# Patient Record
Sex: MALE | Race: WHITE | NOT HISPANIC OR LATINO | Employment: OTHER | URBAN - METROPOLITAN AREA
[De-identification: names, ages, dates, MRNs, and addresses within clinical notes are randomized per-mention and may not be internally consistent; named-entity substitution may affect disease eponyms.]

---

## 2017-07-24 PROBLEM — I10 ESSENTIAL HYPERTENSION: Status: ACTIVE | Noted: 2017-07-24

## 2017-07-24 PROBLEM — E78.5 HYPERLIPIDEMIA: Status: ACTIVE | Noted: 2017-07-24

## 2017-10-24 PROBLEM — E66.9 OBESITY (BMI 30-39.9): Status: ACTIVE | Noted: 2017-10-24

## 2017-10-24 PROBLEM — F41.9 ANXIETY AND DEPRESSION: Status: ACTIVE | Noted: 2017-10-24

## 2017-10-24 PROBLEM — F32.A ANXIETY AND DEPRESSION: Status: ACTIVE | Noted: 2017-10-24

## 2017-12-18 ENCOUNTER — RESOLUTE PROFESSIONAL BILLING HOSPITAL PROF FEE (OUTPATIENT)
Dept: HOSPITALIST | Facility: MEDICAL CENTER | Age: 62
End: 2017-12-18

## 2017-12-18 ENCOUNTER — APPOINTMENT (OUTPATIENT)
Dept: RADIOLOGY | Facility: MEDICAL CENTER | Age: 62
DRG: 175 | End: 2017-12-18
Attending: EMERGENCY MEDICINE

## 2017-12-18 ENCOUNTER — HOSPITAL ENCOUNTER (INPATIENT)
Facility: MEDICAL CENTER | Age: 62
LOS: 4 days | DRG: 175 | End: 2017-12-22
Attending: EMERGENCY MEDICINE | Admitting: HOSPITALIST

## 2017-12-18 DIAGNOSIS — R55 SYNCOPE, UNSPECIFIED SYNCOPE TYPE: ICD-10-CM

## 2017-12-18 DIAGNOSIS — I26.92 SADDLE EMBOLUS OF PULMONARY ARTERY WITHOUT ACUTE COR PULMONALE, UNSPECIFIED CHRONICITY (HCC): ICD-10-CM

## 2017-12-18 DIAGNOSIS — R00.0 TACHYCARDIA: ICD-10-CM

## 2017-12-18 DIAGNOSIS — I95.9 HYPOTENSION, UNSPECIFIED HYPOTENSION TYPE: ICD-10-CM

## 2017-12-18 PROBLEM — I26.99 PULMONARY EMBOLISM (HCC): Status: ACTIVE | Noted: 2017-12-18

## 2017-12-18 LAB
ALBUMIN SERPL BCP-MCNC: 3.7 G/DL (ref 3.2–4.9)
ALBUMIN/GLOB SERPL: 1.1 G/DL
ALP SERPL-CCNC: 93 U/L (ref 30–99)
ALT SERPL-CCNC: 59 U/L (ref 2–50)
ANION GAP SERPL CALC-SCNC: 12 MMOL/L (ref 0–11.9)
ANISOCYTOSIS BLD QL SMEAR: ABNORMAL
AST SERPL-CCNC: 50 U/L (ref 12–45)
BASOPHILS # BLD AUTO: 1 % (ref 0–1.8)
BASOPHILS # BLD: 0.14 K/UL (ref 0–0.12)
BILIRUB SERPL-MCNC: 0.7 MG/DL (ref 0.1–1.5)
BLOOD CULTURE HOLD CXBCH: NORMAL
BNP SERPL-MCNC: 383 PG/ML (ref 0–100)
BUN SERPL-MCNC: 27 MG/DL (ref 8–22)
CALCIUM SERPL-MCNC: 8.6 MG/DL (ref 8.5–10.5)
CHLORIDE SERPL-SCNC: 106 MMOL/L (ref 96–112)
CO2 SERPL-SCNC: 18 MMOL/L (ref 20–33)
CREAT SERPL-MCNC: 1.49 MG/DL (ref 0.5–1.4)
DEPRECATED D DIMER PPP IA-ACNC: ABNORMAL NG/ML(D-DU)
EKG IMPRESSION: NORMAL
EOSINOPHIL # BLD AUTO: 0.29 K/UL (ref 0–0.51)
EOSINOPHIL NFR BLD: 2 % (ref 0–6.9)
ERYTHROCYTE [DISTWIDTH] IN BLOOD BY AUTOMATED COUNT: 46.4 FL (ref 35.9–50)
GFR SERPL CREATININE-BSD FRML MDRD: 47 ML/MIN/1.73 M 2
GLOBULIN SER CALC-MCNC: 3.3 G/DL (ref 1.9–3.5)
GLUCOSE SERPL-MCNC: 103 MG/DL (ref 65–99)
HCT VFR BLD AUTO: 45.7 % (ref 42–52)
HGB BLD-MCNC: 15.9 G/DL (ref 14–18)
LYMPHOCYTES # BLD AUTO: 1.56 K/UL (ref 1–4.8)
LYMPHOCYTES NFR BLD: 10.9 % (ref 22–41)
MACROCYTES BLD QL SMEAR: ABNORMAL
MANUAL DIFF BLD: NORMAL
MCH RBC QN AUTO: 34 PG (ref 27–33)
MCHC RBC AUTO-ENTMCNC: 34.8 G/DL (ref 33.7–35.3)
MCV RBC AUTO: 97.6 FL (ref 81.4–97.8)
MONOCYTES # BLD AUTO: 1.42 K/UL (ref 0–0.85)
MONOCYTES NFR BLD AUTO: 9.9 % (ref 0–13.4)
MORPHOLOGY BLD-IMP: NORMAL
NEUTROPHILS # BLD AUTO: 10.9 K/UL (ref 1.82–7.42)
NEUTROPHILS NFR BLD: 76.2 % (ref 44–72)
NRBC # BLD AUTO: 0 K/UL
NRBC BLD-RTO: 0 /100 WBC
PLATELET # BLD AUTO: 130 K/UL (ref 164–446)
PLATELET BLD QL SMEAR: NORMAL
PMV BLD AUTO: 11.8 FL (ref 9–12.9)
POTASSIUM SERPL-SCNC: 3.2 MMOL/L (ref 3.6–5.5)
PROT SERPL-MCNC: 7 G/DL (ref 6–8.2)
RBC # BLD AUTO: 4.68 M/UL (ref 4.7–6.1)
RBC BLD AUTO: PRESENT
SODIUM SERPL-SCNC: 136 MMOL/L (ref 135–145)
TROPONIN I SERPL-MCNC: 0.27 NG/ML (ref 0–0.04)
TROPONIN I SERPL-MCNC: 0.38 NG/ML (ref 0–0.04)
WBC # BLD AUTO: 14.3 K/UL (ref 4.8–10.8)

## 2017-12-18 PROCEDURE — 80053 COMPREHEN METABOLIC PANEL: CPT

## 2017-12-18 PROCEDURE — 99291 CRITICAL CARE FIRST HOUR: CPT

## 2017-12-18 PROCEDURE — 85007 BL SMEAR W/DIFF WBC COUNT: CPT

## 2017-12-18 PROCEDURE — A9270 NON-COVERED ITEM OR SERVICE: HCPCS | Performed by: EMERGENCY MEDICINE

## 2017-12-18 PROCEDURE — 83880 ASSAY OF NATRIURETIC PEPTIDE: CPT

## 2017-12-18 PROCEDURE — 36415 COLL VENOUS BLD VENIPUNCTURE: CPT

## 2017-12-18 PROCEDURE — 93005 ELECTROCARDIOGRAM TRACING: CPT | Performed by: EMERGENCY MEDICINE

## 2017-12-18 PROCEDURE — 51798 US URINE CAPACITY MEASURE: CPT

## 2017-12-18 PROCEDURE — 700102 HCHG RX REV CODE 250 W/ 637 OVERRIDE(OP): Performed by: EMERGENCY MEDICINE

## 2017-12-18 PROCEDURE — 85379 FIBRIN DEGRADATION QUANT: CPT

## 2017-12-18 PROCEDURE — 84484 ASSAY OF TROPONIN QUANT: CPT | Mod: 91

## 2017-12-18 PROCEDURE — 93005 ELECTROCARDIOGRAM TRACING: CPT

## 2017-12-18 PROCEDURE — 700117 HCHG RX CONTRAST REV CODE 255: Performed by: EMERGENCY MEDICINE

## 2017-12-18 PROCEDURE — 96361 HYDRATE IV INFUSION ADD-ON: CPT

## 2017-12-18 PROCEDURE — 700105 HCHG RX REV CODE 258: Performed by: EMERGENCY MEDICINE

## 2017-12-18 PROCEDURE — 71275 CT ANGIOGRAPHY CHEST: CPT

## 2017-12-18 PROCEDURE — 85027 COMPLETE CBC AUTOMATED: CPT

## 2017-12-18 PROCEDURE — 71010 DX-CHEST-PORTABLE (1 VIEW): CPT

## 2017-12-18 PROCEDURE — 770022 HCHG ROOM/CARE - ICU (200)

## 2017-12-18 PROCEDURE — 99291 CRITICAL CARE FIRST HOUR: CPT | Performed by: HOSPITALIST

## 2017-12-18 RX ORDER — VITAMIN E 268 MG
400 CAPSULE ORAL DAILY
COMMUNITY
End: 2018-04-11

## 2017-12-18 RX ORDER — SODIUM CHLORIDE 9 MG/ML
INJECTION, SOLUTION INTRAVENOUS ONCE
Status: COMPLETED | OUTPATIENT
Start: 2017-12-19 | End: 2017-12-19

## 2017-12-18 RX ORDER — SODIUM CHLORIDE 9 MG/ML
1000 INJECTION, SOLUTION INTRAVENOUS ONCE
Status: COMPLETED | OUTPATIENT
Start: 2017-12-18 | End: 2017-12-18

## 2017-12-18 RX ORDER — POTASSIUM CHLORIDE 20 MEQ/1
20 TABLET, EXTENDED RELEASE ORAL ONCE
Status: COMPLETED | OUTPATIENT
Start: 2017-12-18 | End: 2017-12-18

## 2017-12-18 RX ORDER — FLUTICASONE PROPIONATE 50 MCG
1 SPRAY, SUSPENSION (ML) NASAL
COMMUNITY
End: 2021-11-19

## 2017-12-18 RX ADMIN — IOHEXOL 75 ML: 350 INJECTION, SOLUTION INTRAVENOUS at 23:21

## 2017-12-18 RX ADMIN — SODIUM CHLORIDE 1000 ML: 9 INJECTION, SOLUTION INTRAVENOUS at 20:43

## 2017-12-18 RX ADMIN — POTASSIUM CHLORIDE 20 MEQ: 1500 TABLET, EXTENDED RELEASE ORAL at 21:27

## 2017-12-18 ASSESSMENT — PAIN SCALES - GENERAL: PAINLEVEL_OUTOF10: 0

## 2017-12-19 PROBLEM — I26.99 PULMONARY EMBOLISM (HCC): Status: ACTIVE | Noted: 2017-12-19

## 2017-12-19 PROBLEM — I26.99 PULMONARY EMBOLISM (HCC): Status: RESOLVED | Noted: 2017-12-19 | Resolved: 2017-12-19

## 2017-12-19 PROBLEM — R65.10 SIRS (SYSTEMIC INFLAMMATORY RESPONSE SYNDROME) (HCC): Status: ACTIVE | Noted: 2017-12-19

## 2017-12-19 PROBLEM — R79.89 ELEVATED TROPONIN: Status: ACTIVE | Noted: 2017-12-19

## 2017-12-19 PROBLEM — I51.7 RIGHT HEART ENLARGEMENT: Status: ACTIVE | Noted: 2017-12-19

## 2017-12-19 PROBLEM — N17.9 AKI (ACUTE KIDNEY INJURY) (HCC): Status: ACTIVE | Noted: 2017-12-19

## 2017-12-19 PROBLEM — I50.9 ACUTE HEART FAILURE (HCC): Status: ACTIVE | Noted: 2017-12-19

## 2017-12-19 PROBLEM — J96.00 ACUTE RESPIRATORY FAILURE (HCC): Status: ACTIVE | Noted: 2017-12-19

## 2017-12-19 PROBLEM — E87.6 HYPOKALEMIA: Status: ACTIVE | Noted: 2017-12-19

## 2017-12-19 PROBLEM — I26.92 SADDLE EMBOLUS OF PULMONARY ARTERY (HCC): Status: ACTIVE | Noted: 2017-12-18

## 2017-12-19 LAB
ABO GROUP BLD: NORMAL
ABO GROUP BLD: NORMAL
ALBUMIN SERPL BCP-MCNC: 3.1 G/DL (ref 3.2–4.9)
ALBUMIN/GLOB SERPL: 1.1 G/DL
ALP SERPL-CCNC: 76 U/L (ref 30–99)
ALT SERPL-CCNC: 30 U/L (ref 2–50)
AMPHET UR QL SCN: NEGATIVE
ANION GAP SERPL CALC-SCNC: 11 MMOL/L (ref 0–11.9)
APPEARANCE UR: CLEAR
APTT PPP: 40.5 SEC (ref 24.7–36)
APTT PPP: 45.5 SEC (ref 24.7–36)
APTT PPP: 53 SEC (ref 24.7–36)
APTT PPP: 68.2 SEC (ref 24.7–36)
AST SERPL-CCNC: 20 U/L (ref 12–45)
BACTERIA #/AREA URNS HPF: NEGATIVE /HPF
BARBITURATES UR QL SCN: NEGATIVE
BASOPHILS # BLD AUTO: 1.2 % (ref 0–1.8)
BASOPHILS # BLD: 0.14 K/UL (ref 0–0.12)
BENZODIAZ UR QL SCN: NEGATIVE
BILIRUB SERPL-MCNC: 0.6 MG/DL (ref 0.1–1.5)
BILIRUB UR QL STRIP.AUTO: NEGATIVE
BLD GP AB SCN SERPL QL: NORMAL
BUN SERPL-MCNC: 22 MG/DL (ref 8–22)
BZE UR QL SCN: NEGATIVE
CALCIUM SERPL-MCNC: 8 MG/DL (ref 8.5–10.5)
CANNABINOIDS UR QL SCN: POSITIVE
CHLORIDE SERPL-SCNC: 108 MMOL/L (ref 96–112)
CO2 SERPL-SCNC: 20 MMOL/L (ref 20–33)
COLOR UR: YELLOW
CREAT SERPL-MCNC: 1.33 MG/DL (ref 0.5–1.4)
CULTURE IF INDICATED INDCX: NO UA CULTURE
EOSINOPHIL # BLD AUTO: 0.75 K/UL (ref 0–0.51)
EOSINOPHIL NFR BLD: 6.4 % (ref 0–6.9)
EPI CELLS #/AREA URNS HPF: ABNORMAL /HPF
ERYTHROCYTE [DISTWIDTH] IN BLOOD BY AUTOMATED COUNT: 49.1 FL (ref 35.9–50)
GFR SERPL CREATININE-BSD FRML MDRD: 54 ML/MIN/1.73 M 2
GLOBULIN SER CALC-MCNC: 2.8 G/DL (ref 1.9–3.5)
GLUCOSE BLD-MCNC: 103 MG/DL (ref 65–99)
GLUCOSE BLD-MCNC: 104 MG/DL (ref 65–99)
GLUCOSE BLD-MCNC: 115 MG/DL (ref 65–99)
GLUCOSE SERPL-MCNC: 107 MG/DL (ref 65–99)
GLUCOSE UR STRIP.AUTO-MCNC: NEGATIVE MG/DL
HCT VFR BLD AUTO: 39.9 % (ref 42–52)
HGB BLD-MCNC: 13.7 G/DL (ref 14–18)
HYALINE CASTS #/AREA URNS LPF: ABNORMAL /LPF
IMM GRANULOCYTES # BLD AUTO: 0.13 K/UL (ref 0–0.11)
IMM GRANULOCYTES NFR BLD AUTO: 1.1 % (ref 0–0.9)
INR PPP: 1.41 (ref 0.87–1.13)
KETONES UR STRIP.AUTO-MCNC: NEGATIVE MG/DL
LEUKOCYTE ESTERASE UR QL STRIP.AUTO: NEGATIVE
LV EJECT FRACT  99904: 75
LV EJECT FRACT MOD 2C 99903: 81.64
LV EJECT FRACT MOD 4C 99902: 76.04
LV EJECT FRACT MOD BP 99901: 74.01
LYMPHOCYTES # BLD AUTO: 2.27 K/UL (ref 1–4.8)
LYMPHOCYTES NFR BLD: 19.4 % (ref 22–41)
MAGNESIUM SERPL-MCNC: 1.8 MG/DL (ref 1.5–2.5)
MCH RBC QN AUTO: 34.7 PG (ref 27–33)
MCHC RBC AUTO-ENTMCNC: 34.3 G/DL (ref 33.7–35.3)
MCV RBC AUTO: 101 FL (ref 81.4–97.8)
METHADONE UR QL SCN: NEGATIVE
MICRO URNS: ABNORMAL
MONOCYTES # BLD AUTO: 1.21 K/UL (ref 0–0.85)
MONOCYTES NFR BLD AUTO: 10.3 % (ref 0–13.4)
NEUTROPHILS # BLD AUTO: 7.2 K/UL (ref 1.82–7.42)
NEUTROPHILS NFR BLD: 61.6 % (ref 44–72)
NITRITE UR QL STRIP.AUTO: NEGATIVE
NRBC # BLD AUTO: 0 K/UL
NRBC BLD-RTO: 0 /100 WBC
OPIATES UR QL SCN: NEGATIVE
OXYCODONE UR QL SCN: NEGATIVE
PCP UR QL SCN: NEGATIVE
PH UR STRIP.AUTO: 5 [PH]
PLATELET # BLD AUTO: 118 K/UL (ref 164–446)
PMV BLD AUTO: 11.7 FL (ref 9–12.9)
POTASSIUM SERPL-SCNC: 3.5 MMOL/L (ref 3.6–5.5)
PROPOXYPH UR QL SCN: NEGATIVE
PROT SERPL-MCNC: 5.9 G/DL (ref 6–8.2)
PROT UR QL STRIP: 30 MG/DL
PROTHROMBIN TIME: 16.9 SEC (ref 12–14.6)
RBC # BLD AUTO: 3.95 M/UL (ref 4.7–6.1)
RBC # URNS HPF: ABNORMAL /HPF
RBC UR QL AUTO: NEGATIVE
RH BLD: NORMAL
SODIUM SERPL-SCNC: 139 MMOL/L (ref 135–145)
SP GR UR REFRACTOMETRY: >1.045
TROPONIN I SERPL-MCNC: 0.34 NG/ML (ref 0–0.04)
TROPONIN I SERPL-MCNC: 0.34 NG/ML (ref 0–0.04)
TROPONIN I SERPL-MCNC: 0.57 NG/ML (ref 0–0.04)
UROBILINOGEN UR STRIP.AUTO-MCNC: 1 MG/DL
WBC # BLD AUTO: 11.7 K/UL (ref 4.8–10.8)
WBC #/AREA URNS HPF: ABNORMAL /HPF

## 2017-12-19 PROCEDURE — 85025 COMPLETE CBC W/AUTO DIFF WBC: CPT

## 2017-12-19 PROCEDURE — 96376 TX/PRO/DX INJ SAME DRUG ADON: CPT

## 2017-12-19 PROCEDURE — 85730 THROMBOPLASTIN TIME PARTIAL: CPT

## 2017-12-19 PROCEDURE — 86850 RBC ANTIBODY SCREEN: CPT

## 2017-12-19 PROCEDURE — 700111 HCHG RX REV CODE 636 W/ 250 OVERRIDE (IP): Performed by: INTERNAL MEDICINE

## 2017-12-19 PROCEDURE — 93306 TTE W/DOPPLER COMPLETE: CPT

## 2017-12-19 PROCEDURE — 85610 PROTHROMBIN TIME: CPT

## 2017-12-19 PROCEDURE — 81001 URINALYSIS AUTO W/SCOPE: CPT

## 2017-12-19 PROCEDURE — 770022 HCHG ROOM/CARE - ICU (200)

## 2017-12-19 PROCEDURE — 700105 HCHG RX REV CODE 258: Performed by: INTERNAL MEDICINE

## 2017-12-19 PROCEDURE — 82962 GLUCOSE BLOOD TEST: CPT | Mod: 91

## 2017-12-19 PROCEDURE — 84484 ASSAY OF TROPONIN QUANT: CPT

## 2017-12-19 PROCEDURE — 86900 BLOOD TYPING SEROLOGIC ABO: CPT

## 2017-12-19 PROCEDURE — 36415 COLL VENOUS BLD VENIPUNCTURE: CPT

## 2017-12-19 PROCEDURE — 83735 ASSAY OF MAGNESIUM: CPT

## 2017-12-19 PROCEDURE — 86901 BLOOD TYPING SEROLOGIC RH(D): CPT

## 2017-12-19 PROCEDURE — 80053 COMPREHEN METABOLIC PANEL: CPT

## 2017-12-19 PROCEDURE — 96375 TX/PRO/DX INJ NEW DRUG ADDON: CPT

## 2017-12-19 PROCEDURE — 700102 HCHG RX REV CODE 250 W/ 637 OVERRIDE(OP): Performed by: HOSPITALIST

## 2017-12-19 PROCEDURE — 93306 TTE W/DOPPLER COMPLETE: CPT | Mod: 26 | Performed by: INTERNAL MEDICINE

## 2017-12-19 PROCEDURE — 96366 THER/PROPH/DIAG IV INF ADDON: CPT

## 2017-12-19 PROCEDURE — 3E03317 INTRODUCTION OF OTHER THROMBOLYTIC INTO PERIPHERAL VEIN, PERCUTANEOUS APPROACH: ICD-10-PCS | Performed by: INTERNAL MEDICINE

## 2017-12-19 PROCEDURE — 80307 DRUG TEST PRSMV CHEM ANLYZR: CPT

## 2017-12-19 PROCEDURE — A9270 NON-COVERED ITEM OR SERVICE: HCPCS | Performed by: HOSPITALIST

## 2017-12-19 PROCEDURE — 96365 THER/PROPH/DIAG IV INF INIT: CPT

## 2017-12-19 PROCEDURE — 37212 THROMBOLYTIC VENOUS THERAPY: CPT

## 2017-12-19 PROCEDURE — 99233 SBSQ HOSP IP/OBS HIGH 50: CPT | Performed by: HOSPITALIST

## 2017-12-19 RX ORDER — POLYETHYLENE GLYCOL 3350 17 G/17G
1 POWDER, FOR SOLUTION ORAL
Status: DISCONTINUED | OUTPATIENT
Start: 2017-12-18 | End: 2017-12-22 | Stop reason: HOSPADM

## 2017-12-19 RX ORDER — AMOXICILLIN 250 MG
2 CAPSULE ORAL 2 TIMES DAILY
Status: DISCONTINUED | OUTPATIENT
Start: 2017-12-19 | End: 2017-12-22 | Stop reason: HOSPADM

## 2017-12-19 RX ORDER — BISACODYL 10 MG
10 SUPPOSITORY, RECTAL RECTAL
Status: DISCONTINUED | OUTPATIENT
Start: 2017-12-18 | End: 2017-12-22 | Stop reason: HOSPADM

## 2017-12-19 RX ORDER — HEPARIN SODIUM 1000 [USP'U]/ML
4000 INJECTION, SOLUTION INTRAVENOUS; SUBCUTANEOUS PRN
Status: DISCONTINUED | OUTPATIENT
Start: 2017-12-19 | End: 2017-12-20

## 2017-12-19 RX ORDER — SERTRALINE HYDROCHLORIDE 100 MG/1
200 TABLET, FILM COATED ORAL DAILY
Status: DISCONTINUED | OUTPATIENT
Start: 2017-12-19 | End: 2017-12-19

## 2017-12-19 RX ORDER — POTASSIUM CHLORIDE 20 MEQ/1
40 TABLET, EXTENDED RELEASE ORAL ONCE
Status: COMPLETED | OUTPATIENT
Start: 2017-12-19 | End: 2017-12-19

## 2017-12-19 RX ORDER — SIMVASTATIN 40 MG
80 TABLET ORAL DAILY
Status: DISCONTINUED | OUTPATIENT
Start: 2017-12-19 | End: 2017-12-22 | Stop reason: HOSPADM

## 2017-12-19 RX ORDER — DEXTROSE MONOHYDRATE 25 G/50ML
25 INJECTION, SOLUTION INTRAVENOUS
Status: DISCONTINUED | OUTPATIENT
Start: 2017-12-19 | End: 2017-12-19

## 2017-12-19 RX ADMIN — HEPARIN SODIUM 1050 UNITS/HR: 5000 INJECTION, SOLUTION INTRAVENOUS at 15:59

## 2017-12-19 RX ADMIN — HEPARIN SODIUM 4000 UNITS: 1000 INJECTION, SOLUTION INTRAVENOUS; SUBCUTANEOUS at 02:21

## 2017-12-19 RX ADMIN — HEPARIN SODIUM 1050 UNITS: 5000 INJECTION, SOLUTION INTRAVENOUS at 08:59

## 2017-12-19 RX ADMIN — SODIUM CHLORIDE 1000 ML: 9 INJECTION, SOLUTION INTRAVENOUS at 02:15

## 2017-12-19 RX ADMIN — SIMVASTATIN 80 MG: 40 TABLET, FILM COATED ORAL at 20:13

## 2017-12-19 RX ADMIN — ALTEPLASE 40 MG: KIT at 00:27

## 2017-12-19 RX ADMIN — HEPARIN SODIUM 4000 UNITS: 1000 INJECTION, SOLUTION INTRAVENOUS; SUBCUTANEOUS at 09:00

## 2017-12-19 RX ADMIN — HEPARIN SODIUM 25000 UNITS: 5000 INJECTION, SOLUTION INTRAVENOUS at 02:31

## 2017-12-19 RX ADMIN — HEPARIN SODIUM 4000 UNITS: 1000 INJECTION, SOLUTION INTRAVENOUS; SUBCUTANEOUS at 16:55

## 2017-12-19 RX ADMIN — SIMVASTATIN 80 MG: 40 TABLET, FILM COATED ORAL at 03:39

## 2017-12-19 RX ADMIN — ASPIRIN 81 MG: 81 TABLET, COATED ORAL at 14:00

## 2017-12-19 RX ADMIN — HEPARIN SODIUM 1150 UNITS/HR: 5000 INJECTION, SOLUTION INTRAVENOUS at 16:58

## 2017-12-19 RX ADMIN — POTASSIUM CHLORIDE 40 MEQ: 1500 TABLET, EXTENDED RELEASE ORAL at 03:38

## 2017-12-19 RX ADMIN — ALTEPLASE 10 MG: KIT at 00:27

## 2017-12-19 ASSESSMENT — PAIN SCALES - GENERAL
PAINLEVEL_OUTOF10: 0

## 2017-12-19 ASSESSMENT — ENCOUNTER SYMPTOMS
ORTHOPNEA: 0
FOCAL WEAKNESS: 0
SHORTNESS OF BREATH: 0
WEAKNESS: 0
FALLS: 0
HEARTBURN: 0
DIAPHORESIS: 0
BLOOD IN STOOL: 0
LOSS OF CONSCIOUSNESS: 1
DOUBLE VISION: 0
WEIGHT LOSS: 0
HEADACHES: 0
DEPRESSION: 0
EYES NEGATIVE: 1
PALPITATIONS: 0
SENSORY CHANGE: 0
DIARRHEA: 0
ABDOMINAL PAIN: 0
VOMITING: 0
CONSTIPATION: 0
FLANK PAIN: 0
PND: 0
BLURRED VISION: 0
WHEEZING: 0
DIZZINESS: 1
CHILLS: 0
MYALGIAS: 0
HEMOPTYSIS: 0
EYE DISCHARGE: 0
SHORTNESS OF BREATH: 1
NAUSEA: 0
INSOMNIA: 1
FEVER: 0
BRUISES/BLEEDS EASILY: 0
NERVOUS/ANXIOUS: 0
COUGH: 0
MUSCULOSKELETAL NEGATIVE: 1

## 2017-12-19 ASSESSMENT — PATIENT HEALTH QUESTIONNAIRE - PHQ9
7. TROUBLE CONCENTRATING ON THINGS, SUCH AS READING THE NEWSPAPER OR WATCHING TELEVISION: MORE THAN HALF THE DAYS
SUM OF ALL RESPONSES TO PHQ9 QUESTIONS 1 AND 2: 6
5. POOR APPETITE OR OVEREATING: NEARLY EVERY DAY
3. TROUBLE FALLING OR STAYING ASLEEP OR SLEEPING TOO MUCH: NEARLY EVERY DAY
8. MOVING OR SPEAKING SO SLOWLY THAT OTHER PEOPLE COULD HAVE NOTICED. OR THE OPPOSITE, BEING SO FIGETY OR RESTLESS THAT YOU HAVE BEEN MOVING AROUND A LOT MORE THAN USUAL: NOT AT ALL
6. FEELING BAD ABOUT YOURSELF - OR THAT YOU ARE A FAILURE OR HAVE LET YOURSELF OR YOUR FAMILY DOWN: NEARLY EVERY DAY
9. THOUGHTS THAT YOU WOULD BE BETTER OFF DEAD, OR OF HURTING YOURSELF: SEVERAL DAYS
2. FEELING DOWN, DEPRESSED, IRRITABLE, OR HOPELESS: NEARLY EVERY DAY
SUM OF ALL RESPONSES TO PHQ QUESTIONS 1-9: 21
1. LITTLE INTEREST OR PLEASURE IN DOING THINGS: NEARLY EVERY DAY
4. FEELING TIRED OR HAVING LITTLE ENERGY: NEARLY EVERY DAY

## 2017-12-19 ASSESSMENT — COPD QUESTIONNAIRES
DURING THE PAST 4 WEEKS HOW MUCH DID YOU FEEL SHORT OF BREATH: NONE/LITTLE OF THE TIME
HAVE YOU SMOKED AT LEAST 100 CIGARETTES IN YOUR ENTIRE LIFE: YES
COPD SCREENING SCORE: 4
DO YOU EVER COUGH UP ANY MUCUS OR PHLEGM?: NO/ONLY WITH OCCASIONAL COLDS OR INFECTIONS

## 2017-12-19 ASSESSMENT — LIFESTYLE VARIABLES
DO YOU DRINK ALCOHOL: NO
EVER_SMOKED: YES
EVER_SMOKED: YES

## 2017-12-19 NOTE — H&P
" Hospital Medicine History and Physical    Date of Service  12/18/2017    Chief Complaint  Chief Complaint   Patient presents with   • Syncope     increasing over past 2 weeks.  reports ongoing since September.  reports passed out this am and woke in \"my own feces\".  reports bruising but denies any specific injury.       History of Presenting Illness  62 y.o. male who presented 12/18/2017 with Syncope. Patient has experienced 2 syncopal episodes today during ambulation. 1st time for this morning was waking up to walk to the bathroom. He fall onto cart for denies any head trauma. 2nd episode of syncope earlier this evening also occurred with standing from a seated position. Symptoms are exacerbated by standing and alleviated by lying flat. Patient has also been having intermittent dizziness lightheadedness past 4 months. He is symptomatic pressure syncopal episodes and has sensation of lightheadedness. He does not have any headachesand nausea vomiting. He does have generalized fatigue as well as shortness of breath that has been persistent for the last several weeks worse over the last 24 hours he has not had any associated chest pain. He denies any history of thrombus or cancer. He denies any long distance travel.     Primary Care Physician  Deb Ponce P.A.-C.    Consultants  [facility]    Code Status  Full Code     Review of Systems  Review of Systems   Constitutional: Positive for malaise/fatigue. Negative for chills and fever.   HENT: Negative for congestion and hearing loss.    Eyes: Negative for blurred vision, double vision and discharge.   Respiratory: Positive for shortness of breath. Negative for cough.    Cardiovascular: Negative for chest pain and leg swelling.   Gastrointestinal: Negative for abdominal pain, constipation, diarrhea, heartburn, nausea and vomiting.   Genitourinary: Negative for dysuria and flank pain.   Musculoskeletal: Negative for falls and myalgias.   Skin: Negative for rash. "   Neurological: Positive for dizziness and loss of consciousness. Negative for sensory change and weakness.   Endo/Heme/Allergies: Does not bruise/bleed easily.   Psychiatric/Behavioral: Negative for depression. The patient is not nervous/anxious.         Past Medical History  Past Medical History:   Diagnosis Date   • Anxiety    • Depression    • Hyperlipidemia    • Hypertension        Surgical History  Past Surgical History:   Procedure Laterality Date   • HERNIA REPAIR         • OTHER      Back sx-L4,L5-        Medications  No current facility-administered medications on file prior to encounter.      Current Outpatient Prescriptions on File Prior to Encounter   Medication Sig Dispense Refill   • losartan (COZAAR) 50 MG Tab Take 1 Tab by mouth every day. 90 Tab 0   • simvastatin (ZOCOR) 80 MG tablet Take 1 Tab by mouth every day. 90 Tab 0   • VITAMIN E PO Take  by mouth.     • Cholecalciferol (VITAMIN D PO) Take  by mouth.     • sertraline (ZOLOFT) 100 MG Tab Take 2 Tabs by mouth every day. 30 Tab 0   • fluticasone (FLONASE) 50 MCG/ACT nasal spray Spray 1 Spray in nose every day.     • Omega-3 Fatty Acids (FISH OIL) 1000 MG Cap capsule Take 1,000 mg by mouth 3 times a day, with meals.     • aspirin 81 MG tablet Take 3 Tabs by mouth every day. 90 Tab 0       Family History  Family History   Problem Relation Age of Onset   • Cancer Mother    • Stroke Mother    • Heart Disease Father        Social History  Social History   Substance Use Topics   • Smoking status: Former Smoker   • Smokeless tobacco: Never Used   • Alcohol use No      Comment: Quit in 2017       Allergies  Allergies   Allergen Reactions   • Seasonal         Physical Exam  Laboratory   Hemodynamics  Temp (24hrs), Av.1 °C (97 °F), Min:36.1 °C (97 °F), Max:36.1 °C (97 °F)   Temperature: 36.1 °C (97 °F)  Pulse  Av.5  Min: 82  Max: 135 Heart Rate (Monitored): (!) 106  Blood Pressure: 115/71, NIBP: 106/72      Respiratory       Respiration: (!) 24, Pulse Oximetry: 95 %             Physical Exam   Constitutional: He is oriented to person, place, and time. He appears well-developed and well-nourished. No distress.   HENT:   Head: Normocephalic and atraumatic.   Eyes: Conjunctivae and EOM are normal. Pupils are equal, round, and reactive to light.   Neck: Normal range of motion. Neck supple. No JVD present.   Cardiovascular: Normal rate, regular rhythm, normal heart sounds and intact distal pulses.    No murmur heard.  Pulmonary/Chest: Effort normal and breath sounds normal. No respiratory distress. He exhibits no tenderness.   Abdominal: Soft. Bowel sounds are normal. He exhibits no distension. There is no tenderness.   Musculoskeletal: Normal range of motion. He exhibits edema.   Neurological: He is alert and oriented to person, place, and time. No cranial nerve deficit. He exhibits normal muscle tone.   Skin: Skin is warm and dry. No erythema.   Psychiatric: He has a normal mood and affect. His behavior is normal. Judgment and thought content normal.   Nursing note and vitals reviewed.      Recent Labs      12/18/17 1945   WBC  14.3*   RBC  4.68*   HEMOGLOBIN  15.9   HEMATOCRIT  45.7   MCV  97.6   MCH  34.0*   MCHC  34.8   RDW  46.4   PLATELETCT  130*   MPV  11.8     Recent Labs      12/18/17 1945   SODIUM  136   POTASSIUM  3.2*   CHLORIDE  106   CO2  18*   GLUCOSE  103*   BUN  27*   CREATININE  1.49*   CALCIUM  8.6     Recent Labs      12/18/17 1945   ALTSGPT  59*   ASTSGOT  50*   ALKPHOSPHAT  93   TBILIRUBIN  0.7   GLUCOSE  103*         Recent Labs      12/18/17 1945   BNPBTYPENAT  383*         Lab Results   Component Value Date    TROPONINI 0.27 (H) 12/18/2017     Urinalysis:  No results found for: SPECGRAVITY, GLUCOSEUR, KETONES, NITRITE, WBCURINE, RBCURINE, BACTERIA, EPITHELCELL     Imaging  CT-CTA CHEST PULMONARY ARTERY W/ RECONS [[id number]] Resulted: 12/18/17 2345   Order Status: Completed Updated: 12/18/17 2347   Narrative:        12/18/2017 10:57 PM    HISTORY/REASON FOR EXAM:  Pain      TECHNIQUE/EXAM DESCRIPTION:  CT angiogram scan for pulmonary embolism with contrast, with reconstructions.    1.25 mm helical sections were obtained from the lung apices through the lung bases following the rapid bolus administration of 75 mL of Omnipaque 350 nonionic contrast. Thin-section overlapping reconstruction interval was utilized. Coronal   reconstructions were generated from the axial data. MIP post processing was performed and utilized for the interpretation.    Low dose optimization technique was utilized for this CT exam including automated exposure control and adjustment of the mA and/or kV according to patient size.    COMPARISON: None    FINDINGS:  Pulmonary Embolism: Extensive.    Sagittal embolus at the bifurcation of the main pulmonary artery with multi segmental and lobar emboli, some are occlusive, some are not.    Only if positive for PE:    RV diameter: 4.2 cm.    LV diameter: Approximately 2.6 cm.    RV/LV ratio: 1.6.    No significant inferior vena cava or hepatic venous reflux    Lungs: There is some lingular peripheral groundglass    Subpleural posterior costophrenic sulcus opacities measuring less than a centimeter.    Pleura: No pleural effusion.    Nodes: No enlarged lymph nodes.     Impression:       1.  Extensive pulmonary thromboemboli with saddle embolus and some evidence of right heart strain    2.  Mild lingular groundglass is worrisome for pulmonary infarction    3.  Subpleural subcentimeter lower lobe opacities most likely indicates scarring although small nodule formation is not excluded    Findings were discussed with and shown to Dr. Bonner on 12/18/2017 11:40 PM.   DX-CHEST-PORTABLE (1 VIEW) [[id number]] Resulted: 12/18/17 2150   Order Status: Completed Updated: 12/18/17 2152   Narrative:       12/18/2017 8:48 PM    HISTORY/REASON FOR EXAM: Chest Pain.    TECHNIQUE/EXAM DESCRIPTION AND NUMBER OF VIEWS:  Single AP  view of the chest.    COMPARISON: None    FINDINGS:  Lungs: No consolidation detected.    Pleura:  No pleural space process is seen.    Heart and mediastinum: The cardiomediastinal contours are normal.   Impression:       No radiographic evidence of acute cardiopulmonary process.        Assessment/Plan     I anticipate this patient will require at least two midnights for appropriate medical management, necessitating inpatient admission.    * Saddle embolus of pulmonary artery (CMS-HCC)   Assessment & Plan    With hemodynamic compromise  Critically ill patient received TPA because of evidence of right heart strain   Saddle embouls   Heparin drip  Echo  Us bilateral r/o dvt  Tele ICU; PMA consulted patient given TPA         Acute heart failure (CMS-HCC)   Assessment & Plan    Echo pending  Edema on exam   R heart strain on CT   S/p TPA should see improvement in function   Slight elevated BNP  Hold on lasix as hypotensive        BLAKE (acute kidney injury) (CMS-HCC)   Assessment & Plan    Also given contrast for PE study; Anticipate worsening function   Received 3 L fluid in ER  antcipate improvement of function with improvement of HF            Hypokalemia   Assessment & Plan    Replace; repeat labs        Acute respiratory failure (CMS-HCC)   Assessment & Plan    2/2 to PE         SIRS (systemic inflammatory response syndrome) (CMS-HCC)   Assessment & Plan    This is not sepsis        Elevated troponin   Assessment & Plan    Likely demand from PE right heart strain   Cont to trend        Syncope   Assessment & Plan    Likely cardiogenic vs hypoxic from PE with R heart strain           Obesity (BMI 30-39.9)- (present on admission)   Assessment & Plan    Encourage weight loss        Hyperlipidemia- (present on admission)   Assessment & Plan    Statin therapy         Essential hypertension- (present on admission)   Assessment & Plan    Currently hypotensive hold medications for now            VTE prophylaxis: heparin        I spent a total of 32  minutes of critical care time during this clinical encounter of which > 50% was devoted to counseling and coordinating care including review of records, pertinent lab data and studies, as well as discussing diagnostic evaluation and work up, planned therapeutic interventions and future disposition of care. Where indicated, the assessment and plan reflect discussion of patient with consultants, other healthcare providers, family members, and additional research needed to obtain further information in formulating the plan of care of this patient. This time includes no procedures or overlap with other providers.

## 2017-12-19 NOTE — ED NOTES
Assumed care of pt, pt laying back in bed with head on pillow and pillows on either side of pt. Pt denies pain currently

## 2017-12-19 NOTE — ED NOTES
No change, resting quietly with no acute distress noted.  Reports feels that symptoms are resolving at this time

## 2017-12-19 NOTE — ED NOTES
"BIB [facility] with report of   Chief Complaint   Patient presents with   • Syncope     increasing over past 2 weeks.  reports ongoing since September.  reports passed out this am and woke in \"my own feces\".  reports bruising but denies any specific injury.   reports increased weakness, fatigue, and SOB with any activity including standing up.  Reports multiple syncopal episode.  Reports no medical evaluation due to lack of insurance.  A&O at this time.  Denies recent illness, denies fever, denies N/V/D, denies cough or congestion  Per EMS report, pt was cold, hypoxic, tachypnic, tachycardic on scene.  Given 1l NS PTA.  "

## 2017-12-19 NOTE — ED NOTES
Received report from ORLIN Garcia, taking over pt care at this time. Pt sitting up comfortably, bed in lowered position, side rails up, call light in reach. Heparin infusion running at 1000 units/hr iv patent, pt on 02 2lpm, pt pain free at this time

## 2017-12-19 NOTE — ED PROVIDER NOTES
ED Provider Note    ED Provider Note    Scribed for Charlotte Arellano M.D. by Charlotte Arellano. 12/18/2017, 7:56 PM.    Primary care provider: Deb Ponce P.A.-C.  Means of arrival: Walk In  History obtained from: Patient  History limited by: None    CHIEF COMPLAINT  Syncope    ALBERTO Quintanilla is a 62 y.o. male who was brought to the Emergency Department by [facility] from his home with syncope. The patient experienced two syncopal episodes today during ambulation. He states his first episode of syncope occurred this morning while walking to use the bathroom. He experienced a ground level fall onto a soft carpet floor, and denies any head trauma. The second episode of syncope was earlier this evening, also occurred when he was standing from a seated position. Symptoms are exacerbated by standing and alleviated by lying flat.    He has been having intermittent dizziness and lightheadedness for the past 4 months. He is currently being treated for high blood pressure, was recently taking hydrochlorothiazide and losartan, discontinued the hydrochlorothiazide 3 days ago. He is still taking losartan. Additionally he was being treated for anxiety by psychiatrist with Prozac and propranolol. Due to his episodes of lightheadedness his propranolol was discontinued several weeks ago by his psychiatrist, however his anxiety returned so he resumed the propranolol on his own 2 weeks ago. He saw his primary care physician 3 days ago, was concerned about decreased GFR, discontinued his hydrochlorothiazide and prescribed metoprolol while instructing him to discontinue his propranolol. Patient has not taken propranolol for 2-3 days. He did not fill his metoprolol prescription. His only antihypertensive for the last 2-3 days has been his losartan.    He is symptomatic prior to the syncopal episodes and has a sensation of lightheadedness. He has not had any headaches, no associated nausea or vomiting. He does have  generalized fatigue, as well as shortness of breath that has been persistent for the last several weeks, worse over the last 24 hours. He has not had any associated chest pain.    He denies any history of thrombus or cancer. He denies any long distance travel, his only hospitalization was 2-3 months ago at [facility] for similar symptoms..   He endorses regular marijuana use at night for treatment of his anxiety.     Denies abdominal pain, nausea, vomiting, leg swelling, rash.     REVIEW OF SYSTEMS  Pertinent positives include syncope, lightheadedness, anxiety, generalized fatigue, shortness of breath. Pertinent negatives include no abdominal pain, nausea, vomiting, leg swelling, rash.  All other systems reviewed and negative.    PAST MEDICAL HISTORY   has a past medical history of Anxiety; Depression; Hyperlipidemia; and Hypertension.    SURGICAL HISTORY   has a past surgical history that includes other and hernia repair.    SOCIAL HISTORY  Social History   Substance Use Topics   • Smoking status: Former Smoker   • Smokeless tobacco: Never Used   • Alcohol use No      Comment: Quit in April 2017      History   Drug Use   • Types: Marijuana     Comment: Pt smokes pot every night      FAMILY HISTORY  Family History   Problem Relation Age of Onset   • Cancer Mother    • Stroke Mother    • Heart Disease Father      CURRENT MEDICATIONS  Home Medications     Reviewed by Mara Parker (Pharmacy Tech) on 12/18/17 at 2253  Med List Status: Complete   Medication Last Dose Status   aspirin EC (ECOTRIN) 81 MG Tablet Delayed Response 12/18/2017 Active   fluticasone (FLONASE) 50 MCG/ACT nasal spray 12/17/2017 Active   losartan (COZAAR) 50 MG Tab 12/18/2017 Active   Omega-3 Fatty Acids (FISH OIL) 1000 MG Cap capsule 12/18/2017 Active   sertraline (ZOLOFT) 100 MG Tab 12/18/2017 Active   simvastatin (ZOCOR) 80 MG tablet 12/18/2017 Active   vitamin e (VITAMIN E) 400 UNIT Cap 12/18/2017 Active              ALLERGIES  Allergies  "  Allergen Reactions   • Seasonal      PHYSICAL EXAM  Vital Signs: /71   Pulse (!) 130   Temp 36.1 °C (97 °F)   Resp (!) 24   Ht 1.854 m (6' 1\")   Wt (!) 128.8 kg (284 lb)   SpO2 95%   BMI 37.47 kg/m²   Constitutional: Alert, no acute distress  HENT: Normocephalic, atraumatic, dry mucus membranes  Eyes: Pupils equal and reactive, normal conjunctiva, non-icteric  Neck: Supple, normal range of motion, no stridor  Cardiovascular: Regular rhythm, Normal peripheral perfusion, no cyanosis, Normal cardiac auscultation, symptomatic lightheadedness when sitting from a lying position.  Pulmonary: No respiratory distress, normal work of breathing, no accessory muscle usage, Clear to auscultation bilaterally  Abdomen: Soft, non tender, no peritoneal signs, bowel sounds are present.   Skin: Warm, dry, no rashes or lesions  Back: No pain with active range of motion  Musculoskeletal: Normal range of motion in all extremities, no swelling or deformity noted  Neurologic: Alert, oriented, normal motor function, no speech deficits.   Psychiatric: Normal and appropriate mood and affect    DIAGNOSTIC STUDIES/PROCEDURES:    LABS  Labs Reviewed   CBC WITH DIFFERENTIAL - Abnormal; Notable for the following:        Result Value    WBC 14.3 (*)     RBC 4.68 (*)     MCH 34.0 (*)     Platelet Count 130 (*)     Neutrophils-Polys 76.20 (*)     Lymphocytes 10.90 (*)     Neutrophils (Absolute) 10.90 (*)     Monos (Absolute) 1.42 (*)     Baso (Absolute) 0.14 (*)     All other components within normal limits    Narrative:     1. Age less then 50  2. Heart reate less then 100  3. 02 sat > 94%  4. No prior history of DVT or PE  5. No recent trauma or surgery (2 weeks)  6. No hemoptisis.  7. No  or HRP  8. No un-lateral leg swelling.   COMP METABOLIC PANEL - Abnormal; Notable for the following:     Potassium 3.2 (*)     Co2 18 (*)     Anion Gap 12.0 (*)     Glucose 103 (*)     Bun 27 (*)     Creatinine 1.49 (*)     AST(SGOT) 50 (*)     " ALT(SGPT) 59 (*)     All other components within normal limits    Narrative:     1. Age less then 50  2. Heart reate less then 100  3. 02 sat > 94%  4. No prior history of DVT or PE  5. No recent trauma or surgery (2 weeks)  6. No hemoptisis.  7. No  or HRP  8. No un-lateral leg swelling.   BTYPE NATRIURETIC PEPTIDE - Abnormal; Notable for the following:     B Natriuretic Peptide 383 (*)     All other components within normal limits    Narrative:     1. Age less then 50  2. Heart reate less then 100  3. 02 sat > 94%  4. No prior history of DVT or PE  5. No recent trauma or surgery (2 weeks)  6. No hemoptisis.  7. No  or HRP  8. No un-lateral leg swelling.   TROPONIN - Abnormal; Notable for the following:     Troponin I 0.27 (*)     All other components within normal limits    Narrative:     1. Age less then 50  2. Heart reate less then 100  3. 02 sat > 94%  4. No prior history of DVT or PE  5. No recent trauma or surgery (2 weeks)  6. No hemoptisis.  7. No  or HRP  8. No un-lateral leg swelling.   TROPONIN - Abnormal; Notable for the following:     Troponin I 0.38 (*)     All other components within normal limits   D-DIMER - Abnormal; Notable for the following:     D-Dimer Screen >19413 (*)     All other components within normal limits    Narrative:     1. Age less then 50  2. Heart reate less then 100  3. 02 sat > 94%  4. No prior history of DVT or PE  5. No recent trauma or surgery (2 weeks)  6. No hemoptisis.  7. No  or HRP  8. No un-lateral leg swelling.   ESTIMATED GFR - Abnormal; Notable for the following:     GFR If  57 (*)     GFR If Non  47 (*)     All other components within normal limits    Narrative:     1. Age less then 50  2. Heart reate less then 100  3. 02 sat > 94%  4. No prior history of DVT or PE  5. No recent trauma or surgery (2 weeks)  6. No hemoptisis.  7. No  or HRP  8. No un-lateral leg swelling.   PROTHROMBIN TIME - Abnormal; Notable for the  following:     PT 16.9 (*)     INR 1.41 (*)     All other components within normal limits    Narrative:     Indicate which anticoagulants the patient is on:->UNKNOWN   APTT - Abnormal; Notable for the following:     APTT 40.5 (*)     All other components within normal limits    Narrative:     Indicate which anticoagulants the patient is on:->UNKNOWN   TROPONIN - Abnormal; Notable for the following:     Troponin I 0.34 (*)     All other components within normal limits   BLOOD CULTURE,HOLD   DIFFERENTIAL MANUAL    Narrative:     1. Age less then 50  2. Heart reate less then 100  3. 02 sat > 94%  4. No prior history of DVT or PE  5. No recent trauma or surgery (2 weeks)  6. No hemoptisis.  7. No  or HRP  8. No un-lateral leg swelling.   PERIPHERAL SMEAR REVIEW    Narrative:     1. Age less then 50  2. Heart reate less then 100  3. 02 sat > 94%  4. No prior history of DVT or PE  5. No recent trauma or surgery (2 weeks)  6. No hemoptisis.  7. No  or HRP  8. No un-lateral leg swelling.   PLATELET ESTIMATE    Narrative:     1. Age less then 50  2. Heart reate less then 100  3. 02 sat > 94%  4. No prior history of DVT or PE  5. No recent trauma or surgery (2 weeks)  6. No hemoptisis.  7. No  or HRP  8. No un-lateral leg swelling.   MORPHOLOGY    Narrative:     1. Age less then 50  2. Heart reate less then 100  3. 02 sat > 94%  4. No prior history of DVT or PE  5. No recent trauma or surgery (2 weeks)  6. No hemoptisis.  7. No  or HRP  8. No un-lateral leg swelling.   COD (ADULT)    Narrative:     Send an extra lavender tube for crossmatch on demand, when  ordered in conjunction with any other hematology order  Quantity->1   ABO CONFIRMATION   URINALYSIS,CULTURE IF INDICATED   URINE DRUG SCREEN   APTT   TROPONIN     All labs reviewed by me.    EKG  12 Lead EKG interpreted by me to show:  Sinus tachycardia, rate 128, regular rhythm, less than 1 mm ST depression in V5 and V6, no ST elevations, no peaked T  waves    Radiology results revealed:   CT-CTA CHEST PULMONARY ARTERY W/ RECONS   Final Result      1.  Extensive pulmonary thromboemboli with saddle embolus and some evidence of right heart strain      2.  Mild lingular groundglass is worrisome for pulmonary infarction      3.  Subpleural subcentimeter lower lobe opacities most likely indicates scarring although small nodule formation is not excluded      Findings were discussed with and shown to Dr. Bonner on 12/18/2017 11:40 PM.      DX-CHEST-PORTABLE (1 VIEW)   Final Result      No radiographic evidence of acute cardiopulmonary process.      LE Venous Duplex-DVT ([facility] only)    (Results Pending)        COURSE & MEDICAL DECISION MAKING  Pertinent Labs & Imaging studies reviewed. (See chart for details)    Review of old medical records for continuity of care. Patient seen and evaluated in primary care clinic 12/15/17. History of hypertension, takes hydrochlorothiazide and losartan daily. Is followed by psychiatry who added propranolol and Zoloft. Hydrochlorothiazide discontinued at this visit due to decreased GFR. Metoprolol 50 mg initiated, patient was advised to discontinue his propranolol.    Differential diagnoses include but are not limited to: Anemia, medication side effect, dehydration, heart failure, pulmonary embolism    7:56 PM - Patient seen and examined at bedside. Patient was resuscitated with IV fluids for dehydration. Ordered Chest X Ray, CBC, CMP, BNP, D Dimer, urine drug screen, blood culture, GFR, differential, platelet estimate to evaluate his symptoms.     9:45 PM Recheck: Patient re-evaluated at beside. Vital signs have improved, blood pressure has normalized. The patient is still complaining of some lightheadedness. He was updated of his lab and radiology results.     9:51 paged cardiology.     9:55 PM Spoke with Dr. Shelton, Hospitalist, who is agreeable to admit patient to medicine service.      10:18 PM Spoke with   Anneliese, Cardiology, who was informed of patient's condition and gave instructions: If troponin's trend up, call him in the morning.     11:22 PM CT called, informing of patient's CTA Chest results showing concern for saddle pulmonary embolism.      11:27 PM paged Pulmonary.     11:29 PM Spoke with pharmacy regarding possible TPA treatment.       CRITICAL CARE  The very real possibilty of a deterioration of this patient's condition required the highest level of my preparedness for sudden, emergent intervention.  I provided critical care services, which included medication orders, frequent reevaluations of the patient's condition and response to treatment, ordering and reviewing test results, and discussing the case with various consultants.  The affected system is the circulatory system with risk of hemodynamic instability, hypotension, circulatory collapse leading to cardiac arrest. The critical care time associated with the care of the patient was 40 minutes. Review chart for interventions. This time is exclusive of any other billable procedures.      Decision Making:  This is a 62 y.o. year old male who presents with 2 syncopal episodes today with associated lightheadedness that has been persistent throughout the day. Both episodes occurred while he was standing from a seated position. He has had symptoms of lightheadedness for the past 4 months. He has undergone multiple medication adjustments including discontinuation of beta blockers however his dizziness has persisted. For the past 3 days he has not taken any beta blockers, has taken his losartan as prescribed. He endorses shortness of breath, no point has he had any chest pain. He denies any risk factors for pulmonary embolism including malignancy, leg pain, leg swelling, travel or recent hospital admission. He has never been treated by a cardiologist.    On physical exam he does have a positive shock index, on arrival to the emergency department is  tachycardic with heart rate in the 130s, systolic blood pressure is in the 80s. He was treated with fluid resuscitation.     On laboratory evaluation white blood count is mildly elevated to 14.3. He has not had any fevers, no cough, no rashes or lesions, no other symptoms of infectious etiology. Potassium is low at 3.2, creatinine elevated at 1.49 consistent with his history of decreased GFR at his most recent primary care visit a few days ago. Again treated with fluid resuscitation. Troponin is elevated to 0.27, he has not had any chest pain, his EKG shows ST depressions as documented above, no elevations. Suspect this may be due demand ischemia as he does have tachycardia in the setting of renal insufficiency. BNP is elevated at 383, doubt primary etiology of heart failure as the cause of his symptoms.    The patient does not have any pulmonary embolism risk factors, however due to persistent tachycardia and hypotension I did order a screening d-dimer. This resulted as greater than 10,000. Despite his renal insufficiency I do believe the risks of CTA outweighed by the benefits as he may have a life-threatening pulmonary embolism.  CTA demonstrates extensive ulnar embolism with saddle embolus and evidence of right heart strain. Groundglass opacities worrisome for pulmonary infarction.    I discussed the case with our hospitalist on call, as well as intensivist. Decision was made to treat with alteplase for submassive pulmonary embolism given his persistent tachycardia, hypotension on admission, and significant clot burden.      Plan at this time is for admission to critical care unit for continued monitoring and alteplase administration. Case discussed with hospitalist and intensivist to agree to admit the patient.    Disposition:  Patient will be admitted to the hospital under the care of Dr. Shelton (Hospitalist) in critical condition.     FINAL IMPRESSION  1. Tachycardia    2. Syncope, unspecified syncope type     3. Hypotension, unspecified hypotension type       The note accurately reflects work and decisions made by me.  Charlotte Arellano  12/19/2017  2:15 AM

## 2017-12-19 NOTE — CONSULTS
Pulmonary & Critical Care Consult Note    DATE OF CONSULTATION:  12/18/2017     REFERRING PHYSICIAN:  Charlotte Arellano M.D.     CONSULTANT:  Ousmane Bonner DO     REASON FOR CONSULTATION:  Submassive Pulmonary embolism     HISTORY OF PRESENT ILLNESS:  62 yom pmhx of HLP, HTN, anxiety and depression presents to [facility] for recurrent syncope. Per the patient he had 2 episodes of syncope today, the syncopal episodes were preceded by light-headedness which was worsened by standing. He denies any chest pain but does admit to some dyspnea which has been present for several weeks but acutely worse today. He does admit to recent travel via plane to [place]. Denies any hx of cancer, VTE or hypercoagulable state. States he had a colonoscopy several years ago which was normal per him, denies any rectal bleeding or unintentional weight loss (45 pounds of intentional weight loss). He denies any hemoptysis or urinary symptoms.    The patient reports several months of intermittent dizziness and was admitted to [facility] for dizziness months ago with no clear etiology. The patient reports he takes HTCZ and losartan for HTN as well as propranolol for his anxiety in addition to zoloft, he does smoke THC daily for his anxiety.    A d-dime was obtained in the ED which was markedly elevated and a CTPA was obtained which showed a large pulmonary embolus with RV/LV ratio of 1.6. His troponin was noted to be elevated in the ED. EKG demonstrated RBBB without significant acute ischemic changes.     PAST MEDICAL HISTORY:   Past Medical History:   Diagnosis Date   • Anxiety    • Depression    • Hyperlipidemia    • Hypertension         PAST SURGICAL HISTORY:   Past Surgical History:   Procedure Laterality Date   • HERNIA REPAIR      1980's   • OTHER      Back sx-L4,L5- 1980's        ALLERGIES:   Seasonal     MEDICATIONS PRIOR TO ADMISSION:  No current facility-administered medications on file prior to encounter.      Current Outpatient  "Prescriptions on File Prior to Encounter   Medication Sig Dispense Refill   • losartan (COZAAR) 50 MG Tab Take 1 Tab by mouth every day. 90 Tab 0   • simvastatin (ZOCOR) 80 MG tablet Take 1 Tab by mouth every day. 90 Tab 0   • sertraline (ZOLOFT) 100 MG Tab Take 2 Tabs by mouth every day. 30 Tab 0   • Omega-3 Fatty Acids (FISH OIL) 1000 MG Cap capsule Take 1,000 mg by mouth every day.         SOCIAL HISTORY:   Social History     Social History   • Marital status: Single     Spouse name: N/A   • Number of children: N/A   • Years of education: N/A     Occupational History   • Not on file.     Social History Main Topics   • Smoking status: Former Smoker   • Smokeless tobacco: Never Used   • Alcohol use No      Comment: Quit in April 2017   • Drug use:      Types: Marijuana      Comment: Pt smokes pot every night    • Sexual activity: Not on file     Other Topics Concern   • Not on file     Social History Narrative   • No narrative on file       FAMILY HISTORY:  Family History   Problem Relation Age of Onset   • Cancer Mother    • Stroke Mother    • Heart Disease Father         REVIEW OF SYSTEMS:   Constitutional: No fever, no chills, + fatigue  Respiratory: No cough, no hemoptysis, + dyspnea  Cardiovascular: No chest pain, no palpitations, no orthopnea, no PND, no lower extremity swelling  GI: No nausea, no vomiting, no abdominal pain, no diarrhea, no constipation, no hematochezia, no melena  : no dysuria, no frequency, no urgency  Endocrine: No polyuria, no polydipsia, no hair loss  Hematology: No easy bruising, no bleeding  Musculoskeletal: No joint swelling, no joint erythema, no arthralgia, no myalgias  Neuro: No seizures, no numbness, no tingling sensation, no extremity weakness, no change in vision. + dizziness and syncope  Psychiatry: + depression, no suicidal ideation     PHYSICAL EXAMINATION:  /71   Pulse 86   Temp 36.1 °C (97 °F)   Resp 20   Ht 1.854 m (6' 1\")   Wt (!) 128.8 kg (284 lb)   SpO2 " 93%   BMI 37.47 kg/m²   GENERAL:Alert, well-developed, age-appropriate appearing male, in moderate distress  HEENT: Normocephalic, atraumatic, PERRL, EOMI, external ears normal, external nose normal, moist mucous membranes, nasal cannula in place  NECK: Supple, trachea midline, no JVD  PULM: Clear to auscultation bilaterally  CVS: Tachycardic rate and normal rhythm  ABDOMEN: Soft, nontender, nondistended  EXTREMITIES: Trace edema bilaterally  SKIN: Warm, pink, dry  NEURO: Alert and oriented ×4, no focal neurologic deficits    LABORATORY DATA:    Lab Results   Component Value Date/Time    WBC 14.3 (H) 12/18/2017 07:45 PM    RBC 4.68 (L) 12/18/2017 07:45 PM    HEMOGLOBIN 15.9 12/18/2017 07:45 PM    HEMATOCRIT 45.7 12/18/2017 07:45 PM    MCV 97.6 12/18/2017 07:45 PM    MCH 34.0 (H) 12/18/2017 07:45 PM    MCHC 34.8 12/18/2017 07:45 PM    MPV 11.8 12/18/2017 07:45 PM    NEUTSPOLYS 76.20 (H) 12/18/2017 07:45 PM    LYMPHOCYTES 10.90 (L) 12/18/2017 07:45 PM    MONOCYTES 9.90 12/18/2017 07:45 PM    EOSINOPHILS 2.00 12/18/2017 07:45 PM    BASOPHILS 1.00 12/18/2017 07:45 PM    ANISOCYTOSIS 1+ 12/18/2017 07:45 PM      Lab Results   Component Value Date/Time    SODIUM 136 12/18/2017 07:45 PM    POTASSIUM 3.2 (L) 12/18/2017 07:45 PM    CHLORIDE 106 12/18/2017 07:45 PM    CO2 18 (L) 12/18/2017 07:45 PM    GLUCOSE 103 (H) 12/18/2017 07:45 PM    BUN 27 (H) 12/18/2017 07:45 PM    CREATININE 1.49 (H) 12/18/2017 07:45 PM      Lab Results   Component Value Date/Time    PROTHROMBTM 16.9 (H) 12/19/2017 01:25 AM    INR 1.41 (H) 12/19/2017 01:25 AM         IMAGING:   CXR (personally reviewed)  CT-CTA CHEST PULMONARY ARTERY W/ RECONS   Final Result      1.  Extensive pulmonary thromboemboli with saddle embolus and some evidence of right heart strain      2.  Mild lingular groundglass is worrisome for pulmonary infarction      3.  Subpleural subcentimeter lower lobe opacities most likely indicates scarring although small nodule formation is  not excluded      Findings were discussed with and shown to Dr. Bonner on 12/18/2017 11:40 PM.      DX-CHEST-PORTABLE (1 VIEW)   Final Result      No radiographic evidence of acute cardiopulmonary process.      LE Venous Duplex-DVT ([facility] only)    (Results Pending)   ECHOCARDIOGRAM COMP W/O CONT    (Results Pending)   LE VENOUS DUPLEX - DVT ([facility])    (Results Pending)     ASSESSMENT/PLAN:  Submassive pulmonary embolus with possible lingular infarction   - Query etiology?   - Submassive PE TPA protocol   - therapeutic heparin infusion   - Supplemental oxygen as needed   - ICU admission   - Post TPA protocol   - Monitor for any signs of bleeding   - Lower extremity venous ultrasound    Syncope   - Likely secondary to pulmonary embolus   - Telemetry   - Trend trops    Hypoxia   - Likely secondary to pulmonary embolus    Elevated troponin   - Likely due to myocardial necrosis secondary to submassive pulmonary embolus   - trend, however ACS remains unlikely    Acute kidney injury   - Renally dose medications, avoid nephrotoxins   - IV fluids    Prolonged QT interval   - Likely secondary to Zoloft use   - Telemetry   - will hold the Zoloft at this time    Leukocytosis   - Likely demargination related to syncope and PE    Hypokalemia   - mild   - Replacement per electrolyte replacement protocol    Hypertension   - Currently with borderline blood pressure   - Hold home antihypertensives    Hyperlipidemia   - Continue Zocor    Anxiety    Prophylaxis: heparin gtt    Patient is critically ill at this time.  I have spent 40 minutes examining this patient, all lab data, x-ray, and discussion with RN, pharmacy, ED physician, radiologist. Critical care time: 40 min. No time overlap. Procedures not included in time. Thank you for asking me to consult on the patient.  I appreciate the opportunity to assist in their care and will follow along closely with you.    Ousmane Bonner,  DO  Critical Care Medicine    This dictation was created using voice recognition software. The accuracy of the dictation is limited to the abilities of the software. Errors of grammar and possibly content are to be expected.

## 2017-12-19 NOTE — PROGRESS NOTES
Pulmonary Critical Care Progress Note        DOS:  12/19/17, admit 12/18/17    Chief Complaint:  Syncope - submassive PE by eval    History of Present Illness:   62 yom pmhx of HLP, HTN, anxiety and depression presents to [facility] (formerly [facility]) for recurrent syncope. Per the patient he had 2 episodes of syncope today, the syncopal episodes were preceded by light-headedness which was worsened by standing. He denies any chest pain but does admit to some dyspnea which has been present for several weeks but acutely worse today. He does admit to recent travel via plane to [place]. Denies any hx of cancer, VTE or hypercoagulable state. States he had a colonoscopy several years ago which was normal per him, denies any rectal bleeding or unintentional weight loss (45 pounds of intentional weight loss). He denies any hemoptysis or urinary symptoms.     The patient reports several months of intermittent dizziness and was admitted to [facility] for dizziness months ago with no clear etiology. The patient reports he takes HTCZ and losartan for HTN as well as propranolol for his anxiety in addition to zoloft, he does smoke THC daily for his anxiety.     A d-dime was obtained in the ED which was markedly elevated and a CTPA was obtained which showed a large pulmonary embolus with RV/LV ratio of 1.6. His troponin was noted to be elevated in the ED. EKG demonstrated RBBB without significant acute ischemic changes.    Review of Systems   Constitutional: Negative for chills, diaphoresis, fever and weight loss.   HENT: Negative.  Negative for nosebleeds.    Eyes: Negative.    Respiratory: Negative for cough, hemoptysis, shortness of breath and wheezing.    Cardiovascular: Negative for chest pain, palpitations, orthopnea, leg swelling and PND.   Gastrointestinal: Negative for abdominal pain, blood in stool, heartburn, melena, nausea and vomiting.   Genitourinary: Negative.    Musculoskeletal: Negative.    Skin: Negative.    Neurological: Negative for  focal weakness, weakness and headaches.   Endo/Heme/Allergies: Does not bruise/bleed easily.   Psychiatric/Behavioral: The patient has insomnia.        Interval Events:  24 hour interval history reviewed   Reason for visit:  Submassive PE with R heart strain, S/p TPA, elevated Trop I, HARSHA    Admit for syncope - dizziness resolved - not out of bed since admit  SOB and CP better  No bleeding pre or post TPA so far  On heparin drip  NC O2 2-4 lpm  CTA reviewed - significant PE!  Clear RV strain  Trop I positive  Hemodynamics noted - no pressors this AM  ECHO pending  NIVT LE pending    PFSH:  No change.    General:  Obese, appears stated age, NAD, A&O x4    Skin:  Warm and dry, no rash or lesion    Respiratory:     Pulse Oximetry: 97 %  NAD - 2 lpm NC O2          Exam: unlabored respirations, no intercostal retractions or accessory muscle use    Diminished BS at the bases   No wheezes  ImagingCXR  I have personally reviewed the chest x-ray my impression is  noted above and films are reviewed with Team on rounds    CTA 12/18  1.  Extensive pulmonary thromboemboli with saddle embolus and some evidence of right heart strain  2.  Mild lingular groundglass is worrisome for pulmonary infarction  3.  Subpleural subcentimeter lower lobe opacities most likely indicates scarring although small nodule formation is not excluded        Invalid input(s): [id number]    HemoDynamics:  Pulse: 70, Heart Rate (Monitored): 83  Blood Pressure: (!) 99/69, NIBP: 113/66       Exam: regular rhythm (Sinus), no ectopy, no edema, strong pulses, no signs DVT  Imaging: Available data reviewed  Recent Labs      12/18/17   1945   12/19/17   0125  12/19/17   0545  12/19/17   1038   TROPONINI  0.27*   < >  0.34*  0.57*  0.34*   BNPBTYPENAT  383*   --    --    --    --     < > = values in this interval not displayed.       Neuro:  GCS  15       Exam: no focal deficits noted mental status intact, follows well, tired from no sleep  Imaging: Available  data reviewed    Fluids:  Intake/Output     None        Weight: (!) 128.8 kg (284 lb)  Recent Labs      17   0545   SODIUM  136   --    POTASSIUM  3.2*   --    CHLORIDE  106   --    CO2  18*   --    BUN  27*   --    CREATININE  1.49*   --    MAGNESIUM   --   1.8   CALCIUM  8.6   --        GI/Nutrition:  Exam: abdomen is soft and non-tender, normal active bowel sounds  Imaging: Available data reviewed  taking PO  Liver Function  Recent Labs      17   ALTSGPT  59*   ASTSGOT  50*   ALKPHOSPHAT  93   TBILIRUBIN  0.7   GLUCOSE  103*       Heme:  Recent Labs      17   0125  17   0824   RBC  4.68*   --    --    HEMOGLOBIN  15.9   --    --    HEMATOCRIT  45.7   --    --    PLATELETCT  130*   --    --    PROTHROMBTM   --   16.9*   --    APTT   --   40.5*  53.0*   INR   --   1.41*   --        Infectious Disease:  Temp  Av.1 °C (97 °F)  Min: 36.1 °C (97 °F)  Max: 36.1 °C (97 °F)  Micro: reviewed  Recent Labs      17   WBC  14.3*   NEUTSPOLYS  76.20*   LYMPHOCYTES  10.90*   MONOCYTES  9.90   EOSINOPHILS  2.00   BASOPHILS  1.00   ASTSGOT  50*   ALTSGPT  59*   ALKPHOSPHAT  93   TBILIRUBIN  0.7     Current Facility-Administered Medications   Medication Dose Frequency Provider Last Rate Last Dose   • senna-docusate (PERICOLACE or SENOKOT S) 8.6-50 MG per tablet 2 Tab  2 Tab BID Ousmane Bonner Jr., D.O.        And   • polyethylene glycol/lytes (MIRALAX) PACKET 1 Packet  1 Packet QDAY PRN JEFFREY Abreu Jr..O.        And   • magnesium hydroxide (MILK OF MAGNESIA) suspension 30 mL  30 mL QDAY PRN Ousmane Bonner Jr., D.O.        And   • bisacodyl (DULCOLAX) suppository 10 mg  10 mg QDAY PRN Ousmane Bonner Jr., D.O.       • Respiratory Care per Protocol   Continuous RT Ousmane Bonner Jr., D.O.       • insulin regular (HUMULIN R) injection 1-6 Units  1-6 Units Q6HRS Ousmane Bonner Jr., D.O.   Stopped at 17 0030    And   • glucose 4 g  chewable tablet 16 g  16 g Q15 MIN PRN Ousmane Bonner Jr. D.O.        And   • dextrose 50% (D50W) injection 25 mL  25 mL Q15 MIN PRN JEFFREY Abreu Jr..O.       • heparin injection 4,000 Units  4,000 Units PRN Ousmane Bonner Jr. D.O.   4,000 Units at 12/19/17 0900    And   • heparin infusion 25,000 units in 500 ml 0.45% nacl   Continuous JEFFREY Abreu Jr..O. 1,000 mL/hr at 12/19/17 0231 1,050 Units at 12/19/17 0859   • aspirin EC (ECOTRIN) tablet 81 mg  81 mg DAILY Tashi Snowden M.D.       • simvastatin (ZOCOR) tablet 80 mg  80 mg DAILY Tashi Snowden M.D.   80 mg at 12/19/17 0339     Last reviewed on 12/18/2017 10:53 PM by Mara Parker    Quality  Measures:  Labs reviewed, Radiology images reviewed, Medications reviewed and EKG reviewed  Paiz catheter: Critically Ill - Requiring Accurate Measurement of Urinary Output      DVT Prophylaxis: Heparin  DVT prophylaxis - mechanical: Not indicated at this time, ambulatory  Ulcer prophylaxis: Not indicated          Problems/plan:  Submassive pulmonary embolus with possible lingular infarction              - Query etiology?              - Submassive PE - s/p TPA protocol PM 12/18              - therapeutic heparin infusion - protocols              - Supplemental oxygen as needed              - ICU admission x 24 hrs - TPA protocols              - Post TPA protocol ongoing              - Monitor for any signs of bleeding - none so far              - Lower extremity venous ultrasound pending still    - ECHO/doppler   - F/u CTA/ECHO 3 months?  Syncope              - Likely secondary to pulmonary embolus              - Cardiac monitoring              - Trend Trop I  Hypoxia              - Likely secondary to pulmonary embolus    - currently mild - monitor for need for escalation in therapy  Elevated troponin              - Likely due to myocardial necrosis secondary to submassive pulmonary embolus              - trend, however ACS remains unlikely    -  Cards eval PRN  Acute kidney injury              - Renally dose medications,    - avoid nephrotoxins              - IV fluids    - follow UO/renal function - Renal consult PRN  Prolonged QT interval              - Likely secondary to Zoloft use              - Cardiac monitoring              - will hold the Zoloft at this time  Leukocytosis              - Likely demargination related to syncope and PE - monitor  Hypokalemia              - mild              - Replacement per electrolyte replacement protocol    - Eval Mg -> 1.8 - replete as well  Hypertension              - Currently with borderline blood pressure              - Hold home antihypertensives - resume when clinically appropriate  Hyperlipidemia              - Continue Zocor   - diet  Thrombocytopenia - mild   - serial CBC - especially on full dose heparin rx  Anxiety - PRN meds  Prophylaxis: heparin gtt    Prognosis guarded.  Critically ill with unstable pulmonary status on full paredes anticoagualtion s/p TPA.  Monitor for need for escalation in respiratory support.  Continue full dose anticoagulation and monitor in ICU.  High risk of deterioration and worsening vital organ dysfunction and death without the above critical care interventions.      Discussed patient condition and risk of morbidity and/or mortality with Hospitalist, ER Staff, RN, RT, Pharmacy and Patient.    The patient remains critically ill.  Critical care time = 40 minutes in directly providing and coordinating critical care and extensive data review.  No time overlap and excludes procedures.

## 2017-12-19 NOTE — PROGRESS NOTES
[facility]    Date of Service: 2017    Chief Complaint  62 y.o. male admitted 2017 with dizziness and syncope for the last couple of months, along with sob, found to have saddle PE.  S/P TPA.    Interval Problem Update  Feeling much better today, breathing fine, on RA and stable, bp better; echo and US pending    Consultants/Specialty  Intensivist    Disposition  ICU 24 hours post TPA        Review of Systems   Constitutional: Negative for chills and fever.   Eyes: Negative for blurred vision.   Respiratory: Negative for cough and shortness of breath.    Cardiovascular: Negative for chest pain and palpitations.        Says that heart feels \"calmer\"   Gastrointestinal: Negative for abdominal pain, nausea and vomiting.   Genitourinary: Negative for dysuria.   Musculoskeletal: Negative for myalgias.   Neurological: Positive for dizziness (feels less dizzy now). Negative for headaches.      Physical Exam  Laboratory/Imaging   Hemodynamics  Temp (24hrs), Av.1 °C (97 °F), Min:36.1 °C (97 °F), Max:36.1 °C (97 °F)   Temperature: 36.1 °C (97 °F)  Pulse  Av.2  Min: 67  Max: 135 Heart Rate (Monitored): 83  Blood Pressure: (!) 99/69, NIBP: 113/66      Respiratory      Respiration: (!) 36, Pulse Oximetry: 97 %, O2 Daily Delivery Respiratory : Silicone Nasal Cannula     Work Of Breathing / Effort: Moderate;Shallow  RUL Breath Sounds: Clear, RML Breath Sounds: Clear;Diminished, RLL Breath Sounds: Diminished, JASE Breath Sounds: Clear, LLL Breath Sounds: Diminished    Fluids  No intake or output data in the 24 hours ending 17 1235    Nutrition  Orders Placed This Encounter   Procedures   • Diet Order     Standing Status:   Standing     Number of Occurrences:   1     Order Specific Question:   Diet:     Answer:   Regular [1]     Physical Exam   Constitutional: He is oriented to person, place, and time. He appears well-developed and well-nourished.   obese   HENT:   Head: Normocephalic " and atraumatic.   Cardiovascular: Normal rate, regular rhythm and normal heart sounds.    No murmur heard.  Pulmonary/Chest: Effort normal and breath sounds normal. No respiratory distress. He has no wheezes. He has no rales.   Now 95% on RA   Abdominal: Soft. Bowel sounds are normal. He exhibits no distension. There is no tenderness.   Musculoskeletal: Normal range of motion. He exhibits no edema (large legs, difficult to tell if edematous, difficult to feel any cords).   Neurological: He is alert and oriented to person, place, and time.   Skin: Skin is warm and dry. No erythema.   Nursing note and vitals reviewed.      Recent Labs      12/18/17 1945   WBC  14.3*   RBC  4.68*   HEMOGLOBIN  15.9   HEMATOCRIT  45.7   MCV  97.6   MCH  34.0*   MCHC  34.8   RDW  46.4   PLATELETCT  130*   MPV  11.8     Recent Labs      12/18/17 1945   SODIUM  136   POTASSIUM  3.2*   CHLORIDE  106   CO2  18*   GLUCOSE  103*   BUN  27*   CREATININE  1.49*   CALCIUM  8.6     Recent Labs      12/19/17   0125  12/19/17   0824   APTT  40.5*  53.0*   INR  1.41*   --      Recent Labs      12/18/17 1945   BNPBTYPENAT  383*              Assessment/Plan     * Saddle embolus of pulmonary artery (CMS-HCC)- (present on admission)   Assessment & Plan    -With hemodynamic compromise, now post TPA and much improved  -Critically ill patient received TPA because of evidence of right heart strain   -continue hep gtt, awaiting echo and US legs  -will likely need coumadin as no insurance        Right heart enlargement- (present on admission)   Assessment & Plan    -2/2 saddle PE; on hep gtt  -Echo and US pending; right heart strain seen on CT   -S/p TPA; clinically much improved          BLAKE (acute kidney injury) (CMS-[facility])- (present on admission)   Assessment & Plan    -Also given contrast for PE study; aworsening function   -Received 3 L fluid in ER  -awaiting repeat bmp            Hypokalemia- (present on admission)   Assessment & Plan    -Replaced;  awaiting repeat labs        Acute respiratory failure (CMS-HCC)- (present on admission)   Assessment & Plan    -2/2 to PE; resolved; currently on RA and satting well        Elevated troponin- (present on admission)   Assessment & Plan    -Likely demand from PE right heart strain, trended back down          Syncope- (present on admission)   Assessment & Plan    -Likely cardiogenic vs hypoxic from PE with R heart strain           Obesity (BMI 30-39.9)- (present on admission)   Assessment & Plan    -Encouraged weight loss        Hyperlipidemia- (present on admission)   Assessment & Plan    -continue zocor        Essential hypertension- (present on admission)   Assessment & Plan    -was hypotensive in ER, improved post TPA, hold cozaar for now            Reviewed items::  Labs reviewed, Medications reviewed and Radiology images reviewed  Paiz catheter::  No Paiz  DVT prophylaxis pharmacological::  Heparin (gtt)  Ulcer Prophylaxis::  Not indicated

## 2017-12-19 NOTE — ED NOTES
3l IVF of NS complete.  Pt unable to void.  ERP aware.  MD at bedside to assess for admit.  Traction called for bladder scan

## 2017-12-19 NOTE — ASSESSMENT & PLAN NOTE
-With hemodynamic compromise, status post TPA and much improved   -change heparin to xarelto  Outpatient hypercoagulable workup    Echo:  CONCLUSIONS  No prior study is available for comparison.   Hyperdynamic left ventricular systolic function.  Left ventricular ejection fraction is visually estimated to be greater   than 75%.  Dilated right ventricle.  Reduced right ventricular systolic function.  Akinetic right ventricular free wall.  Right heart pressures are consistent with severe pulmonary hypertension   of 68 mmHg.

## 2017-12-20 PROBLEM — I26.99 PULMONARY EMBOLISM (HCC): Status: ACTIVE | Noted: 2017-12-20

## 2017-12-20 PROBLEM — I82.403 ACUTE DEEP VEIN THROMBOSIS (DVT) OF BOTH LOWER EXTREMITIES (HCC): Status: ACTIVE | Noted: 2017-12-20

## 2017-12-20 LAB
ALBUMIN SERPL BCP-MCNC: 3 G/DL (ref 3.2–4.9)
ALBUMIN/GLOB SERPL: 1.1 G/DL
ALP SERPL-CCNC: 70 U/L (ref 30–99)
ALT SERPL-CCNC: 25 U/L (ref 2–50)
ANION GAP SERPL CALC-SCNC: 9 MMOL/L (ref 0–11.9)
APTT PPP: 47.5 SEC (ref 24.7–36)
APTT PPP: 55.8 SEC (ref 24.7–36)
AST SERPL-CCNC: 16 U/L (ref 12–45)
BASOPHILS # BLD AUTO: 1.1 % (ref 0–1.8)
BASOPHILS # BLD: 0.12 K/UL (ref 0–0.12)
BILIRUB SERPL-MCNC: 0.6 MG/DL (ref 0.1–1.5)
BUN SERPL-MCNC: 19 MG/DL (ref 8–22)
CALCIUM SERPL-MCNC: 8 MG/DL (ref 8.5–10.5)
CHLORIDE SERPL-SCNC: 110 MMOL/L (ref 96–112)
CO2 SERPL-SCNC: 19 MMOL/L (ref 20–33)
CREAT SERPL-MCNC: 1.11 MG/DL (ref 0.5–1.4)
EOSINOPHIL # BLD AUTO: 0.93 K/UL (ref 0–0.51)
EOSINOPHIL NFR BLD: 8.6 % (ref 0–6.9)
ERYTHROCYTE [DISTWIDTH] IN BLOOD BY AUTOMATED COUNT: 48.5 FL (ref 35.9–50)
GFR SERPL CREATININE-BSD FRML MDRD: >60 ML/MIN/1.73 M 2
GLOBULIN SER CALC-MCNC: 2.7 G/DL (ref 1.9–3.5)
GLUCOSE SERPL-MCNC: 103 MG/DL (ref 65–99)
HCT VFR BLD AUTO: 36.7 % (ref 42–52)
HGB BLD-MCNC: 12.6 G/DL (ref 14–18)
IMM GRANULOCYTES # BLD AUTO: 0.23 K/UL (ref 0–0.11)
IMM GRANULOCYTES NFR BLD AUTO: 2.1 % (ref 0–0.9)
LYMPHOCYTES # BLD AUTO: 2.56 K/UL (ref 1–4.8)
LYMPHOCYTES NFR BLD: 23.6 % (ref 22–41)
MCH RBC QN AUTO: 34.3 PG (ref 27–33)
MCHC RBC AUTO-ENTMCNC: 34.3 G/DL (ref 33.7–35.3)
MCV RBC AUTO: 100 FL (ref 81.4–97.8)
MONOCYTES # BLD AUTO: 0.96 K/UL (ref 0–0.85)
MONOCYTES NFR BLD AUTO: 8.8 % (ref 0–13.4)
NEUTROPHILS # BLD AUTO: 6.06 K/UL (ref 1.82–7.42)
NEUTROPHILS NFR BLD: 55.8 % (ref 44–72)
NRBC # BLD AUTO: 0 K/UL
NRBC BLD-RTO: 0 /100 WBC
PLATELET # BLD AUTO: 125 K/UL (ref 164–446)
PMV BLD AUTO: 11.9 FL (ref 9–12.9)
POTASSIUM SERPL-SCNC: 3.4 MMOL/L (ref 3.6–5.5)
PROT SERPL-MCNC: 5.7 G/DL (ref 6–8.2)
RBC # BLD AUTO: 3.67 M/UL (ref 4.7–6.1)
SODIUM SERPL-SCNC: 138 MMOL/L (ref 135–145)
WBC # BLD AUTO: 10.9 K/UL (ref 4.8–10.8)

## 2017-12-20 PROCEDURE — 93970 EXTREMITY STUDY: CPT | Mod: 26 | Performed by: SURGERY

## 2017-12-20 PROCEDURE — 93970 EXTREMITY STUDY: CPT

## 2017-12-20 PROCEDURE — 700111 HCHG RX REV CODE 636 W/ 250 OVERRIDE (IP): Performed by: INTERNAL MEDICINE

## 2017-12-20 PROCEDURE — 85730 THROMBOPLASTIN TIME PARTIAL: CPT | Mod: 91

## 2017-12-20 PROCEDURE — 36415 COLL VENOUS BLD VENIPUNCTURE: CPT

## 2017-12-20 PROCEDURE — A9270 NON-COVERED ITEM OR SERVICE: HCPCS | Performed by: HOSPITALIST

## 2017-12-20 PROCEDURE — 99233 SBSQ HOSP IP/OBS HIGH 50: CPT | Performed by: HOSPITALIST

## 2017-12-20 PROCEDURE — 80053 COMPREHEN METABOLIC PANEL: CPT

## 2017-12-20 PROCEDURE — 700102 HCHG RX REV CODE 250 W/ 637 OVERRIDE(OP): Performed by: HOSPITALIST

## 2017-12-20 PROCEDURE — 85025 COMPLETE CBC W/AUTO DIFF WBC: CPT

## 2017-12-20 PROCEDURE — 770020 HCHG ROOM/CARE - TELE (206)

## 2017-12-20 RX ORDER — POTASSIUM CHLORIDE 20 MEQ/1
40 TABLET, EXTENDED RELEASE ORAL ONCE
Status: COMPLETED | OUTPATIENT
Start: 2017-12-20 | End: 2017-12-20

## 2017-12-20 RX ADMIN — HEPARIN SODIUM 4000 UNITS: 1000 INJECTION, SOLUTION INTRAVENOUS; SUBCUTANEOUS at 05:05

## 2017-12-20 RX ADMIN — HEPARIN SODIUM 1200 UNITS/HR: 5000 INJECTION, SOLUTION INTRAVENOUS at 13:27

## 2017-12-20 RX ADMIN — SIMVASTATIN 80 MG: 40 TABLET, FILM COATED ORAL at 20:41

## 2017-12-20 RX ADMIN — POTASSIUM CHLORIDE 40 MEQ: 1500 TABLET, EXTENDED RELEASE ORAL at 09:24

## 2017-12-20 RX ADMIN — RIVAROXABAN 15 MG: 15 TABLET, FILM COATED ORAL at 18:47

## 2017-12-20 RX ADMIN — ASPIRIN 81 MG: 81 TABLET, COATED ORAL at 09:24

## 2017-12-20 ASSESSMENT — PAIN SCALES - GENERAL
PAINLEVEL_OUTOF10: 0

## 2017-12-20 ASSESSMENT — ENCOUNTER SYMPTOMS
FEVER: 0
SHORTNESS OF BREATH: 1
CHILLS: 0
COUGH: 0

## 2017-12-20 NOTE — PROGRESS NOTES
Report called to ORLIN Coronado. Pt xferred to Rm [id number] via bed; on monitor; meds, chart, and belongings sent with Pt; RN x2 as escort.

## 2017-12-20 NOTE — PROGRESS NOTES
Report received from ORLIN Luis. Pt to bed 707-1 via bed. Tele box in place, verified with monitor tech. VSS. Pt denies pain/discomfort. No other needs. Oriented to room and unit. Bed in low/locked position, treaded socks on pt. Call bell within reach, encouraged to call with needs. Will continue to monitor.

## 2017-12-20 NOTE — CARE PLAN
Problem: Safety  Goal: Will remain free from injury  Fall prevention and safety protocols in place    Problem: Venous Thromboembolism (VTW)/Deep Vein Thrombosis (DVT) Prevention:  Goal: Patient will participate in Venous Thrombosis (VTE)/Deep Vein Thrombosis (DVT)Prevention Measures   12/20/17 0800   Mechanical/VTE Prophylaxis   Mechanical Prophylaxis  SCDs, Sequential Compression Device   SCDs, Sequential Compression Device On   OTHER   Risk Assessment Score 7   VTE RISK Very High   Pharmacologic Prophylaxis Used Unfractionated Heparin       Problem: Respiratory:  Goal: Respiratory status will improve  Pulm toilet, Therapeutic anticoagulation

## 2017-12-20 NOTE — CARE PLAN
Problem: Safety  Goal: Will remain free from falls  Patient will remain free from falls and use call light when assistance is needed

## 2017-12-20 NOTE — CARE PLAN
Problem: Infection  Goal: Will remain free from infection  Patient will remain free from infection for remainder of hospital stay

## 2017-12-20 NOTE — PROGRESS NOTES
[facility] Hospitalist Progress Note    Date of Service: 2017    Chief Complaint  62 y.o. male admitted 2017 with shortness of breath.    Interval Problem Update  Mr. Quintanilla has no significant previous medical problems that was found to have a saddle PE. He was given alteplase due to right heart strain and severe pulm hypertension.   This morning his nurse notes no neuro deficits. He has been in a sinus rhythm.   He has been on a liquid diet outpatient.  He is on room air. His K is 3.4.   Consultants/Specialty  Pulmonology. I discussed his condition with Dr. Chin on ICU Hot Rounds.    Disposition  tele        Review of Systems   Constitutional: Negative for chills and fever.   Respiratory: Positive for shortness of breath. Negative for cough.    Cardiovascular: Negative for chest pain.   All other systems reviewed and are negative.     Physical Exam  Laboratory/Imaging   Hemodynamics  Temp (24hrs), Av.7 °C (98 °F), Min:36.2 °C (97.1 °F), Max:37.2 °C (98.9 °F)   Temperature: 37.2 °C (98.9 °F)  Pulse  Av.2  Min: 65  Max: 135 Heart Rate (Monitored): 69  NIBP: 122/71      Respiratory      Respiration: (!) 26, Pulse Oximetry: 92 %     Work Of Breathing / Effort: Mild  RUL Breath Sounds: Clear, RML Breath Sounds: Clear, RLL Breath Sounds: Clear, JASE Breath Sounds: Clear, LLL Breath Sounds: Clear    Fluids    Intake/Output Summary (Last 24 hours) at 17 0838  Last data filed at 17 0800   Gross per 24 hour   Intake          1357.07 ml   Output             1075 ml   Net           282.07 ml       Nutrition  Orders Placed This Encounter   Procedures   • Diet Order     Standing Status:   Standing     Number of Occurrences:   1     Order Specific Question:   Diet:     Answer:   Regular [1]     Physical Exam   Constitutional: He is oriented to person, place, and time. He appears well-developed. No distress.   Neck: Neck supple.   Cardiovascular: Normal rate and regular rhythm.    No murmur  heard.  Pulmonary/Chest: Effort normal and breath sounds normal. No respiratory distress. He has no wheezes.   Abdominal: Soft. He exhibits no distension.   Musculoskeletal:   No significant LE swelling.  Negative Manny's sign.   Neurological: He is alert and oriented to person, place, and time.   Skin: Skin is warm and dry. He is not diaphoretic.   Bruises bilateral antecubital fossa.   Psychiatric: He has a normal mood and affect. His behavior is normal.       Recent Labs      12/18/17 1945 12/19/17   1510  12/20/17   0407   WBC  14.3*  11.7*  10.9*   RBC  4.68*  3.95*  3.67*   HEMOGLOBIN  15.9  13.7*  12.6*   HEMATOCRIT  45.7  39.9*  36.7*   MCV  97.6  101.0*  100.0*   MCH  34.0*  34.7*  34.3*   MCHC  34.8  34.3  34.3   RDW  46.4  49.1  48.5   PLATELETCT  130*  118*  125*   MPV  11.8  11.7  11.9     Recent Labs      12/18/17 1945 12/19/17   1510  12/20/17   0407   SODIUM  136  139  138   POTASSIUM  3.2*  3.5*  3.4*   CHLORIDE  106  108  110   CO2  18*  20  19*   GLUCOSE  103*  107*  103*   BUN  27*  22  19   CREATININE  1.49*  1.33  1.11   CALCIUM  8.6  8.0*  8.0*     Recent Labs      12/19/17   0125   12/19/17   1520  12/19/17 2015 12/20/17   0407   APTT  40.5*   < >  45.5*  68.2*  47.5*   INR  1.41*   --    --    --    --     < > = values in this interval not displayed.     Recent Labs      12/18/17 1945   BNPBTYPENAT  383*              Assessment/Plan     * Saddle embolus of pulmonary artery (CMS-HCC)- (present on admission)   Assessment & Plan    -With hemodynamic compromise, now post TPA and much improved   -change heparin to xarelto   consult to assist in outpatient anticoag clinic--see orders.  He will need an outpatient cancer work up.    Echo:  CONCLUSIONS  No prior study is available for comparison.   Hyperdynamic left ventricular systolic function.  Left ventricular ejection fraction is visually estimated to be greater   than 75%.  Dilated right ventricle.  Reduced right  ventricular systolic function.  Akinetic right ventricular free wall.  Right heart pressures are consistent with severe pulmonary hypertension   of 68 mmHg.        Acute deep vein thrombosis (DVT) of both lower extremities (CMS-HCC)- (present on admission)   Assessment & Plan    As seen on lower extremity doppler.        Right heart enlargement- (present on admission)   Assessment & Plan    -2/2 saddle PE  -Echo reveals severe pulm htn.  -S/p TPA; clinically much improved          BLAKE (acute kidney injury) (CMS-HCC)- (present on admission)   Assessment & Plan    -Also given contrast for PE study; aworsening function   -Received 3 L fluid in ER  Repeat BMP revealed an improvement of the Cr.            Hypokalemia- (present on admission)   Assessment & Plan    Replacement given.        Acute respiratory failure (CMS-HCC)- (present on admission)   Assessment & Plan    Due to pulmonary embolism.  Pulmonology consulted.         Elevated troponin- (present on admission)   Assessment & Plan    -Likely demand from PE right heart strain, trended back down          Syncope- (present on admission)   Assessment & Plan    -Likely cardiogenic vs hypoxic from PE with R heart strain           Obesity (BMI 30-39.9)- (present on admission)   Assessment & Plan    -Encouraged weight loss        Hyperlipidemia- (present on admission)   Assessment & Plan    -continue zocor        Essential hypertension- (present on admission)   Assessment & Plan    -was hypotensive in ER, improved post TPA, hold cozaar as he is normotensive.            Reviewed items::  Labs reviewed and Medications reviewed  DVT: xarelto.

## 2017-12-21 PROCEDURE — 99233 SBSQ HOSP IP/OBS HIGH 50: CPT | Performed by: INTERNAL MEDICINE

## 2017-12-21 PROCEDURE — 700102 HCHG RX REV CODE 250 W/ 637 OVERRIDE(OP): Performed by: HOSPITALIST

## 2017-12-21 PROCEDURE — 770020 HCHG ROOM/CARE - TELE (206)

## 2017-12-21 PROCEDURE — A9270 NON-COVERED ITEM OR SERVICE: HCPCS | Performed by: HOSPITALIST

## 2017-12-21 RX ADMIN — RIVAROXABAN 15 MG: 15 TABLET, FILM COATED ORAL at 08:13

## 2017-12-21 RX ADMIN — RIVAROXABAN 15 MG: 15 TABLET, FILM COATED ORAL at 21:42

## 2017-12-21 RX ADMIN — SIMVASTATIN 80 MG: 40 TABLET, FILM COATED ORAL at 21:41

## 2017-12-21 RX ADMIN — ASPIRIN 81 MG: 81 TABLET, COATED ORAL at 08:13

## 2017-12-21 ASSESSMENT — PAIN SCALES - GENERAL
PAINLEVEL_OUTOF10: 0
PAINLEVEL_OUTOF10: 0

## 2017-12-21 ASSESSMENT — ENCOUNTER SYMPTOMS
CHILLS: 0
SHORTNESS OF BREATH: 1
FEVER: 0

## 2017-12-21 NOTE — PROGRESS NOTES
2 RN skin check with Charlotte Rn: Patient's generalized skin is intact and bruising on both upper extremities.

## 2017-12-21 NOTE — PROGRESS NOTES
[facility]ist Progress Note    Date of Service: 2017    Chief Complaint  62 y.o. male admitted 2017 with saddle PE with RV strain status post TPA    Interval Problem Update  No acute events overnight, denies CP. Patient reports dyspnea on exertion. She will be provided with 1 month sample of Xarelto on discharge.    Consultants/Specialty  Pulmonary     Disposition  Likely home tomorrow        Review of Systems   Constitutional: Negative for chills and fever.   Respiratory: Positive for shortness of breath.    Genitourinary: Negative for dysuria and urgency.   All other systems reviewed and are negative.     Physical Exam  Laboratory/Imaging   Hemodynamics  Temp (24hrs), Av.6 °C (97.9 °F), Min:36.4 °C (97.6 °F), Max:36.7 °C (98 °F)   Temperature: 36.4 °C (97.6 °F)  Pulse  Av.4  Min: 65  Max: 135    Blood Pressure: 113/68      Respiratory      Respiration: 18, Pulse Oximetry: 97 %        RUL Breath Sounds: Clear, RML Breath Sounds: Clear, RLL Breath Sounds: Clear, JASE Breath Sounds: Clear, LLL Breath Sounds: Clear    Fluids    Intake/Output Summary (Last 24 hours) at 17 0918  Last data filed at 17 0600   Gross per 24 hour   Intake             1428 ml   Output              750 ml   Net              678 ml       Nutrition  Orders Placed This Encounter   Procedures   • Diet Order     Standing Status:   Standing     Number of Occurrences:   1     Order Specific Question:   Diet:     Answer:   Regular [1]     Physical Exam   Constitutional: He is oriented to person, place, and time. He appears well-developed and well-nourished. No distress.   HENT:   Head: Normocephalic and atraumatic.   Mouth/Throat: Oropharynx is clear and moist.   Eyes: Conjunctivae are normal. Pupils are equal, round, and reactive to light.   Neck: Neck supple.   Cardiovascular: Normal rate and regular rhythm.    Pulmonary/Chest: Effort normal and breath sounds normal. No respiratory distress. He has no  wheezes.   Abdominal: Soft. Bowel sounds are normal. He exhibits no distension. There is no tenderness.   Musculoskeletal: Normal range of motion. He exhibits no edema.   Neurological: He is alert and oriented to person, place, and time. No cranial nerve deficit. Coordination normal.   Skin: Skin is warm and dry.   Psychiatric: He has a normal mood and affect. His behavior is normal.   Nursing note and vitals reviewed.      Recent Labs      12/18/17 1945 12/19/17 1510 12/20/17   0407   WBC  14.3*  11.7*  10.9*   RBC  4.68*  3.95*  3.67*   HEMOGLOBIN  15.9  13.7*  12.6*   HEMATOCRIT  45.7  39.9*  36.7*   MCV  97.6  101.0*  100.0*   MCH  34.0*  34.7*  34.3*   MCHC  34.8  34.3  34.3   RDW  46.4  49.1  48.5   PLATELETCT  130*  118*  125*   MPV  11.8  11.7  11.9     Recent Labs      12/18/17 1945 12/19/17   1510  12/20/17   0407   SODIUM  136  139  138   POTASSIUM  3.2*  3.5*  3.4*   CHLORIDE  106  108  110   CO2  18*  20  19*   GLUCOSE  103*  107*  103*   BUN  27*  22  19   CREATININE  1.49*  1.33  1.11   CALCIUM  8.6  8.0*  8.0*     Recent Labs      12/19/17   0125   12/19/17 2015 12/20/17   0407  12/20/17   1245   APTT  40.5*   < >  68.2*  47.5*  55.8*   INR  1.41*   --    --    --    --     < > = values in this interval not displayed.     Recent Labs      12/18/17 1945   BNPBTYPENAT  383*              Assessment/Plan     * Saddle embolus of pulmonary artery (CMS-HCC)- (present on admission)   Assessment & Plan    -With hemodynamic compromise, status post TPA and much improved   -change heparin to xarelto  Outpatient hypercoagulable workup    Echo:  CONCLUSIONS  No prior study is available for comparison.   Hyperdynamic left ventricular systolic function.  Left ventricular ejection fraction is visually estimated to be greater   than 75%.  Dilated right ventricle.  Reduced right ventricular systolic function.  Akinetic right ventricular free wall.  Right heart pressures are consistent with severe  pulmonary hypertension   of 68 mmHg.        Acute deep vein thrombosis (DVT) of both lower extremities (CMS-HCC)- (present on admission)   Assessment & Plan    As seen on lower extremity doppler.  Continue Xarelto  Outpatient hypercoagulable workup        Right heart enlargement- (present on admission)   Assessment & Plan    -2/2 saddle PE  -Echo reveals severe pulm htn.  -S/p TPA; clinically much improved          BLAKE (acute kidney injury) (CMS-HCC)- (present on admission)   Assessment & Plan    Improving with IV fluid hydration  Avoid nephrotoxic medications  Monitor BMP          Hypokalemia- (present on admission)   Assessment & Plan    Replacement given.        Acute respiratory failure (CMS-HCC)- (present on admission)   Assessment & Plan    Due to pulmonary embolism.  Improving  Continue protocol        Elevated troponin- (present on admission)   Assessment & Plan    -Likely demand from PE right heart strain, trended back down          Syncope- (present on admission)   Assessment & Plan    -Likely cardiogenic vs hypoxic from PE with R heart strain           Obesity (BMI 30-39.9)- (present on admission)   Assessment & Plan    -Encouraged weight loss        Hyperlipidemia- (present on admission)   Assessment & Plan    -continue zocor        Essential hypertension- (present on admission)   Assessment & Plan    -was hypotensive in ER, improved post TPA, hold cozaar as he is normotensive.            Reviewed items::  Labs reviewed, Medications reviewed and Radiology images reviewed  DVT: Xarelto.      Patient plan of care discussed at multidisplinary team rounds, with patient and R.N at beside.     32 minutes were spent on counseling and coordinating care, discussing plan of care with patient, nurse and during multidisciplinary rounds, reviewing the chart, vitals, labs and imaging, medication reconciliation, placing orders and enacting the plan above. All questions answered.  Greater than 50% of this time was at  the patient's bedside.

## 2017-12-21 NOTE — CARE PLAN
Problem: Communication  Goal: The ability to communicate needs accurately and effectively will improve  Outcome: PROGRESSING AS EXPECTED  Patient addresses any questions or concerns that he has with his care, treatment, and medications.     Problem: Venous Thromboembolism (VTW)/Deep Vein Thrombosis (DVT) Prevention:  Goal: Patient will participate in Venous Thrombosis (VTE)/Deep Vein Thrombosis (DVT)Prevention Measures  Outcome: PROGRESSING AS EXPECTED  Patient is now taking Xarelto to prevent VTE.

## 2017-12-21 NOTE — DISCHARGE PLANNING
Medical Social Work     SHAMAR spoke with Tr[facility] and she stated they received the prescriptions and are filling it. The prescription should be ready in the next 30 minutes. SHAMAR called and advised the bedside RN. SHAMAR met with pt at bedside and provided him with the address and phone number [facility] in [facility] to  his medications.     Plan: SHAMAR will remain available for pt support.

## 2017-12-21 NOTE — DISCHARGE PLANNING
Medical Social Work     FRANCISCO JAVIER called to the [facility] to see if they had sample of the medication. FRANCISCO JAVIER spoke with Sal and he stated that they do have samples available. FRANCISCO JAVIER faxed a prescription for Xarelto for a months worth of sample to the [facility] to be filled.     Plan: Francisco Javier will follow up with the clinic to see if the prescription is ready.

## 2017-12-21 NOTE — PROGRESS NOTES
Received report on patient. He is resting in bed watching Tv. He has no complaints of pain and no indications of distress. Bed alarm is on, bed in the lowest position, and call light and personal belongings within reach.

## 2017-12-21 NOTE — DISCHARGE PLANNING
Medical Social Work     SW obtained the medication from the healthcare [facility]. SW also called security and asked if they could bring up a shirt and pants for the pt. SHAMAR will set up transportation once the RN advise the pt is clear for discharge.     Plan: SHAMAR will set up an uber when he is ready for discharge.

## 2017-12-21 NOTE — PROGRESS NOTES
Called pharmacy to verify Xarelto and Heparin doses/changes. Currently Xarelto is ordered for 2100 and Heparin d/c at 1800. Pharmacy stated they would change Xarelto dose to 1900 and Heparin is to be discontinued when it is given. Will continue to check MAR for updated dosing.

## 2017-12-21 NOTE — CARE PLAN
Problem: Safety  Goal: Will remain free from falls  Outcome: PROGRESSING AS EXPECTED  Pt provided with safety precaution and fall prevention education. Pt verbalizes understanding. Calls appropriately as needed. Bed in low/locked position, bed alarm on, treaded socks on pt. Call bell within reach.     Problem: Respiratory:  Goal: Respiratory status will improve  Outcome: PROGRESSING AS EXPECTED  Pt reports improvement in respiratory status today, denies pain/discomfort. No SOB. Continuing to monitor.

## 2017-12-21 NOTE — DIETARY
Nutrition Services:  Poor PO    Poor PO on Nutrition Admit Screen. Pt is currently on a Regular diet and per chart pt PO %. Ht: 184.4 cm, Wt: 135 kg, BMI 39.87.  Attempted to speak with pt at bedside, but pt was busy with another discipline.  Consult RD as needed. RD will re-screen weekly.      RD available PRN.

## 2017-12-21 NOTE — PROGRESS NOTES
Bedside report received. Updated on pt status and POC. Pt sleeping in bed, unlabored breathing on room air. Bed in low/locked position, treaded socks on pt. Call bell within reach. Will continue to monitor.

## 2017-12-22 ENCOUNTER — PATIENT OUTREACH (OUTPATIENT)
Dept: HEALTH INFORMATION MANAGEMENT | Facility: OTHER | Age: 62
End: 2017-12-22

## 2017-12-22 VITALS
BODY MASS INDEX: 41.39 KG/M2 | RESPIRATION RATE: 20 BRPM | OXYGEN SATURATION: 93 % | DIASTOLIC BLOOD PRESSURE: 85 MMHG | HEIGHT: 72 IN | HEART RATE: 79 BPM | SYSTOLIC BLOOD PRESSURE: 139 MMHG | WEIGHT: 305.56 LBS | TEMPERATURE: 98.7 F

## 2017-12-22 PROBLEM — J96.00 ACUTE RESPIRATORY FAILURE (HCC): Status: RESOLVED | Noted: 2017-12-19 | Resolved: 2017-12-22

## 2017-12-22 PROBLEM — E87.6 HYPOKALEMIA: Status: RESOLVED | Noted: 2017-12-19 | Resolved: 2017-12-22

## 2017-12-22 PROBLEM — R55 SYNCOPE: Status: RESOLVED | Noted: 2017-12-18 | Resolved: 2017-12-22

## 2017-12-22 PROBLEM — N17.9 AKI (ACUTE KIDNEY INJURY) (HCC): Status: RESOLVED | Noted: 2017-12-19 | Resolved: 2017-12-22

## 2017-12-22 PROBLEM — R79.89 ELEVATED TROPONIN: Status: RESOLVED | Noted: 2017-12-19 | Resolved: 2017-12-22

## 2017-12-22 LAB
ANION GAP SERPL CALC-SCNC: 10 MMOL/L (ref 0–11.9)
BASOPHILS # BLD AUTO: 1 % (ref 0–1.8)
BASOPHILS # BLD: 0.1 K/UL (ref 0–0.12)
BUN SERPL-MCNC: 13 MG/DL (ref 8–22)
CALCIUM SERPL-MCNC: 9 MG/DL (ref 8.5–10.5)
CHLORIDE SERPL-SCNC: 107 MMOL/L (ref 96–112)
CO2 SERPL-SCNC: 23 MMOL/L (ref 20–33)
CREAT SERPL-MCNC: 1.03 MG/DL (ref 0.5–1.4)
EKG IMPRESSION: NORMAL
EOSINOPHIL # BLD AUTO: 0.74 K/UL (ref 0–0.51)
EOSINOPHIL NFR BLD: 7.2 % (ref 0–6.9)
ERYTHROCYTE [DISTWIDTH] IN BLOOD BY AUTOMATED COUNT: 48.7 FL (ref 35.9–50)
GFR SERPL CREATININE-BSD FRML MDRD: >60 ML/MIN/1.73 M 2
GLUCOSE SERPL-MCNC: 88 MG/DL (ref 65–99)
HCT VFR BLD AUTO: 36.9 % (ref 42–52)
HGB BLD-MCNC: 12.5 G/DL (ref 14–18)
IMM GRANULOCYTES # BLD AUTO: 0.22 K/UL (ref 0–0.11)
IMM GRANULOCYTES NFR BLD AUTO: 2.2 % (ref 0–0.9)
LYMPHOCYTES # BLD AUTO: 1.84 K/UL (ref 1–4.8)
LYMPHOCYTES NFR BLD: 18 % (ref 22–41)
MCH RBC QN AUTO: 34.6 PG (ref 27–33)
MCHC RBC AUTO-ENTMCNC: 33.9 G/DL (ref 33.7–35.3)
MCV RBC AUTO: 102.2 FL (ref 81.4–97.8)
MONOCYTES # BLD AUTO: 1.03 K/UL (ref 0–0.85)
MONOCYTES NFR BLD AUTO: 10.1 % (ref 0–13.4)
NEUTROPHILS # BLD AUTO: 6.3 K/UL (ref 1.82–7.42)
NEUTROPHILS NFR BLD: 61.5 % (ref 44–72)
NRBC # BLD AUTO: 0 K/UL
NRBC BLD-RTO: 0 /100 WBC
PLATELET # BLD AUTO: 151 K/UL (ref 164–446)
PMV BLD AUTO: 11.4 FL (ref 9–12.9)
POTASSIUM SERPL-SCNC: 3.9 MMOL/L (ref 3.6–5.5)
RBC # BLD AUTO: 3.61 M/UL (ref 4.7–6.1)
SODIUM SERPL-SCNC: 140 MMOL/L (ref 135–145)
WBC # BLD AUTO: 10.2 K/UL (ref 4.8–10.8)

## 2017-12-22 PROCEDURE — 99239 HOSP IP/OBS DSCHRG MGMT >30: CPT | Performed by: INTERNAL MEDICINE

## 2017-12-22 PROCEDURE — 85025 COMPLETE CBC W/AUTO DIFF WBC: CPT

## 2017-12-22 PROCEDURE — A9270 NON-COVERED ITEM OR SERVICE: HCPCS | Performed by: HOSPITALIST

## 2017-12-22 PROCEDURE — 36415 COLL VENOUS BLD VENIPUNCTURE: CPT

## 2017-12-22 PROCEDURE — 700102 HCHG RX REV CODE 250 W/ 637 OVERRIDE(OP): Performed by: HOSPITALIST

## 2017-12-22 PROCEDURE — 80048 BASIC METABOLIC PNL TOTAL CA: CPT

## 2017-12-22 RX ADMIN — RIVAROXABAN 15 MG: 15 TABLET, FILM COATED ORAL at 08:24

## 2017-12-22 RX ADMIN — ASPIRIN 81 MG: 81 TABLET, COATED ORAL at 08:23

## 2017-12-22 ASSESSMENT — PAIN SCALES - GENERAL
PAINLEVEL_OUTOF10: 0
PAINLEVEL_OUTOF10: 0

## 2017-12-22 ASSESSMENT — LIFESTYLE VARIABLES: EVER_SMOKED: YES

## 2017-12-22 NOTE — DISCHARGE INSTRUCTIONS
Discharge Instructions    Discharged to home by taxi with self. Discharged via wheelchair, hospital escort: Yes.  Special equipment needed: Not Applicable    Be sure to schedule a follow-up appointment with your primary care doctor or any specialists as instructed.     Discharge Plan:   Diet Plan: Discussed  Activity Level: Discussed  Confirmed Follow up Appointment: Appointment Scheduled  Confirmed Symptoms Management: Discussed  Medication Reconciliation Updated: Yes  Influenza Vaccine Indication: Not indicated: Previously immunized this influenza season and > 8 years of age    I understand that a diet low in cholesterol, fat, and sodium is recommended for good health. Unless I have been given specific instructions below for another diet, I accept this instruction as my diet prescription.   Other diet:     Special Instructions:   Deep Vein Thrombosis Discharge Instructions    A deep vein thrombosis (DVT) is a blood clot (thrombus) that develops in a deep vein. A DVT is a clot in the deep, larger veins of the leg, arm, or pelvis. These are more dangerous than clots that might form in veins on the surface of the body. Deep vein thrombosis can lead to complications if the clot breaks off and travels in the bloodstream to the lungs.     CAUSES  Blood clots form in a vein for different reasons. Usually several things cause blood clots. They include:   · The flow of blood slows down.   · The inside of the vein is damaged in some way.   · The person has a condition that makes blood clot more easily. These conditions may include:  · Older age (especially over 75 years old).  · Having a history of blood clots.  · Having major or lengthy surgery. Hip surgery is particularly high-risk.   · Breaking a hip or leg.  · Sitting or lying still for a long time.  · Cancer or cancer treatment.  · Having a long, thin tube (catheter) placed inside a vein during a medical procedure.   · Being overweight (obese).  · Pregnancy and  childbirth.  · Medicines with estrogen.  · Smoking.  · Other circulation or heart problems.     SYMPTOMS  When a clot forms, it can either partially or totally block the blood flow in that vein. Symptoms of a DVT can include:  · Swelling of the leg or arm, especially if one side is much worse.  · Warmth and redness of the leg or arm, especially if one side is much worse.   · Pain in an arm or leg. If the clot is in the leg, symptoms may be more noticeable or worse when standing or walking.  If the blood clot travels to the lung, it may cause:  · Shortness of breath.  · Chest pain. The pain may be worsened by deep breaths.   · Coughing up thick mucus (phlegm), possibly flecked with blood.   Anyone with these symptoms should get emergency medical treatment right away. Call your local emergency  Services (911 in [place] if you have these symptoms.     DIAGNOSIS  If a DVT is suspected, your caregiver will take a full medical history. He or she will also perform a physical exam. Tests that also may be required include:   · Studies of the clotting properties of the blood.   · An ultrasound scan.   · X-rays to show the flow of blood when special dye is injected into the veins (venography).   · Studies of your lungs if you have any chest symptoms.     PREVENTION  · Exercise the legs regularly. Take a brisk 30 minute walk every day.   · Maintain a weight that is appropriate for your height.  · Avoid sitting or lying in bed for long periods of time without moving your legs.   · Women, particularly those over the age of 35, should consider the risks and benefits of taking estrogen medicine, including birth control pills.   · Do not smoke, especially if you take estrogen medicines.   · Long-distance travel can increase your risk. You should exercise your legs by walking or pumping the muscles every hour.   · In hospital prevention: Prevention may include medical and non medical measures.     TREATMENT  · The most common treatment  for DVT is blood thinning (anticoagulant) medicine, which reduces the blood's tendency to clot. Anticoagulants can stop new blood clots from forming and old ones from growing. They cannot dissolve existing clots. Your body does this by itself over time. Anticoagulants can be given by mouth, by intravenous (IV) access, or by injection. Your caregiver will determine the best program for you.   · Less commonly, clot-dissolving drugs (thrombolytics) are used to dissolve a DVT. They carry a high risk of bleeding, so they are used mainly in severe cases.   · Very rarely, a blood clot in the leg needs to be removed surgically.   · If you are unable to take anticoagulants, your caregiver may arrange for you to have a filter placed in a main vein in your belly (abdomen). This filter prevents clots from traveling to your lungs.     HOME CARE INSTRUCTIONS  Take all medicines prescribed by your caregiver. Follow the directions carefully.   · You will most likely continue taking anticoagulants after you leave the hospital. Your caregiver will advise you on the length of treatment (usually 3 to 5 months, sometimes for life).   · Taking too much or too little of an anticoagulant is dangerous. While taking this type of medicine, you will need to have regular blood tests to be sure the dose is correct. The dose can change for many reasons. It is critically important that you take this medicine exactly as prescribed, and that you have blood tests exactly as directed.   · Many foods can interfere with anticoagulants. These include foods high in vitamin K, such as spinach, kale, broccoli, cabbage, gloria and turnip greens, [place] sprouts, peas, cauliflower, seaweed, parsley, beef and pork liver, green tea, and soybean oil. Your caregiver should discuss limits on these foods with you or you should arrange a visit with a dietician to answer your questions.   · Many medicines can interfere with anticoagulants. You must tell your  caregiver about any and all medicines you take. This includes all vitamins and supplements. Be especially cautious with aspirin and anti-inflammatory medicines. Ask your caregiver before taking these.   · Anticoagulants can have side effects, mostly excessive bruising or bleeding. You will need to hold pressure over cuts for longer than usual. Avoid alcoholic drinks or consume only very small amounts while taking this medicine.    If you are taking an anticoagulant:  · Wear a medical alert bracelet.  · Notify your dentist or other caregivers before procedures.  · Avoid contact sports.    · Ask your caregiver how soon you can go back to normal activities. Not being active can lead to new clots. Ask for a list of what you should and should not do.   · Exercise your lower leg muscles. This is important while traveling.   · You may need to wear compression stockings. These are tight elastic stockings that apply pressure to the lower legs. This can help keep the blood in the legs from clotting.   · If you are a smoker, you should quit.   · Learn as much as you can about DVT.     SEEK MEDICAL CARE IF:  · You have unusual bruising or any bleeding problems.  · The swelling or pain in your affected arm or leg is not gradually improving.   · You anticipate surgery or long-distance travel. You should get specific advice on DVT prevention.   · You discover other family members with blood clots. This may require further testing for inherited diseases or conditions.     SEEK IMMEDIATE MEDICAL CARE IF:  · You develop chest pain.  · You develop severe shortness of breath.  · You begin to cough up bloody mucus or phlegm (sputum).  · You feel dizzy or faint.   · You develop swelling or pain in the leg.  · You have breathing problems after traveling.    MAKE SURE YOU:  · Understand these instructions.  · Will watch your condition.  · Will get help right away if you are not doing well or get worse.       · Is patient discharged on  Warfarin / Coumadin?   No     · Is patient Post Blood Transfusion?  No    Depression / Suicide Risk    As you are discharged from this [facility] facility, it is important to learn how to keep safe from harming yourself.    Recognize the warning signs:  · Abrupt changes in personality, positive or negative- including increase in energy   · Giving away possessions  · Change in eating patterns- significant weight changes-  positive or negative  · Change in sleeping patterns- unable to sleep or sleeping all the time   · Unwillingness or inability to communicate  · Depression  · Unusual sadness, discouragement and loneliness  · Talk of wanting to die  · Neglect of personal appearance   · Rebelliousness- reckless behavior  · Withdrawal from people/activities they love  · Confusion- inability to concentrate     If you or a loved one observes any of these behaviors or has concerns about self-harm, here's what you can do:  · Talk about it- your feelings and reasons for harming yourself  · Remove any means that you might use to hurt yourself (examples: pills, rope, extension cords, firearm)  · Get professional help from the community (Mental Health, Substance Abuse, psychological counseling)  · Do not be alone:Call your Safe Contact- someone whom you trust who will be there for you.  · Call your local CRISIS HOTLINE 595-6529 or 840-142-3436  · Call your local Children's Mobile Crisis Response Team [place] (194) 315-7908 or www.[employer]  · Call the toll free National Suicide Prevention Hotlines   · National Suicide Prevention Lifeline 728-723-GPEM (9090)  · National Hope Line Network 800-SUICIDE (862-8288)    Pulmonary Embolism  A pulmonary (lung) embolism (PE) is a blood clot that has traveled to the lung and results in a blockage of blood flow in the affected lung. Most clots come from deep veins in the legs or pelvis. PE is a dangerous and potentially life-threatening condition that can be treated if  identified.  CAUSES  Blood clots form in a vein for different reasons. Usually several things cause blood clots. They include:  · The flow of blood slows down.  · The inside of the vein is damaged in some way.  · The person has a condition that makes the blood clot more easily.  RISK FACTORS  Some people are more likely than others to develop PE. Risk factors include:   · Smoking.  · Being overweight (obese).  · Sitting or lying still for a long time. This includes long-distance travel, paralysis, or recovery from an illness or surgery.  Other factors that increase risk are:   · Older age, especially over 75 years of age.  · Having a family history of blood clots or if you have already had a blood clot.  · Having major or lengthy surgery. This is especially true for surgery on the hip, knee, or belly (abdomen). Hip surgery is particularly high risk.  · Having a long, thin tube (catheter) placed inside a vein during a medical procedure.  · Breaking a hip or leg.  · Having cancer or cancer treatment.  · Medicines containing the female hormone estrogen. This includes birth control pills and hormone replacement therapy.  · Other circulation or heart problems.  · Pregnancy and childbirth.  ¨ Hormone changes make the blood clot more easily during pregnancy.  ¨ The fetus puts pressure on the veins of the pelvis.  ¨ There is a risk of injury to veins during delivery or a caesarean delivery. The risk is highest just after childbirth.    PREVENTION   · Exercise the legs regularly. Take a brisk 30 minute walk every day.  · Maintain a weight that is appropriate for your height.  · Avoid sitting or lying in bed for long periods of time without moving your legs.  · Women, particularly those over the age of 35 years, should consider the risks and benefits of taking estrogen medicines, including birth control pills.  · Do not smoke, especially if you take estrogen medicines.  · Long-distance travel can increase your risk. You  should exercise your legs by walking or pumping the muscles every hour.  · Many of the risk factors above relate to situations that exist with hospitalization, either for illness, injury, or elective surgery. Prevention may include medical and nonmedical measures.    ¨ Your health care provider will assess you for the need for venous thromboembolism prevention when you are admitted to the hospital. If you are having surgery, your surgeon will assess you the day of or day after surgery.     SYMPTOMS   The symptoms of a PE usually start suddenly and include:  · Shortness of breath.  · Coughing.  · Coughing up blood or blood-tinged mucus.  · Chest pain. Pain is often worse with deep breaths.  · Rapid heartbeat.  DIAGNOSIS   Your health care provider will take a medical history, perform a physical exam, and use rule-out criteria to assess your risk for PE. If your risk is intermediate or high, other tests may be done. These include:  · Blood tests, such as studies of the clotting properties of your blood.  · Imaging tests, such as ultrasound, CT, MRI, and other tests to see if you have clots in your legs or lungs.  · An electrocardiogram. This can look for heart strain from blood clots in the lungs.  TREATMENT   · The most common treatment for a PE is blood thinning (anticoagulant) medicine, which reduces the blood's tendency to clot. Anticoagulants can stop new blood clots from forming and old clots from growing. They cannot dissolve existing clots. Your body does this by itself over time. Anticoagulants can be given by mouth, through an intravenous (IV) tube, or by injection. Your health care provider will determine the best program for you.  · Less commonly, clot-dissolving medicines (thrombolytics) are used to dissolve a PE. They carry a high risk of bleeding, so they are used mainly in severe cases.  · Very rarely, a blood clot in the leg needs to be removed surgically.  · If you are unable to take anticoagulants,  your health care provider may arrange for you to have a filter placed in a main vein in your abdomen. This filter prevents clots from traveling to your lungs.  HOME CARE INSTRUCTIONS   · Take all medicines as directed by your health care provider.  · Learn as much as you can about DVT.  · Wear a medical alert bracelet or carry a medical alert card.  · Ask your health care provider how soon you can go back to normal activities. It is important to stay active to prevent blood clots. If you are on anticoagulant medicine, avoid contact sports.  · It is very important to exercise. This is especially important while traveling, sitting, or standing for long periods of time. Exercise your legs by walking or by tightening and relaxing your leg muscles regularly. Take frequent walks.  · You may need to wear compression stockings. These are tight elastic stockings that apply pressure to the lower legs. This pressure can help keep the blood in the legs from clotting.  Taking Warfarin  Warfarin is a daily medicine that is taken by mouth. Your health care provider will advise you on the length of treatment (usually 3-6 months, sometimes lifelong). If you take warfarin:  · Understand how to take warfarin and foods that can affect how warfarin works in your body.  · Too much and too little warfarin are both dangerous. Too much warfarin increases the risk of bleeding. Too little warfarin continues to allow the risk for blood clots.  Warfarin and Regular Blood Testing  While taking warfarin, you will need to have regular blood tests to measure your blood clotting time. These blood tests usually include both the prothrombin time (PT) and international normalized ratio (INR) tests. The PT and INR results allow your health care provider to adjust your dose of warfarin. It is very important that you have your PT and INR tested as often as directed by your health care provider.   Warfarin and Your Diet  Avoid major changes in your diet,  or notify your health care provider before changing your diet. Arrange a visit with a registered dietitian to answer your questions. Many foods, especially foods high in vitamin K, can interfere with warfarin and affect the PT and INR results. You should eat a consistent amount of foods high in vitamin K. Foods high in vitamin K include:   · Spinach, kale, broccoli, cabbage, gloria and turnip greens, [place] sprouts, peas, cauliflower, seaweed, and parsley.  · Beef and pork liver.  · Green tea.  · Soybean oil.  Warfarin with Other Medicines  Many medicines can interfere with warfarin and affect the PT and INR results. You must:  · Tell your health care provider about any and all medicines, vitamins, and supplements you take, including aspirin and other over-the-counter anti-inflammatory medicines. Be especially cautious with aspirin and anti-inflammatory medicines. Ask your health care provider before taking these.  · Do not take or discontinue any prescribed or over-the-counter medicine except on the advice of your health care provider or pharmacist.  Warfarin Side Effects  Warfarin can have side effects, such as easy bruising and difficulty stopping bleeding. Ask your health care provider or pharmacist about other side effects of warfarin. You will need to:  · Hold pressure over cuts for longer than usual.  · Notify your dentist and other health care providers that you are taking warfarin before you undergo any procedures where bleeding may occur.  Warfarin with Alcohol and Tobacco   · Drinking alcohol frequently can increase the effect of warfarin, leading to excess bleeding. It is best to avoid alcoholic drinks or consume only very small amounts while taking warfarin. Notify your health care provider if you change your alcohol intake.  · Do not use any tobacco products including cigarettes, chewing tobacco, or electronic cigarettes. If you smoke, quit. Ask your health care provider for help with quitting  smoking.  Alternative Medicines to Warfarin: Factor Xa Inhibitor Medicines  · These blood thinning medicines are taken by mouth, usually for several weeks or longer. It is important to take the medicine every single day, at the same time each day.  · There are no regular blood tests required when using these medicines.  · There are fewer food and drug interactions than with warfarin.  · The side effects of this class of medicine is similar to that of warfarin, including excessive bruising or bleeding. Ask your health care provider or pharmacist about other potential side effects.  SEEK MEDICAL CARE IF:   · You notice a rapid heartbeat.  · You feel weaker or more tired than usual.  · You feel faint.  · You notice increased bruising.  · Your symptoms are not getting better in the time expected.  · You are having side effects of medicine.  SEEK IMMEDIATE MEDICAL CARE IF:   · You have chest pain.  · You have trouble breathing.  · You have new or increased swelling or pain in one leg.  · You cough up blood.  · You notice blood in vomit, in a bowel movement, or in urine.  · You have a fever.  Symptoms of PE may represent a serious problem that is an emergency. Do not wait to see if the symptoms will go away. Get medical help right away. Call your local emergency services (911 in the [place]). Do not drive yourself to the hospital.       This information is not intended to replace advice given to you by your health care provider. Make sure you discuss any questions you have with your health care provider.     Document Released: 12/15/2001 Document Revised: 01/08/2016 Document Reviewed: 04/13/2016  [employer] Patient Education ©2016 [employer]

## 2017-12-22 NOTE — DISCHARGE PLANNING
Medical Social Work     SW was advised by the RN that the pt is not medically clear yet. SW gave the medication to the RN. SW advised the RN to notify SW when the pt is clear to discharge so SW can set up transportation to get back to [facility] [facility] [address] [facility] [address] [facility], [address].     Plan: SW will set up [employer] transportation or uber him to his house. Per, supervisor uber is approved if  It is needed.

## 2017-12-22 NOTE — CARE PLAN
Problem: Safety  Goal: Will remain free from falls  Outcome: PROGRESSING AS EXPECTED  Safety precautions and fall prevention interventions in place. Pt provided education regarding fall prevention. Pt verbalized understanding. Calls appropriately for assistance.     Problem: Knowledge Deficit  Goal: Knowledge of the prescribed therapeutic regimen will improve  Outcome: PROGRESSING AS EXPECTED  Updated pt on treatment, medications, POC and discharge plans. All questions answered as needed. Pt verbalizes understanding.

## 2017-12-22 NOTE — PROGRESS NOTES
Attempted ambulation with pt. Ambulated with walker and wheelchair follow, pt walked approx 50 ft and had to stop and sit in wheelchair x2 for weakness and SOB. Pt was brought back to room in wheelchair and assisted to chair. Have encouraged pt to sit up in chair and call for assistance with ambulation.     Dr. Shelton notified, pt will not be discharged today.     SHAMAR gave this RN meds for pt to go home with, placed in pt medication  nurse .

## 2017-12-22 NOTE — DISCHARGE PLANNING
SHAMAR spoke with pt's RN regarding time for uber pickup. RN requested 1515 pickup. SHAMAR then spoke with ER SHAMAR Helms. Analia arranged Uber for pt at 1515, trip will cost $66-88, and RN/med staff should take pt down to [facility] and look out for uber car.      SHAMAR notified RN of above.

## 2017-12-22 NOTE — PROGRESS NOTES
Received report on patient. He is sitting up in the chair watching Tv. Has no complaints of pain and no indications of distress. Patient has been noticing some blood in his urine and says its similar to when he passed a kidney stone. Bed alarm is on, bed in the lowest position and call light and personal belongings within reach.

## 2017-12-23 NOTE — DISCHARGE SUMMARY
"CHIEF COMPLAINT ON ADMISSION  Chief Complaint   Patient presents with   • Syncope     increasing over past 2 weeks.  reports ongoing since September.  reports passed out this am and woke in \"my own feces\".  reports bruising but denies any specific injury.       CODE STATUS  Prior    HPI & HOSPITAL COURSE  Please review H&P for details on admission.   This is a 62 y.o. maleWho presented with a syncopal episode. He also reported shortness of breath. In the ER he is found to be hypoxic and was started on supplemental oxygen. He underwent a CTA that revealed Sagittal embolus at the bifurcation of the main pulmonary artery with multi segmental and lobar emboli with evidence of right heart strain. The patient was admitted to the ICU with close cardiac monitoring and was started on IV heparin. Critical care and pulmonology was consulted. The patient received IV TPA. He was followed also found to have BLAKE and was started on IV fluids. Following day his symptoms improved. He is weaned off of oxygen and he was switched to Xarelto. The patient's vitals remained stable. He was able to ambulate without shortness of breath or chest pain. He was instructed to follow-up with his PCP and hematology for a hypercoagulable workup.    The patient was instructed to return to the ER in the event of worsening symptoms. I have counseled the patient on the importance of compliance and the patient has agreed to proceed with all medical recommendations and follow up plan indicated above.      Therefore, he is discharged in fair and stable condition with close outpatient follow-up.    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  Active Problems:    Acute deep vein thrombosis (DVT) of both lower extremities (CMS-HCC) POA: Yes    Essential hypertension POA: Yes    Hyperlipidemia POA: Yes    Obesity (BMI 30-39.9) POA: Yes    Right heart enlargement " POA: Yes    Pulmonary embolism (CMS-HCC) POA: Yes  Resolved Problems:    Syncope POA: Yes    Elevated troponin POA: Yes    Pulmonary embolism (CMS-HCC) POA: Unknown    Acute respiratory failure (CMS-HCC) POA: Yes    Hypokalemia POA: Yes    BLAKE (acute kidney injury) (CMS-HCC) POA: Yes      FOLLOW UP  Future Appointments  Date Time Provider Department Center   1/2/2018 3:30 PM Deb Ponce P.A.-C. [facility] None     Deb Ponce P.A.-C.  [address]  [place] [address]  [phone]    Go on 1/2/2018  Please arrive at 3:30 pm for your appointment. Thank you.      MEDICATIONS ON DISCHARGE   Jose Quintanilla   Home Medication Instructions MINH:[id number]    Printed on:12/22/17 3179   Medication Information                      aspirin EC (ECOTRIN) 81 MG Tablet Delayed Response  Take 81 mg by mouth every day.             fluticasone (FLONASE) 50 MCG/ACT nasal spray  Spray 1 Spray in nose every bedtime.             losartan (COZAAR) 50 MG Tab  Take 1 Tab by mouth every day.             Omega-3 Fatty Acids (FISH OIL) 1000 MG Cap capsule  Take 1,000 mg by mouth every day.             rivaroxaban (XARELTO) 15 MG Tab tablet  Take 1 Tab by mouth 2 Times a Day.             rivaroxaban (XARELTO) 20 MG Tab tablet  Take 1 Tab by mouth every day.             sertraline (ZOLOFT) 100 MG Tab  Take 2 Tabs by mouth every day.             simvastatin (ZOCOR) 80 MG tablet  Take 1 Tab by mouth every day.             vitamin e (VITAMIN E) 400 UNIT Cap  Take 400 Units by mouth every day.                 DIET  No orders of the defined types were placed in this encounter.      ACTIVITY  As tolerated.  Weight bearing as tolerated      CONSULTATIONS  Pulm Dr. Bonner    PROCEDURES  None.  LE VENOUS DUPLEX   Final Result      ECHOCARDIOGRAM-COMP W/ CONT   Final Result      CT-CTA CHEST PULMONARY ARTERY W/ RECONS   Final Result      1.  Extensive pulmonary thromboemboli with saddle embolus and some evidence of right heart strain      2.   Mild lingular groundglass is worrisome for pulmonary infarction      3.  Subpleural subcentimeter lower lobe opacities most likely indicates scarring although small nodule formation is not excluded      Findings were discussed with and shown to Dr. Bonner on 12/18/2017 11:40 PM.      DX-CHEST-PORTABLE (1 VIEW)   Final Result      No radiographic evidence of acute cardiopulmonary process.            LABORATORY  Lab Results   Component Value Date/Time    SODIUM 140 12/22/2017 02:29 AM    POTASSIUM 3.9 12/22/2017 02:29 AM    CHLORIDE 107 12/22/2017 02:29 AM    CO2 23 12/22/2017 02:29 AM    GLUCOSE 88 12/22/2017 02:29 AM    BUN 13 12/22/2017 02:29 AM    CREATININE 1.03 12/22/2017 02:29 AM        Lab Results   Component Value Date/Time    WBC 10.2 12/22/2017 02:29 AM    HEMOGLOBIN 12.5 (L) 12/22/2017 02:29 AM    HEMATOCRIT 36.9 (L) 12/22/2017 02:29 AM    PLATELETCT 151 (L) 12/22/2017 02:29 AM        Total time of the discharge process exceeds 36 minutes

## 2018-01-03 ENCOUNTER — ANTICOAGULATION VISIT (OUTPATIENT)
Dept: MEDICAL GROUP | Facility: PHYSICIAN GROUP | Age: 63
End: 2018-01-03
Payer: COMMERCIAL

## 2018-01-03 VITALS
HEART RATE: 93 BPM | OXYGEN SATURATION: 95 % | SYSTOLIC BLOOD PRESSURE: 146 MMHG | DIASTOLIC BLOOD PRESSURE: 72 MMHG | HEIGHT: 73 IN

## 2018-01-03 DIAGNOSIS — I26.01 SEPTIC PULMONARY EMBOLISM WITH ACUTE COR PULMONALE, UNSPECIFIED CHRONICITY (HCC): ICD-10-CM

## 2018-01-03 LAB — INR PPP: 1.5 (ref 2–3.5)

## 2018-01-03 PROCEDURE — 99201 PR OFFICE/OUTPT VISIT,NEW,LEVL I: CPT | Performed by: FAMILY MEDICINE

## 2018-01-03 PROCEDURE — 85610 PROTHROMBIN TIME: CPT | Performed by: FAMILY MEDICINE

## 2018-01-03 NOTE — PROGRESS NOTES
Anticoagulation Summary  As of 1/3/2018    INR goal:      TTR:   --   Today's INR:   1.5   Maintenance plan:   No maintenance plan   Next INR check:      Target end date:       Indications    Pulmonary embolism (CMS-HCC) [I26.99]  Pulmonary embolism (CMS-HCC) (Resolved) [I26.99]             Anticoagulation Episode Summary     INR check location:       Preferred lab:       Send INR reminders to:       Comments:         Anticoagulation Care Providers     Provider Role Specialty Phone number    Deb Ponce P.A.-C. Referring Family Medicine [phone]    [facility] Responsible  [phone]    Luiz PradoD Responsible          Anticoagulation Patient Findings     Pt is new to Xarelto and new to [facility].  Discussed indication for drug therapy.  Explained our services, hours of operation, Xarelto therapy, potential SE, potential DI.. Discussed monitoring parameters, such as blood in urine, blood in stool, discussed what to do if a dose is missed, or suspected as missed.  Pt to continue with current Xarelto dosing regimen. Pt denies any unusual s/s of bleeding, bruising, clotting or any changes to diet or medications.        Added [facility] to Care Team  Pt is a new referral for Saddle PE as evidenced by CT 12/19/2017.  Pt PCP is Deb Ponce, with whom he will f/u in am.  Pt is rather sedentary, however has not been bed bound, no recent surgeries.  Pt family history positive for stroke in both mother and father, breast CA in mother. Pt is unaware of any clot history in his family.    Pt was previously on ASA 81 mg daily from his MD in [place] as a preventative measure.  We will dc that now.    He was discharged with f/u with PCP (which he will do in am), and Hematology, for a hyper coagulable workup. however there is no heme referral in the system.         Pt is due to transition from Xarelto 15mg po bid to Xarelto 20mg po daily 1-.  Pt has both strengths, and  had zero copay. Card given for free Xarelto for a year    Hermila Mobley, LuizD

## 2018-01-04 ENCOUNTER — TELEPHONE (OUTPATIENT)
Dept: VASCULAR LAB | Facility: MEDICAL CENTER | Age: 63
End: 2018-01-04

## 2018-01-04 NOTE — TELEPHONE ENCOUNTER
Initial anticoag note and most recent d/c summary reviewed.  Patient started on anticoagulation with xarelto for what appears to be an unprovoked DVT/PE  Evidence of pulmonary htn at presentation   Received thrombolysis.     Pending further recs from PCP, we will continue with at least 6 months of oral anticoagulation after which time we can discuss the risks and benefits of extended anticoag with PCP.  If tolerates anticoagulation well, would be a IIb indication for extended anticoag per accp guidelines.     No obvious indication for concomitant asa, will talk to clinical pharmacist about discontinuing.      Will defer any further indicated w/u for occult malignancy to pcp.    May benefit from repeat echo in a few months to ensure that pulmonary pressures have improved, but will leave that determination to pcp.    Michael Bloch, MD  Anticoagulation Clinic    Cc: JAROCHO Leal

## 2018-04-11 ENCOUNTER — ANTICOAGULATION VISIT (OUTPATIENT)
Dept: MEDICAL GROUP | Facility: PHYSICIAN GROUP | Age: 63
End: 2018-04-11
Payer: COMMERCIAL

## 2018-04-11 VITALS — SYSTOLIC BLOOD PRESSURE: 126 MMHG | HEART RATE: 102 BPM | DIASTOLIC BLOOD PRESSURE: 70 MMHG | OXYGEN SATURATION: 93 %

## 2018-04-11 DIAGNOSIS — I27.82 CHRONIC SEPTIC PULMONARY EMBOLISM WITH ACUTE COR PULMONALE (HCC): ICD-10-CM

## 2018-04-11 DIAGNOSIS — I26.01 CHRONIC SEPTIC PULMONARY EMBOLISM WITH ACUTE COR PULMONALE (HCC): ICD-10-CM

## 2018-04-11 DIAGNOSIS — Z86.711 HISTORY OF PULMONARY EMBOLISM: ICD-10-CM

## 2018-04-11 PROCEDURE — 99211 OFF/OP EST MAY X REQ PHY/QHP: CPT | Performed by: FAMILY MEDICINE

## 2018-04-11 RX ORDER — BUPROPION HYDROCHLORIDE 100 MG/1
TABLET, EXTENDED RELEASE ORAL
COMMUNITY
Start: 2018-03-21 | End: 2018-06-06

## 2018-04-11 NOTE — PROGRESS NOTES
Target end date:tbd     Indication: PE     Drug: Xarelto 20mg          Health Status Since Last Assessment   Patient denies any new relevant medical problems, ED visits or hospitalizations   Patient denies any embolic events (stroke/tia/systemic embolism)    Adherence with DOAC Therapy   Pt has zero missed any doses in the average week    Bleeding Risk Assessment     none Epistaxis   Pt denies any excessive or unusual bleeding/hematomas.  Pt denies any GI bleeds or hematemesis.  Pt denies any concerning daily headache or sub dural hematoma symptoms.     Pt denies any hematuria or abnormal vaginal bleeding.   Latest Hemoglobin pending   ETOH overuse 2 glasses of wine weekly     Creatinine Clearance/Renal Function     Latest ClCr pending     Drug Interactions   Platelets: pending   ASA/other antiplatelets none   NSAID none   Other drug interactions none    Verified no anticonvulsant or azole therapy, education provided for future use.     Examination   Blood Pressure 126/70   Symptomatic hypotension none   Significant gait impairment/imbalance/high risk for falls? none    Final Assessment and Recommendations:   Patient appears stable from the anticoagulation staindpoint.     Benefits of continued DOAC therapy outweigh risks for this patient   Recommend pt continue with current DOAC therapy       Other Actions: cmp/ cbc hemogram ordered prior to next visit    Follow up:   Will follow up with patient via telephone in 6 months.  I have added this patient to my list for follow up.    Pt will do his labs tomorrow.  He will f/u with PCP in migue Mobley, PharmD

## 2018-04-13 PROBLEM — G89.29 ELBOW PAIN, CHRONIC, RIGHT: Status: ACTIVE | Noted: 2018-04-13

## 2018-04-13 PROBLEM — E78.2 ELEVATED TRIGLYCERIDES WITH HIGH CHOLESTEROL: Status: ACTIVE | Noted: 2017-07-24

## 2018-04-13 PROBLEM — D17.21 LIPOMA OF RIGHT UPPER EXTREMITY: Status: ACTIVE | Noted: 2018-04-13

## 2018-04-13 PROBLEM — R73.01 ELEVATED FASTING GLUCOSE: Status: ACTIVE | Noted: 2018-04-13

## 2018-04-13 PROBLEM — R20.2 NUMBNESS AND TINGLING IN BOTH HANDS: Status: ACTIVE | Noted: 2018-04-13

## 2018-04-13 PROBLEM — R20.0 NUMBNESS AND TINGLING IN BOTH HANDS: Status: ACTIVE | Noted: 2018-04-13

## 2018-04-13 PROBLEM — M25.521 ELBOW PAIN, CHRONIC, RIGHT: Status: ACTIVE | Noted: 2018-04-13

## 2018-05-15 PROBLEM — J30.89 ENVIRONMENTAL AND SEASONAL ALLERGIES: Status: ACTIVE | Noted: 2018-05-15

## 2018-05-15 PROBLEM — J45.909 REACTIVE AIRWAY DISEASE: Status: ACTIVE | Noted: 2018-05-15

## 2018-06-01 PROBLEM — N52.9 ERECTILE DYSFUNCTION: Status: ACTIVE | Noted: 2018-06-01

## 2018-06-14 PROBLEM — R94.4 DECREASED GFR: Status: ACTIVE | Noted: 2018-06-14

## 2018-09-07 DIAGNOSIS — I82.403 ACUTE DEEP VEIN THROMBOSIS (DVT) OF BOTH LOWER EXTREMITIES, UNSPECIFIED VEIN (HCC): ICD-10-CM

## 2018-09-07 RX ORDER — RIVAROXABAN 20 MG/1
TABLET, FILM COATED ORAL
Qty: 90 TAB | Refills: 2 | Status: SHIPPED | OUTPATIENT
Start: 2018-09-07 | End: 2019-03-21

## 2018-09-07 NOTE — TELEPHONE ENCOUNTER
Cockcroft & Gault (Actual body weight): 110.4 (ml/min) 6/2018.  Refill for Xarelto sent; rpt labs in Dec.      ANTONETTE Hurd  [facility]

## 2018-10-03 ENCOUNTER — ANTICOAGULATION VISIT (OUTPATIENT)
Dept: MEDICAL GROUP | Facility: PHYSICIAN GROUP | Age: 63
End: 2018-10-03
Payer: COMMERCIAL

## 2018-10-03 VITALS — SYSTOLIC BLOOD PRESSURE: 140 MMHG | DIASTOLIC BLOOD PRESSURE: 90 MMHG | HEART RATE: 110 BPM | OXYGEN SATURATION: 96 %

## 2018-10-03 DIAGNOSIS — I27.82 CHRONIC SEPTIC PULMONARY EMBOLISM WITH ACUTE COR PULMONALE (HCC): ICD-10-CM

## 2018-10-03 DIAGNOSIS — I26.01 CHRONIC SEPTIC PULMONARY EMBOLISM WITH ACUTE COR PULMONALE (HCC): ICD-10-CM

## 2018-10-03 LAB — INR PPP: 1.1 (ref 2–3.5)

## 2018-10-03 PROCEDURE — 99211 OFF/OP EST MAY X REQ PHY/QHP: CPT | Performed by: FAMILY MEDICINE

## 2018-10-03 PROCEDURE — 85610 PROTHROMBIN TIME: CPT | Performed by: FAMILY MEDICINE

## 2018-10-03 NOTE — PROGRESS NOTES
Target end date:tbd     Indication: PE     Drug: Xarelto 20         Health Status Since Last Assessment   Patient denies any new relevant medical problems, ED visits or hospitalizations   Patient denies any embolic events (stroke/tia/systemic embolism)    Adherence with DOAC Therapy   Pt has zero missed any doses in the average week    Bleeding Risk Assessment     none Epistaxis   Pt denies any excessive or unusual bleeding/hematomas.  Pt denies any GI bleeds or hematemesis.  Pt denies any concerning daily headache or sub dural hematoma symptoms.     Pt denies any hematuria or abnormal vaginal bleeding.   Latest Hemoglobin 12.5   ETOH overuse zero     Creatinine Clearance/Renal Function     Latest ClCr 55-60ml/min     Drug Interactions   Platelets: pending   ASA/other antiplatelets none   NSAID none   Other drug interactions      Verified no anticonvulsant or azole therapy, education provided for future use.     Examination   Blood Pressure 140/90   Symptomatic hypotension none   Significant gait impairment/imbalance/high risk for falls? none    Final Assessment and Recommendations:   Patient appears stable from the anticoagulation staindpoint.     Benefits of continued DOAC therapy outweigh risks for this patient   Recommend pt continue with current DOAC therapy  (with dose adjustment)     Other Actions: cmp/ cbc hemogram ordered prior to next visit    Follow up:   Tbd.  Pt has completed 9 months of anticoagulation.   Hermila Mobley, Clinical Pharmacist

## 2018-10-03 NOTE — Clinical Note
This patient has completed 9 months of anticoagulation, however his PCP has not mentioned LOT in any of her notes.

## 2018-10-05 ENCOUNTER — HOSPITAL ENCOUNTER (OUTPATIENT)
Dept: LAB | Facility: MEDICAL CENTER | Age: 63
End: 2018-10-05
Attending: PHYSICIAN ASSISTANT
Payer: COMMERCIAL

## 2018-10-05 DIAGNOSIS — N52.9 ERECTILE DYSFUNCTION, UNSPECIFIED ERECTILE DYSFUNCTION TYPE: ICD-10-CM

## 2018-10-05 LAB
ANION GAP SERPL CALC-SCNC: 13 MMOL/L (ref 0–11.9)
BUN SERPL-MCNC: 21 MG/DL (ref 8–22)
CALCIUM SERPL-MCNC: 10.2 MG/DL (ref 8.5–10.5)
CHLORIDE SERPL-SCNC: 103 MMOL/L (ref 96–112)
CO2 SERPL-SCNC: 21 MMOL/L (ref 20–33)
CREAT SERPL-MCNC: 1.24 MG/DL (ref 0.5–1.4)
GLUCOSE SERPL-MCNC: 107 MG/DL (ref 65–99)
POTASSIUM SERPL-SCNC: 4.2 MMOL/L (ref 3.6–5.5)
PSA SERPL-MCNC: 4.25 NG/ML (ref 0–4)
SODIUM SERPL-SCNC: 137 MMOL/L (ref 135–145)
TSH SERPL DL<=0.005 MIU/L-ACNC: 2.18 UIU/ML (ref 0.38–5.33)

## 2018-10-05 PROCEDURE — 80048 BASIC METABOLIC PNL TOTAL CA: CPT

## 2018-10-05 PROCEDURE — 84443 ASSAY THYROID STIM HORMONE: CPT

## 2018-10-05 PROCEDURE — 84153 ASSAY OF PSA TOTAL: CPT

## 2018-10-05 PROCEDURE — 36415 COLL VENOUS BLD VENIPUNCTURE: CPT

## 2018-10-15 ENCOUNTER — ANTICOAGULATION VISIT (OUTPATIENT)
Dept: VASCULAR LAB | Facility: MEDICAL CENTER | Age: 63
End: 2018-10-15
Attending: INTERNAL MEDICINE
Payer: COMMERCIAL

## 2018-10-15 DIAGNOSIS — I27.82 CHRONIC SEPTIC PULMONARY EMBOLISM WITH ACUTE COR PULMONALE (HCC): ICD-10-CM

## 2018-10-15 DIAGNOSIS — I26.01 CHRONIC SEPTIC PULMONARY EMBOLISM WITH ACUTE COR PULMONALE (HCC): ICD-10-CM

## 2018-10-15 LAB — INR PPP: 1.5 (ref 2–3.5)

## 2018-10-15 PROCEDURE — 99213 OFFICE O/P EST LOW 20 MIN: CPT | Performed by: NURSE PRACTITIONER

## 2018-10-15 PROCEDURE — 85610 PROTHROMBIN TIME: CPT | Performed by: NURSE PRACTITIONER

## 2018-10-15 PROCEDURE — 99212 OFFICE O/P EST SF 10 MIN: CPT | Performed by: NURSE PRACTITIONER

## 2018-10-15 ASSESSMENT — ENCOUNTER SYMPTOMS
WHEEZING: 0
BRUISES/BLEEDS EASILY: 0
CHILLS: 0
HEMOPTYSIS: 0
FEVER: 0
HEADACHES: 0
SHORTNESS OF BREATH: 0
DIZZINESS: 0
BLOOD IN STOOL: 0
FALLS: 0
LOSS OF CONSCIOUSNESS: 0

## 2018-10-15 NOTE — PROGRESS NOTES
"VASCULAR ANTI-COAGULATION VISIT  Subjective:   Jose Quintanilla is a 62 y.o. male who presents today 10/15/2018 for   No chief complaint on file.      HPI: Patient presents today for vascular evaluation of anticoagulation therapy.  Had 1st time VTE in Dec 2017 - \"Extensive pulmonary thromboemboli with saddle embolus and some evidence of right heart strain\" and \"Right lower extremity tibial deep venous thrombosis, without extension into the right popliteal or more proximal veins\" and \"left lower extremity deep venous thrombosis involving the tibial veins and extending into the distal left popliteal vein.\"  No known provoking factors: recent sx, trauma or extended travel  No family hx of DVT or PE  Father and mother  from CVA - mother took warfarin  He smoked for 1-2 years in college in the 1970's  Is up to date on age-appropriate cancer screenings  No hx of malignancies  Never on HRTs  Currently taking Xarelto 20 mg daily; copay $25 per month which he can currently afford. Switching to medicare . Interested in getting part D for rx coverage  No problems with bleeding or excessive bruising  No chest pain or SOB  No leg swelling  Feels anxious about stopping blood thinners      Social History   Substance Use Topics   • Smoking status: Former Smoker   • Smokeless tobacco: Never Used   • Alcohol use No     DIET AND EXERCISE:  Weight Change: none  Diet: common adult  Exercise: no regular exercise program     Review of Systems   Constitutional: Negative for chills and fever.   HENT: Negative for nosebleeds.    Respiratory: Negative for hemoptysis, shortness of breath and wheezing.    Cardiovascular: Negative for chest pain and leg swelling.   Gastrointestinal: Negative for blood in stool and melena.   Genitourinary: Negative for hematuria.   Musculoskeletal: Negative for falls.   Skin: Negative for itching and rash.   Neurological: Negative for dizziness, loss of consciousness and headaches. "   Endo/Heme/Allergies: Does not bruise/bleed easily.      Objective:   There were no vitals filed for this visit.  There is no height or weight on file to calculate BMI.  Physical Exam   Constitutional: He is oriented to person, place, and time. He appears well-developed and well-nourished.   obese   HENT:   Head: Normocephalic.   Neck: Normal range of motion.   Cardiovascular: Normal rate and normal heart sounds.    Pulmonary/Chest: Effort normal and breath sounds normal.   Abdominal: Soft. Bowel sounds are normal.   Musculoskeletal: Normal range of motion.   Neurological: He is alert and oriented to person, place, and time.   Skin: Skin is warm and dry.   Psychiatric: He has a normal mood and affect. His behavior is normal. Judgment and thought content normal.         Lab Results   Component Value Date    PROTHROMBTM 16.9 (H) 12/19/2017    INR 1.5 10/15/2018         Lab Results   Component Value Date    SODIUM 137 10/05/2018    POTASSIUM 4.2 10/05/2018    CHLORIDE 103 10/05/2018    CO2 21 10/05/2018    GLUCOSE 107 (H) 10/05/2018    BUN 21 10/05/2018    CREATININE 1.24 10/05/2018        Lab Results   Component Value Date    WBC 10.2 12/22/2017    RBC 3.61 (L) 12/22/2017    HEMOGLOBIN 12.5 (L) 12/22/2017    HEMATOCRIT 36.9 (L) 12/22/2017    .2 (H) 12/22/2017    MCH 34.6 (H) 12/22/2017    MCHC 33.9 12/22/2017    MPV 11.4 12/22/2017 12/18/17 chest CT  1.  Extensive pulmonary thromboemboli with saddle embolus and some evidence of right heart strain  2.  Mild lingular groundglass is worrisome for pulmonary infarction  3.  Subpleural subcentimeter lower lobe opacities most likely indicates scarring although small nodule formation is not excluded     12/20/17 LE US   1.  Right lower extremity tibial deep venous thrombosis, without extension    into the right popliteal or more proximal veins.   2.  Left lower extremity deep venous thrombosis involving the tibial veins    and extending into the distal left  popliteal vein.  The proximal veins are    patent.     12/19/17 ECHO  No prior study is available for comparison.   Hyperdynamic left ventricular systolic function.  Left ventricular ejection fraction is visually estimated to be greater   than 75%.  Dilated right ventricle.  Reduced right ventricular systolic function.  Akinetic right ventricular free wall.  Right heart pressures are consistent with severe pulmonary hypertension   of 68 mmHg.      Medical Decision Making:  Today's Assessment / Status / Plan:     1. 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 Let pt know there is a 5-10% higher risk of recurrence after having any VTE. Discussed that there is risk for bleeding while on any anticoagulant and that there is no immediate reversal agent currently available for Xarelto. D/t unprovoked nature of her VTE, we also discussed the possibility of a hypercoag w/u to r/o any predisposing genetic factors for increased VTE risk. Pt understands that it is our recommendation for indefinite therapy based on current guidelines for unprovoked VTE. After much discussion, pt wishes to remain on Xarelto. Given identification bracelet. We will recheck ECHO.    HAS-BLED score = 0 (1 for age after 11/6/18)    -obtain ECHO to f/u pulm pressures  -recommended pt see PCP for age-appropriate cancer screenings as a small % of VTE may be caused by an underlying malignancy  -discussed s/s of return VTE and when to seek immediate care  -if any bleeding lasting 30min w/o stop, pt is to seek care at UC or ER  -if having any invasive  procedure, pt is to make sure MD knows of his clot hx and that he is currently on Xarelto  - he will let us know if the cost of this medication becomes too much  - encouraged walking as much as possible and weight loss, even as little as 5% reduction    Smoking: continue complete abstinence      Physical Activity: frequency : goal is 3-4 times a week    Instructed to follow-up with PCP for remainder of adult medical needs: yes  We will partner with other provider in the management of established vascular disease and cardiometabolic risk factors    Studies to Be Obtained: ECHO  Labs to Be Obtained: none    Follow up in: 6 months in antico clinic with labs. I will call pt to discuss ECHO once results received    Next Appointment: Monday, April 15, 2019 at 1:00 pm.         Amara Cardona, AHawaPSERGIO.    CC:   Carrie A Emory, P.A.-C. Bloch

## 2018-10-17 LAB — INR BLD: 1.5 (ref 0.9–1.2)

## 2018-11-12 ENCOUNTER — TELEPHONE (OUTPATIENT)
Dept: VASCULAR LAB | Facility: MEDICAL CENTER | Age: 63
End: 2018-11-12

## 2018-12-06 ENCOUNTER — DOCUMENTATION (OUTPATIENT)
Dept: VASCULAR LAB | Facility: MEDICAL CENTER | Age: 63
End: 2018-12-06

## 2018-12-06 NOTE — PROGRESS NOTES
Spoke with pt by phone to remind him to get ECHO. Pt unable to afford ECHO at this time and declines. He understands that we want to re-eval pulm pressures.    He is currently taking Xarelto. Copay will be too expensive in the new year. He has a 90 day supply. Wants to switch to warfarin after he uses his available tablets. Will plan to see pt in clinic 3/15/19.    Amara WHITMAN

## 2018-12-17 DIAGNOSIS — Z86.718 HISTORY OF DEEP VEIN THROMBOSIS: ICD-10-CM

## 2019-03-15 ENCOUNTER — ANTICOAGULATION VISIT (OUTPATIENT)
Dept: VASCULAR LAB | Facility: MEDICAL CENTER | Age: 64
End: 2019-03-15
Attending: INTERNAL MEDICINE
Payer: MEDICARE

## 2019-03-15 DIAGNOSIS — I82.403 ACUTE DEEP VEIN THROMBOSIS (DVT) OF BOTH LOWER EXTREMITIES, UNSPECIFIED VEIN (HCC): ICD-10-CM

## 2019-03-15 PROCEDURE — 99212 OFFICE O/P EST SF 10 MIN: CPT

## 2019-03-15 RX ORDER — WARFARIN SODIUM 5 MG/1
5 TABLET ORAL DAILY
Qty: 30 TAB | Refills: 3 | Status: SHIPPED | OUTPATIENT
Start: 2019-03-15 | End: 2019-04-03 | Stop reason: SDUPTHER

## 2019-03-15 NOTE — PROGRESS NOTES
Anticoagulation Summary  As of 3/15/2019    INR goal:   2.0-3.0   TTR:   --   INR used for dosing:      Warfarin maintenance plan:   5 mg (5 mg x 1) every day   Weekly warfarin total:   35 mg   Plan last modified:   Eva Casillas PharmD (3/15/2019)   Next INR check:   3/18/2019   Target end date:   Indefinite    Indications    Pulmonary embolism (HCC) [I26.99]  Pulmonary embolism (HCC) (Resolved) [I26.99]             Anticoagulation Episode Summary     INR check location:       Preferred lab:       Send INR reminders to:       Comments:         Anticoagulation Care Providers     Provider Role Specialty Phone number    Deb Ponce P.A.-C. Referring Family Medicine [phone]    [facility] Responsible  [phone]    Luiz PradoD Responsible          Anticoagulation Patient Findings      HPI:  Jose Quintanilla seen in clinic today for follow up on anticoagulation therapy in the presence of PE. Denies any changes to current medical/health status since last appointment. Denies any medication or diet changes. No current symptoms of bleeding or thrombosis reported.    A/P:   Patient cannot afford Xarelto hence we transitioning to warfarin. Pt with indefinite need for anticoagulation.  Discussed side effects and dosing instructions for warfarin.  Pt to Bridge with Xarelto until INR >2.0. (Explained to pt this is an off-label use, he understands). Xarelto samples provided today.  Warfarin 5 mg strength sent to pharmacy. Pt to start tonight.    Follow up appointment in 3 day(s).    Next Appointment: Monday, 03/18/2019     Luiz Bob.D

## 2019-03-18 ENCOUNTER — ANTICOAGULATION VISIT (OUTPATIENT)
Dept: VASCULAR LAB | Facility: MEDICAL CENTER | Age: 64
End: 2019-03-18
Attending: INTERNAL MEDICINE
Payer: MEDICARE

## 2019-03-18 VITALS — SYSTOLIC BLOOD PRESSURE: 130 MMHG | DIASTOLIC BLOOD PRESSURE: 83 MMHG | HEART RATE: 111 BPM

## 2019-03-18 DIAGNOSIS — I26.99 OTHER PULMONARY EMBOLISM WITHOUT ACUTE COR PULMONALE, UNSPECIFIED CHRONICITY (HCC): ICD-10-CM

## 2019-03-18 LAB
INR BLD: 2.1 (ref 0.9–1.2)
INR PPP: 2.1 (ref 2–3.5)

## 2019-03-18 PROCEDURE — 99212 OFFICE O/P EST SF 10 MIN: CPT | Performed by: NURSE PRACTITIONER

## 2019-03-18 PROCEDURE — 85610 PROTHROMBIN TIME: CPT

## 2019-03-21 ENCOUNTER — ANTICOAGULATION VISIT (OUTPATIENT)
Dept: VASCULAR LAB | Facility: MEDICAL CENTER | Age: 64
End: 2019-03-21
Attending: INTERNAL MEDICINE
Payer: MEDICARE

## 2019-03-21 VITALS — DIASTOLIC BLOOD PRESSURE: 95 MMHG | HEART RATE: 104 BPM | SYSTOLIC BLOOD PRESSURE: 144 MMHG

## 2019-03-21 DIAGNOSIS — I26.99 OTHER PULMONARY EMBOLISM WITHOUT ACUTE COR PULMONALE, UNSPECIFIED CHRONICITY (HCC): ICD-10-CM

## 2019-03-21 DIAGNOSIS — Z79.01 CURRENT USE OF LONG TERM ANTICOAGULATION: ICD-10-CM

## 2019-03-21 LAB — INR PPP: 1.3 (ref 2–3.5)

## 2019-03-21 PROCEDURE — 99212 OFFICE O/P EST SF 10 MIN: CPT

## 2019-03-21 PROCEDURE — 85610 PROTHROMBIN TIME: CPT

## 2019-03-21 NOTE — PROGRESS NOTES
Anticoagulation Summary  As of 3/21/2019    INR goal:   2.0-3.0   TTR:   56.3 % (6 d)   INR used for dosin.3! (3/21/2019)   Warfarin maintenance plan:   5 mg (5 mg x 1) every day   Weekly warfarin total:   35 mg   Plan last modified:   Eva Casillas, PharmD (3/15/2019)   Next INR check:   3/25/2019   Target end date:   Indefinite    Indications    Pulmonary embolism (HCC) [I26.99]  Pulmonary embolism (HCC) (Resolved) [I26.99]             Anticoagulation Episode Summary     INR check location:       Preferred lab:       Send INR reminders to:       Comments:         Anticoagulation Care Providers     Provider Role Specialty Phone number    Deb Ponce P.A.-C. [facility] [phone]    [facility] Responsible  [phone]    Hermila Mobley, PharmD Responsible          Anticoagulation Patient Findings  Patient Findings     Negatives:   Signs/symptoms of thrombosis, Signs/symptoms of bleeding, Laboratory test error suspected, Change in health, Change in alcohol use, Change in activity, Upcoming invasive procedure, Emergency department visit, Upcoming dental procedure, Missed doses, Extra doses, Change in medications, Change in diet/appetite, Hospital admission, Bruising, Other complaints        History of Present Illness: follow up appointment for chronic anticoagulation with the high risk medication, warfarin for PE    Last INR was at goal, pt is now sub therapeutic today.  Will bolus dose with 10mg qhs x2 then resume current dosing regimen. Follow up in 3 days, to reduce the risk of adverse events related to this high risk medication, warfarin.    Hermila Mobley, Clinical Pharmacist

## 2019-03-22 LAB — INR BLD: 1.3 (ref 0.9–1.2)

## 2019-03-25 ENCOUNTER — ANTICOAGULATION MONITORING (OUTPATIENT)
Dept: VASCULAR LAB | Facility: MEDICAL CENTER | Age: 64
End: 2019-03-25

## 2019-03-25 DIAGNOSIS — I26.99 OTHER PULMONARY EMBOLISM WITHOUT ACUTE COR PULMONALE, UNSPECIFIED CHRONICITY (HCC): ICD-10-CM

## 2019-03-25 LAB — INR PPP: 1.99 (ref 2–3.5)

## 2019-03-25 NOTE — PROGRESS NOTES
Anticoagulation Summary  As of 3/25/2019    INR goal:   2.0-3.0   TTR:   33.8 % (1.4 wk)   INR used for dosin.99! (3/25/2019)   Warfarin maintenance plan:   5 mg (5 mg x 1) every day   Weekly warfarin total:   35 mg   No change documented:   Sera Santiago   Plan last modified:   Luiz MichaelD (3/15/2019)   Next INR check:   3/27/2019   Target end date:   Indefinite    Indications    Pulmonary embolism (HCC) [I26.99]  Pulmonary embolism (HCC) (Resolved) [I26.99]             Anticoagulation Episode Summary     INR check location:       Preferred lab:       Send INR reminders to:       Comments:         Anticoagulation Care Providers     Provider Role Specialty Phone number    Deb Ponce P.A.-C. [facility] Family Medicine [phone]    [facility] Responsible  [phone]    Luiz PradoD Responsible          Anticoagulation Patient Findings  Patient Findings     Negatives:   Signs/symptoms of thrombosis, Signs/symptoms of bleeding, Laboratory test error suspected, Change in health, Change in alcohol use, Change in activity, Upcoming invasive procedure, Emergency department visit, Upcoming dental procedure, Missed doses, Extra doses, Change in medications, Change in diet/appetite, Hospital admission, Bruising, Other complaints        Spoke with the patient on the phone today, reporting a therapeutic INR of 1.99. Confirmed the current warfarin dosing regimen and patient compliance. Patient denies any interval changes to diet and/or medications. Patient denies any signs/symptoms of bleeding or clotting.  Patient instructed to continue with the current warfarin dosing regimen, and asked to follow up again in 3 days.     Marbella DeeD

## 2019-03-26 ENCOUNTER — ANTICOAGULATION MONITORING (OUTPATIENT)
Dept: VASCULAR LAB | Facility: MEDICAL CENTER | Age: 64
End: 2019-03-26

## 2019-03-26 DIAGNOSIS — I26.99 OTHER PULMONARY EMBOLISM WITHOUT ACUTE COR PULMONALE, UNSPECIFIED CHRONICITY (HCC): ICD-10-CM

## 2019-03-26 NOTE — PROGRESS NOTES
Anticoagulation Summary  As of 3/26/2019    INR goal:   2.0-3.0   TTR:   33.8 % (1.4 wk)   INR used for dosin.99! (3/25/2019)   Warfarin maintenance plan:   5 mg (5 mg x 1) every day   Weekly warfarin total:   35 mg   No change documented:   Hari Berger Med Ass't   Plan last modified:   Luiz MichaelD (3/15/2019)   Next INR check:   2019   Target end date:   Indefinite    Indications    Pulmonary embolism (HCC) [I26.99]  Pulmonary embolism (HCC) (Resolved) [I26.99]             Anticoagulation Episode Summary     INR check location:       Preferred lab:       Send INR reminders to:       Comments:         Anticoagulation Care Providers     Provider Role Specialty Phone number    Deb Ponce P.A.-C. Referring Family Medicine [phone]    [facility] Anticoagulation Services Responsible  [phone]    Hermila Mobley, PharmD Responsible          Anticoagulation Patient Findings  Patient Findings     Negatives:   Signs/symptoms of thrombosis, Signs/symptoms of bleeding, Laboratory test error suspected, Change in health, Change in alcohol use, Change in activity, Upcoming invasive procedure, Emergency department visit, Upcoming dental procedure, Missed doses, Extra doses, Change in medications, Change in diet/appetite, Hospital admission, Bruising, Other complaints        Left voicemail message to report therapeutic INR of 1.99.  Patient to continue with current warfarin dosing regimen. Requested pt contact the clinic for any change to diet or medication, and to report any signs or symptoms of bleeding, bruising or clotting.  Pt to follow up in 1 week.    Hari Berger Med Ass't     I have reviewed and concur with the above plan on 2019.  Hermila Mobley Clinical Pharmacist

## 2019-03-27 ENCOUNTER — ANCILLARY PROCEDURE (OUTPATIENT)
Dept: CARDIOLOGY | Facility: IMAGING CENTER | Age: 64
End: 2019-03-27
Attending: NURSE PRACTITIONER
Payer: MEDICARE

## 2019-03-27 ENCOUNTER — ANTICOAGULATION VISIT (OUTPATIENT)
Dept: MEDICAL GROUP | Facility: PHYSICIAN GROUP | Age: 64
End: 2019-03-27
Payer: MEDICARE

## 2019-03-27 DIAGNOSIS — I26.01 CHRONIC SEPTIC PULMONARY EMBOLISM WITH ACUTE COR PULMONALE (HCC): ICD-10-CM

## 2019-03-27 DIAGNOSIS — I27.82 CHRONIC SEPTIC PULMONARY EMBOLISM WITH ACUTE COR PULMONALE (HCC): ICD-10-CM

## 2019-03-27 DIAGNOSIS — I26.99 OTHER PULMONARY EMBOLISM WITHOUT ACUTE COR PULMONALE, UNSPECIFIED CHRONICITY (HCC): ICD-10-CM

## 2019-03-27 LAB
INR PPP: 2.1 (ref 2–3.5)
LV EJECT FRACT  99904: 75
LV EJECT FRACT MOD 2C 99903: 70.44
LV EJECT FRACT MOD 4C 99902: 77.51
LV EJECT FRACT MOD BP 99901: 74.11

## 2019-03-27 PROCEDURE — 93306 TTE W/DOPPLER COMPLETE: CPT | Performed by: INTERNAL MEDICINE

## 2019-03-27 PROCEDURE — 85610 PROTHROMBIN TIME: CPT | Performed by: NURSE PRACTITIONER

## 2019-03-27 PROCEDURE — 93793 ANTICOAG MGMT PT WARFARIN: CPT | Performed by: NURSE PRACTITIONER

## 2019-03-27 NOTE — PROGRESS NOTES
Anticoagulation Summary  As of 3/27/2019    INR goal:   2.0-3.0   TTR:   45.4 % (1.7 wk)   INR used for dosin.1 (3/27/2019)   Warfarin maintenance plan:   7.5 mg (5 mg x 1.5) every Mon, Wed, Fri; 5 mg (5 mg x 1) all other days   Weekly warfarin total:   42.5 mg   Plan last modified:   Hermila Mobley (3/27/2019)   Next INR check:   4/3/2019   Target end date:   Indefinite    Indications    Pulmonary embolism (HCC) [I26.99]  Pulmonary embolism (HCC) (Resolved) [I26.99]             Anticoagulation Episode Summary     INR check location:       Preferred lab:       Send INR reminders to:       Comments:         Anticoagulation Care Providers     Provider Role Specialty Phone number    Deb Ponce P.A.-C. Referring Family Medicine [phone]    [facility] Anticoagulation Services Responsible  [phone]    Hermila Mobley, PharmD Responsible          Anticoagulation Patient Findings  Patient Findings     Negatives:   Signs/symptoms of thrombosis, Signs/symptoms of bleeding, Laboratory test error suspected, Change in health, Change in alcohol use, Change in activity, Upcoming invasive procedure, Emergency department visit, Upcoming dental procedure, Missed doses, Extra doses, Change in medications, Change in diet/appetite, Hospital admission, Bruising, Other complaints        History of Present Illness: follow up appointment for chronic anticoagulation with the high risk medication, warfarin for PE    Last INR was out of range, dosage adjusted: pt is now at goal. Continue current dosing regimen.  Follow up in 1 weeks, to reduce the risk of adverse events related to this high risk medication, warfarin.    Hermila Mobley Clinical Pharmacist  Pt declines vitals today

## 2019-04-03 ENCOUNTER — ANTICOAGULATION VISIT (OUTPATIENT)
Dept: MEDICAL GROUP | Facility: PHYSICIAN GROUP | Age: 64
End: 2019-04-03
Payer: MEDICARE

## 2019-04-03 DIAGNOSIS — I26.99 OTHER PULMONARY EMBOLISM WITHOUT ACUTE COR PULMONALE, UNSPECIFIED CHRONICITY (HCC): ICD-10-CM

## 2019-04-03 LAB — INR PPP: 2.8 (ref 2–3.5)

## 2019-04-03 PROCEDURE — 85610 PROTHROMBIN TIME: CPT | Performed by: FAMILY MEDICINE

## 2019-04-03 PROCEDURE — 93793 ANTICOAG MGMT PT WARFARIN: CPT | Performed by: FAMILY MEDICINE

## 2019-04-03 RX ORDER — WARFARIN SODIUM 5 MG/1
TABLET ORAL
Qty: 135 TAB | Refills: 1 | Status: SHIPPED | OUTPATIENT
Start: 2019-04-03 | End: 2019-06-28

## 2019-04-03 NOTE — PROGRESS NOTES
Anticoagulation Summary  As of 4/3/2019    INR goal:   2.0-3.0   TTR:   65.0 % (2.7 wk)   INR used for dosin.8 (4/3/2019)   Warfarin maintenance plan:   7.5 mg (5 mg x 1.5) every Mon, Wed, Fri; 5 mg (5 mg x 1) all other days   Weekly warfarin total:   42.5 mg   Plan last modified:   Hermila Mobley (3/27/2019)   Next INR check:   2019   Target end date:   Indefinite    Indications    Pulmonary embolism (HCC) [I26.99]  Pulmonary embolism (HCC) (Resolved) [I26.99]             Anticoagulation Episode Summary     INR check location:       Preferred lab:       Send INR reminders to:       Comments:         Anticoagulation Care Providers     Provider Role Specialty Phone number    Deb Ponce P.A.-C. Referring Family Medicine [phone]    [facility] Anticoagulation Services Responsible  [phone]    Hermila Mobley, PharmD Responsible          Anticoagulation Patient Findings    History of Present Illness: follow up appointment for chronic anticoagulation with the high risk medication, warfarin for PE    Pt remains therapeutic today. Continue current dosing regimen.  Follow up in 3 weeks, to reduce the risk of adverse events related to this high risk medication, warfarin.    Hermila Mobley Clinical Pharmacist    Pt declines vitals today

## 2019-04-24 ENCOUNTER — ANTICOAGULATION VISIT (OUTPATIENT)
Dept: MEDICAL GROUP | Facility: PHYSICIAN GROUP | Age: 64
End: 2019-04-24
Payer: MEDICARE

## 2019-04-24 VITALS — WEIGHT: 298.4 LBS | BODY MASS INDEX: 39.37 KG/M2

## 2019-04-24 DIAGNOSIS — I26.99 OTHER PULMONARY EMBOLISM WITHOUT ACUTE COR PULMONALE, UNSPECIFIED CHRONICITY (HCC): ICD-10-CM

## 2019-04-24 LAB — INR PPP: 2.1 (ref 2–3.5)

## 2019-04-24 PROCEDURE — 85610 PROTHROMBIN TIME: CPT | Performed by: FAMILY MEDICINE

## 2019-04-24 PROCEDURE — 93793 ANTICOAG MGMT PT WARFARIN: CPT | Performed by: FAMILY MEDICINE

## 2019-04-24 NOTE — PROGRESS NOTES
Anticoagulation Summary  As of 2019    INR goal:   2.0-3.0   TTR:   83.0 % (1.3 mo)   INR used for dosin.10 (2019)   Warfarin maintenance plan:   7.5 mg (5 mg x 1.5) every Mon, Wed, Fri; 5 mg (5 mg x 1) all other days   Weekly warfarin total:   42.5 mg   Plan last modified:   Hermila Mobley (3/27/2019)   Next INR check:   2019   Target end date:   Indefinite    Indications    Pulmonary embolism (HCC) [I26.99]  Pulmonary embolism (HCC) (Resolved) [I26.99]             Anticoagulation Episode Summary     INR check location:       Preferred lab:       Send INR reminders to:       Comments:         Anticoagulation Care Providers     Provider Role Specialty Phone number    Deb Ponce P.A.-C. Referring Family Medicine [phone]    [facility] Anticoagulation Services Responsible  [phone]    Hermila Mobley, PharmD Responsible          Anticoagulation Patient Findings    History of Present Illness: follow up appointment for chronic anticoagulation with the high risk medication, warfarin for PE    Pt remains therapeutic today. Continue current dosing regimen.  Follow up in 8 weeks, to reduce the risk of adverse events related to this high risk medication, warfarin.    Hermila Mobley Clinical Pharmacist    Pt declines vitals today

## 2019-06-05 ENCOUNTER — ANTICOAGULATION VISIT (OUTPATIENT)
Dept: MEDICAL GROUP | Facility: PHYSICIAN GROUP | Age: 64
End: 2019-06-05
Payer: MEDICARE

## 2019-06-05 DIAGNOSIS — Z79.01 CHRONIC ANTICOAGULATION: ICD-10-CM

## 2019-06-05 DIAGNOSIS — I26.99 OTHER PULMONARY EMBOLISM WITHOUT ACUTE COR PULMONALE, UNSPECIFIED CHRONICITY (HCC): ICD-10-CM

## 2019-06-05 LAB — INR PPP: 1.4 (ref 2–3.5)

## 2019-06-05 NOTE — PROGRESS NOTES
Anticoagulation Summary  As of 2019    INR goal:   2.0-3.0   TTR:   47.9 % (2.7 mo)   INR used for dosin.40! (2019)   Warfarin maintenance plan:   5 mg (5 mg x 1) every Mon, Fri; 7.5 mg (5 mg x 1.5) all other days   Weekly warfarin total:   47.5 mg   Plan last modified:   Hermila Mobley (2019)   Next INR check:   2019   Target end date:   Indefinite    Indications    Pulmonary embolism (HCC) [I26.99]  Pulmonary embolism (HCC) (Resolved) [I26.99]             Anticoagulation Episode Summary     INR check location:       Preferred lab:       Send INR reminders to:       Comments:         Anticoagulation Care Providers     Provider Role Specialty Phone number    Deb Ponce P.A.-C. Referring Family Medicine [phone]    [facility] Anticoagulation [facility] Responsible  [phone]    Hermila Mobley, PharmD Responsible          Anticoagulation Patient Findings          History of Present Illness: follow up appointment for chronic anticoagulation with the high risk medication, warfarin for Pulmonary embololism    Last INR was at goal, pt is now sub therapeutic today.  Will bolus dose with 10mg tonight, and increase weekly dose by ~10%.  Follow up in 2 weeks, to reduce the risk of adverse events related to this high risk medication, warfarin.    Hermila Mobley Clinical Pharmacist  Pt declines vitals today    Pt is no longer able to be seen in this clinic as he does not have a [facility] PCP.  SO given to patient.

## 2019-06-19 ENCOUNTER — HOSPITAL ENCOUNTER (OUTPATIENT)
Dept: LAB | Facility: MEDICAL CENTER | Age: 64
End: 2019-06-19
Attending: NURSE PRACTITIONER
Payer: MEDICARE

## 2019-06-19 DIAGNOSIS — Z79.01 CHRONIC ANTICOAGULATION: ICD-10-CM

## 2019-06-19 LAB
INR PPP: 1.19 (ref 0.87–1.13)
PROTHROMBIN TIME: 15.4 SEC (ref 12–14.6)

## 2019-06-19 PROCEDURE — 85610 PROTHROMBIN TIME: CPT

## 2019-06-19 PROCEDURE — 36415 COLL VENOUS BLD VENIPUNCTURE: CPT

## 2019-06-20 ENCOUNTER — ANTICOAGULATION MONITORING (OUTPATIENT)
Dept: VASCULAR LAB | Facility: MEDICAL CENTER | Age: 64
End: 2019-06-20

## 2019-06-20 DIAGNOSIS — I26.99 OTHER PULMONARY EMBOLISM WITHOUT ACUTE COR PULMONALE, UNSPECIFIED CHRONICITY (HCC): ICD-10-CM

## 2019-06-20 NOTE — PROGRESS NOTES
Anticoagulation Summary  As of 2019    INR goal:   2.0-3.0   TTR:   40.9 % (3.2 mo)   INR used for dosin.19! (2019)   Warfarin maintenance plan:   10 mg (5 mg x 2) every day   Weekly warfarin total:   70 mg   Plan last modified:   Hermila Mobley (2019)   Next INR check:   2019   Target end date:   Indefinite    Indications    Pulmonary embolism (HCC) [I26.99]  Pulmonary embolism (HCC) (Resolved) [I26.99]             Anticoagulation Episode Summary     INR check location:       Preferred lab:       Send INR reminders to:       Comments:         Anticoagulation Care Providers     Provider Role Specialty Phone number    Deb Ponce P.A.-C. Referring Family Medicine [phone]    [facility] Anticoagulation Services Responsible  [phone]    Hermila Mobley, PharmD Responsible          Anticoagulation Patient Findings  Patient Findings     Negatives:   Signs/symptoms of thrombosis, Signs/symptoms of bleeding, Laboratory test error suspected, Change in health, Change in alcohol use, Change in activity, Upcoming invasive procedure, Emergency department visit, Upcoming dental procedure, Missed doses, Extra doses, Change in medications, Change in diet/appetite, Hospital admission, Bruising, Other complaints              Spoke with Jose to report a sub therapeutic INR of 1.2.  Pt admits to having V-8 juice daily, however he is out now. Will increase weekly dose to 70mg/week.  Follow up in 1 weeks, to reduce the risk of adverse events related to this high risk medication, warfarin.    Hermila Mobley, Clinical Pharmacist

## 2019-06-27 ENCOUNTER — HOSPITAL ENCOUNTER (OUTPATIENT)
Dept: LAB | Facility: MEDICAL CENTER | Age: 64
End: 2019-06-27
Attending: NURSE PRACTITIONER
Payer: MEDICARE

## 2019-06-27 DIAGNOSIS — Z79.01 CHRONIC ANTICOAGULATION: ICD-10-CM

## 2019-06-27 LAB
INR PPP: 1.76 (ref 0.87–1.13)
INR PPP: 1.76 (ref 2–3.5)
PROTHROMBIN TIME: 21 SEC (ref 12–14.6)

## 2019-06-27 PROCEDURE — 36415 COLL VENOUS BLD VENIPUNCTURE: CPT

## 2019-06-27 PROCEDURE — 85610 PROTHROMBIN TIME: CPT

## 2019-06-28 ENCOUNTER — ANTICOAGULATION MONITORING (OUTPATIENT)
Dept: VASCULAR LAB | Facility: MEDICAL CENTER | Age: 64
End: 2019-06-28

## 2019-06-28 DIAGNOSIS — I26.99 OTHER PULMONARY EMBOLISM WITHOUT ACUTE COR PULMONALE, UNSPECIFIED CHRONICITY (HCC): ICD-10-CM

## 2019-06-28 RX ORDER — WARFARIN SODIUM 5 MG/1
TABLET ORAL
Qty: 60 TAB | Refills: 3 | Status: SHIPPED | OUTPATIENT
Start: 2019-06-28 | End: 2020-01-08

## 2019-06-28 NOTE — PROGRESS NOTES
Anticoagulation Summary  As of 2019    INR goal:   2.0-3.0   TTR:   38.0 % (3.5 mo)   INR used for dosin.76! (2019)   Warfarin maintenance plan:   10 mg (5 mg x 2) every day   Weekly warfarin total:   70 mg   Plan last modified:   Hermila Mobley (2019)   Next INR check:   2019   Target end date:   Indefinite    Indications    Pulmonary embolism (HCC) [I26.99]  Pulmonary embolism (HCC) (Resolved) [I26.99]             Anticoagulation Episode Summary     INR check location:       Preferred lab:       Send INR reminders to:       Comments:         Anticoagulation Care Providers     Provider Role Specialty Phone number    Deb Ponce P.A.-C. Referring Family Medicine [phone]    [facility] Anticoagulation Services Responsible  [phone]    Hermila Mobley, PharmD Responsible          Anticoagulation Patient Findings  Patient Findings     Negatives:   Signs/symptoms of thrombosis, Signs/symptoms of bleeding, Laboratory test error suspected, Change in health, Change in alcohol use, Change in activity, Upcoming invasive procedure, Emergency department visit, Upcoming dental procedure, Missed doses, Extra doses, Change in medications, Change in diet/appetite, Hospital admission, Bruising, Other complaints        Spoke with patient today regarding subtherapeutic INR of 1.76.  Patient denies any signs/symptoms of bruising or bleeding or any changes in diet and medications.  Instructed patient to call clinic with any questions or concerns.  Patient no longer drinking V8 juice and has cut down green vegetable consumption as well.  Given the decrease in VitK intake and increase in warfarin dosing, will have patient continue with current warfarin regimen at this point.  Follow up in 1 weeks, to reduce risk of adverse events related to this high risk medication,  Warfarin.    Vinay Alcantara, PharmD, BCACP

## 2019-07-16 ENCOUNTER — ANTICOAGULATION MONITORING (OUTPATIENT)
Dept: VASCULAR LAB | Facility: MEDICAL CENTER | Age: 64
End: 2019-07-16

## 2019-07-16 ENCOUNTER — HOSPITAL ENCOUNTER (OUTPATIENT)
Dept: LAB | Facility: MEDICAL CENTER | Age: 64
End: 2019-07-16
Attending: NURSE PRACTITIONER
Payer: MEDICARE

## 2019-07-16 DIAGNOSIS — I26.99 OTHER PULMONARY EMBOLISM WITHOUT ACUTE COR PULMONALE, UNSPECIFIED CHRONICITY (HCC): ICD-10-CM

## 2019-07-16 DIAGNOSIS — Z79.01 CHRONIC ANTICOAGULATION: ICD-10-CM

## 2019-07-16 LAB
INR PPP: 7.32 (ref 0.87–1.13)
PROTHROMBIN TIME: 65.9 SEC (ref 12–14.6)

## 2019-07-16 PROCEDURE — 85610 PROTHROMBIN TIME: CPT

## 2019-07-16 PROCEDURE — 36415 COLL VENOUS BLD VENIPUNCTURE: CPT

## 2019-07-17 ENCOUNTER — TELEPHONE (OUTPATIENT)
Dept: URGENT CARE | Facility: PHYSICIAN GROUP | Age: 64
End: 2019-07-17

## 2019-07-17 ENCOUNTER — TELEPHONE (OUTPATIENT)
Dept: VASCULAR LAB | Facility: MEDICAL CENTER | Age: 64
End: 2019-07-17

## 2019-07-17 NOTE — TELEPHONE ENCOUNTER
Jose called, returning your call. Per your notes I advised him to stop Warfrin and have INR rechecked on 7/19/19, he advised he is out of town and will not return until Monday. Please provide me with further instructions and I will call him back. Or, he can be reached at [phone].         Joseph returned my call at 437pm. He will be calling patient

## 2019-07-17 NOTE — TELEPHONE ENCOUNTER
Attempted to return patient's call in regards to recent INR results.  No answer to call, left detailed message.  Vinay Alcantara, LuizD, BCACP

## 2019-07-17 NOTE — PROGRESS NOTES
Anticoagulation Summary  As of 2019    INR goal:   2.0-3.0   TTR:   34.9 % (4.1 mo)   INR used for dosin.32! (2019)   Warfarin maintenance plan:   10 mg (5 mg x 2) every day   Weekly warfarin total:   70 mg   Plan last modified:   Hermila ADAM Filter (2019)   Next INR check:      Target end date:   Indefinite    Indications    Pulmonary embolism (HCC) [I26.99]  Pulmonary embolism (HCC) (Resolved) [I26.99]             Anticoagulation Episode Summary     INR check location:       Preferred lab:       Send INR reminders to:       Comments:         Anticoagulation Care Providers     Provider Role Specialty Phone number    Deb Ponce P.A.-C. Referring [facility] Medicine [phone]    [facility] Anticoagulation Services Responsible  [phone]    Herimla Mobley, LuizD Responsible          Anticoagulation Patient Findings     Left voicemail message to report a critically supratherapeutic INR of 7.32 reported by lab this evening.  Requested pt contact the clinic for any s/s of unusual bleeding, bruising, clotting or any changes to diet or medication.   Instructed patient to hold warfarin for three days, then recheck INR by Enrique 19 in the early part of day to ensure results before weekend.  Will call back in the morning to verify patient has received these instructions.  FU 3 days.  Vinay Alcantara, PharmD, BCACP

## 2019-07-17 NOTE — TELEPHONE ENCOUNTER
[facility]    Called pt and both emergency contacts and left a VM requesting to stop warfarin and test INR again on 7/19/19.  Also requested they return a phone call to the clinic to make sure they received the messages we have left.  Instructed pt to seek emergency medical help for any s/s of bleeding.    Joseph Clayton, PharmD

## 2019-07-17 NOTE — TELEPHONE ENCOUNTER
[facility]    Received a call from Kylie about the pt updates and that he is leaving [facility].  Tried calling pt again, but was unable to reach the pt.  Requested the pt to call our on-call pharmacist if necessary and I provided the phone number on the pt's VM.    Joseph Clayton, PharmD

## 2019-07-18 ENCOUNTER — ANTICOAGULATION MONITORING (OUTPATIENT)
Dept: VASCULAR LAB | Facility: MEDICAL CENTER | Age: 64
End: 2019-07-18

## 2019-07-18 NOTE — PROGRESS NOTES
Anticoagulation Summary  As of 7/18/2019    INR goal:   2.0-3.0   TTR:   34.9 % (4.1 mo)   INR used for dosing:   No new INR was available at the time of this encounter.   Warfarin maintenance plan:   10 mg (5 mg x 2) every day   Weekly warfarin total:   70 mg   Plan last modified:   Hermila Mobley (6/20/2019)   Next INR check:   7/22/2019   Target end date:   Indefinite    Indications    Pulmonary embolism (HCC) [I26.99]  Pulmonary embolism (HCC) (Resolved) [I26.99]             Anticoagulation Episode Summary     INR check location:       Preferred lab:       Send INR reminders to:       Comments:         Anticoagulation Care Providers     Provider Role Specialty Phone number    Deb Ponce P.A.-C. [facility] Family Medicine [phone]    [facility] Anticoagulation Services Responsible  [phone]    Hermila Mobley, PharmD Responsible          Anticoagulation Patient Findings    HPI:  Jose Quintanilla, on anticoagulation therapy with warfarin for PE  Changes to current medical/health status since last appt: none  Denies signs/symptoms of bleeding and/or thrombosis since the last appt.    Denies any interval changes to diet  Denies any interval changes to medications since last appt.   Denies any complications or cost restrictions with current therapy.     A/P   INR  SUPRA-therapeutic.   Unknown cause of elevated INR  Pt already took two more doses since his INR.  Requested he stop warfarin today and two more additional days.     Next INR in 4 days.     Joseph Clayton, PharmD

## 2019-07-22 ENCOUNTER — HOSPITAL ENCOUNTER (OUTPATIENT)
Dept: LAB | Facility: MEDICAL CENTER | Age: 64
End: 2019-07-22
Attending: NURSE PRACTITIONER
Payer: MEDICARE

## 2019-07-22 DIAGNOSIS — Z79.01 CHRONIC ANTICOAGULATION: ICD-10-CM

## 2019-07-22 LAB
INR PPP: 2.51 (ref 0.87–1.13)
PROTHROMBIN TIME: 28 SEC (ref 12–14.6)

## 2019-07-22 PROCEDURE — 36415 COLL VENOUS BLD VENIPUNCTURE: CPT

## 2019-07-22 PROCEDURE — 85610 PROTHROMBIN TIME: CPT

## 2019-07-23 ENCOUNTER — ANTICOAGULATION MONITORING (OUTPATIENT)
Dept: VASCULAR LAB | Facility: MEDICAL CENTER | Age: 64
End: 2019-07-23

## 2019-07-23 NOTE — PROGRESS NOTES
Anticoagulation Summary  As of 2019    INR goal:   2.0-3.0   TTR:   33.7 % (4.3 mo)   INR used for dosin.51 (2019)   Warfarin maintenance plan:   7.5 mg (5 mg x 1.5) every day   Weekly warfarin total:   52.5 mg   Plan last modified:   Tiara Albright, Pharmacy Intern (2019)   Next INR check:      Target end date:   Indefinite    Indications    Pulmonary embolism (HCC) [I26.99]  Pulmonary embolism (HCC) (Resolved) [I26.99]             Anticoagulation Episode Summary     INR check location:       Preferred lab:       Send INR reminders to:       Comments:         Anticoagulation Care Providers     Provider Role Specialty Phone number    Deb Ponce P.A.-C. Referring Family Medicine [phone]    [facility]tico[facility] Responsible  [phone]    Hermila Mobley, LuizD Responsible          Anticoagulation Patient Findings    Spoke with patient to inform the last INR reading of 2.5.   Patient is being treated for PE.     Patient is therapeutic and within goal range of 2.0-3.0 and was told to reduce weekly regimen to 7.5mgTThSun and 10mg all other days.     Slight reduction due to supra-therapeutic INR last week and changes in diet from previous notes. Patient was therapeutic on 42.5mg/week in April and then started drinking V8 juices and increased vitK vegetables. He reported reducing those in  which would explain the fluctuations.    Patient reports having no S&S of bleeding. Patient reports no changes in diet and medications.    Patient drinks about 10 oz of red wine each night.     Patient should follow up in 3 days for INR and monitoring per protocol.      Regimen was discussed and approved by Yoni Anne.    Tiara Albright, Pharmacy Intern       I have reviewed and concur with the above plan     Lenard Anne, PharmD

## 2019-07-26 ENCOUNTER — HOSPITAL ENCOUNTER (OUTPATIENT)
Dept: LAB | Facility: MEDICAL CENTER | Age: 64
End: 2019-07-26
Attending: NURSE PRACTITIONER
Payer: MEDICARE

## 2019-07-26 DIAGNOSIS — Z79.01 CHRONIC ANTICOAGULATION: ICD-10-CM

## 2019-07-26 LAB
INR PPP: 2.68 (ref 0.87–1.13)
PROTHROMBIN TIME: 29.5 SEC (ref 12–14.6)

## 2019-07-26 PROCEDURE — 36415 COLL VENOUS BLD VENIPUNCTURE: CPT

## 2019-07-26 PROCEDURE — 85610 PROTHROMBIN TIME: CPT

## 2019-07-29 ENCOUNTER — ANTICOAGULATION MONITORING (OUTPATIENT)
Dept: VASCULAR LAB | Facility: MEDICAL CENTER | Age: 64
End: 2019-07-29

## 2019-07-29 DIAGNOSIS — I26.99 OTHER PULMONARY EMBOLISM WITHOUT ACUTE COR PULMONALE, UNSPECIFIED CHRONICITY (HCC): ICD-10-CM

## 2019-07-29 NOTE — PROGRESS NOTES
OP Telephone Anticoagulation Service Note    Date: 2019      Anticoagulation Summary  As of 2019    INR goal:   2.0-3.0   TTR:   35.8 % (4.4 mo)   INR used for dosin.68 (2019)   Warfarin maintenance plan:   7.5 mg (5 mg x 1.5) every e, Thu, Sat; 10 mg (5 mg x 2) all other days   Weekly warfarin total:   62.5 mg   Plan last modified:   Francis Cook, Pharmacy Intern (2019)   Next INR check:   2019   Target end date:   Indefinite    Indications    Pulmonary embolism (HCC) [I26.99]  Pulmonary embolism (HCC) (Resolved) [I26.99]             Anticoagulation Episode Summary     INR check location:       Preferred lab:       Send INR reminders to:       Comments:         Anticoagulation Care Providers     Provider Role Specialty Phone number    Deb Ponce P.A.-C. Referring Family Medicine [phone]    [facility] Anticoagulation Services Responsible  [phone]    Hermila Mobley, PharmD Responsible          Anticoagulation Patient Findings        Plan: Spoke with patient on the phone. Patient is therapeutic today. Confirmed dosing, patient took 7.5 mg on Tues Th, and Sat as outlined in the last note, but also took 7.5 mg on . No missed tablets in the last week. Patient denies any changes in medications or diet. Patient denies any signs or symptoms of bleeding or clotting. Instructed patient to call clinic if any unusual bleeding or bruising occurs. Instructed patient to continue dosing as outlined and updated calendar to reflect change from previous note. Will follow-up with patient in 1 week(s) due to recent changes in dosing and labile INR readings.        Francis Cook, Pharmacy Intern

## 2019-08-02 ENCOUNTER — HOSPITAL ENCOUNTER (OUTPATIENT)
Dept: LAB | Facility: MEDICAL CENTER | Age: 64
End: 2019-08-02
Attending: NURSE PRACTITIONER
Payer: MEDICARE

## 2019-08-02 DIAGNOSIS — Z79.01 CHRONIC ANTICOAGULATION: ICD-10-CM

## 2019-08-02 LAB
INR PPP: 5.33 (ref 0.87–1.13)
PROTHROMBIN TIME: 51.1 SEC (ref 12–14.6)

## 2019-08-02 PROCEDURE — 36415 COLL VENOUS BLD VENIPUNCTURE: CPT

## 2019-08-02 PROCEDURE — 85610 PROTHROMBIN TIME: CPT

## 2019-08-05 ENCOUNTER — ANTICOAGULATION MONITORING (OUTPATIENT)
Dept: VASCULAR LAB | Facility: MEDICAL CENTER | Age: 64
End: 2019-08-05

## 2019-08-05 DIAGNOSIS — I26.99 OTHER PULMONARY EMBOLISM WITHOUT ACUTE COR PULMONALE, UNSPECIFIED CHRONICITY (HCC): ICD-10-CM

## 2019-08-05 NOTE — PROGRESS NOTES
Anticoagulation Summary  As of 2019    INR goal:   2.0-3.0   TTR:   34.6 % (4.7 mo)   INR used for dosin.33! (2019)   Warfarin maintenance plan:   7.5 mg (5 mg x 1.5) every day   Weekly warfarin total:   52.5 mg   Plan last modified:   Hermila Mobley (2019)   Next INR check:   2019   Target end date:   Indefinite    Indications    Pulmonary embolism (HCC) [I26.99]  Pulmonary embolism (HCC) (Resolved) [I26.99]             Anticoagulation Episode Summary     INR check location:       Preferred lab:       Send INR reminders to:       Comments:         Anticoagulation Care Providers     Provider Role Specialty Phone number    Deb Ponce P.A.-C. Referring Family Medicine [phone]    [facility] Anticoagulation Services Responsible  [phone]    Hermila Mobley, PharmD Responsible          Anticoagulation Patient Findings        spoke with Jose to report a supratherapeutic INR of 5.3.  Pt held his dose last night, will hold dose tonight too and reduce weekly dose by 15%. Follow up in 1 weeks, to reduce the risk of adverse events related to this high risk medication, warfarin.    Hermila Mobley, Clinical Pharmacist

## 2019-08-14 ENCOUNTER — HOSPITAL ENCOUNTER (OUTPATIENT)
Dept: LAB | Facility: MEDICAL CENTER | Age: 64
End: 2019-08-14
Attending: NURSE PRACTITIONER
Payer: MEDICARE

## 2019-08-14 DIAGNOSIS — Z79.01 CHRONIC ANTICOAGULATION: ICD-10-CM

## 2019-08-14 LAB
INR PPP: 3.2 (ref 0.87–1.13)
PROTHROMBIN TIME: 34 SEC (ref 12–14.6)

## 2019-08-14 PROCEDURE — 85610 PROTHROMBIN TIME: CPT

## 2019-08-14 PROCEDURE — 36415 COLL VENOUS BLD VENIPUNCTURE: CPT

## 2019-08-15 ENCOUNTER — ANTICOAGULATION MONITORING (OUTPATIENT)
Dept: VASCULAR LAB | Facility: MEDICAL CENTER | Age: 64
End: 2019-08-15

## 2019-08-15 DIAGNOSIS — I26.99 OTHER PULMONARY EMBOLISM WITHOUT ACUTE COR PULMONALE, UNSPECIFIED CHRONICITY (HCC): ICD-10-CM

## 2019-08-15 NOTE — PROGRESS NOTES
Anticoagulation Summary  As of 8/15/2019    INR goal:   2.0-3.0   TTR:   31.9 % (5.1 mo)   INR used for dosing:   3.20! (8/14/2019)   Warfarin maintenance plan:   5 mg (5 mg x 1) every Tue, Thu; 7.5 mg (5 mg x 1.5) all other days   Weekly warfarin total:   47.5 mg   Plan last modified:   Hermila Mobley (8/15/2019)   Next INR check:   8/29/2019   Target end date:   Indefinite    Indications    Pulmonary embolism (HCC) [I26.99]  Pulmonary embolism (HCC) (Resolved) [I26.99]             Anticoagulation Episode Summary     INR check location:       Preferred lab:       Send INR reminders to:       Comments:         Anticoagulation Care Providers     Provider Role Specialty Phone number    Deb Ponce P.A.-C. [facility] Family Medicine [phone]    [facility] Anticoagulation Services Responsible  [phone]    Hermila Mobley, PharmD Responsible          Anticoagulation Patient Findings        Left voicemail message to report a supra therapeutic INR of 3.2.  Pt to REDUCE WEEKLY DOSE BY 10%. Requested pt contact the clinic for any s/s of unusual bleeding, bruising, clotting or any changes to diet or medication.  Follow up in 2 weeks, to reduce the risk of adverse events related to this high risk medication, warfarin.    Hermila Mobley Clinical Pharmacist

## 2019-09-06 PROBLEM — Z86.711 HISTORY OF PULMONARY EMBOLISM: Status: ACTIVE | Noted: 2019-09-06

## 2019-09-06 PROBLEM — R73.02 IMPAIRED GLUCOSE TOLERANCE: Status: ACTIVE | Noted: 2018-04-13

## 2019-09-06 PROBLEM — I26.92 SADDLE EMBOLUS OF PULMONARY ARTERY (HCC): Status: RESOLVED | Noted: 2017-12-18 | Resolved: 2019-09-06

## 2019-09-06 PROBLEM — I51.7 RIGHT HEART ENLARGEMENT: Status: RESOLVED | Noted: 2017-12-19 | Resolved: 2019-09-06

## 2019-09-06 PROBLEM — Z79.01 WARFARIN ANTICOAGULATION: Status: ACTIVE | Noted: 2019-09-06

## 2019-09-06 PROBLEM — I82.403 ACUTE DEEP VEIN THROMBOSIS (DVT) OF BOTH LOWER EXTREMITIES (HCC): Status: RESOLVED | Noted: 2017-12-20 | Resolved: 2019-09-06

## 2019-09-06 PROBLEM — R94.4 DECREASED GFR: Status: RESOLVED | Noted: 2018-06-14 | Resolved: 2019-09-06

## 2019-09-06 PROBLEM — I26.99 PULMONARY EMBOLISM (HCC): Status: RESOLVED | Noted: 2017-12-20 | Resolved: 2019-09-06

## 2019-09-18 ENCOUNTER — TELEPHONE (OUTPATIENT)
Dept: VASCULAR LAB | Facility: MEDICAL CENTER | Age: 64
End: 2019-09-18

## 2019-09-19 ENCOUNTER — HOSPITAL ENCOUNTER (OUTPATIENT)
Dept: LAB | Facility: MEDICAL CENTER | Age: 64
End: 2019-09-19
Attending: NURSE PRACTITIONER
Payer: MEDICARE

## 2019-09-19 ENCOUNTER — HOSPITAL ENCOUNTER (OUTPATIENT)
Dept: LAB | Facility: MEDICAL CENTER | Age: 64
End: 2019-09-19
Attending: PHYSICIAN ASSISTANT
Payer: MEDICARE

## 2019-09-19 DIAGNOSIS — E78.5 HYPERLIPIDEMIA, UNSPECIFIED HYPERLIPIDEMIA TYPE: ICD-10-CM

## 2019-09-19 DIAGNOSIS — I10 ESSENTIAL HYPERTENSION: ICD-10-CM

## 2019-09-19 DIAGNOSIS — D64.9 ANEMIA, UNSPECIFIED TYPE: ICD-10-CM

## 2019-09-19 DIAGNOSIS — E55.9 VITAMIN D DEFICIENCY: ICD-10-CM

## 2019-09-19 DIAGNOSIS — Z79.01 CHRONIC ANTICOAGULATION: ICD-10-CM

## 2019-09-19 DIAGNOSIS — R94.4 DECREASED GFR: ICD-10-CM

## 2019-09-19 DIAGNOSIS — Z00.00 PREVENTATIVE HEALTH CARE: ICD-10-CM

## 2019-09-19 DIAGNOSIS — R73.02 IMPAIRED GLUCOSE TOLERANCE: ICD-10-CM

## 2019-09-19 LAB
25(OH)D3 SERPL-MCNC: 42 NG/ML (ref 30–100)
ALBUMIN SERPL BCP-MCNC: 5.3 G/DL (ref 3.2–4.9)
ALBUMIN/GLOB SERPL: 1.5 G/DL
ALP SERPL-CCNC: 71 U/L (ref 30–99)
ALT SERPL-CCNC: 19 U/L (ref 2–50)
ANION GAP SERPL CALC-SCNC: 15 MMOL/L (ref 0–11.9)
AST SERPL-CCNC: 27 U/L (ref 12–45)
BASOPHILS # BLD AUTO: 1 % (ref 0–1.8)
BASOPHILS # BLD: 0.09 K/UL (ref 0–0.12)
BILIRUB SERPL-MCNC: 0.9 MG/DL (ref 0.1–1.5)
BUN SERPL-MCNC: 24 MG/DL (ref 8–22)
CALCIUM SERPL-MCNC: 9.9 MG/DL (ref 8.5–10.5)
CHLORIDE SERPL-SCNC: 103 MMOL/L (ref 96–112)
CHOLEST SERPL-MCNC: 255 MG/DL (ref 100–199)
CO2 SERPL-SCNC: 19 MMOL/L (ref 20–33)
CREAT SERPL-MCNC: 1.11 MG/DL (ref 0.5–1.4)
EOSINOPHIL # BLD AUTO: 0.15 K/UL (ref 0–0.51)
EOSINOPHIL NFR BLD: 1.6 % (ref 0–6.9)
ERYTHROCYTE [DISTWIDTH] IN BLOOD BY AUTOMATED COUNT: 56.5 FL (ref 35.9–50)
FASTING STATUS PATIENT QL REPORTED: NORMAL
GLOBULIN SER CALC-MCNC: 3.6 G/DL (ref 1.9–3.5)
GLUCOSE SERPL-MCNC: 68 MG/DL (ref 65–99)
HCT VFR BLD AUTO: 53.7 % (ref 42–52)
HDLC SERPL-MCNC: 73 MG/DL
HGB BLD-MCNC: 17.8 G/DL (ref 14–18)
IMM GRANULOCYTES # BLD AUTO: 0.05 K/UL (ref 0–0.11)
IMM GRANULOCYTES NFR BLD AUTO: 0.5 % (ref 0–0.9)
INR PPP: 4.46 (ref 0.87–1.13)
LDLC SERPL CALC-MCNC: 155 MG/DL
LYMPHOCYTES # BLD AUTO: 1.34 K/UL (ref 1–4.8)
LYMPHOCYTES NFR BLD: 14.7 % (ref 22–41)
MCH RBC QN AUTO: 33.1 PG (ref 27–33)
MCHC RBC AUTO-ENTMCNC: 33.1 G/DL (ref 33.7–35.3)
MCV RBC AUTO: 100 FL (ref 81.4–97.8)
MONOCYTES # BLD AUTO: 0.73 K/UL (ref 0–0.85)
MONOCYTES NFR BLD AUTO: 8 % (ref 0–13.4)
NEUTROPHILS # BLD AUTO: 6.74 K/UL (ref 1.82–7.42)
NEUTROPHILS NFR BLD: 74.2 % (ref 44–72)
NRBC # BLD AUTO: 0 K/UL
NRBC BLD-RTO: 0 /100 WBC
PLATELET # BLD AUTO: 251 K/UL (ref 164–446)
PMV BLD AUTO: 10.6 FL (ref 9–12.9)
POTASSIUM SERPL-SCNC: 4.4 MMOL/L (ref 3.6–5.5)
PROT SERPL-MCNC: 8.9 G/DL (ref 6–8.2)
PROTHROMBIN TIME: 44.3 SEC (ref 12–14.6)
RBC # BLD AUTO: 5.37 M/UL (ref 4.7–6.1)
SODIUM SERPL-SCNC: 137 MMOL/L (ref 135–145)
TRIGL SERPL-MCNC: 136 MG/DL (ref 0–149)
WBC # BLD AUTO: 9.1 K/UL (ref 4.8–10.8)

## 2019-09-19 PROCEDURE — 85610 PROTHROMBIN TIME: CPT

## 2019-09-19 PROCEDURE — 80061 LIPID PANEL: CPT

## 2019-09-19 PROCEDURE — 80053 COMPREHEN METABOLIC PANEL: CPT

## 2019-09-19 PROCEDURE — 36415 COLL VENOUS BLD VENIPUNCTURE: CPT

## 2019-09-19 PROCEDURE — 85025 COMPLETE CBC W/AUTO DIFF WBC: CPT

## 2019-09-19 PROCEDURE — 82306 VITAMIN D 25 HYDROXY: CPT

## 2019-09-20 ENCOUNTER — ANTICOAGULATION MONITORING (OUTPATIENT)
Dept: VASCULAR LAB | Facility: MEDICAL CENTER | Age: 64
End: 2019-09-20

## 2019-09-20 ENCOUNTER — ANTICOAGULATION MONITORING (OUTPATIENT)
Dept: MEDICAL GROUP | Facility: PHYSICIAN GROUP | Age: 64
End: 2019-09-20

## 2019-09-20 NOTE — PROGRESS NOTES
Anticoagulation Summary  As of 2019    INR goal:   2.0-3.0   TTR:   25.8 % (6.3 mo)   INR used for dosin.46! (2019)   Warfarin maintenance plan:   7.5 mg (5 mg x 1.5) every Mon, Wed, Fri; 5 mg (5 mg x 1) all other days   Weekly warfarin total:   42.5 mg   Plan last modified:   Eva Casillas PharmD (2019)   Next INR check:   10/4/2019   Target end date:   Indefinite    Indications    Pulmonary embolism (HCC) (Resolved) [I26.99]  Pulmonary embolism (HCC) (Resolved) [I26.99]             Anticoagulation Episode Summary     INR check location:       Preferred lab:       Send INR reminders to:       Comments:         Anticoagulation Care Providers     Provider Role Specialty Phone number    Deb Ponce P.A.-C. Referring Family Medicine [phone]    [facility] Anticoagulation Services Responsible  [phone]    Luiz PradoD Responsible          Anticoagulation Patient Findings          Spoke to patient on the phone. Patients INR was supratherapeutic today at 4.46 .Patient denied any signs/symptoms of bleeding or bruising. Patient denied any recent changes to medications or diet.    Paitent was instructed to HOLD warfarin tonight, then begin 10% decreased regimen.     Follow up in 2 week(s).    Eva Casillas, Pharm.D

## 2019-10-15 PROBLEM — R73.02 IMPAIRED GLUCOSE TOLERANCE: Status: RESOLVED | Noted: 2018-04-13 | Resolved: 2019-10-15

## 2019-10-23 ENCOUNTER — HOSPITAL ENCOUNTER (OUTPATIENT)
Dept: LAB | Facility: MEDICAL CENTER | Age: 64
End: 2019-10-23
Attending: NURSE PRACTITIONER
Payer: MEDICARE

## 2019-10-23 DIAGNOSIS — Z79.01 CHRONIC ANTICOAGULATION: ICD-10-CM

## 2019-10-23 LAB
INR PPP: 1.84 (ref 0.87–1.13)
PROTHROMBIN TIME: 21.8 SEC (ref 12–14.6)

## 2019-10-23 PROCEDURE — 85610 PROTHROMBIN TIME: CPT

## 2019-10-23 PROCEDURE — 36415 COLL VENOUS BLD VENIPUNCTURE: CPT

## 2019-10-24 ENCOUNTER — ANTICOAGULATION MONITORING (OUTPATIENT)
Dept: VASCULAR LAB | Facility: MEDICAL CENTER | Age: 64
End: 2019-10-24

## 2019-10-24 NOTE — PROGRESS NOTES
Anticoagulation Summary  As of 10/24/2019    INR goal:   2.0-3.0   TTR:   27.7 % (7.4 mo)   INR used for dosin.84! (10/23/2019)   Warfarin maintenance plan:   7.5 mg (5 mg x 1.5) every Mon, Wed, Fri; 5 mg (5 mg x 1) all other days   Weekly warfarin total:   42.5 mg   Plan last modified:   Vinay Alcantara PharmD (10/24/2019)   Next INR check:   2019   Target end date:   Indefinite    Indications    Pulmonary embolism (HCC) (Resolved) [I26.99]  Pulmonary embolism (HCC) (Resolved) [I26.99]             Anticoagulation Episode Summary     INR check location:       Preferred lab:       Send INR reminders to:       Comments:         Anticoagulation Care Providers     Provider Role Specialty Phone number    Deb Ponce P.A.-C. Referring Family Medicine [phone]    [facility] Anticoagulation Services Responsible  [phone]    Luiz PradoD Responsible          Anticoagulation Patient Findings  Patient Findings     Negatives:   Signs/symptoms of thrombosis, Signs/symptoms of bleeding, Laboratory test error suspected, Change in health, Change in alcohol use, Change in activity, Upcoming invasive procedure, Emergency department visit, Upcoming dental procedure, Missed doses, Extra doses, Change in medications, Change in diet/appetite, Hospital admission, Bruising, Other complaints        Spoke with patient today regarding subtherapeutic INR of 1.84.  Patient denies any signs/symptoms of bruising or bleeding or any changes in diet and medications.  Instructed patient to call clinic with any questions or concerns.  Has been taking lower dose than documented.  Instructed patient to bolus with 7.5mg X 1, then resume current warfarin regimen.  Follow up in 2 weeks, to reduce risk of adverse events related to this high risk medication,  Warfarin.    Vinay Alcantara, Xander, BCACP

## 2019-11-13 ENCOUNTER — HOSPITAL ENCOUNTER (OUTPATIENT)
Dept: LAB | Facility: MEDICAL CENTER | Age: 64
End: 2019-11-13
Attending: NURSE PRACTITIONER
Payer: MEDICARE

## 2019-11-13 DIAGNOSIS — Z79.01 CHRONIC ANTICOAGULATION: ICD-10-CM

## 2019-11-13 LAB
INR PPP: 1.21 (ref 0.87–1.13)
PROTHROMBIN TIME: 15.6 SEC (ref 12–14.6)

## 2019-11-13 PROCEDURE — 36415 COLL VENOUS BLD VENIPUNCTURE: CPT

## 2019-11-13 PROCEDURE — 85610 PROTHROMBIN TIME: CPT

## 2019-11-14 ENCOUNTER — ANTICOAGULATION MONITORING (OUTPATIENT)
Dept: MEDICAL GROUP | Facility: PHYSICIAN GROUP | Age: 64
End: 2019-11-14

## 2019-11-14 NOTE — PROGRESS NOTES
Anticoagulation Summary  As of 2019    INR goal:   2.0-3.0   TTR:   25.3 % (8.1 mo)   INR used for dosin.21! (2019)   Warfarin maintenance plan:   7.5 mg (5 mg x 1.5) every Mon, Wed, Fri; 5 mg (5 mg x 1) all other days   Weekly warfarin total:   42.5 mg   Plan last modified:   Belkys Meyers, Pharmacy Intern (2019)   Next INR check:   2019   Target end date:   Indefinite    Indications    Pulmonary embolism (HCC) (Resolved) [I26.99]  Pulmonary embolism (HCC) (Resolved) [I26.99]             Anticoagulation Episode Summary     INR check location:       Preferred lab:       Send INR reminders to:       Comments:         Anticoagulation Care Providers     Provider Role Specialty Phone number    Deb Ponce P.A.-C. Referring Family Medicine [phone]    [facility] Anticoagulation Services Responsible  [phone]    Hermila Mobley, PharmD Responsible          Anticoagulation Patient Findings          HPI:  Jose Quintanilla, on anticoagulation therapy with warfarin for PE  Changes to current medical/health status since last appt: None  Denies signs/symptoms of bleeding and/or thrombosis since the last appt.    Denies any interval changes to diet  Denies any interval changes to medications since last appt.   Denies any complications or cost restrictions with current therapy.     A/P   INR is sub-therapeutic.     Will bolus dose with 10 mg on Thursday, Friday, Saturday, and then continue with current warfarin regimen.     Next INR in 1 week.    Belkys Meyers, Pharmacy Intern

## 2019-11-18 PROBLEM — J30.1 SEASONAL ALLERGIC RHINITIS DUE TO POLLEN: Status: ACTIVE | Noted: 2019-11-18

## 2019-11-18 PROBLEM — Z09 FOLLOW UP: Status: ACTIVE | Noted: 2019-11-18

## 2019-11-25 ENCOUNTER — HOSPITAL ENCOUNTER (OUTPATIENT)
Dept: LAB | Facility: MEDICAL CENTER | Age: 64
End: 2019-11-25
Attending: NURSE PRACTITIONER
Payer: MEDICARE

## 2019-11-25 DIAGNOSIS — Z79.01 CHRONIC ANTICOAGULATION: ICD-10-CM

## 2019-11-25 LAB
INR PPP: 2.91 (ref 0.87–1.13)
PROTHROMBIN TIME: 31.5 SEC (ref 12–14.6)

## 2019-11-25 PROCEDURE — 36415 COLL VENOUS BLD VENIPUNCTURE: CPT

## 2019-11-25 PROCEDURE — 85610 PROTHROMBIN TIME: CPT

## 2019-11-26 ENCOUNTER — ANTICOAGULATION MONITORING (OUTPATIENT)
Dept: VASCULAR LAB | Facility: MEDICAL CENTER | Age: 64
End: 2019-11-26

## 2019-11-27 NOTE — PROGRESS NOTES
Anticoagulation Summary  As of 2019    INR goal:   2.0-3.0   TTR:   26.6 % (8.5 mo)   INR used for dosin.91 (2019)   Warfarin maintenance plan:   5 mg (5 mg x 1) every Tue, Thu; 7.5 mg (5 mg x 1.5) all other days   Weekly warfarin total:   47.5 mg   Plan last modified:   Sera Santiago (2019)   Next INR check:   12/3/2019   Target end date:   Indefinite    Indications    Pulmonary embolism (HCC) (Resolved) [I26.99]  Pulmonary embolism (HCC) (Resolved) [I26.99]             Anticoagulation Episode Summary     INR check location:       Preferred lab:       Send INR reminders to:       Comments:         Anticoagulation Care Providers     Provider Role Specialty Phone number    Deb Ponce P.A.-C. Referring Family Medicine [phone]    [facility] Anticoagulation Services Responsible  [phone]    Luiz PradoD Responsible          Anticoagulation Patient Findings     Spoke with the patient on the phone today, reporting a therapeutic INR of 2.91.  Confirmed the current warfarin dosing regimen and patient compliance. Patient reported taking a DIFFERENT REGIMEN than we had documented. He reported taking 7.5mg on Mon, Wed & Fri and 10mg ROW. This is a significant increase from previous dosing. Patient denies any interval changes to diet and/or medications. Patient denies any signs/symptoms of bleeding or clotting.  Although the patient's INR is therapeutic today, I recommended patient begin a slightly reduced regimen from what he had reported. Patient instructed to begin taking 5mg on Tues and Th and 7.5mg ROW. Patient will retest again in 1 week and further adjustments can be made if necessary.     Marbella DeeD

## 2019-12-09 DIAGNOSIS — Z79.01 CHRONIC ANTICOAGULATION: ICD-10-CM

## 2019-12-18 ENCOUNTER — HOSPITAL ENCOUNTER (OUTPATIENT)
Dept: LAB | Facility: MEDICAL CENTER | Age: 64
End: 2019-12-18
Attending: NURSE PRACTITIONER
Payer: MEDICARE

## 2019-12-18 DIAGNOSIS — Z79.01 CHRONIC ANTICOAGULATION: ICD-10-CM

## 2019-12-18 LAB
INR PPP: 2.92 (ref 0.87–1.13)
PROTHROMBIN TIME: 31.6 SEC (ref 12–14.6)

## 2019-12-18 PROCEDURE — 85610 PROTHROMBIN TIME: CPT

## 2019-12-18 PROCEDURE — 36415 COLL VENOUS BLD VENIPUNCTURE: CPT

## 2019-12-19 ENCOUNTER — ANTICOAGULATION MONITORING (OUTPATIENT)
Dept: VASCULAR LAB | Facility: MEDICAL CENTER | Age: 64
End: 2019-12-19

## 2019-12-19 NOTE — PROGRESS NOTES
Anticoagulation Summary  As of 2019    INR goal:   2.0-3.0   TTR:   32.7 % (9.3 mo)   INR used for dosin.92 (2019)   Warfarin maintenance plan:   5 mg (5 mg x 1) every e, Thu; 7.5 mg (5 mg x 1.5) all other days   Weekly warfarin total:   47.5 mg   Plan last modified:   Sera Santiago (2019)   Next INR check:   2020   Target end date:   Indefinite    Indications    Pulmonary embolism (HCC) (Resolved) [I26.99]  Pulmonary embolism (HCC) (Resolved) [I26.99]             Anticoagulation Episode Summary     INR check location:       Preferred lab:       Send INR reminders to:       Comments:         Anticoagulation Care Providers     Provider Role Specialty Phone number    Deb Ponce P.A.-C. Referring Family Medicine [phone]    [facility] Anticoagulation Services Responsible  [phone]    Luiz PradoD Responsible          Anticoagulation Patient Findings      Left voicemail message to report a therapeutic INR of 2.92.      Pt to continue with current warfarin dosing regimen. Requested pt contact the clinic for any s/s of unusual bleeding, bruising, clotting or any changes to diet or medication.    FU INR in 4 week(s).    Fabiola Walton, PharmD

## 2020-03-05 ENCOUNTER — TELEPHONE (OUTPATIENT)
Dept: VASCULAR LAB | Facility: MEDICAL CENTER | Age: 65
End: 2020-03-05

## 2020-03-09 ENCOUNTER — HOSPITAL ENCOUNTER (OUTPATIENT)
Dept: LAB | Facility: MEDICAL CENTER | Age: 65
End: 2020-03-09
Attending: NURSE PRACTITIONER
Payer: MEDICARE

## 2020-03-09 DIAGNOSIS — Z79.01 CHRONIC ANTICOAGULATION: ICD-10-CM

## 2020-03-09 LAB
INR PPP: 1.24 (ref 0.87–1.13)
PROTHROMBIN TIME: 15.9 SEC (ref 12–14.6)

## 2020-03-09 PROCEDURE — 36415 COLL VENOUS BLD VENIPUNCTURE: CPT

## 2020-03-09 PROCEDURE — 85610 PROTHROMBIN TIME: CPT

## 2020-03-10 ENCOUNTER — ANTICOAGULATION MONITORING (OUTPATIENT)
Dept: MEDICAL GROUP | Facility: PHYSICIAN GROUP | Age: 65
End: 2020-03-10

## 2020-03-10 NOTE — PROGRESS NOTES
Anticoagulation Summary  As of 3/10/2020    INR goal:   2.0-3.0   TTR:   37.7 % (1 y)   INR used for dosin.24! (3/9/2020)   Warfarin maintenance plan:   7.5 mg (5 mg x 1.5) every day   Weekly warfarin total:   52.5 mg   Plan last modified:   Lenard Anne PharmD (3/10/2020)   Next INR check:   3/13/2020   Target end date:   Indefinite    Indications    Pulmonary embolism (HCC) (Resolved) [I26.99]  Pulmonary embolism (HCC) (Resolved) [I26.99]             Anticoagulation Episode Summary     INR check location:       Preferred lab:       Send INR reminders to:       Comments:         Anticoagulation Care Providers     Provider Role Specialty Phone number    Deb Ponce P.A.-C. Referring Family Medicine [phone]    [facility] Anticoagulation Services Responsible  [phone]    Hermila Mobley PharmD Responsible          Anticoagulation Patient Findings    Spoke to patient on the phone.   INR  sub-therapeutic.  He has been taking his warfarin incorrectly.  Denies signs/symptoms of bleeding and/or thrombosis.   Denies changes to diet or medications.   Follow up appointment in 3 days     Increase weekly warfarin dose as noted      Lenard Anne, PharmD, MS, BCACP, LCC    This note was created using voice recognition software (Dragon). The accuracy of the dictation is limited by the abilities of the software. I have reviewed the note prior to signing, however some errors in grammar and context are still possible. If you have any questions related to this note please do not hesitate to contact our office.

## 2020-03-17 ENCOUNTER — HOSPITAL ENCOUNTER (OUTPATIENT)
Dept: LAB | Facility: MEDICAL CENTER | Age: 65
End: 2020-03-17
Attending: NURSE PRACTITIONER
Payer: MEDICARE

## 2020-03-17 DIAGNOSIS — Z79.01 CHRONIC ANTICOAGULATION: ICD-10-CM

## 2020-03-17 LAB
INR PPP: 2.77 (ref 0.87–1.13)
PROTHROMBIN TIME: 30.3 SEC (ref 12–14.6)

## 2020-03-17 PROCEDURE — 85610 PROTHROMBIN TIME: CPT

## 2020-03-17 PROCEDURE — 36415 COLL VENOUS BLD VENIPUNCTURE: CPT

## 2020-03-18 ENCOUNTER — ANTICOAGULATION MONITORING (OUTPATIENT)
Dept: VASCULAR LAB | Facility: MEDICAL CENTER | Age: 65
End: 2020-03-18

## 2020-03-18 NOTE — PROGRESS NOTES
Anticoagulation Summary  As of 3/18/2020    INR goal:   2.0-3.0   TTR:   38.0 % (1 y)   INR used for dosin.77 (3/17/2020)   Warfarin maintenance plan:   7.5 mg (5 mg x 1.5) every day   Weekly warfarin total:   52.5 mg   Plan last modified:   Lenard Anne PharmD (3/10/2020)   Next INR check:   2020   Target end date:   Indefinite    Indications    Pulmonary embolism (HCC) (Resolved) [I26.99]  Pulmonary embolism (HCC) (Resolved) [I26.99]             Anticoagulation Episode Summary     INR check location:       Preferred lab:       Send INR reminders to:       Comments:         Anticoagulation Care Providers     Provider Role Specialty Phone number    Deb Ponce P.A.-C. [facility] Family Medicine [phone]    [facility] Anticoagulation Services Responsible  [phone]    Luiz PradoD Responsible          Anticoagulation Patient Findings      Left voicemail message to report a therapeutic INR of 2.77.      Pt to continue with current warfarin dosing regimen. Requested pt contact the clinic for any s/s of unusual bleeding, bruising, clotting or any changes to diet or medication.    FU INR in 3 week(s).    Fabiola Walton, PharmD

## 2020-06-23 ENCOUNTER — HOSPITAL ENCOUNTER (OUTPATIENT)
Dept: LAB | Facility: MEDICAL CENTER | Age: 65
End: 2020-06-23
Attending: PHYSICIAN ASSISTANT
Payer: MEDICARE

## 2020-06-23 DIAGNOSIS — E78.2 MIXED HYPERLIPIDEMIA: ICD-10-CM

## 2020-06-23 LAB
ALBUMIN SERPL BCP-MCNC: 4.7 G/DL (ref 3.2–4.9)
ALBUMIN/GLOB SERPL: 1.4 G/DL
ALP SERPL-CCNC: 87 U/L (ref 30–99)
ALT SERPL-CCNC: 27 U/L (ref 2–50)
ANION GAP SERPL CALC-SCNC: 17 MMOL/L (ref 7–16)
AST SERPL-CCNC: 27 U/L (ref 12–45)
BILIRUB SERPL-MCNC: 0.9 MG/DL (ref 0.1–1.5)
BUN SERPL-MCNC: 30 MG/DL (ref 8–22)
CALCIUM SERPL-MCNC: 9.7 MG/DL (ref 8.5–10.5)
CHLORIDE SERPL-SCNC: 101 MMOL/L (ref 96–112)
CHOLEST SERPL-MCNC: 229 MG/DL (ref 100–199)
CO2 SERPL-SCNC: 21 MMOL/L (ref 20–33)
CREAT SERPL-MCNC: 1.51 MG/DL (ref 0.5–1.4)
GLOBULIN SER CALC-MCNC: 3.3 G/DL (ref 1.9–3.5)
GLUCOSE SERPL-MCNC: 96 MG/DL (ref 65–99)
HDLC SERPL-MCNC: 62 MG/DL
LDLC SERPL CALC-MCNC: 132 MG/DL
POTASSIUM SERPL-SCNC: 4.9 MMOL/L (ref 3.6–5.5)
PROT SERPL-MCNC: 8 G/DL (ref 6–8.2)
SODIUM SERPL-SCNC: 139 MMOL/L (ref 135–145)
TRIGL SERPL-MCNC: 175 MG/DL (ref 0–149)

## 2020-06-23 PROCEDURE — 36415 COLL VENOUS BLD VENIPUNCTURE: CPT

## 2020-06-23 PROCEDURE — 80053 COMPREHEN METABOLIC PANEL: CPT

## 2020-06-23 PROCEDURE — 80061 LIPID PANEL: CPT

## 2020-06-30 DIAGNOSIS — Z79.01 CHRONIC ANTICOAGULATION: ICD-10-CM

## 2020-06-30 NOTE — PROGRESS NOTES
[facility]    Patient requesting renewal of SO for INR lab draw.  Sent in new SO today.      Marbella DeeD

## 2020-07-08 ENCOUNTER — TELEPHONE (OUTPATIENT)
Dept: VASCULAR LAB | Facility: MEDICAL CENTER | Age: 65
End: 2020-07-08

## 2020-07-08 NOTE — LETTER
July 8, 2020        Jose Quintanilla  [address]  [place] [address]        Dear Jose Quintanilla ,    We have been unsuccessful in our attempts to contact you regarding your Anticoagulation Service appointments. Warfarin is a potent blood-thinning agent that requires monitoring to ensure that the dosage is correct for your body.  If it isn't, you could develop serious, sometimes life-threatening bleeding problems or life-threatening blood clots or stroke could result.     It is extremely important that you have your lab work drawn as soon as possible.  We are open Monday-Friday 8 am until 5 pm.  You may reach our Service at [phone].     To monitor you effectively, we need to be able to communicate with you.  This is a requirement to be followed by our Service.  If we are unable to contact you on three separate occasions, you will be discharged from the Anticoagulation Service.     If you repeatedly fail to keep your lab appointments, you are at risk of being discharged from the Service.           Sincerely,           Reji Her, PharmD, [facility]  Pharmacy Clinical Supervisor  [facility]  Outpatient Anticoagulation Service

## 2020-07-08 NOTE — TELEPHONE ENCOUNTER
Left message for pt to have INR checked  2nd call  Letter sent  Hermila Mobley, Clinical Pharmacist, CDE, CACP

## 2020-07-15 DIAGNOSIS — Z79.01 CHRONIC ANTICOAGULATION: ICD-10-CM

## 2020-07-21 ENCOUNTER — ANTICOAGULATION MONITORING (OUTPATIENT)
Dept: VASCULAR LAB | Facility: MEDICAL CENTER | Age: 65
End: 2020-07-21

## 2020-07-21 DIAGNOSIS — Z79.01 CHRONIC ANTICOAGULATION: ICD-10-CM

## 2020-07-21 NOTE — PROGRESS NOTES
Placed an order for his INR at the patient's request.    Sent the patient today for confirmation email via my chart.    Lenard Anne, PharmD, MS, BCACP, LCC    This note was created using voice recognition software (Dragon). The accuracy of the dictation is limited by the abilities of the software. I have reviewed the note prior to signing, however some errors in grammar and context are still possible. If you have any questions related to this note please do not hesitate to contact our office.

## 2020-07-30 PROBLEM — E66.01 CLASS 3 SEVERE OBESITY WITH SERIOUS COMORBIDITY IN ADULT (HCC): Status: ACTIVE | Noted: 2017-10-24

## 2020-07-30 PROBLEM — N18.30 STAGE 3 CHRONIC KIDNEY DISEASE: Status: ACTIVE | Noted: 2020-07-30

## 2020-07-30 PROBLEM — Z09 FOLLOW UP: Status: RESOLVED | Noted: 2019-11-18 | Resolved: 2020-07-30

## 2020-08-03 ENCOUNTER — HOSPITAL ENCOUNTER (OUTPATIENT)
Dept: LAB | Facility: MEDICAL CENTER | Age: 65
End: 2020-08-03
Attending: NURSE PRACTITIONER
Payer: MEDICARE

## 2020-08-03 DIAGNOSIS — Z79.01 CHRONIC ANTICOAGULATION: ICD-10-CM

## 2020-08-03 LAB
INR PPP: 4.88 (ref 0.87–1.13)
PROTHROMBIN TIME: 47 SEC (ref 12–14.6)

## 2020-08-03 PROCEDURE — 36415 COLL VENOUS BLD VENIPUNCTURE: CPT

## 2020-08-03 PROCEDURE — 85610 PROTHROMBIN TIME: CPT

## 2020-08-04 ENCOUNTER — ANTICOAGULATION MONITORING (OUTPATIENT)
Dept: VASCULAR LAB | Facility: MEDICAL CENTER | Age: 65
End: 2020-08-04

## 2020-08-04 NOTE — PROGRESS NOTES
Anticoagulation Summary  As of 2020    INR goal:   2.0-3.0   TTR:   30.6 % (1.4 y)   INR used for dosin.88! (8/3/2020)   Warfarin maintenance plan:   7.5 mg (5 mg x 1.5) every day   Weekly warfarin total:   52.5 mg   Plan last modified:   Lenard Anne, PharmD (3/10/2020)   Next INR check:   2020   Target end date:   Indefinite    Indications    Pulmonary embolism (HCC) (Resolved) [I26.99]  Pulmonary embolism (HCC) (Resolved) [I26.99]             Anticoagulation Episode Summary     INR check location:       Preferred lab:       Send INR reminders to:       Comments:         Anticoagulation Care Providers     Provider Role Specialty Phone number    Deb Ponce P.A.-C. Referring Family Medicine [phone]    [facility] Anticoagulation Services Responsible  [phone]    Hermila Mobley PharmD Responsible          Anticoagulation Patient Findings  Patient Findings     Positives:   Change in medications (Pt fell and bruised his back. He has been using a topical CBD to help heal. Pt will stop using as he is mostly healed.)    Negatives:   Signs/symptoms of thrombosis, Signs/symptoms of bleeding, Laboratory test error suspected, Change in health, Change in alcohol use, Change in activity, Upcoming invasive procedure, Emergency department visit, Upcoming dental procedure, Missed doses, Extra doses, Change in diet/appetite, Hospital admission, Bruising, Other complaints          Spoke with Jose.  INR is supra-therapeutic.   Pt denies any unusual s/s of bleeding, bruising, clotting or any changes to diet. Denies any etoh, cranberries, supplements, or illness.   Pt verifies warfarin weekly dosing.  Elevated INR may be due to interaction with topical CBD product per interaction from [employer]: Summary Cannabidiol may increase the serum concentration of Warfarin. Pt is going to stop using topical CBD.    Will have pt hold dose today and reduce tomorrow to 5 mg once then continue with current regimen.      Repeat INR in 1 week(s).     Izaiah Morris, Pharmacy Intern

## 2020-08-17 ENCOUNTER — HOSPITAL ENCOUNTER (OUTPATIENT)
Dept: LAB | Facility: MEDICAL CENTER | Age: 65
End: 2020-08-17
Attending: NURSE PRACTITIONER
Payer: MEDICARE

## 2020-08-17 DIAGNOSIS — Z79.01 CHRONIC ANTICOAGULATION: ICD-10-CM

## 2020-08-17 LAB
INR PPP: 1.67 (ref 0.87–1.13)
PROTHROMBIN TIME: 20.2 SEC (ref 12–14.6)

## 2020-08-17 PROCEDURE — 36415 COLL VENOUS BLD VENIPUNCTURE: CPT

## 2020-08-17 PROCEDURE — 85610 PROTHROMBIN TIME: CPT

## 2020-08-18 ENCOUNTER — ANTICOAGULATION MONITORING (OUTPATIENT)
Dept: VASCULAR LAB | Facility: MEDICAL CENTER | Age: 65
End: 2020-08-18

## 2020-08-18 NOTE — PROGRESS NOTES
Anticoagulation Summary  As of 2020    INR goal:  2.0-3.0   TTR:  30.6 % (1.4 y)   INR used for dosin.67 (2020)   Warfarin maintenance plan:  10 mg (5 mg x 2) every Tue; 7.5 mg (5 mg x 1.5) all other days   Weekly warfarin total:  55 mg   Plan last modified:  Vinay Alcantara PharmD (2020)   Next INR check:  2020   Target end date:  Indefinite    Indications    Pulmonary embolism (HCC) (Resolved) [I26.99]  Pulmonary embolism (HCC) (Resolved) [I26.99]             Anticoagulation Episode Summary     INR check location:      Preferred lab:      Send INR reminders to:      Comments:        Anticoagulation Care Providers     Provider Role Specialty Phone number    Deb Ponce P.A.-C. [facility] Family Medicine [phone]    [facility] Anticoagulation Services Responsible  [phone]    Luiz PradoD Responsible          Anticoagulation Patient Findings  Patient Findings     Positives:  Change in alcohol use, Change in diet/appetite    Negatives:  Signs/symptoms of thrombosis, Signs/symptoms of bleeding, Laboratory test error suspected, Change in health, Change in activity, Upcoming invasive procedure, Emergency department visit, Upcoming dental procedure, Missed doses, Extra doses, Change in medications, Hospital admission, Bruising, Other complaints    Comments:  No longer drinking any alcohol  Eating healthier, more vegetables        Spoke with patient today regarding subtherapeutic INR of 1.67.  Patient denies any signs/symptoms of bruising or bleeding or any changes in diet and medications.  Instructed patient to call clinic with any questions or concerns.  Instructed patient to increase weekly warfarin regimen as detailed above.  Follow up in 2 weeks, to reduce risk of adverse events related to this high risk medication,  Warfarin.    Vinay Alcantara, PharmD, BCACP

## 2020-09-03 ENCOUNTER — HOSPITAL ENCOUNTER (OUTPATIENT)
Dept: LAB | Facility: MEDICAL CENTER | Age: 65
End: 2020-09-03
Attending: NURSE PRACTITIONER
Payer: MEDICARE

## 2020-09-03 DIAGNOSIS — Z79.01 CHRONIC ANTICOAGULATION: ICD-10-CM

## 2020-09-03 LAB
INR PPP: 4.13 (ref 0.87–1.13)
PROTHROMBIN TIME: 41.2 SEC (ref 12–14.6)

## 2020-09-03 PROCEDURE — 85610 PROTHROMBIN TIME: CPT

## 2020-09-03 PROCEDURE — 36415 COLL VENOUS BLD VENIPUNCTURE: CPT

## 2020-09-04 ENCOUNTER — ANTICOAGULATION MONITORING (OUTPATIENT)
Dept: VASCULAR LAB | Facility: MEDICAL CENTER | Age: 65
End: 2020-09-04

## 2020-09-04 NOTE — PROGRESS NOTES
Anticoagulation Summary  As of 2020    INR goal:  2.0-3.0   TTR:  30.9 % (1.5 y)   INR used for dosin.13 (9/3/2020)   Warfarin maintenance plan:  10 mg (5 mg x 2) every Tue; 7.5 mg (5 mg x 1.5) all other days   Weekly warfarin total:  55 mg   Plan last modified:  Vinay Alcantara PharmD (2020)   Next INR check:  2020   Target end date:  Indefinite    Indications    Pulmonary embolism (HCC) (Resolved) [I26.99]  Pulmonary embolism (HCC) (Resolved) [I26.99]             Anticoagulation Episode Summary     INR check location:      Preferred lab:      Send INR reminders to:      Comments:        Anticoagulation Care Providers     Provider Role Specialty Phone number    Deb Ponce P.A.-C. Referring Family Medicine [phone]    [facility] Anticoagulation Services Responsible  [phone]    Luiz PradoD Responsible          Anticoagulation Patient Findings    INR  supra-therapeutic.   Left a voice message for the patient, asked patient to please call the anticoagulation clinic if they have any signs/symptoms of bleeding and/or thrombosis or any changes to diet or medications.    Follow up appointment in 1 week(s)    Hold today then continue the same warfarin dose, as noted above.       Lenard Anne, PharmD, MS, BCACP, LCC    This note was created using voice recognition software (Dragon). The accuracy of the dictation is limited by the abilities of the software. I have reviewed the note prior to signing, however some errors in grammar and context are still possible. If you have any questions related to this note please do not hesitate to contact our office.

## 2020-10-07 ENCOUNTER — HOSPITAL ENCOUNTER (OUTPATIENT)
Dept: LAB | Facility: MEDICAL CENTER | Age: 65
End: 2020-10-07
Attending: NURSE PRACTITIONER
Payer: MEDICARE

## 2020-10-07 ENCOUNTER — APPOINTMENT (OUTPATIENT)
Dept: MEDICAL GROUP | Facility: PHYSICIAN GROUP | Age: 65
End: 2020-10-07

## 2020-10-07 DIAGNOSIS — Z79.01 CHRONIC ANTICOAGULATION: ICD-10-CM

## 2020-10-07 LAB
INR PPP: 5.11 (ref 0.87–1.13)
PROTHROMBIN TIME: 48.8 SEC (ref 12–14.6)

## 2020-10-07 PROCEDURE — 36415 COLL VENOUS BLD VENIPUNCTURE: CPT

## 2020-10-07 PROCEDURE — 85610 PROTHROMBIN TIME: CPT

## 2020-10-08 ENCOUNTER — ANTICOAGULATION MONITORING (OUTPATIENT)
Dept: VASCULAR LAB | Facility: MEDICAL CENTER | Age: 65
End: 2020-10-08

## 2020-10-08 NOTE — PROGRESS NOTES
Anticoagulation Summary  As of 10/8/2020    INR goal:  2.0-3.0   TTR:  29.1 % (1.6 y)   INR used for dosin.11 (10/7/2020)   Warfarin maintenance plan:  5 mg (5 mg x 1) every Mon; 7.5 mg (5 mg x 1.5) all other days   Weekly warfarin total:  50 mg   Plan last modified:  Hermila Mobley (10/8/2020)   Next INR check:  10/13/2020   Target end date:  Indefinite    Indications    Pulmonary embolism (HCC) (Resolved) [I26.99]  Pulmonary embolism (HCC) (Resolved) [I26.99]             Anticoagulation Episode Summary     INR check location:      Preferred lab:      Send INR reminders to:      Comments:        Anticoagulation Care Providers     Provider Role Specialty Phone number    Deb Ponce P.A.-C. Referring Family Medicine [phone]    [facility] Anticoagulation Services Responsible  [phone]    Hermila Mobley, PharmD Responsible          Anticoagulation Patient Findings          Spoke with Jose to report a supra therapeutic INR of 5.1.  Will HOLD warfarin x2 days, then reduce weekly dose by 10%.  Follow up in 1 weeks, to reduce the risk of adverse events related to this high risk medication, warfarin.    Hermila Mobley, Clinical Pharmacist

## 2020-10-12 DIAGNOSIS — Z79.01 CHRONIC ANTICOAGULATION: ICD-10-CM

## 2020-10-12 RX ORDER — WARFARIN SODIUM 5 MG/1
TABLET ORAL
Qty: 135 TAB | Refills: 0 | Status: SHIPPED | OUTPATIENT
Start: 2020-10-12 | End: 2020-12-31 | Stop reason: SDUPTHER

## 2020-10-14 ENCOUNTER — HOSPITAL ENCOUNTER (OUTPATIENT)
Dept: LAB | Facility: MEDICAL CENTER | Age: 65
End: 2020-10-14
Attending: NURSE PRACTITIONER
Payer: MEDICARE

## 2020-10-14 DIAGNOSIS — Z79.01 CHRONIC ANTICOAGULATION: ICD-10-CM

## 2020-10-14 LAB
INR PPP: 1.39 (ref 0.87–1.13)
PROTHROMBIN TIME: 17.5 SEC (ref 12–14.6)

## 2020-10-14 PROCEDURE — 85610 PROTHROMBIN TIME: CPT

## 2020-10-14 PROCEDURE — 36415 COLL VENOUS BLD VENIPUNCTURE: CPT

## 2020-10-15 ENCOUNTER — ANTICOAGULATION MONITORING (OUTPATIENT)
Dept: VASCULAR LAB | Facility: MEDICAL CENTER | Age: 65
End: 2020-10-15

## 2020-10-15 NOTE — PROGRESS NOTES
Anticoagulation Summary  As of 10/15/2020    INR goal:  2.0-3.0   TTR:  29.0 % (1.6 y)   INR used for dosin.39 (10/14/2020)   Warfarin maintenance plan:  5 mg (5 mg x 1) every Mon; 7.5 mg (5 mg x 1.5) all other days   Weekly warfarin total:  50 mg   Plan last modified:  Hermila MEDINA Filter (10/8/2020)   Next INR check:  10/21/2020   Target end date:  Indefinite    Indications    Pulmonary embolism (HCC) (Resolved) [I26.99]  Pulmonary embolism (HCC) (Resolved) [I26.99]             Anticoagulation Episode Summary     INR check location:      Preferred lab:      Send INR reminders to:      Comments:        Anticoagulation Care Providers     Provider Role Specialty Phone number    Deb Ponce P.A.-C. Referring Family Medicine [phone]    [facility] Anticoagulation Services Responsible  [phone]    Hermila Mobley, PharmD Responsible          Anticoagulation Patient Findings     Spoke with patient today regarding sub therapeutic INR of 1.39.  Patient denies any signs/symptoms of bruising or bleeding or any changes in medications. He has been drinking boost nutritional drinks. Informed pt that those drinks contain vitamin K and to not drink them until we can get his INR score up. Explained to pt signs/ symptoms of a clot and that his is at higher risk because of current INR score. Instructed patient to call clinic with any questions or concerns.    Instructed pt to bolus x1 tonight with 10 mg then take 7.5 mg ROW.     Follow up in 1 weeks, to reduce risk of adverse events related to this high risk medication,  Warfarin.    Mynor Galvan, Pharmacy Intern

## 2020-10-21 ENCOUNTER — HOSPITAL ENCOUNTER (OUTPATIENT)
Dept: LAB | Facility: MEDICAL CENTER | Age: 65
End: 2020-10-21
Attending: NURSE PRACTITIONER
Payer: MEDICARE

## 2020-10-21 DIAGNOSIS — Z79.01 CHRONIC ANTICOAGULATION: ICD-10-CM

## 2020-10-21 LAB
INR PPP: 2.44 (ref 0.87–1.13)
PROTHROMBIN TIME: 27.3 SEC (ref 12–14.6)

## 2020-10-21 PROCEDURE — 36415 COLL VENOUS BLD VENIPUNCTURE: CPT

## 2020-10-21 PROCEDURE — 85610 PROTHROMBIN TIME: CPT

## 2020-10-22 ENCOUNTER — ANTICOAGULATION MONITORING (OUTPATIENT)
Dept: MEDICAL GROUP | Facility: PHYSICIAN GROUP | Age: 65
End: 2020-10-22

## 2020-10-22 NOTE — PROGRESS NOTES
Anticoagulation Summary  As of 10/22/2020    INR goal:  2.0-3.0   TTR:  29.2 % (1.6 y)   INR used for dosin.44 (10/21/2020)   Warfarin maintenance plan:  5 mg (5 mg x 1) every Mon; 7.5 mg (5 mg x 1.5) all other days   Weekly warfarin total:  50 mg   Plan last modified:  Hermila MEDINA Filter (10/8/2020)   Next INR check:     Target end date:  Indefinite    Indications    Pulmonary embolism (HCC) (Resolved) [I26.99]  Pulmonary embolism (HCC) (Resolved) [I26.99]             Anticoagulation Episode Summary     INR check location:      Preferred lab:      Send INR reminders to:      Comments:        Anticoagulation Care Providers     Provider Role Specialty Phone number    Deb Ponce P.A.-C. Referring Family Medicine [phone]    [facility] Anticoagulation Services Responsible  [phone]    Hermila Mobley, PharmD Responsible          Anticoagulation Patient Findings    Spoke with patient today regarding therapeutic INR of 2.4. Patient verified last weeks dosing. Patient denies any signs/symptoms of bruising or bleeding or any changes in diet and medications. Patient has found an alternative nutrition drink that does not contain vitamin K. Instructed patient to call clinic with any questions or concerns.    Pt is to continue with current warfarin dosing regimen as detailed above.    Follow up in 2 weeks, to reduce risk of adverse events related to this high risk medication,  Warfarin.    Cari German, Pharmacy Intern

## 2020-11-23 ENCOUNTER — HOSPITAL ENCOUNTER (OUTPATIENT)
Dept: LAB | Facility: MEDICAL CENTER | Age: 65
End: 2020-11-23
Attending: NURSE PRACTITIONER
Payer: MEDICARE

## 2020-11-23 DIAGNOSIS — Z79.01 CHRONIC ANTICOAGULATION: ICD-10-CM

## 2020-11-23 LAB
INR PPP: 2.08 (ref 0.87–1.13)
PROTHROMBIN TIME: 24 SEC (ref 12–14.6)

## 2020-11-23 PROCEDURE — 85610 PROTHROMBIN TIME: CPT

## 2020-11-23 PROCEDURE — 36415 COLL VENOUS BLD VENIPUNCTURE: CPT

## 2020-11-24 ENCOUNTER — ANTICOAGULATION MONITORING (OUTPATIENT)
Dept: VASCULAR LAB | Facility: MEDICAL CENTER | Age: 65
End: 2020-11-24

## 2020-11-24 NOTE — PROGRESS NOTES
Anticoagulation Summary  As of 2020    INR goal:  2.0-3.0   TTR:  33.0 % (1.7 y)   INR used for dosin.08 (2020)   Warfarin maintenance plan:  5 mg (5 mg x 1) every Mon; 7.5 mg (5 mg x 1.5) all other days   Weekly warfarin total:  50 mg   Plan last modified:  Hermila Mobley (10/8/2020)   Next INR check:  2020   Target end date:  Indefinite    Indications    Pulmonary embolism (HCC) (Resolved) [I26.99]  Pulmonary embolism (HCC) (Resolved) [I26.99]             Anticoagulation Episode Summary     INR check location:      Preferred lab:      Send INR reminders to:      Comments:        Anticoagulation Care Providers     Provider Role Specialty Phone number    Deb Ponce P.A.-C. Referring Family Medicine [phone]    [facility] Anticoagulation Services Responsible  [phone]    Hermila Mobley, PharmD Responsible          Anticoagulation Patient Findings      Left voicemail message to report a therapeutic INR of 2.07.    Pt to continue with current warfarin dosing regimen. Requested pt contact the clinic for any s/s of unusual bleeding, bruising, clotting or any changes to diet or medication.     FU INR in 4 week(s).    Med Burgos, Pharmacy Intern

## 2020-12-31 DIAGNOSIS — Z79.01 CHRONIC ANTICOAGULATION: ICD-10-CM

## 2020-12-31 RX ORDER — WARFARIN SODIUM 5 MG/1
TABLET ORAL
Qty: 135 TAB | Refills: 0 | Status: SHIPPED | OUTPATIENT
Start: 2020-12-31 | End: 2021-01-29

## 2021-01-14 NOTE — PROGRESS NOTES
Discharge instructions given to patient. Prescriptions sent to pt pharmacy. Discharge instructions given to patient at bedside, verbalizes understanding and states plans for follow-up with PCP. New and home medication review, post-discharge activity level and worsening of symptoms needing follow-up care discussed. Telemetry monitor/IV cathlon removed. All belongings accounted for, all questions answered at this time. Patient escorted out in wheelchair to [facility].   no

## 2021-11-19 PROBLEM — G47.30 SLEEP APNEA: Status: ACTIVE | Noted: 2021-11-19

## 2021-11-19 PROBLEM — Z91.81 RISK FOR FALLS: Status: ACTIVE | Noted: 2021-11-19

## 2022-01-01 ENCOUNTER — APPOINTMENT (OUTPATIENT)
Dept: RADIOLOGY | Facility: MEDICAL CENTER | Age: 67
DRG: 064 | End: 2022-01-01
Attending: EMERGENCY MEDICINE
Payer: MEDICARE

## 2022-01-01 ENCOUNTER — HOSPITAL ENCOUNTER (INPATIENT)
Facility: MEDICAL CENTER | Age: 67
LOS: 24 days | DRG: 064 | End: 2022-01-25
Attending: EMERGENCY MEDICINE | Admitting: STUDENT IN AN ORGANIZED HEALTH CARE EDUCATION/TRAINING PROGRAM
Payer: MEDICARE

## 2022-01-01 DIAGNOSIS — E87.29 HIGH ANION GAP METABOLIC ACIDOSIS: ICD-10-CM

## 2022-01-01 DIAGNOSIS — I63.031: ICD-10-CM

## 2022-01-01 DIAGNOSIS — I63.231 CEREBROVASCULAR ACCIDENT (CVA) DUE TO OCCLUSION OF RIGHT CAROTID ARTERY (HCC): ICD-10-CM

## 2022-01-01 DIAGNOSIS — E87.20 LACTIC ACIDOSIS: ICD-10-CM

## 2022-01-01 DIAGNOSIS — J45.20 MILD INTERMITTENT ASTHMA WITHOUT COMPLICATION: ICD-10-CM

## 2022-01-01 DIAGNOSIS — T79.6XXA TRAUMATIC RHABDOMYOLYSIS, INITIAL ENCOUNTER (HCC): ICD-10-CM

## 2022-01-01 DIAGNOSIS — F32.A DEPRESSION, UNSPECIFIED DEPRESSION TYPE: ICD-10-CM

## 2022-01-01 LAB
ABO + RH BLD: NORMAL
ABO GROUP BLD: NORMAL
ALBUMIN SERPL BCP-MCNC: 4.5 G/DL (ref 3.2–4.9)
ALBUMIN/GLOB SERPL: 1.3 G/DL
ALP SERPL-CCNC: 119 U/L (ref 30–99)
ALT SERPL-CCNC: 35 U/L (ref 2–50)
ANION GAP SERPL CALC-SCNC: 23 MMOL/L (ref 7–16)
APTT PPP: 26.8 SEC (ref 24.7–36)
AST SERPL-CCNC: 54 U/L (ref 12–45)
BILIRUB SERPL-MCNC: 1.6 MG/DL (ref 0.1–1.5)
BLD GP AB SCN SERPL QL: NORMAL
BUN SERPL-MCNC: 37 MG/DL (ref 8–22)
CALCIUM SERPL-MCNC: 9.4 MG/DL (ref 8.5–10.5)
CFT BLD TEG: 4.9 MIN (ref 4.6–9.1)
CFT P HPASE BLD TEG: 7.4 MIN (ref 4.3–8.3)
CHLORIDE SERPL-SCNC: 106 MMOL/L (ref 96–112)
CK SERPL-CCNC: 1571 U/L (ref 0–154)
CLOT ANGLE BLD TEG: 74.8 DEGREES (ref 63–78)
CLOT LYSIS 30M P MA LENFR BLD TEG: 0 % (ref 0–2.6)
CO2 SERPL-SCNC: 18 MMOL/L (ref 20–33)
CREAT SERPL-MCNC: 1.53 MG/DL (ref 0.5–1.4)
CT.EXTRINSIC BLD ROTEM: 1.2 MIN (ref 0.8–2.1)
ERYTHROCYTE [DISTWIDTH] IN BLOOD BY AUTOMATED COUNT: 53.1 FL (ref 35.9–50)
EST. AVERAGE GLUCOSE BLD GHB EST-MCNC: 105 MG/DL
ETHANOL BLD-MCNC: <10.1 MG/DL (ref 0–10)
GLOBULIN SER CALC-MCNC: 3.6 G/DL (ref 1.9–3.5)
GLUCOSE SERPL-MCNC: 98 MG/DL (ref 65–99)
HBA1C MFR BLD: 5.3 % (ref 4–5.6)
HCT VFR BLD AUTO: 53.7 % (ref 42–52)
HGB BLD-MCNC: 18.1 G/DL (ref 14–18)
INR PPP: 1.18 (ref 0.87–1.13)
LACTATE BLD-SCNC: 3.1 MMOL/L (ref 0.5–2)
LACTATE BLD-SCNC: 4.2 MMOL/L (ref 0.5–2)
MAGNESIUM SERPL-MCNC: 1.9 MG/DL (ref 1.5–2.5)
MCF BLD TEG: 62.6 MM (ref 52–69)
MCF.PLATELET INHIB BLD ROTEM: 24.1 MM (ref 15–32)
MCH RBC QN AUTO: 33.5 PG (ref 27–33)
MCHC RBC AUTO-ENTMCNC: 33.7 G/DL (ref 33.7–35.3)
MCV RBC AUTO: 99.3 FL (ref 81.4–97.8)
PA AA BLD-ACNC: 6.3 % (ref 0–11)
PA ADP BLD-ACNC: 40.4 % (ref 0–17)
PHOSPHATE SERPL-MCNC: 4.7 MG/DL (ref 2.5–4.5)
PLATELET # BLD AUTO: 236 K/UL (ref 164–446)
PMV BLD AUTO: 10.6 FL (ref 9–12.9)
POTASSIUM SERPL-SCNC: 4.1 MMOL/L (ref 3.6–5.5)
PROCALCITONIN SERPL-MCNC: <0.05 NG/ML
PROT SERPL-MCNC: 8.1 G/DL (ref 6–8.2)
PROTHROMBIN TIME: 14.6 SEC (ref 12–14.6)
RBC # BLD AUTO: 5.41 M/UL (ref 4.7–6.1)
RH BLD: NORMAL
SODIUM SERPL-SCNC: 147 MMOL/L (ref 135–145)
TEG ALGORITHM TGALG: ABNORMAL
WBC # BLD AUTO: 15.1 K/UL (ref 4.8–10.8)

## 2022-01-01 PROCEDURE — 85027 COMPLETE CBC AUTOMATED: CPT

## 2022-01-01 PROCEDURE — 73620 X-RAY EXAM OF FOOT: CPT | Mod: LT

## 2022-01-01 PROCEDURE — 700105 HCHG RX REV CODE 258: Performed by: EMERGENCY MEDICINE

## 2022-01-01 PROCEDURE — 84145 PROCALCITONIN (PCT): CPT

## 2022-01-01 PROCEDURE — 99222 1ST HOSP IP/OBS MODERATE 55: CPT | Performed by: SURGERY

## 2022-01-01 PROCEDURE — 83605 ASSAY OF LACTIC ACID: CPT

## 2022-01-01 PROCEDURE — 700105 HCHG RX REV CODE 258: Performed by: STUDENT IN AN ORGANIZED HEALTH CARE EDUCATION/TRAINING PROGRAM

## 2022-01-01 PROCEDURE — 85384 FIBRINOGEN ACTIVITY: CPT

## 2022-01-01 PROCEDURE — 700111 HCHG RX REV CODE 636 W/ 250 OVERRIDE (IP): Performed by: EMERGENCY MEDICINE

## 2022-01-01 PROCEDURE — 73030 X-RAY EXAM OF SHOULDER: CPT | Mod: LT

## 2022-01-01 PROCEDURE — 99285 EMERGENCY DEPT VISIT HI MDM: CPT

## 2022-01-01 PROCEDURE — 82077 ASSAY SPEC XCP UR&BREATH IA: CPT

## 2022-01-01 PROCEDURE — 85347 COAGULATION TIME ACTIVATED: CPT

## 2022-01-01 PROCEDURE — 85610 PROTHROMBIN TIME: CPT

## 2022-01-01 PROCEDURE — 72170 X-RAY EXAM OF PELVIS: CPT

## 2022-01-01 PROCEDURE — 72125 CT NECK SPINE W/O DYE: CPT

## 2022-01-01 PROCEDURE — 770000 HCHG ROOM/CARE - INTERMEDIATE ICU *

## 2022-01-01 PROCEDURE — 83036 HEMOGLOBIN GLYCOSYLATED A1C: CPT

## 2022-01-01 PROCEDURE — 71260 CT THORAX DX C+: CPT

## 2022-01-01 PROCEDURE — 80053 COMPREHEN METABOLIC PANEL: CPT

## 2022-01-01 PROCEDURE — 86900 BLOOD TYPING SEROLOGIC ABO: CPT

## 2022-01-01 PROCEDURE — 70496 CT ANGIOGRAPHY HEAD: CPT

## 2022-01-01 PROCEDURE — 73630 X-RAY EXAM OF FOOT: CPT | Mod: RT

## 2022-01-01 PROCEDURE — 99221 1ST HOSP IP/OBS SF/LOW 40: CPT | Mod: AI | Performed by: STUDENT IN AN ORGANIZED HEALTH CARE EDUCATION/TRAINING PROGRAM

## 2022-01-01 PROCEDURE — 71045 X-RAY EXAM CHEST 1 VIEW: CPT

## 2022-01-01 PROCEDURE — 3E0234Z INTRODUCTION OF SERUM, TOXOID AND VACCINE INTO MUSCLE, PERCUTANEOUS APPROACH: ICD-10-PCS | Performed by: EMERGENCY MEDICINE

## 2022-01-01 PROCEDURE — 85730 THROMBOPLASTIN TIME PARTIAL: CPT

## 2022-01-01 PROCEDURE — 73080 X-RAY EXAM OF ELBOW: CPT | Mod: RT

## 2022-01-01 PROCEDURE — 72131 CT LUMBAR SPINE W/O DYE: CPT

## 2022-01-01 PROCEDURE — 70450 CT HEAD/BRAIN W/O DYE: CPT

## 2022-01-01 PROCEDURE — 90471 IMMUNIZATION ADMIN: CPT

## 2022-01-01 PROCEDURE — 84100 ASSAY OF PHOSPHORUS: CPT

## 2022-01-01 PROCEDURE — 72128 CT CHEST SPINE W/O DYE: CPT | Mod: MG

## 2022-01-01 PROCEDURE — 82550 ASSAY OF CK (CPK): CPT

## 2022-01-01 PROCEDURE — 83735 ASSAY OF MAGNESIUM: CPT

## 2022-01-01 PROCEDURE — 85576 BLOOD PLATELET AGGREGATION: CPT | Mod: 91

## 2022-01-01 PROCEDURE — 305949 HCHG RED TRAUMA ACT PRE-NOTIFY NO CC

## 2022-01-01 PROCEDURE — 700117 HCHG RX CONTRAST REV CODE 255: Performed by: EMERGENCY MEDICINE

## 2022-01-01 PROCEDURE — 86901 BLOOD TYPING SEROLOGIC RH(D): CPT

## 2022-01-01 PROCEDURE — 73030 X-RAY EXAM OF SHOULDER: CPT | Mod: RT

## 2022-01-01 PROCEDURE — 70498 CT ANGIOGRAPHY NECK: CPT

## 2022-01-01 PROCEDURE — 90715 TDAP VACCINE 7 YRS/> IM: CPT | Performed by: EMERGENCY MEDICINE

## 2022-01-01 PROCEDURE — 86850 RBC ANTIBODY SCREEN: CPT

## 2022-01-01 RX ORDER — SODIUM CHLORIDE, SODIUM LACTATE, POTASSIUM CHLORIDE, AND CALCIUM CHLORIDE .6; .31; .03; .02 G/100ML; G/100ML; G/100ML; G/100ML
1000 INJECTION, SOLUTION INTRAVENOUS ONCE
Status: COMPLETED | OUTPATIENT
Start: 2022-01-01 | End: 2022-01-01

## 2022-01-01 RX ORDER — ONDANSETRON 4 MG/1
4 TABLET, ORALLY DISINTEGRATING ORAL EVERY 4 HOURS PRN
Status: DISCONTINUED | OUTPATIENT
Start: 2022-01-01 | End: 2022-01-02

## 2022-01-01 RX ORDER — BISACODYL 10 MG
10 SUPPOSITORY, RECTAL RECTAL
Status: DISCONTINUED | OUTPATIENT
Start: 2022-01-01 | End: 2022-01-02

## 2022-01-01 RX ORDER — AMOXICILLIN 250 MG
2 CAPSULE ORAL 2 TIMES DAILY
Status: DISCONTINUED | OUTPATIENT
Start: 2022-01-02 | End: 2022-01-02

## 2022-01-01 RX ORDER — BUPROPION HYDROCHLORIDE 450 MG/1
450 TABLET, FILM COATED, EXTENDED RELEASE ORAL DAILY
Status: ON HOLD | COMMUNITY
End: 2022-01-24

## 2022-01-01 RX ORDER — SODIUM CHLORIDE, SODIUM LACTATE, POTASSIUM CHLORIDE, CALCIUM CHLORIDE 600; 310; 30; 20 MG/100ML; MG/100ML; MG/100ML; MG/100ML
INJECTION, SOLUTION INTRAVENOUS CONTINUOUS
Status: ACTIVE | OUTPATIENT
Start: 2022-01-01 | End: 2022-01-02

## 2022-01-01 RX ORDER — ONDANSETRON 2 MG/ML
4 INJECTION INTRAMUSCULAR; INTRAVENOUS EVERY 4 HOURS PRN
Status: DISCONTINUED | OUTPATIENT
Start: 2022-01-01 | End: 2022-01-25 | Stop reason: HOSPADM

## 2022-01-01 RX ORDER — ATORVASTATIN CALCIUM 80 MG/1
80 TABLET, FILM COATED ORAL EVERY EVENING
Status: DISCONTINUED | OUTPATIENT
Start: 2022-01-01 | End: 2022-01-02

## 2022-01-01 RX ORDER — ATORVASTATIN CALCIUM 80 MG/1
80 TABLET, FILM COATED ORAL NIGHTLY
COMMUNITY

## 2022-01-01 RX ORDER — ESCITALOPRAM OXALATE 20 MG/1
20 TABLET ORAL DAILY
Status: ON HOLD | COMMUNITY
End: 2022-01-24

## 2022-01-01 RX ORDER — SODIUM CHLORIDE, SODIUM LACTATE, POTASSIUM CHLORIDE, CALCIUM CHLORIDE 600; 310; 30; 20 MG/100ML; MG/100ML; MG/100ML; MG/100ML
1000 INJECTION, SOLUTION INTRAVENOUS ONCE
Status: COMPLETED | OUTPATIENT
Start: 2022-01-01 | End: 2022-01-01

## 2022-01-01 RX ORDER — POLYETHYLENE GLYCOL 3350 17 G/17G
1 POWDER, FOR SOLUTION ORAL
Status: DISCONTINUED | OUTPATIENT
Start: 2022-01-01 | End: 2022-01-02

## 2022-01-01 RX ORDER — LOSARTAN POTASSIUM 25 MG/1
25 TABLET ORAL DAILY
COMMUNITY

## 2022-01-01 RX ORDER — WARFARIN SODIUM 5 MG/1
5 TABLET ORAL EVERY EVENING
Status: ON HOLD | COMMUNITY
End: 2022-01-24

## 2022-01-01 RX ORDER — ACETAMINOPHEN 325 MG/1
650 TABLET ORAL EVERY 6 HOURS PRN
Status: DISCONTINUED | OUTPATIENT
Start: 2022-01-01 | End: 2022-01-02

## 2022-01-01 RX ORDER — HYDRALAZINE HYDROCHLORIDE 20 MG/ML
10 INJECTION INTRAMUSCULAR; INTRAVENOUS
Status: DISCONTINUED | OUTPATIENT
Start: 2022-01-01 | End: 2022-01-03

## 2022-01-01 RX ORDER — LABETALOL HYDROCHLORIDE 5 MG/ML
10 INJECTION, SOLUTION INTRAVENOUS
Status: DISCONTINUED | OUTPATIENT
Start: 2022-01-01 | End: 2022-01-03

## 2022-01-01 RX ADMIN — IOHEXOL 100 ML: 350 INJECTION, SOLUTION INTRAVENOUS at 18:24

## 2022-01-01 RX ADMIN — SODIUM CHLORIDE, POTASSIUM CHLORIDE, SODIUM LACTATE AND CALCIUM CHLORIDE: 600; 310; 30; 20 INJECTION, SOLUTION INTRAVENOUS at 23:25

## 2022-01-01 RX ADMIN — SODIUM CHLORIDE, POTASSIUM CHLORIDE, SODIUM LACTATE AND CALCIUM CHLORIDE 1000 ML: 600; 310; 30; 20 INJECTION, SOLUTION INTRAVENOUS at 22:10

## 2022-01-01 RX ADMIN — SODIUM CHLORIDE, POTASSIUM CHLORIDE, SODIUM LACTATE AND CALCIUM CHLORIDE 1000 ML: 600; 310; 30; 20 INJECTION, SOLUTION INTRAVENOUS at 18:51

## 2022-01-01 RX ADMIN — CLOSTRIDIUM TETANI TOXOID ANTIGEN (FORMALDEHYDE INACTIVATED), CORYNEBACTERIUM DIPHTHERIAE TOXOID ANTIGEN (FORMALDEHYDE INACTIVATED), BORDETELLA PERTUSSIS TOXOID ANTIGEN (GLUTARALDEHYDE INACTIVATED), BORDETELLA PERTUSSIS FILAMENTOUS HEMAGGLUTININ ANTIGEN (FORMALDEHYDE INACTIVATED), BORDETELLA PERTUSSIS PERTACTIN ANTIGEN, AND BORDETELLA PERTUSSIS FIMBRIAE 2/3 ANTIGEN 0.5 ML: 5; 2; 2.5; 5; 3; 5 INJECTION, SUSPENSION INTRAMUSCULAR at 17:57

## 2022-01-01 ASSESSMENT — FIBROSIS 4 INDEX: FIB4 SCORE: 2.63

## 2022-01-01 ASSESSMENT — PATIENT HEALTH QUESTIONNAIRE - PHQ9
SUM OF ALL RESPONSES TO PHQ9 QUESTIONS 1 AND 2: 0
1. LITTLE INTEREST OR PLEASURE IN DOING THINGS: NOT AT ALL
2. FEELING DOWN, DEPRESSED, IRRITABLE, OR HOPELESS: NOT AT ALL

## 2022-01-01 ASSESSMENT — LIFESTYLE VARIABLES: DO YOU DRINK ALCOHOL: NO

## 2022-01-02 ENCOUNTER — APPOINTMENT (OUTPATIENT)
Dept: RADIOLOGY | Facility: MEDICAL CENTER | Age: 67
DRG: 064 | End: 2022-01-02
Attending: STUDENT IN AN ORGANIZED HEALTH CARE EDUCATION/TRAINING PROGRAM
Payer: MEDICARE

## 2022-01-02 ENCOUNTER — APPOINTMENT (OUTPATIENT)
Dept: RADIOLOGY | Facility: MEDICAL CENTER | Age: 67
DRG: 064 | End: 2022-01-02
Attending: HOSPITALIST
Payer: MEDICARE

## 2022-01-02 ENCOUNTER — APPOINTMENT (OUTPATIENT)
Dept: CARDIOLOGY | Facility: MEDICAL CENTER | Age: 67
DRG: 064 | End: 2022-01-02
Attending: STUDENT IN AN ORGANIZED HEALTH CARE EDUCATION/TRAINING PROGRAM
Payer: MEDICARE

## 2022-01-02 PROBLEM — J45.20 MILD INTERMITTENT ASTHMA WITHOUT COMPLICATION: Status: ACTIVE | Noted: 2022-01-02

## 2022-01-02 PROBLEM — F32.A DEPRESSION: Status: ACTIVE | Noted: 2022-01-02

## 2022-01-02 PROBLEM — I10 PRIMARY HYPERTENSION: Status: ACTIVE | Noted: 2022-01-02

## 2022-01-02 PROBLEM — Z79.01 CHRONIC ANTICOAGULATION: Status: ACTIVE | Noted: 2022-01-02

## 2022-01-02 PROBLEM — E78.5 DYSLIPIDEMIA: Status: ACTIVE | Noted: 2022-01-02

## 2022-01-02 LAB
ALBUMIN SERPL BCP-MCNC: 3.6 G/DL (ref 3.2–4.9)
ALBUMIN/GLOB SERPL: 1.1 G/DL
ALP SERPL-CCNC: 97 U/L (ref 30–99)
ALT SERPL-CCNC: 31 U/L (ref 2–50)
ANION GAP SERPL CALC-SCNC: 16 MMOL/L (ref 7–16)
AST SERPL-CCNC: 48 U/L (ref 12–45)
BASOPHILS # BLD AUTO: 0.5 % (ref 0–1.8)
BASOPHILS # BLD: 0.08 K/UL (ref 0–0.12)
BILIRUB SERPL-MCNC: 1.6 MG/DL (ref 0.1–1.5)
BUN SERPL-MCNC: 32 MG/DL (ref 8–22)
CALCIUM SERPL-MCNC: 9.1 MG/DL (ref 8.5–10.5)
CHLORIDE SERPL-SCNC: 110 MMOL/L (ref 96–112)
CHOLEST SERPL-MCNC: 204 MG/DL (ref 100–199)
CO2 SERPL-SCNC: 22 MMOL/L (ref 20–33)
CREAT SERPL-MCNC: 1.28 MG/DL (ref 0.5–1.4)
EKG IMPRESSION: NORMAL
EOSINOPHIL # BLD AUTO: 0.08 K/UL (ref 0–0.51)
EOSINOPHIL NFR BLD: 0.5 % (ref 0–6.9)
ERYTHROCYTE [DISTWIDTH] IN BLOOD BY AUTOMATED COUNT: 54.5 FL (ref 35.9–50)
GLOBULIN SER CALC-MCNC: 3.2 G/DL (ref 1.9–3.5)
GLUCOSE SERPL-MCNC: 89 MG/DL (ref 65–99)
HCT VFR BLD AUTO: 48.7 % (ref 42–52)
HDLC SERPL-MCNC: 35 MG/DL
HGB BLD-MCNC: 15.9 G/DL (ref 14–18)
IMM GRANULOCYTES # BLD AUTO: 0.11 K/UL (ref 0–0.11)
IMM GRANULOCYTES NFR BLD AUTO: 0.7 % (ref 0–0.9)
LACTATE BLD-SCNC: 1.3 MMOL/L (ref 0.5–2)
LACTATE BLD-SCNC: 1.4 MMOL/L (ref 0.5–2)
LACTATE BLD-SCNC: 1.9 MMOL/L (ref 0.5–2)
LDLC SERPL CALC-MCNC: 133 MG/DL
LV EJECT FRACT  99904: 75
LV EJECT FRACT MOD 2C 99903: 70.13
LV EJECT FRACT MOD 4C 99902: 74.99
LV EJECT FRACT MOD BP 99901: 72.5
LYMPHOCYTES # BLD AUTO: 1.64 K/UL (ref 1–4.8)
LYMPHOCYTES NFR BLD: 10.9 % (ref 22–41)
MCH RBC QN AUTO: 33 PG (ref 27–33)
MCHC RBC AUTO-ENTMCNC: 32.6 G/DL (ref 33.7–35.3)
MCV RBC AUTO: 101 FL (ref 81.4–97.8)
MONOCYTES # BLD AUTO: 1.16 K/UL (ref 0–0.85)
MONOCYTES NFR BLD AUTO: 7.7 % (ref 0–13.4)
NEUTROPHILS # BLD AUTO: 12.03 K/UL (ref 1.82–7.42)
NEUTROPHILS NFR BLD: 79.7 % (ref 44–72)
NRBC # BLD AUTO: 0 K/UL
NRBC BLD-RTO: 0 /100 WBC
PLATELET # BLD AUTO: 182 K/UL (ref 164–446)
PMV BLD AUTO: 10.4 FL (ref 9–12.9)
POTASSIUM SERPL-SCNC: 3.7 MMOL/L (ref 3.6–5.5)
PROT SERPL-MCNC: 6.8 G/DL (ref 6–8.2)
RBC # BLD AUTO: 4.82 M/UL (ref 4.7–6.1)
SODIUM SERPL-SCNC: 148 MMOL/L (ref 135–145)
TRIGL SERPL-MCNC: 182 MG/DL (ref 0–149)
WBC # BLD AUTO: 15.1 K/UL (ref 4.8–10.8)

## 2022-01-02 PROCEDURE — 700102 HCHG RX REV CODE 250 W/ 637 OVERRIDE(OP): Performed by: STUDENT IN AN ORGANIZED HEALTH CARE EDUCATION/TRAINING PROGRAM

## 2022-01-02 PROCEDURE — 93306 TTE W/DOPPLER COMPLETE: CPT

## 2022-01-02 PROCEDURE — 99233 SBSQ HOSP IP/OBS HIGH 50: CPT | Performed by: HOSPITALIST

## 2022-01-02 PROCEDURE — 93010 ELECTROCARDIOGRAM REPORT: CPT | Performed by: INTERNAL MEDICINE

## 2022-01-02 PROCEDURE — 70551 MRI BRAIN STEM W/O DYE: CPT | Mod: MG

## 2022-01-02 PROCEDURE — 700111 HCHG RX REV CODE 636 W/ 250 OVERRIDE (IP): Performed by: HOSPITALIST

## 2022-01-02 PROCEDURE — 80053 COMPREHEN METABOLIC PANEL: CPT

## 2022-01-02 PROCEDURE — 93306 TTE W/DOPPLER COMPLETE: CPT | Mod: 26 | Performed by: INTERNAL MEDICINE

## 2022-01-02 PROCEDURE — 93005 ELECTROCARDIOGRAM TRACING: CPT | Performed by: STUDENT IN AN ORGANIZED HEALTH CARE EDUCATION/TRAINING PROGRAM

## 2022-01-02 PROCEDURE — 93970 EXTREMITY STUDY: CPT

## 2022-01-02 PROCEDURE — 92610 EVALUATE SWALLOWING FUNCTION: CPT

## 2022-01-02 PROCEDURE — A9270 NON-COVERED ITEM OR SERVICE: HCPCS | Performed by: STUDENT IN AN ORGANIZED HEALTH CARE EDUCATION/TRAINING PROGRAM

## 2022-01-02 PROCEDURE — 99223 1ST HOSP IP/OBS HIGH 75: CPT | Performed by: STUDENT IN AN ORGANIZED HEALTH CARE EDUCATION/TRAINING PROGRAM

## 2022-01-02 PROCEDURE — 85025 COMPLETE CBC W/AUTO DIFF WBC: CPT

## 2022-01-02 PROCEDURE — 80061 LIPID PANEL: CPT

## 2022-01-02 PROCEDURE — 700102 HCHG RX REV CODE 250 W/ 637 OVERRIDE(OP): Performed by: HOSPITALIST

## 2022-01-02 PROCEDURE — 302136 NUTRITION PUMP: Performed by: HOSPITALIST

## 2022-01-02 PROCEDURE — A9270 NON-COVERED ITEM OR SERVICE: HCPCS | Performed by: HOSPITALIST

## 2022-01-02 PROCEDURE — 770000 HCHG ROOM/CARE - INTERMEDIATE ICU *

## 2022-01-02 PROCEDURE — 83605 ASSAY OF LACTIC ACID: CPT

## 2022-01-02 RX ORDER — ATORVASTATIN CALCIUM 80 MG/1
80 TABLET, FILM COATED ORAL EVERY EVENING
Status: DISCONTINUED | OUTPATIENT
Start: 2022-01-02 | End: 2022-01-20

## 2022-01-02 RX ORDER — IPRATROPIUM BROMIDE AND ALBUTEROL SULFATE 2.5; .5 MG/3ML; MG/3ML
3 SOLUTION RESPIRATORY (INHALATION)
Status: DISCONTINUED | OUTPATIENT
Start: 2022-01-02 | End: 2022-01-15

## 2022-01-02 RX ORDER — ASPIRIN 81 MG/1
81 TABLET, CHEWABLE ORAL DAILY
Status: DISCONTINUED | OUTPATIENT
Start: 2022-01-02 | End: 2022-01-03

## 2022-01-02 RX ORDER — ASPIRIN 81 MG/1
81 TABLET, CHEWABLE ORAL DAILY
Status: DISCONTINUED | OUTPATIENT
Start: 2022-01-02 | End: 2022-01-02

## 2022-01-02 RX ORDER — ACETAMINOPHEN 325 MG/1
650 TABLET ORAL EVERY 6 HOURS PRN
Status: DISCONTINUED | OUTPATIENT
Start: 2022-01-02 | End: 2022-01-20

## 2022-01-02 RX ORDER — ONDANSETRON 4 MG/1
4 TABLET, ORALLY DISINTEGRATING ORAL EVERY 4 HOURS PRN
Status: DISCONTINUED | OUTPATIENT
Start: 2022-01-02 | End: 2022-01-20

## 2022-01-02 RX ORDER — ASPIRIN 300 MG/1
300 SUPPOSITORY RECTAL ONCE
Status: COMPLETED | OUTPATIENT
Start: 2022-01-02 | End: 2022-01-02

## 2022-01-02 RX ORDER — POLYETHYLENE GLYCOL 3350 17 G/17G
1 POWDER, FOR SOLUTION ORAL
Status: DISCONTINUED | OUTPATIENT
Start: 2022-01-02 | End: 2022-01-18

## 2022-01-02 RX ORDER — BISACODYL 10 MG
10 SUPPOSITORY, RECTAL RECTAL
Status: DISCONTINUED | OUTPATIENT
Start: 2022-01-02 | End: 2022-01-18

## 2022-01-02 RX ORDER — AMOXICILLIN 250 MG
2 CAPSULE ORAL 2 TIMES DAILY
Status: DISCONTINUED | OUTPATIENT
Start: 2022-01-02 | End: 2022-01-18

## 2022-01-02 RX ADMIN — ASPIRIN 300 MG: 300 SUPPOSITORY RECTAL at 01:22

## 2022-01-02 RX ADMIN — ATORVASTATIN CALCIUM 80 MG: 80 TABLET, FILM COATED ORAL at 17:49

## 2022-01-02 RX ADMIN — DOCUSATE SODIUM 50 MG AND SENNOSIDES 8.6 MG 2 TABLET: 8.6; 5 TABLET, FILM COATED ORAL at 17:49

## 2022-01-02 RX ADMIN — ASPIRIN 81 MG: 81 TABLET, CHEWABLE ORAL at 17:49

## 2022-01-02 RX ADMIN — ENOXAPARIN SODIUM 40 MG: 40 INJECTION SUBCUTANEOUS at 17:48

## 2022-01-02 ASSESSMENT — ENCOUNTER SYMPTOMS
NAUSEA: 0
COUGH: 0
SPUTUM PRODUCTION: 0
SORE THROAT: 0
NECK PAIN: 1
FOCAL WEAKNESS: 1
BACK PAIN: 1
EYE PAIN: 0
FEVER: 0
NECK PAIN: 0
CHILLS: 0
DEPRESSION: 1
PHOTOPHOBIA: 0
FALLS: 1
PALPITATIONS: 0
SPEECH CHANGE: 1
SINUS PAIN: 0
ABDOMINAL PAIN: 0
VOMITING: 0
NERVOUS/ANXIOUS: 0
SENSORY CHANGE: 1
SHORTNESS OF BREATH: 0
HEADACHES: 0

## 2022-01-02 ASSESSMENT — PATIENT HEALTH QUESTIONNAIRE - PHQ9
2. FEELING DOWN, DEPRESSED, IRRITABLE, OR HOPELESS: NOT AT ALL
SUM OF ALL RESPONSES TO PHQ9 QUESTIONS 1 AND 2: 0
1. LITTLE INTEREST OR PLEASURE IN DOING THINGS: NOT AT ALL

## 2022-01-02 ASSESSMENT — FIBROSIS 4 INDEX
FIB4 SCORE: 3.22
FIB4 SCORE: 3.22

## 2022-01-02 ASSESSMENT — LIFESTYLE VARIABLES: SUBSTANCE_ABUSE: 0

## 2022-01-02 NOTE — ED NOTES
Bedside report to ORLIN Swain.  [facility] transported to admit room via [facility] with RN and tech. All personal belongings in possession.  NAD noted.

## 2022-01-02 NOTE — CONSULTS
"Neurology Initial Consult H&P  Neurohospitalist Service, [facility]iences    Referring Physician: Toby Vanegas M.D.    CC: L sided weakness, found down    HPI: Dayami Morel (first name Jose) is a 66 y.o. man presenting as a trauma code after being found down with an unknown last well time.  Initial CT head revealed a large subacute right MCA territory ischemic infarct.  Follow-up CTA showed a complete right ICA occlusion.    Today, the patient states that he does not remember exactly what happened, but he remembers that he fell down.  He knows that he is in the hospital because he had a stroke.  He states that he is never had a stroke before or anything like this in the past.    Further history is limited due to the patient's condition.  He is very slow to respond to questions and occasionally does not have an answer at all.  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  Patient's INR on presentation is 1.18.    Review of systems: In addition to what is detailed in the HPI above, all other systems reviewed and are negative.    Past Medical History:   Reports HTN and HLD    FHx:  Mother had a stroke. Unknown age.    SHx:  Denies tobacco, recreational drugs.  Reports drinking daily but \"only a little.\"    Allergies:  No Known Allergies    Medications:    Current Facility-Administered Medications:   •  ipratropium-albuterol (DUONEB) nebulizer solution, 3 mL, Nebulization, Q4H PRN (RT), Toby Vanegas M.D.  •  Allow for permissive hypertension: SBP up to 220 mmHg/DBP to 120 mmHg; If SBP greater than 220 mmHg or DBP greater than 120 mmHg administer PRN antihypertensive medications., , Other, PHARMACY TO DOSE, Toby Vanegas M.D.  •  senna-docusate (PERICOLACE or SENOKOT S) 8.6-50 MG per tablet 2 Tablet, 2 Tablet, Oral, BID **AND** polyethylene glycol/lytes (MIRALAX) PACKET 1 Packet, 1 Packet, Oral, QDAY PRN **AND** magnesium " "hydroxide (MILK OF MAGNESIA) suspension 30 mL, 30 mL, Oral, QDAY PRN **AND** bisacodyl (DULCOLAX) suppository 10 mg, 10 mg, Rectal, QDAY PRN, Toby Vanegas M.D.  •  acetaminophen (Tylenol) tablet 650 mg, 650 mg, Oral, Q6HRS PRN, Toby Vanegas M.D.  •  labetalol (NORMODYNE/TRANDATE) injection 10 mg, 10 mg, Intravenous, Q10 MIN PRN, Toby Vanegas M.D.  •  hydrALAZINE (APRESOLINE) injection 10 mg, 10 mg, Intravenous, Q2HRS PRN, Toby Vanegas M.D.  •  ondansetron (ZOFRAN) syringe/vial injection 4 mg, 4 mg, Intravenous, Q4HRS PRN, Toby Vanegas M.D.  •  ondansetron (ZOFRAN ODT) dispertab 4 mg, 4 mg, Oral, Q4HRS PRN, Toby Vanegas M.D.  •  atorvastatin (LIPITOR) tablet 80 mg, 80 mg, Oral, Q EVENING, Toby Vanegas M.D.    Physical Examination:     General: Patient is awake and in no acute distress  Eye: Examination of optic disks not indicated at this time given acuity of consult  Neck: There is normal range of motion  CV: regular rate   Extremities:  clear, dry, intact, without peripheral edema    NEUROLOGICAL EXAM:     /64   Pulse 98   Temp 36.4 °C (97.5 °F) (Temporal)   Resp 19   Ht 1.854 m (6' 1\")   Wt 122 kg (269 lb 13.5 oz)   SpO2 98%   BMI 35.60 kg/m²       Mental status: Awake, alert and fully oriented  Speech and language: Speech is dysarthric but fluent. The patient is able to name and repeat, and follow commands  Cranial nerve exam: Pupils are equal, round and reactive to light bilaterally. L visual field neglect.  There is no nystagmus. Extraocular muscles are intact.  Left facial droop. Sensation in the face reduced on the left to light touch. Palate elevates symmetrically. Tongue is midline.  Motor exam: Power 0/5 in the left arm and leg.  Tone is increased in the left arm and leg.  Moves right side with purpose 5/5 power.  No abnormal movements were seen on exam.  Sensory exam: Dense left-sided hemisensory neglect.  Deep tendon reflexes:   3/4 in the left arm and leg. "  2/4 in the right.  Left toe upgoing.  Coordination: No gross dysmetria on the right  Gait: Deferred due to patient condition      Objective Data:    Labs:  Lab Results   Component Value Date/Time    PROTHROMBTM 14.6 01/01/2022 05:55 PM    INR 1.18 (H) 01/01/2022 05:55 PM      Lab Results   Component Value Date/Time    WBC 15.1 (H) 01/02/2022 01:25 AM    RBC 4.82 01/02/2022 01:25 AM    HEMOGLOBIN 15.9 01/02/2022 01:25 AM    HEMATOCRIT 48.7 01/02/2022 01:25 AM    .0 (H) 01/02/2022 01:25 AM    MCH 33.0 01/02/2022 01:25 AM    MCHC 32.6 (L) 01/02/2022 01:25 AM    MPV 10.4 01/02/2022 01:25 AM    NEUTSPOLYS 79.70 (H) 01/02/2022 01:25 AM    LYMPHOCYTES 10.90 (L) 01/02/2022 01:25 AM    MONOCYTES 7.70 01/02/2022 01:25 AM    EOSINOPHILS 0.50 01/02/2022 01:25 AM    BASOPHILS 0.50 01/02/2022 01:25 AM      Lab Results   Component Value Date/Time    SODIUM 148 (H) 01/02/2022 01:25 AM    POTASSIUM 3.7 01/02/2022 01:25 AM    CHLORIDE 110 01/02/2022 01:25 AM    CO2 22 01/02/2022 01:25 AM    GLUCOSE 89 01/02/2022 01:25 AM    BUN 32 (H) 01/02/2022 01:25 AM    CREATININE 1.28 01/02/2022 01:25 AM      Lab Results   Component Value Date/Time    CHOLSTRLTOT 204 (H) 01/02/2022 01:25 AM     (H) 01/02/2022 01:25 AM    HDL 35 (A) 01/02/2022 01:25 AM    TRIGLYCERIDE 182 (H) 01/02/2022 01:25 AM       Lab Results   Component Value Date/Time    ALKPHOSPHAT 97 01/02/2022 01:25 AM    ASTSGOT 48 (H) 01/02/2022 01:25 AM    ALTSGPT 31 01/02/2022 01:25 AM    TBILIRUBIN 1.6 (H) 01/02/2022 01:25 AM        Imaging/Testing:    I interpreted and/or reviewed the patient's neuroimaging    EC-ECHOCARDIOGRAM COMPLETE W/O CONT         DX-SHOULDER 2+ LEFT   Final Result      Negative 2 views of left shoulder.      DX-SHOULDER 2+ RIGHT   Final Result      Moderate subacromial spurring      DX-FOOT-COMPLETE 3+ RIGHT   Final Result      No radiographic evidence of acute displaced fracture or subluxation.      DX-FOOT-2- LEFT   Final Result      No  radiographic evidence of acute displaced fracture or subluxation.      DX-ELBOW-COMPLETE 3+ RIGHT   Final Result      No radiographic evidence of acute traumatic injury.      Limited due to rotation of the lateral radiograph attempts. Consider repeat lateral radiograph to help exclude effusion      CT-CHEST,ABDOMEN,PELVIS WITH   Final Result      No significant traumatic abnormality in thorax, abdomen and pelvis CT scans.      Hepatic steatosis      Moderate colonic diverticulosis.      CT-LSPINE W/O PLUS RECONS   Final Result      No CT evidence of acute traumatic injury.      Moderate degenerative disc disease with trace degenerative L5/S1 retrolisthesis      CT-TSPINE W/O PLUS RECONS   Final Result      No CT evidence of acute traumatic abnormality.      CT-CSPINE WITHOUT PLUS RECONS   Final Result      No CT evidence of acute cervical spine abnormality.      CT-HEAD W/O   Final Result      Subacute right temporal, temporo-occipital infarction without hemorrhagic transformation      CT-CTA HEAD WITH & W/O-POST PROCESS   Final Result      Right internal carotid artery occlusion with extension to the MCA which lacks contrast opacification      Right A1 and KATIA contrast opacification secondary to cross-filling from the left      Normal left anterior and bilateral posterior circulation      Findings of all of the CTs were discussed with Dr. Betancourt before completion of this dictation.            CT-CTA NECK WITH & W/O-POST PROCESSING   Final Result      Occluded right internal carotid artery at its origin      Atherosclerosis at origin of bilateral vertebral arteries could obscure stenosis. Arteries are patent without post stenotic dilatation      DX-PELVIS-1 OR 2 VIEWS   Final Result      Negative AP view of the pelvis.      DX-CHEST-LIMITED (1 VIEW)   Final Result      No acute cardiopulmonary disease.      US-ABORTED US PROCEDURE    (Results Pending)   MR-BRAIN-W/O    (Results Pending)       Assessment and  Plan:    Dayami Morel (first name Jose) is a 66 y.o. man with hypertension and hyperlipidemia presenting as a trauma code after being found down for an unknown time.  He was found to have a large subacute right MCA ischemic infarct in the setting of a complete right ICA occlusion.  I reviewed the neuroimaging, and the patient is not a candidate for any surgical intervention given the completed nature of his strokes.  Reopening the right internal carotid artery would be high risk for reperfusion injury, further strokes, and hemorrhagic conversion.  The etiology is likely large vessel given the appearance of the artery, however the patient is on chronic Coumadin therapy per documentation, and his INR was subtherapeutic on admission.  It is unclear whether or not he has an underlying hypercoagulable state.  The stroke on CT head does not appear impressively dense, and the patient appears to have enough room for some degree of swelling.  I do not anticipate malignant edema in this case, but we will be able to predict this better after MRI.    Problem list:  1.  Large right MCA ischemic infarct, likely large vessel disease versus hypercoagulable state    Recommendations:  Observation - Admit to Hospital for q2H neurochecks and vitals.  Vitals - Allow permissive HTN up to 180/110 for first 24-48 hours. Hold home anti-hypertensives if appropriate. Half home beta blockers to avoid reflex tachycardia if needed for rate control. If neuro-stable after 24-48 hours, then gradually reduce BP to normal with a long-term goal of 110-130/80-90. Telemetry monitoring.  Diet - NPO until appropriate bedside swallow. SLP consult and modified diet or NG tube if fails bedside swallow.  Treatment - Aspirin 81mg daily. Atorvastatin 80mg, or comparable high-intensity statin with a long-term LDL goal < 70.  Unless otherwise medically indicated, I would hold anticoagulation therapy at this time (DVT prophylaxis okay) until MRI can verify  the severity of stroke burden and help quantify hemorrhagic conversion risk.  Laboratory studies - Check Lipid panel, A1C.  Would be worth checking a hypercoagulable panel given the patient's questionable history of unprovoked DVT and PE.  Other imaging - Check MRI Brain without contrast, ECHOcardiogram  Counselling - Tobacco cessation counseling, if applicable.  Dispo - PT/OT to aid in disposition.      The evaluation of the patient, and recommended management, was discussed with the resident staff.     Rivas Otoole D.O.  Neurohospitalist, Acute Care Services

## 2022-01-02 NOTE — CARE PLAN
Problem: Optimal Care of the Stroke Patient  Goal: Optimal emergency care for the stroke patient  Outcome: Progressing  Goal: Optimal acute care for the stroke patient  Outcome: Progressing     Problem: Knowledge Deficit - Stroke Education  Goal: Patient's knowledge of stroke and risk factors will improve  Outcome: Progressing     Problem: Psychosocial - Patient Condition  Goal: Patient's ability to verbalize feelings about condition will improve  Outcome: Progressing  Goal: Patient's ability to re-evaluate and adapt role responsibilities will improve  Outcome: Progressing     Problem: Psychosocial - Patient Condition  Goal: Patient's ability to re-evaluate and adapt role responsibilities will improve  Outcome: Progressing     Problem: Discharge Planning - Stroke  Goal: Ensure Stroke Core Measures are met prior to discharge  Outcome: Progressing  Goal: Patient’s continuum of care needs will be met  Outcome: Progressing     Problem: Respiratory - Stroke Patient  Goal: Patient will achieve/maintain optimum respiratory rate/effort  Outcome: Progressing   The patient is Watcher - Medium risk of patient condition declining or worsening    Shift Goals  Clinical Goals: stable neuro assessments  Patient Goals: unable to assess  Family Goals: stay updated    Progress made toward(s) clinical / shift goals:  patient is alert and aware of person, place, situation

## 2022-01-02 NOTE — ED NOTES
Patient A&Ox4, slow speech, c-collar in place, educated on NPO status, verbalized understanding, mouth swabbed.

## 2022-01-02 NOTE — ED PROVIDER NOTES
ED Provider Note    Scribed for Sera Betancourt M.D. by Josue Cortez-Reyes. 1/1/2022  6:06 PM    Primary care provider: No primary care provider noted  Means of arrival: EMS  History obtained from: EMS  History limited by: None  CHIEF COMPLAINT  No chief complaint on file.      [facility]  Dayami Morel is a 66 y.o. male who presents to the ER as a trauma red via care flight from [place] after he was found today on a welfare check at the bottom of the stairs with left-sided hemiparesis and rightward gaze.  Patient told EMS and told me that he fell down 15 stairs yesterday at 9 PM.  He is unable to get himself up off of the ground.  He lives alone.  He said he fell headfirst.  He complains of some right elbow pain.  He has contusions to his left chest wall and a contusion to his left forehead.  He also complained of some neck pain.  He arrives in a c-collar.  He does not recall when his last tetanus shot was.  He is completely vaccinated for COVID-19 including booster.  Patient has never had a stroke before.  He denies abdominal pain.  He denies back pain.  He denies feeling short of breath.  He denies headache.  He is on Coumadin for pulmonary embolism.  He arrives with his left great toenail avulsed and with his right great toenail partially avulsed.  He has some bruising over his great great toe.  Patient complains of some mild right hip pain.  He denies vision changes.  His speech is slurred.    REVIEW OF SYSTEMS  See HPI for further details.  Review of systems unable to be obtained due to critical condition of patient.    PAST MEDICAL HISTORY  No past medical history on file.  History of PEs per patient.  Is on Coumadin.  No previous history of stroke.    FAMILY HISTORY  No family history on file.    SOCIAL HISTORY  Social History     Socioeconomic History   • Marital status: Single     Spouse name: Not on file   • Number of children: Not on file   • Years of education: Not on file   • Highest education level:  Not on file   Occupational History   • Not on file   Tobacco Use   • Smoking status: Not on file   • Smokeless tobacco: Not on file   Substance and Sexual Activity   • Alcohol use: Not on file   • Drug use: Not on file   • Sexual activity: Not on file   Other Topics Concern   • Not on file   Social History Narrative   • Not on file     Social Determinants of Health     Financial Resource Strain:    • Difficulty of Paying Living Expenses: Not on file   Food Insecurity:    • Worried About Running Out of Food in the Last Year: Not on file   • Ran Out of Food in the Last Year: Not on file   Transportation Needs:    • Lack of Transportation (Medical): Not on file   • Lack of Transportation (Non-Medical): Not on file   Physical Activity:    • Days of Exercise per Week: Not on file   • Minutes of Exercise per Session: Not on file   Stress:    • Feeling of Stress : Not on file   Social Connections:    • Frequency of Communication with Friends and Family: Not on file   • Frequency of Social Gatherings with Friends and Family: Not on file   • Attends Buddhism Services: Not on file   • Active Member of Clubs or Organizations: Not on file   • Attends Club or Organization Meetings: Not on file   • Marital Status: Not on file   Intimate Partner Violence:    • Fear of Current or Ex-Partner: Not on file   • Emotionally Abused: Not on file   • Physically Abused: Not on file   • Sexually Abused: Not on file   Housing Stability:    • Unable to Pay for Housing in the Last Year: Not on file   • Number of Places Lived in the Last Year: Not on file   • Unstable Housing in the Last Year: Not on file       SURGICAL HISTORY  No past surgical history on file.    CURRENT MEDICATIONS  Home Medications     Reviewed by Mara Parker (Pharmacy Tech) on 01/01/22 at 1953  Med List Status: Complete   Medication Last Dose Status        Patient Jose Taking any Medications                       ALLERGIES  No Known Allergies    PHYSICAL EXAM  VITAL  "SIGNS: /81   Pulse (!) 108   Temp 36.5 °C (97.7 °F)   Resp 20   Ht 1.854 m (6' 1\")   Wt 118 kg (260 lb)   SpO2 95%   BMI 34.30 kg/m²      Constitutional: Well developed, well nourished; mild acute distress;   Elevated BMI.  Ill-appearing.  HENT: Normocephalic, atraumatic; Bilateral external ears normal; poor dentition.  No loose teeth.  Very dry mucous membranes.  No raccoons or battles.  Contusion to the left forehead.  No obvious lacerations to the scalp.  Eyes: PERRL, EOMI, Conjunctiva normal. No discharge.   Neck: Arrives in c-collar.  Diffuse midline C-spine tenderness; No stridor; No nuchal rigidity.   Lymphatic: No cervical lymphadenopathy noted.   Cardiovascular: Regular rate and rhythm without murmurs, rubs, or gallop.   Thorax & Lungs: No respiratory distress, breath sounds decreased bilaterally but are otherwise clear to auscultation bilaterally without wheezing, rales or rhonchi. Nontender chest wall. No crepitus or subcutaneous air.  There is some contusion and abrasion over the left mid and lower lateral chest wall and anterolateral chest wall.  Abdomen: Soft, nontender, bowel sounds normal. No obvious masses; No pulsatile masses; no rebound, guarding, or peritoneal signs.   Skin: Good color; warm and dry without rash or petechia.  Back: Nontender midline T-spine and L-spine.  There is no obvious trauma to the back.  Extremities: Distal radial, dorsalis pedis, posterior tibial pulses are equal bilaterally; No edema; Nontender calves or saphenous, No cyanosis, No clubbing.   Musculoskeletal: There is some mild swelling to the right elbow.  The patient denies pain with flexion, extension, pronation or supination.  No crepitus.  No ecchymosis.  He has contusions to the lateral aspects of bilateral shoulders.  He denies any pain with range of motion of his shoulders.  There is blood to the left great toe as the left great toenail is completely avulsed and missing.  The right great toenail is " elevated and partially avulsed.  The right great toe is ecchymotic and a bit swollen.  Good capillary refill.  Good pedal pulses equal bilaterally.  Neurologic: Alert & oriented x 4, speech is slurred.  He has a left-sided facial droop.  He has a preferential rightward gaze with difficulty looking leftward past the midline.  He has hemiparesis to the left arm and left leg.  He can perceive light touch to the left arm and left leg.  NIH 13      EKG  Please see my EKG interpretation below    LABS/RADIOLOGY/PROCEDURES  Results for orders placed or performed during the hospital encounter of 01/01/22   DIAGNOSTIC ALCOHOL   Result Value Ref Range    Diagnostic Alcohol <10.1 0.0 - 10.0 mg/dL   CBC WITHOUT DIFFERENTIAL   Result Value Ref Range    WBC 15.1 (H) 4.8 - 10.8 K/uL    RBC 5.41 4.70 - 6.10 M/uL    Hemoglobin 18.1 (H) 14.0 - 18.0 g/dL    Hematocrit 53.7 (H) 42.0 - 52.0 %    MCV 99.3 (H) 81.4 - 97.8 fL    MCH 33.5 (H) 27.0 - 33.0 pg    MCHC 33.7 33.7 - 35.3 g/dL    RDW 53.1 (H) 35.9 - 50.0 fL    Platelet Count 236 164 - 446 K/uL    MPV 10.6 9.0 - 12.9 fL   Comp Metabolic Panel   Result Value Ref Range    Sodium 147 (H) 135 - 145 mmol/L    Potassium 4.1 3.6 - 5.5 mmol/L    Chloride 106 96 - 112 mmol/L    Co2 18 (L) 20 - 33 mmol/L    Anion Gap 23.0 (H) 7.0 - 16.0    Glucose 98 65 - 99 mg/dL    Bun 37 (H) 8 - 22 mg/dL    Creatinine 1.53 (H) 0.50 - 1.40 mg/dL    Calcium 9.4 8.5 - 10.5 mg/dL    AST(SGOT) 54 (H) 12 - 45 U/L    ALT(SGPT) 35 2 - 50 U/L    Alkaline Phosphatase 119 (H) 30 - 99 U/L    Total Bilirubin 1.6 (H) 0.1 - 1.5 mg/dL    Albumin 4.5 3.2 - 4.9 g/dL    Total Protein 8.1 6.0 - 8.2 g/dL    Globulin 3.6 (H) 1.9 - 3.5 g/dL    A-G Ratio 1.3 g/dL   Prothrombin Time   Result Value Ref Range    PT 14.6 12.0 - 14.6 sec    INR 1.18 (H) 0.87 - 1.13   APTT   Result Value Ref Range    APTT 26.8 24.7 - 36.0 sec   LACTIC ACID   Result Value Ref Range    Lactic Acid 4.2 (HH) 0.5 - 2.0 mmol/L   PLATELET MAPPING WITH BASIC  TEG   Result Value Ref Range    Reaction Time Initial-R 4.9 4.6 - 9.1 min    React Time Initial Hep 7.4 4.3 - 8.3 min    Clot Kinetics-K 1.2 0.8 - 2.1 min    Clot Angle-Angle 74.8 63.0 - 78.0 degrees    Maximum Clot Strength-MA 62.6 52.0 - 69.0 mm    TEG Functional Fibrinogen(MA) 24.1 15.0 - 32.0 mm    Lysis 30 minutes-LY30 0.0 0.0 - 2.6 %    % Inhibition ADP 40.4 (H) 0.0 - 17.0 %    % Inhibition AA 6.3 0.0 - 11.0 %    TEG Algorithm Link Algorithm    CREATINE KINASE   Result Value Ref Range    CPK Total 1571 (HH) 0 - 154 U/L   COD - Adult (Type and Screen)   Result Value Ref Range    ABO Grouping Only O     Rh Grouping Only POS     Antibody Screen-Cod NEG    ABO Rh Confirm   Result Value Ref Range    ABO Rh Confirm O POS    ESTIMATED GFR   Result Value Ref Range    GFR If  55 (A) >60 mL/min/1.73 m 2    GFR If Non African American 45 (A) >60 mL/min/1.73 m 2         DX-SHOULDER 2+ LEFT   Final Result      Negative 2 views of left shoulder.      DX-SHOULDER 2+ RIGHT   Final Result      Moderate subacromial spurring      DX-FOOT-COMPLETE 3+ RIGHT   Final Result      No radiographic evidence of acute displaced fracture or subluxation.      DX-FOOT-2- LEFT   Final Result      No radiographic evidence of acute displaced fracture or subluxation.      DX-ELBOW-COMPLETE 3+ RIGHT   Final Result      No radiographic evidence of acute traumatic injury.      Limited due to rotation of the lateral radiograph attempts. Consider repeat lateral radiograph to help exclude effusion      CT-CHEST,ABDOMEN,PELVIS WITH   Final Result      No significant traumatic abnormality in thorax, abdomen and pelvis CT scans.      Hepatic steatosis      Moderate colonic diverticulosis.      CT-LSPINE W/O PLUS RECONS   Final Result      No CT evidence of acute traumatic injury.      Moderate degenerative disc disease with trace degenerative L5/S1 retrolisthesis      CT-TSPINE W/O PLUS RECONS   Final Result      No CT evidence of acute  traumatic abnormality.      CT-CSPINE WITHOUT PLUS RECONS   Final Result      No CT evidence of acute cervical spine abnormality.      CT-HEAD W/O   Final Result      Subacute right temporal, temporo-occipital infarction without hemorrhagic transformation      CT-CTA HEAD WITH & W/O-POST PROCESS   Final Result      Right internal carotid artery occlusion with extension to the MCA which lacks contrast opacification      Right A1 and KATIA contrast opacification secondary to cross-filling from the left      Normal left anterior and bilateral posterior circulation      Findings of all of the CTs were discussed with Dr. Betancourt before completion of this dictation.            CT-CTA NECK WITH & W/O-POST PROCESSING   Final Result      Occluded right internal carotid artery at its origin      Atherosclerosis at origin of bilateral vertebral arteries could obscure stenosis. Arteries are patent without post stenotic dilatation      DX-PELVIS-1 OR 2 VIEWS   Final Result      Negative AP view of the pelvis.      DX-CHEST-LIMITED (1 VIEW)   Final Result      No acute cardiopulmonary disease.      US-ABORTED US PROCEDURE    (Results Pending)   EC-ECHOCARDIOGRAM COMPLETE W/O CONT    (Results Pending)       COURSE & MEDICAL DECISION MAKING  Pertinent Labs & Imaging studies reviewed. (See chart for details)        6:06 PM - Patient seen and examined at bedside.  Dr. Penn, trauma surgeon, was present at bedside evaluating patient alongside me.    6:35 PM - I reviewed the patient's imaging with radiology who notes that patient has complete occlusion of the right carotid artery and no contrast going into the right-sided [place].  Patient has subacute right-sided stroke.    6:41 PM - Paged Neuro    6:53 PM - I discussed the patient's case and the above findings with Dr. Otoole (Neuro) who does not believe that vascular surgery needs to be consulted at this time for a completely occluded carotid that already resulted in stroke. He  will review the patient's imaging and call back.     6:58 PM - Paged Hospitalist.     7:18 PM - I discussed the patient's case and the above findings with Dr. Vanegas (Hospitalist) who is aware of patient in the event that neurology wants patient in ICU.    7:19 PM - I reevaluated the patient at bedside. I updated him on his lab and imaging results noted above informing him that he had a stroke. I discussed my plan of having him admitted to the hospital for further treatment and monitoring of his symptoms. Patient verbalizes understanding and agreement to this plan of care.     7:30 PM - Paged Intensivist as Dr. Otoole, neurologist, recommends ICU for this patient.    7:35 PM - I discussed the patient's case and the above findings with Dr. Rose (Intensivist) who advised that the patient be placed in the intermediate care unit and that I consult with hospitalist.  Dr. Vanegas is aware and will eval for hospitalize.    9:02 PM - I reevaluated the patient at bedside.       Patient presents to the ER as a trauma red via care flight from [place] after he was found at the bottom of a flight of stairs after falling down the stairs yesterday morning at 9 AM.  The patient believes he might of slipped.  However, upon arrival of EMS crew they found him to be hemiparetic on the left with a preferential rightward gaze and slurred speech.  It is unclear if patient was traumatic head bleed versus stroke.  For this reason he presents as a trauma red.  Dr. Penn, trauma surgeon, was evaluated the patient alongside me.  Patient has some contusion to his left chest but his breath sounds are equal.  Chest x-ray is unremarkable.  He complained of some right hip pain when I ranged his right hip.  Pelvis x-ray is unremarkable.  No obvious fracture.  He complained of some right elbow pain.  Elbow x-ray is limited but no obvious fracture.  He did not really have much pain when I ranged his right elbow.  He did have obvious left-sided  hemiparesis, preferential rightward gaze, inability to look past the midline on the left, and slurred speech with a facial droop.  Symptoms are consistent with a large right-sided stroke.  The patient is on Coumadin.  He says he takes it for PE.  He had a soft and nontender abdomen.  He told EMS he had some neck pain.  He arrives in a c-collar.  He underwent a full trauma evaluation including CT scans of his entire body except for the face.  We also did a CTA of the head and neck.  CT a of the neck reveals complete occlusion of the right carotid artery.  CT of the brain reveals a subacute temporal/temporal septal infarct without hemorrhagic conversion.  I spoke with Dr. Otoole, neurologist on-call.  He says at this time this is a completed stroke.  No surgical intervention for the completely occluded right carotid artery needed at this time.  He recommends ICU admission and permissive hypertension.  Patient did have an avulsed toenail of the great toe on the right.  He has a slightly elevated and minimally mobile great toenail on the left.  X-rays are negative for acute fracture.  His tetanus was updated here in the ER.  He says he is fully vaccinated for COVID, including his booster.  Lab work reveals he has a lactic acidosis with a lactic acid of 4.2.  He has an anion gap of 23.  He has some rhabdomyolysis, likely from trauma as well as from lying on the ground for over 24 hours.  He was given IV fluids here in the ER.  He will be hospitalized in the intermediate care unit under the care of Dr. Rose and Dr. Vanegas.      CRITICAL CARE  The very real possibility of a deterioration of this patient's condition required the highest level of my preparedness for sudden, emergent intervention.  I provided critical care services, which included medication orders, frequent reevaluations of the patient's condition and response to treatment, ordering and reviewing test results, and discussing the case with various  consultants.  The critical care time associated with the care of the patient was 40 minutes. Review chart for interventions. This time is exclusive of any other billable procedures.       DISPOSITION:  Patient will be hospitalized by Gonzalo Rose and Romina in guarded condition.      FINAL IMPRESSION  1. Cerebrovascular accident (CVA) due to occlusion of right carotid artery (HCC) Acute   2. Lactic acidosis Acute   3. High anion gap metabolic acidosis Acute   4. Traumatic rhabdomyolysis, initial encounter (HCC) Acute   5. Stroke due to thrombosis of right carotid artery (HCC)       The critical care time associated with the care of the patient was 40 minutes.    This dictation has been created using voice recognition software. The accuracy of the dictation is limited by the abilities of the software. I expect there may be some errors of grammar and possibly content. I made every attempt to manually correct the errors within my dictation. However, errors related to voice recognition software may still exist and should be interpreted within the appropriate context.     I, Josue Cortez-Reyes (Radhaibe), am scribing for, and in the presence of, Sera Betancourt M.D..    Electronically signed by: Josue Cortez-Reyes (Radhaibedyta), 1/1/2022    Sera HERNANDEZ M.D. personally performed the services described in this documentation, as scribed by Josue Cortez-Reyes in my presence, and it is both accurate and complete.    The note accurately reflects work and decisions made by me.  Sera Betancourt M.D.  1/1/2022  8:21 PM

## 2022-01-02 NOTE — ED NOTES
Tech from Lab called with critical result of LA 4.2 at 1825. Critical lab result read back to Tech.   Dr. Betancourt notified of critical lab result at 1628.  Critical lab result read back by Dr. Betancourt.   Detail Level: Zone Detail Level: Detailed

## 2022-01-02 NOTE — PROGRESS NOTES
66 year old man found down with unknown LWN.   Has R gaze and L hemiplegia on exam.  CTH reviewed and shows large R MCA territory ischemic infarct, subacute.  CTA with complete R ICA occlusion.    Recommendations:  Not a candidate for tPA or surgical intervention due to completed stroke.  Admit to ICU for q1H neurochecks and vitals.  Allow permissive HTN up to 180/110. Hold home antihypertensives if appropriate.  ASA 81mg. Atorvastatin 80mg.   Check FLP, A1C, ECHOcardiogram.  SLP for swallow screen. NG tube if fails.  PT/OT.    Neurology to co-follow.

## 2022-01-02 NOTE — ED NOTES
Jose is a 66 year old gentleman.Found in [facility] fell down 20 stairs yesterday 12/31/2021 at 0900 found down tonight 1/1/2022 around 1700. Exposure to the outside temperature d/t door open. Complain of right knee pain and neck pain. Flaccid on left side. Follows on the right side. Left side responds minimally to painful stimuli. GCS 13.   4 Zofran 300 ml NS  Takes Coumadin for saddle PE

## 2022-01-02 NOTE — PROGRESS NOTES
Hospital Medicine Daily Progress Note    Date of Service  1/2/2022    Chief Complaint  Fall down 15 stairs and left sided weakness.    Hospital Course  Dayami Morel is a 66 y.o. male found down after a fall at home w/ subsequent left sided weakness admitted 1/1/2022 with acute stroke and found on CT head to have a large subacute right MCA territory ischemic infarct.  CTA head showed complete right ICA occlusion.    Interval Problem Update  1/2:  Awake and alert with slurred speech. He has left side neglect.  Left arm/leg responds to pain but has sensory deficit. MRI brain pending.  Has q 2 hour neurochecks for now.    I have personally seen and examined the patient at bedside. I discussed the plan of care with bedside RN.    Consultants/Specialty  neurology    Code Status  Full Code    Disposition  Patient is not medically cleared for discharge.   Anticipate discharge to to an inpatient rehabilitation hospital.  I have placed the appropriate orders for post-discharge needs.    Review of Systems  Review of Systems   Constitutional: Negative for fever and malaise/fatigue.   HENT: Negative for sore throat.    Respiratory: Negative for shortness of breath.    Cardiovascular: Negative for chest pain and leg swelling.   Gastrointestinal: Negative for abdominal pain and nausea.   Musculoskeletal: Positive for back pain and neck pain.   Neurological: Positive for sensory change, speech change and focal weakness. Negative for headaches.   Psychiatric/Behavioral: Positive for depression.        Physical Exam  Temp:  [36.3 °C (97.4 °F)-37.1 °C (98.8 °F)] 36.3 °C (97.4 °F)  Pulse:  [] 92  Resp:  [16-24] 16  BP: (114-185)/() 161/81  SpO2:  [92 %-99 %] 93 %    Physical Exam  Vitals reviewed.   Constitutional:       Appearance: Normal appearance. He is not diaphoretic.   HENT:      Head: Normocephalic and atraumatic.      Nose: Nose normal.      Mouth/Throat:      Mouth: Mucous membranes are moist.      Pharynx: No  oropharyngeal exudate.   Eyes:      General: No scleral icterus.        Right eye: No discharge.         Left eye: No discharge.      Extraocular Movements: Extraocular movements intact.      Conjunctiva/sclera: Conjunctivae normal.   Cardiovascular:      Rate and Rhythm: Normal rate and regular rhythm.      Pulses:           Radial pulses are 2+ on the right side and 2+ on the left side.        Dorsalis pedis pulses are 2+ on the right side and 2+ on the left side.      Heart sounds: No murmur heard.      Pulmonary:      Effort: Pulmonary effort is normal. No respiratory distress.      Breath sounds: Normal breath sounds. No wheezing or rales.   Abdominal:      General: Bowel sounds are normal. There is no distension.      Palpations: Abdomen is soft.      Tenderness: There is no abdominal tenderness.   Musculoskeletal:         General: No swelling.      Cervical back: Tenderness (Cspine) present. No muscular tenderness.   Skin:     Coloration: Skin is not jaundiced or pale.      Findings: Bruising (right and left shoulder, right upper shin,  LUQ abdmen with blister/abrasion) present.   Neurological:      Mental Status: He is alert.      Cranial Nerves: Cranial nerve deficit present.      Sensory: Sensory deficit present.      Motor: Weakness present.      Coordination: Coordination abnormal.   Psychiatric:         Mood and Affect: Mood normal.         Speech: Speech is slurred.         Behavior: Behavior is cooperative.         Fluids    Intake/Output Summary (Last 24 hours) at 1/2/2022 1143  Last data filed at 1/2/2022 1000  Gross per 24 hour   Intake 2322.92 ml   Output 650 ml   Net 1672.92 ml       Laboratory  Recent Labs     01/01/22  1755 01/02/22  0125   WBC 15.1* 15.1*   RBC 5.41 4.82   HEMOGLOBIN 18.1* 15.9   HEMATOCRIT 53.7* 48.7   MCV 99.3* 101.0*   MCH 33.5* 33.0   MCHC 33.7 32.6*   RDW 53.1* 54.5*   PLATELETCT 236 182   MPV 10.6 10.4     Recent Labs     01/01/22  1755 01/02/22  0125   SODIUM 147* 148*    POTASSIUM 4.1 3.7   CHLORIDE 106 110   CO2 18* 22   GLUCOSE 98 89   BUN 37* 32*   CREATININE 1.53* 1.28   CALCIUM 9.4 9.1     Recent Labs     01/01/22  1755   APTT 26.8   INR 1.18*         Recent Labs     01/02/22  0125   TRIGLYCERIDE 182*   HDL 35*   *       Imaging  MR-BRAIN-W/O   Final Result         1. Age-related cerebral atrophy. Mild effacement of the right lateral ventricle.      2. Mild periventricular white matter changes consistent with chronic microvascular ischemic gliosis.      3. Large area of acute infarction involving the right frontal and parietal lobes as well as the right-sided basal ganglia. Subacute area of infarction involving the right temporal and lateral occipital lobes.      4. Petechial hemorrhage noted in the right basal ganglia and involving the cortex in the right temporal and lateral occipital lobes.      5. Right internal carotid artery occlusion or high-grade stenosis.            EC-ECHOCARDIOGRAM COMPLETE W/O CONT         DX-SHOULDER 2+ LEFT   Final Result      Negative 2 views of left shoulder.      DX-SHOULDER 2+ RIGHT   Final Result      Moderate subacromial spurring      DX-FOOT-COMPLETE 3+ RIGHT   Final Result      No radiographic evidence of acute displaced fracture or subluxation.      DX-FOOT-2- LEFT   Final Result      No radiographic evidence of acute displaced fracture or subluxation.      DX-ELBOW-COMPLETE 3+ RIGHT   Final Result      No radiographic evidence of acute traumatic injury.      Limited due to rotation of the lateral radiograph attempts. Consider repeat lateral radiograph to help exclude effusion      CT-CHEST,ABDOMEN,PELVIS WITH   Final Result      No significant traumatic abnormality in thorax, abdomen and pelvis CT scans.      Hepatic steatosis      Moderate colonic diverticulosis.      CT-LSPINE W/O PLUS RECONS   Final Result      No CT evidence of acute traumatic injury.      Moderate degenerative disc disease with trace degenerative L5/S1  retrolisthesis      CT-TSPINE W/O PLUS RECONS   Final Result      No CT evidence of acute traumatic abnormality.      CT-CSPINE WITHOUT PLUS RECONS   Final Result      No CT evidence of acute cervical spine abnormality.      CT-HEAD W/O   Final Result      Subacute right temporal, temporo-occipital infarction without hemorrhagic transformation      CT-CTA HEAD WITH & W/O-POST PROCESS   Final Result      Right internal carotid artery occlusion with extension to the MCA which lacks contrast opacification      Right A1 and KATIA contrast opacification secondary to cross-filling from the left      Normal left anterior and bilateral posterior circulation      Findings of all of the CTs were discussed with Dr. Betancourt before completion of this dictation.            CT-CTA NECK WITH & W/O-POST PROCESSING   Final Result      Occluded right internal carotid artery at its origin      Atherosclerosis at origin of bilateral vertebral arteries could obscure stenosis. Arteries are patent without post stenotic dilatation      DX-PELVIS-1 OR 2 VIEWS   Final Result      Negative AP view of the pelvis.      DX-CHEST-LIMITED (1 VIEW)   Final Result      No acute cardiopulmonary disease.      US-ABORTED US PROCEDURE    (Results Pending)   US-EXTREMITY VENOUS LOWER BILAT    (Results Pending)        Assessment/Plan  * Stroke due to thrombosis of right carotid artery (HCC)- (present on admission)  Assessment & Plan  Start asa  Atorvastatin high intensity once passes swallow eval or has enteral access.  PT/OT/ST  1/2/22 MRI brain showing large right CVA on my read.  Hold anticoagulation until approval from neurology  Has right occluded carotic per CTA neck as etiology of CVA.  Left side neglect  Physiatry consultation    Depression- (present on admission)  Assessment & Plan  On bupropion and Lexapro outpatient, pending swallow eval if he fails he will need NG tube for p.o. intake    Mild intermittent asthma without complication- (present on  admission)  Assessment & Plan  Not in exacerbation, duo nebs as needed    Chronic anticoagulation- (present on admission)  Assessment & Plan  On lifelong anticoagulation for history of unprovoked DVT and saddle pulmonary embolism  Takes warfarin  1/1 INR:1.18 (subtherapeutic)  Currently High risk of hemorrhagic conversion given large right sided CVA.  Await instruction from neurology for restarting anticoagulation.  In the mean time check US DVT studies, if positive he would need an IVC filter.    Dyslipidemia- (present on admission)  Assessment & Plan  High-intensity statin pending swallow or enteral access    Primary hypertension- (present on admission)  Assessment & Plan  History of, home regimen losartan 25 mg daily (holding for now)  Allow permissive hypertension up to 220 systolic, IV antihypertensives with parameters ordered.       VTE prophylaxis: SCDs/TEDs and enoxaparin ppx    I have performed a physical exam and reviewed and updated ROS and Plan today (1/2/2022). In review of yesterday's note (1/1/2022), there are no changes except as documented above.

## 2022-01-02 NOTE — PROGRESS NOTES
4 Eyes Skin Assessment Completed by ORLIN Swain and ORLIN Alegria.    Head Abrasion on forehead  Ears WDL  Nose WDL  Mouth WDL  Neck WDL  Breast/Chest Abrasion and Bruising  Shoulder Blades Bruising  Spine WDL  (R) Arm/Elbow/Hand Bruising and Abrasion  (L) Arm/Elbow/Hand Bruising and Abrasion  Abdomen Abrasion and Bruising  Groin WDL  Scrotum/Coccyx/Buttocks WDL  (R) Leg Redness and Blanching  (L) Leg Redness and Blanching  (R) Heel/Foot/Toe missing toe nail open to air  (L) Heel/Foot/Toe missing toe nail open to air          Devices In Places Tele Box, Blood Pressure Cuff and Pulse Ox      Interventions In Place Gray Ear Foams, Pillows and Q2 Turns    Possible Skin Injury No    Pictures Uploaded Into Epic N/A  Wound Consult Placed N/A  RN Wound Prevention Protocol Ordered No

## 2022-01-02 NOTE — CONSULTS
66-year-old man found down with unknown last known well time  Came in as trauma and has been seen by surgery  Found to have occluded right internal carotid artery and completed stroke    No acute interventions available  Permissive hypertension, SBP<180    No acute needs for ICU as there are no interventions being made aside from neurological checks  Okay for IMCU admission    Marcelino Rose M.D.

## 2022-01-02 NOTE — FLOWSHEET NOTE
Trauma Red       01/01/22 1756   Vital Signs   Pulse (!) 108   Respiration 20   Pulse Oximetry 95 %   Oxygen   O2 (LPM) 2   Oxygen Equipment Initial Setup   O2 Delivery Device Silicone Nasal Cannula

## 2022-01-02 NOTE — THERAPY
"Speech Language Pathology   Initial Assessment     Patient Name: Dayami Sixty-Six  AGE:  66 y.o., SEX:  male  Medical Record #: [id number]  Today's Date: 1/2/2022     Precautions: Swallow Precautions ( See Comments)  Comments: LEFT SIDE deficits: poor sensation    Assessment    Jose Quintanilla is a 66 y.o. male who presented to the ER on 1/1/2022 as a trauma after concerns he fell down stairs being found with left-sided hemiparesis and rightward gaze.  He was found to have occluded right carotid artery causing right sided stroke.  He was not a candidate for IR.  Work up also showed: Otherwise work-up showed dehydration, BLAKE and rhabdomyolysis with metabolic acidosis, lactic acidosis.  PMH includes HTN, hyperlipidemia, chronic anticoagulation with Warfarin 2/2 unprovoked prior DVT and saddle embolus, asthma, and depression.       MRI 1/2/22:   \"1. Age-related cerebral atrophy. Mild effacement of the right lateral ventricle.     2. Mild periventricular white matter changes consistent with chronic microvascular ischemic gliosis.     3. Large area of acute infarction involving the right frontal and parietal lobes as well as the right-sided basal ganglia. Subacute area of infarction involving the right temporal and lateral occipital lobes.     4. Petechial hemorrhage noted in the right basal ganglia and involving the cortex in the right temporal and lateral occipital lobes.     5. Right internal carotid artery occlusion or high-grade stenosis.\"    Patient seen for clinical swallow evaluation this date.  He was awake, alert and oriented to person, place, month, year and episode with some confusion to recent events. Patient with left side facial droop, impaired mobility on left side of face and poor sensation to left side of face. Tongue deviates to the left on protrusion and overall ROM and coordination were noted to be impaired.   Speech was noted to be moderately to severely dysarthric with decreased intensity of voice. " Presentation of PO consisted of single ice chips x5, two single 1/2 teaspoon boluses of thin liquids and three 1/4 - 1/2 teaspoon boluses of mildly thick liquids.  All boluses were presented to the right side of the mouth and patient with fair ability to strip bolus from spoon.  Oral phase was noted to be prolonged leading to delayed onset of swallow.  Laryngeal elevation was palpated as weak and incomplete.  Patient with wet voice in 2/5 trials and throat clearing following 1/5 trials of ice chips.  On 1/4 teaspoon of mildly thick liquids, voice was intermittently wet and there was significant coughing following 1/2 teaspoon bolus.  On thin liquids, he was noted to have wet/audible swallow followed by immediate coughing in both trials.  No further trials were given due to S/Sx of aspiration and high risk.  Extensive education to patient regarding CVA, aspiration risk and concerns for proceeding with further PO trials.  Discussed that NPO with consideration of non oral nutrition would be optimal at this juncture to allow time for brain healing and to ensure nutritional needs are being met.  Patient in agreement with need for nutrition and did agree to isabell if cleared by MD.  Discussed need for FEES in the near future in order to further assess integrity and efficacy of the swallow musculature and to r/o aspiration.  Results discussed with Dr. Acuna who was in agreement with isabell for now.  OK for patient to have 1-2 single ice chips per hour with RN only as long as he is sitting upright.  SLP will follow for aggressive dysphagia therapy, FEES in future and comprehensive speech/language and cognitive evaluation as appropriate.  Thank you for the consult.       RECOMMENDATIONS:     1) NPO with consideration of non oral source of nutrition and medication delivery   2) OK for 1-2 single ice chips per hour with direct 1:1 supervision from RN to minimize xerostomia and improve integrity of the swallow musculature  3)  Aggressive oral care to mitigate potential for aspiration pneumonia     Plan    Recommend Speech Therapy 5 times per week until therapy goals are met for the following treatments:  Dysphagia Training and Patient / Family / Caregiver Education.    Discharge Recommendations: Recommend post-acute placement for additional speech therapy services prior to discharge home       Objective       01/02/22 1445   Pain 0 - 10 Group   Therapist Pain Assessment 0;Nurse Notified;Post Activity Pain Same as Prior to Activity   Prior Living Situation   Prior Services None   Lives with - Patient's Self Care Capacity   (reports he lives with Edgar)   Prior Level Of Function   Communication Within Functional Limits   Swallow Within Functional Limits   Dentition Intact   Dentures None   Hearing Impaired Both Ears   Hearing Aid None   Vision Wears Corrective Lenses   Patient's Primary Language English   Education Completed College   Education Years Completed   (reports he has a PhD)   Occupation (Pre-Hospital Vocational) Employed Full Time  (reports he is  of the [employer] in [facility])   Comments reports he lives with Edgar, but unable to determine who Edgar is from patient's reporting   Oral Motor Eval    Is Patient Able to Complete Oral Motor Eval Yes but Impaired   Labial Function   Labial Structure At Rest Moderate  (left side deficits)   Labial Vowel Production / I /, / U / Moderate   Labial Sequence / I /, / U / Moderate   Frown, Pucker Moderate   Lingual Function   Lingual Structure At Rest Moderate   Lingual Protrude Minimal  (deviates to the left)   Lingual Retract Moderate   Elevate In Mouth Moderate   Elevate Outside Mouth Moderate   Lateralization Moderate Right;Moderate Left   Lick Lips (Circular) Moderate   Lick Palate Moderate   / Pa / 5X's Minimal   / Ta / 5X's Moderate   / Ka / 5X's Moderate   / [place] / 5X's Moderate   Jaw   Jaw Structure At Rest Minimal   Bite ([place]) Within Functional Limits   Chew (Rotary)  "Not Tested   Jaw Open / Resist Within Functional Limits   Jaw Close / Resist Within Functional Limits   Velar Function   Velar Structure At Rest Minimal  (left side palatal asymmetry)   / A / Prolonged Minimal   / A /  5X's Minimal   Laryngeal Function   Voice Quality Minimal   Volutional Cough Moderate   Excursion Upon Swallow Weak    Max Phonation Time (Seconds) 1-2 seconds   Oral Food Presentation   Ice Chips Moderate   Single Swallow Mildly Thick (2) - (Nectar Thick)  Moderate   Single Swallow Thin (0) Severe   Tracheostomy   Tracheostomy  No   Dysphagia Strategies / Recommendations   Strategies / Interventions Recommended (Yes / No) Yes   Compensatory Strategies To Be Assessed   Diet / Liquid Recommendation NPO;Pre-Feeding Trials with SLP Only   Medication Administration  Other (See Comments)  (via alternative route)   Therapy Interventions Dysphagia Therapy By Speech Language Pathologist;Oral Motor Exercises;FEES Evaluation   Follow Up SLP Evaluation Will need FEES in the near future, but would like to work with patient first to maximize potential   Dysphagia Rating   Nutritional Liquid Intake Rating Scale Nothing by mouth   Nutritional Food Intake Rating Scale Tube dependent with minimal attempts of oral intake  (1-2 ice chips per hour with RN only )   Short Term Goals   Short Term Goal # 1 Pt will be able to consume prefeeding trials with SLP only with no overt S/Sx of aspiration noted   Short Term Goal # 2 Pt will be able to complete 10/10 reps of oral motor and laryngeal/pharyngeal exercises with \"good\" accuracy as judged by SLP   Problem List   Problem List Dysphagia;Dysarthia   Anticipated Discharge Needs   Discharge Recommendations Recommend post-acute placement for additional speech therapy services prior to discharge home   Therapy Recommendations Upon DC Dysphagia Training;Community Re-Integration;Patient / Family / Caregiver Education       "

## 2022-01-02 NOTE — H&P
"TRAUMA HISTORY AND PHYSICAL    CHIEF COMPLAINT:     HISTORY OF PRESENT ILLNESS: The patient is a 66 year old male who fell 33 hours ago.  He was found at the bottom of stairs late this afternoon then transported from the [facility] by [facility] .  He is hemiparetic.  [facility] only prehospital.   The patient was triaged as a full trauma activation and met on arrival.   range.  Alert with slurred speech.  GCS 15, hemiparetic Left.  CXR and Pelvis grossly clear.  CTA shows occlusion of the right carotid.        PAST MEDICAL HISTORY:   Coumadin for PE    PAST SURGICAL HISTORY: patient denies any surgical history     ALLERGIES:   Allergies   Allergen Reactions   • Augmentin Rash     Diffuse itchy rash all over patient's back noted. started during this admission (possibly on day 2 of augmentin therapy), was not there prior per discussion with RN.        CURRENT MEDICATIONS:   Home Medications     Reviewed by Mara Parker (Pharmacy Tech) on 01/01/22 at 1953  Med List Status: Complete   Medication Last Dose Status        Patient Jose Taking any Medications                       FAMILY HISTORY: No family history on file.     SOCIAL HISTORY:   Social History     Tobacco Use   • Smoking status: Not on file   • Smokeless tobacco: Not on file   Substance and Sexual Activity   • Alcohol use: Not on file   • Drug use: Not on file   • Sexual activity: Not on file       REVIEW OF SYSTEMS: Comprehensive review of systems is negative with the   exception of the aforementioned HPI, PMH, and PSH elements in accordance with CMS guidelines.     PHYSICAL EXAMINATION:     GENERAL: No distress  VITAL SIGNS: /77   Pulse 91   Temp 36.3 °C (97.3 °F) (Temporal)   Resp 18   Ht 1.854 m (6' 0.99\")   Wt 122 kg (268 lb 11.9 oz)   SpO2 98%   HEAD AND NECK: Pupils:  Equal and Reactive  Dentition: Occlusion is adequate  Facial:  Lower facial paralysis, right gaze preference.    NECK: No JVD. Trachea midline. Cervical " tenderness is  absent   CHEST: Breath sounds are equal. No sternal tenderness.  No  lateral rib tenderness.  CARDIOVASCULAR: Regular rhythm  ABDOMEN: Soft, no tenderness guarding or peritoneal findings.   PELVIS: Stable.  EXTREMITIES: Examination of the upper and lower extremities : Left elbow tender.  Great toe nail avulsion.    BACK: No midline stepoffs.  No Tenderness  NEUROLOGIC: [place] Coma Score is  15 . Left hemiparesis.       LABORATORY VALUES:   Recent Labs     01/15/22  0445 01/16/22  0420   WBC 17.5* 16.1*   RBC 4.13* 4.05*   HEMOGLOBIN 13.3* 13.2*   HEMATOCRIT 39.3* 38.7*   MCV 95.2 95.6   MCH 32.2 32.6   MCHC 33.8 34.1   RDW 49.7 50.6*   PLATELETCT 325 377   MPV 11.4 11.2     Recent Labs     01/15/22  0445 01/16/22  0420   SODIUM 135 137   POTASSIUM 4.2 3.8   CHLORIDE 102 101   CO2 23 24   GLUCOSE 139* 122*   BUN 19 20   CREATININE 0.63 0.68   CALCIUM 8.7 8.8                IMAGING:   DX-CHEST-LIMITED (1 VIEW)   Final Result      1.  Patchy bilateral interstitial opacities. Underlying infection is possible.   2.  Stable enlargement of the cardiomediastinal silhouette.      DX-CHEST-LIMITED (1 VIEW)   Final Result         Linear opacities in the left lung base could be due to discoid atelectasis or developing infiltrate such as pneumonia or pneumonitis.      DX-ABDOMEN FOR TUBE PLACEMENT   Final Result      1.  Feeding tube tip overlies the distal stomach.      IR-PICC LINE PLACEMENT W/ GUIDANCE > AGE 5   Final Result                  Ultrasound-guided PICC placement performed by qualified nursing staff as    above.          CT-HEAD W/O   Final Result      Evolving moderate to large right middle cerebral artery territory has increased in size when compared with the previous exam. No evidence of acute intracranial hemorrhage.         DX-ABDOMEN FOR TUBE PLACEMENT   Final Result      Cortrak feeding tube tip projects in the region of the proximal/mid stomach.      US-EXTREMITY VENOUS LOWER BILAT   Final  Result      MR-BRAIN-W/O   Final Result         1. Age-related cerebral atrophy. Mild effacement of the right lateral ventricle.      2. Mild periventricular white matter changes consistent with chronic microvascular ischemic gliosis.      3. Large area of acute infarction involving the right frontal and parietal lobes as well as the right-sided basal ganglia. Subacute area of infarction involving the right temporal and lateral occipital lobes.      4. Petechial hemorrhage noted in the right basal ganglia and involving the cortex in the right temporal and lateral occipital lobes.      5. Right internal carotid artery occlusion or high-grade stenosis.            EC-ECHOCARDIOGRAM COMPLETE W/O CONT   Final Result      DX-SHOULDER 2+ LEFT   Final Result      Negative 2 views of left shoulder.      DX-SHOULDER 2+ RIGHT   Final Result      Moderate subacromial spurring      DX-FOOT-COMPLETE 3+ RIGHT   Final Result      No radiographic evidence of acute displaced fracture or subluxation.      DX-FOOT-2- LEFT   Final Result      No radiographic evidence of acute displaced fracture or subluxation.      DX-ELBOW-COMPLETE 3+ RIGHT   Final Result      No radiographic evidence of acute traumatic injury.      Limited due to rotation of the lateral radiograph attempts. Consider repeat lateral radiograph to help exclude effusion      CT-CHEST,ABDOMEN,PELVIS WITH   Final Result      No significant traumatic abnormality in thorax, abdomen and pelvis CT scans.      Hepatic steatosis      Moderate colonic diverticulosis.      CT-LSPINE W/O PLUS RECONS   Final Result      No CT evidence of acute traumatic injury.      Moderate degenerative disc disease with trace degenerative L5/S1 retrolisthesis      CT-TSPINE W/O PLUS RECONS   Final Result      No CT evidence of acute traumatic abnormality.      CT-CSPINE WITHOUT PLUS RECONS   Final Result      No CT evidence of acute cervical spine abnormality.      CT-HEAD W/O   Final Result       Subacute right temporal, temporo-occipital infarction without hemorrhagic transformation      CT-CTA HEAD WITH & W/O-POST PROCESS   Final Result      Right internal carotid artery occlusion with extension to the MCA which lacks contrast opacification      Right A1 and KATIA contrast opacification secondary to cross-filling from the left      Normal left anterior and bilateral posterior circulation      Findings of all of the CTs were discussed with Dr. Betancourt before completion of this dictation.            CT-CTA NECK WITH & W/O-POST PROCESSING   Final Result      Occluded right internal carotid artery at its origin      Atherosclerosis at origin of bilateral vertebral arteries could obscure stenosis. Arteries are patent without post stenotic dilatation      DX-PELVIS-1 OR 2 VIEWS   Final Result      Negative AP view of the pelvis.      DX-CHEST-LIMITED (1 VIEW)   Final Result      No acute cardiopulmonary disease.      US-ABORTED US PROCEDURE    (Results Pending)       IMPRESSION AND PLAN:    The patient was triaged as a full activation for the trauma service.  Met on arrival.  Work-up demonstrates an occluded right carotid and a significant stroke.  See imaging results above.  The patient's care will be transferred to medicine.         ____________________________________   Petar Penn MD, FACS      DD: 1/1/2022   DT: 6:03 PM

## 2022-01-02 NOTE — CARE PLAN
The patient is Stable - Low risk of patient condition declining or worsening    Shift Goals  Clinical Goals: improved neuro exam  Patient Goals: rest  Family Goals: stay updated    Progress made toward(s) clinical / shift goals:    Problem: Optimal Care of the Stroke Patient  Goal: Optimal emergency care for the stroke patient  Outcome: Progressing  Goal: Optimal acute care for the stroke patient  Outcome: Progressing     Problem: Knowledge Deficit - Stroke Education  Goal: Patient's knowledge of stroke and risk factors will improve  Outcome: Progressing     Problem: Psychosocial - Patient Condition  Goal: Patient's ability to verbalize feelings about condition will improve  Outcome: Progressing  Goal: Patient's ability to re-evaluate and adapt role responsibilities will improve  Outcome: Progressing     Problem: Discharge Planning - Stroke  Goal: Ensure Stroke Core Measures are met prior to discharge  Outcome: Progressing  Goal: Patient’s continuum of care needs will be met  Outcome: Progressing     Problem: Neuro Status  Goal: Neuro status will remain stable or improve  Outcome: Progressing     Problem: Hemodynamic Monitoring  Goal: Patient's hemodynamics, fluid balance and neurologic status will be stable or improve  Outcome: Progressing     Problem: Respiratory - Stroke Patient  Goal: Patient will achieve/maintain optimum respiratory rate/effort  Outcome: Progressing     Problem: Dysphagia  Goal: Dysphagia will improve  Outcome: Progressing     Problem: Risk for Aspiration  Goal: Patient's risk for aspiration will be absent or decrease  Outcome: Progressing     Problem: Urinary Elimination  Goal: Establish and maintain regular urinary output  Outcome: Progressing     Problem: Bowel Elimination  Goal: Establish and maintain regular bowel function  Outcome: Progressing     Problem: Mobility - Stroke  Goal: Patient's capacity to carry out activities will improve  Outcome: Progressing  Goal: Spasticity will be  prevented or improved  Outcome: Progressing  Goal: Subluxation will be prevented or improved  Outcome: Progressing     Problem: Self Care  Goal: Patient will have the ability to perform ADLs independently or with assistance (bathe, groom, dress, toilet and feed)  Outcome: Progressing     Problem: Knowledge Deficit - Standard  Goal: Patient and family/care givers will demonstrate understanding of plan of care, disease process/condition, diagnostic tests and medications  Outcome: Progressing     Problem: Skin Integrity  Goal: Skin integrity is maintained or improved  Outcome: Progressing     Problem: Fall Risk  Goal: Patient will remain free from falls  Outcome: Progressing     Problem: Pain - Standard  Goal: Alleviation of pain or a reduction in pain to the patient’s comfort goal  Outcome: Progressing       Patient is not progressing towards the following goals:

## 2022-01-02 NOTE — ASSESSMENT & PLAN NOTE
Goal normotensive  Continue monitoring, consider restarting enteral blood pressure pressure agent if needed.

## 2022-01-02 NOTE — ED NOTES
tech from Lab called with critical result of CPK 1057 at 1846. Critical lab result read back to Tech.   Dr. Betancourt notified of critical lab result at 1855.  Critical lab result read back by Dr. Betancourt.

## 2022-01-02 NOTE — ED NOTES
Med rec updated and complete. Allergies reviewed.  Pt able to state name and date of birth.   Pt only able to state his medications , not strengths or last doses taken.  Placed call to lila ( 24h) to verify.      Home pharmacy [facility] [phone]

## 2022-01-02 NOTE — H&P
Hospital Medicine History & Physical Note    Date of Service  1/1/2022    Primary Care Physician  Shivani Weber P.A.-C.    Consultants  Neurology    Specialist Names: Dr. Otoole    Code Status  Full Code    Chief Complaint  Trauma - Fell down stairs      History of Presenting Illness  Jose Quintanilla is a 66 y.o. male with hypertension, hyperlipidemia, chronic anticoagulation with warfarin secondary to unprovoked prior DVT and saddle embolus, asthma, depression, who presented 1/1/2022 as a trauma after concerns he fell down stairs being found with left-sided hemiparesis and rightward gaze, found to have occluded right carotid artery causing right sided stroke.  Patient reported to EMS that he fell down his stairs at 9 PM the night prior to presentation and he was unable to get himself off the ground.  He reported head trauma, right elbow pain.  Additionally he reports right elbow pain, left chest wall tenderness, right knee pain. history is difficult given acute stroke symptoms however denies any recent infectious symptoms, denies recent fever or chills, cough or dyspnea, nausea vomiting, chest pain.  He denies any history of stroke.  Symptoms started acutely there are no aggravating or alleviating factors.  He was immediately evaluated as a trauma with Dr. Penn bedside with ERP.  Initial work-up showed complete occlusion of the right carotid artery and no contrast going to the right-sided [place] causing a subacute right-sided stroke.  ERP discussed case with Dr. Otoole from neurology and the decision was made that he was not a candidate for IR therapy.  Recommendation was made for admission to ICU or IMCU with hourly neuro checks and permissive hypertension for now, start aspirin.  Otherwise work-up showed dehydration, BLAKE and rhabdomyolysis with metabolic acidosis, lactic acidosis.  Patient was subsequently referred to hospitalist for admission.    I discussed the plan of care with patient, bedside RN and  pharmacy.    Review of Systems  Review of Systems   Constitutional: Negative for chills, fever and malaise/fatigue.   HENT: Negative for congestion and sinus pain.    Eyes: Negative for photophobia and pain.   Respiratory: Negative for cough, sputum production and shortness of breath.    Cardiovascular: Negative for chest pain and palpitations.   Gastrointestinal: Negative for abdominal pain, nausea and vomiting.   Genitourinary: Negative for dysuria and urgency.   Musculoskeletal: Positive for falls. Negative for neck pain.   Neurological: Positive for sensory change, speech change and focal weakness.   Psychiatric/Behavioral: Positive for depression. Negative for substance abuse. The patient is not nervous/anxious.        Past Medical History   has no past medical history on file.    Surgical History   has no past surgical history on file.     Family History  family history is not on file.   Family history HTN     Social History       Allergies  No Known Allergies    Medications  Prior to Admission Medications   Prescriptions Last Dose Informant Patient Reported? Taking?   atorvastatin (LIPITOR) 80 MG tablet unknown at unknown Patient's Home Pharmacy Yes Yes   Sig: Take 80 mg by mouth every evening.   buPROPion HCl ER, XL, 450 MG TABLET SR 24 HR unknown at unknown Patient's Home Pharmacy Yes Yes   Sig: Take 450 mg by mouth every day.   escitalopram (LEXAPRO) 20 MG tablet unknown at unknown Patient's Home Pharmacy Yes Yes   Sig: Take 20 mg by mouth every day.   losartan (COZAAR) 25 MG Tab unknown at unknowmn Patient's Home Pharmacy Yes Yes   Sig: Take 25 mg by mouth every day.   warfarin (COUMADIN) 5 MG Tab unknown at unknown Patient's Home Pharmacy Yes Yes   Sig: Take 5 mg by mouth every evening.      Facility-Administered Medications: None       Physical Exam  Temp:  [36.5 °C (97.7 °F)-37.1 °C (98.8 °F)] 37.1 °C (98.8 °F)  Pulse:  [] 105  Resp:  [18-20] 19  BP: (120-185)/() 131/105  SpO2:  [92 %-98 %]  98 %  Blood Pressure : 131/105   Temperature: 37.1 °C (98.8 °F)   Pulse: (!) 105   Respiration: 19   Pulse Oximetry: 98 %       Physical Exam  Vitals and nursing note reviewed. Exam conducted with a chaperone present.   Constitutional:       General: He is not in acute distress.     Appearance: He is obese. He is not toxic-appearing.      Comments: 66-year-old male appears stated age alert and conversant, uncomfortable secondary to musculoskeletal pains and severe dry mouth   HENT:      Head: Normocephalic and atraumatic.      Nose: Nose normal. No rhinorrhea.      Mouth/Throat:      Mouth: Mucous membranes are dry.      Pharynx: Oropharynx is clear.   Eyes:      General: No scleral icterus.     Extraocular Movements: Extraocular movements intact.      Conjunctiva/sclera: Conjunctivae normal.      Pupils: Pupils are equal, round, and reactive to light.      Comments: Right eye conjunctival injection   Neck:      Comments: Rigid C-collar in place  Cardiovascular:      Rate and Rhythm: Normal rate.      Pulses: Normal pulses.      Heart sounds: No murmur heard.      Pulmonary:      Effort: Pulmonary effort is normal. No respiratory distress.      Breath sounds: Normal breath sounds. No wheezing, rhonchi or rales.   Abdominal:      General: Bowel sounds are normal.      Palpations: Abdomen is soft.      Tenderness: There is no abdominal tenderness. There is no guarding.   Musculoskeletal:         General: Tenderness (tenderness to left chest wall) present. No deformity.      Cervical back: No tenderness.   Skin:     General: Skin is warm and dry.      Capillary Refill: Capillary refill takes less than 2 seconds.   Neurological:      Mental Status: He is alert and oriented to person, place, and time.      Cranial Nerves: Cranial nerve deficit present.      Sensory: Sensory deficit present.      Motor: Weakness present.      Comments: Left side hemiparesis, left-sided facial droop, right-sided gaze preference, sensation  decreased on the left, speech slurred, follows commands and oriented, generally answers all questions appropriately, no abnormal movements seen         Laboratory:  Recent Labs     01/01/22  1755   WBC 15.1*   RBC 5.41   HEMOGLOBIN 18.1*   HEMATOCRIT 53.7*   MCV 99.3*   MCH 33.5*   MCHC 33.7   RDW 53.1*   PLATELETCT 236   MPV 10.6     Recent Labs     01/01/22  1755   SODIUM 147*   POTASSIUM 4.1   CHLORIDE 106   CO2 18*   GLUCOSE 98   BUN 37*   CREATININE 1.53*   CALCIUM 9.4     Recent Labs     01/01/22  1755   ALTSGPT 35   ASTSGOT 54*   ALKPHOSPHAT 119*   TBILIRUBIN 1.6*   GLUCOSE 98     Recent Labs     01/01/22  1755   APTT 26.8   INR 1.18*     No results for input(s): NTPROBNP in the last 72 hours.      No results for input(s): TROPONINT in the last 72 hours.    Imaging:  DX-SHOULDER 2+ LEFT   Final Result      Negative 2 views of left shoulder.      DX-SHOULDER 2+ RIGHT   Final Result      Moderate subacromial spurring      DX-FOOT-COMPLETE 3+ RIGHT   Final Result      No radiographic evidence of acute displaced fracture or subluxation.      DX-FOOT-2- LEFT   Final Result      No radiographic evidence of acute displaced fracture or subluxation.      DX-ELBOW-COMPLETE 3+ RIGHT   Final Result      No radiographic evidence of acute traumatic injury.      Limited due to rotation of the lateral radiograph attempts. Consider repeat lateral radiograph to help exclude effusion      CT-CHEST,ABDOMEN,PELVIS WITH   Final Result      No significant traumatic abnormality in thorax, abdomen and pelvis CT scans.      Hepatic steatosis      Moderate colonic diverticulosis.      CT-LSPINE W/O PLUS RECONS   Final Result      No CT evidence of acute traumatic injury.      Moderate degenerative disc disease with trace degenerative L5/S1 retrolisthesis      CT-TSPINE W/O PLUS RECONS   Final Result      No CT evidence of acute traumatic abnormality.      CT-CSPINE WITHOUT PLUS RECONS   Final Result      No CT evidence of acute  cervical spine abnormality.      CT-HEAD W/O   Final Result      Subacute right temporal, temporo-occipital infarction without hemorrhagic transformation      CT-CTA HEAD WITH & W/O-POST PROCESS   Final Result      Right internal carotid artery occlusion with extension to the MCA which lacks contrast opacification      Right A1 and KATIA contrast opacification secondary to cross-filling from the left      Normal left anterior and bilateral posterior circulation      Findings of all of the CTs were discussed with Dr. Betancourt before completion of this dictation.            CT-CTA NECK WITH & W/O-POST PROCESSING   Final Result      Occluded right internal carotid artery at its origin      Atherosclerosis at origin of bilateral vertebral arteries could obscure stenosis. Arteries are patent without post stenotic dilatation      DX-PELVIS-1 OR 2 VIEWS   Final Result      Negative AP view of the pelvis.      DX-CHEST-LIMITED (1 VIEW)   Final Result      No acute cardiopulmonary disease.      US-ABORTED US PROCEDURE    (Results Pending)   EC-ECHOCARDIOGRAM COMPLETE W/O CONT    (Results Pending)   MR-BRAIN-W/O    (Results Pending)       X-Ray:  My impression is: No consolidations, pneumothorax, pleural effusions, no obvious rib fractures    Assessment/Plan:  I anticipate this patient will require at least two midnights for appropriate medical management, necessitating inpatient admission.    * Stroke due to thrombosis of right carotid artery (HCC)- (present on admission)  Assessment & Plan  Admit to MICU, cardiac monitoring  Neuro checks q1h per neurology  MRI  TTE  seizure, aspiration, and fall precautions   NPO pending dysphagia screen  SLP/PT/OT evaluation  lipid panel, HbA1c  ASA  Atorvastatin  Glucose control to maintain <180   Goal SBP <180 mmHg; hydralazine, labetalol and nicardipine PRN  Allow permissive hypertension up to 220/110  Avoid hypotension below 100/50.  Use IV 0.9% normal saline bolus if necessary.   ERP  discussed with neuro, not an IR candidate.    Depression- (present on admission)  Assessment & Plan  On bupropion and Lexapro outpatient, pending swallow eval if he fails he will need NG tube for p.o. intake    Mild intermittent asthma without complication- (present on admission)  Assessment & Plan  Not in exacerbation, duo nebs as needed    Chronic anticoagulation- (present on admission)  Assessment & Plan  On lifelong anticoagulation for history of unprovoked DVT and saddle pulmonary embolism  Takes warfarin  Restart anticoagulation after discussion with neurology.    Dyslipidemia- (present on admission)  Assessment & Plan  High-intensity statin    Primary hypertension- (present on admission)  Assessment & Plan  History of, home regimen losartan 25 mg daily  Allow permissive hypertension up to 220 systolic, IV antihypertensives with parameters ordered.      VTE prophylaxis: SCDs/TEDs

## 2022-01-02 NOTE — ED NOTES
Patient resting on gurney, respirations even and unlabored.  C-collar remains in place.     Patient alert to self, continues to be slow to respond, dysarthria, L sided deficits noted - hemipeligia and decreased sensation continued.   R gaze noted.

## 2022-01-02 NOTE — DISCHARGE PLANNING
Trauma Response    Referral: Trauma Red Response    Intervention: SW responded to trauma Red.  Pt was BIB [facility] after [facility].  Pt was alert upon arrival.  Pts name is Jose Quintanilla (: 1955).  SW obtained the following pt information: Per [facility], pt was found down after a welfare check done from police in [place], [place]. Pt fell down 15-20 stairs yesterday at 9:30am.     MSW met with pt. Pt requested his son Chandra bed called. MSW called and spoke to Chandra. He lives in [place] and called the welfare check on pt. Chandra to call MSW for updates.     Son: Chandra Quintanilla [phone]    Plan: Remain available for support

## 2022-01-02 NOTE — ASSESSMENT & PLAN NOTE
Extensive stroke due to thrombosis of right carotid artery  Occurred while on anticoagulation with warfarin, INR slightly subtherapeutic at 1.8  Cytotoxic edema has improved, appreciate neurology recommendations, low sodium to normalize  High intensity statin  On eliquis 5 mg BID  PT/OT/speech  Needs rehab, pending placement in [place]   Continue inpatient therapies to prevent further deconditioning   Pt with nerve pain and spasms, baclofen at night added, low dose gabapentin

## 2022-01-03 ENCOUNTER — HOSPITAL ENCOUNTER (INPATIENT)
Facility: REHABILITATION | Age: 67
End: 2022-01-03
Attending: PHYSICAL MEDICINE & REHABILITATION | Admitting: PHYSICAL MEDICINE & REHABILITATION
Payer: MEDICARE

## 2022-01-03 ENCOUNTER — APPOINTMENT (OUTPATIENT)
Dept: RADIOLOGY | Facility: MEDICAL CENTER | Age: 67
DRG: 064 | End: 2022-01-03
Attending: HOSPITALIST
Payer: MEDICARE

## 2022-01-03 LAB
ANION GAP SERPL CALC-SCNC: 14 MMOL/L (ref 7–16)
ANION GAP SERPL CALC-SCNC: 15 MMOL/L (ref 7–16)
BUN SERPL-MCNC: 17 MG/DL (ref 8–22)
BUN SERPL-MCNC: 18 MG/DL (ref 8–22)
CALCIUM SERPL-MCNC: 9.5 MG/DL (ref 8.5–10.5)
CALCIUM SERPL-MCNC: 9.5 MG/DL (ref 8.5–10.5)
CHLORIDE SERPL-SCNC: 113 MMOL/L (ref 96–112)
CHLORIDE SERPL-SCNC: 115 MMOL/L (ref 96–112)
CO2 SERPL-SCNC: 19 MMOL/L (ref 20–33)
CO2 SERPL-SCNC: 22 MMOL/L (ref 20–33)
CREAT SERPL-MCNC: 0.93 MG/DL (ref 0.5–1.4)
CREAT SERPL-MCNC: 1.01 MG/DL (ref 0.5–1.4)
CRP SERPL HS-MCNC: 4.79 MG/DL (ref 0–0.75)
GLUCOSE SERPL-MCNC: 102 MG/DL (ref 65–99)
GLUCOSE SERPL-MCNC: 112 MG/DL (ref 65–99)
POTASSIUM SERPL-SCNC: 3.4 MMOL/L (ref 3.6–5.5)
POTASSIUM SERPL-SCNC: 3.6 MMOL/L (ref 3.6–5.5)
PREALB SERPL-MCNC: 13.7 MG/DL (ref 18–38)
SODIUM SERPL-SCNC: 148 MMOL/L (ref 135–145)
SODIUM SERPL-SCNC: 150 MMOL/L (ref 135–145)

## 2022-01-03 PROCEDURE — 700111 HCHG RX REV CODE 636 W/ 250 OVERRIDE (IP): Performed by: STUDENT IN AN ORGANIZED HEALTH CARE EDUCATION/TRAINING PROGRAM

## 2022-01-03 PROCEDURE — 770022 HCHG ROOM/CARE - ICU (200)

## 2022-01-03 PROCEDURE — 92526 ORAL FUNCTION THERAPY: CPT

## 2022-01-03 PROCEDURE — 700101 HCHG RX REV CODE 250: Performed by: STUDENT IN AN ORGANIZED HEALTH CARE EDUCATION/TRAINING PROGRAM

## 2022-01-03 PROCEDURE — 700105 HCHG RX REV CODE 258: Performed by: HOSPITALIST

## 2022-01-03 PROCEDURE — 99233 SBSQ HOSP IP/OBS HIGH 50: CPT | Performed by: HOSPITALIST

## 2022-01-03 PROCEDURE — 700111 HCHG RX REV CODE 636 W/ 250 OVERRIDE (IP): Performed by: HOSPITALIST

## 2022-01-03 PROCEDURE — 70450 CT HEAD/BRAIN W/O DYE: CPT | Mod: MG

## 2022-01-03 PROCEDURE — 700102 HCHG RX REV CODE 250 W/ 637 OVERRIDE(OP): Performed by: HOSPITALIST

## 2022-01-03 PROCEDURE — 99291 CRITICAL CARE FIRST HOUR: CPT | Mod: GC | Performed by: INTERNAL MEDICINE

## 2022-01-03 PROCEDURE — 84134 ASSAY OF PREALBUMIN: CPT

## 2022-01-03 PROCEDURE — 80048 BASIC METABOLIC PNL TOTAL CA: CPT

## 2022-01-03 PROCEDURE — A9270 NON-COVERED ITEM OR SERVICE: HCPCS | Performed by: HOSPITALIST

## 2022-01-03 PROCEDURE — 86140 C-REACTIVE PROTEIN: CPT

## 2022-01-03 PROCEDURE — 99233 SBSQ HOSP IP/OBS HIGH 50: CPT | Performed by: PSYCHIATRY & NEUROLOGY

## 2022-01-03 PROCEDURE — 97602 WOUND(S) CARE NON-SELECTIVE: CPT

## 2022-01-03 RX ORDER — HYDRALAZINE HYDROCHLORIDE 20 MG/ML
10 INJECTION INTRAMUSCULAR; INTRAVENOUS
Status: DISCONTINUED | OUTPATIENT
Start: 2022-01-03 | End: 2022-01-04

## 2022-01-03 RX ORDER — LABETALOL HYDROCHLORIDE 5 MG/ML
10 INJECTION, SOLUTION INTRAVENOUS
Status: COMPLETED | OUTPATIENT
Start: 2022-01-03 | End: 2022-01-04

## 2022-01-03 RX ORDER — 3% SODIUM CHLORIDE 3 G/100ML
500 INJECTION, SOLUTION INTRAVENOUS CONTINUOUS
Status: DISCONTINUED | OUTPATIENT
Start: 2022-01-03 | End: 2022-01-06

## 2022-01-03 RX ADMIN — ACETAMINOPHEN 650 MG: 325 TABLET, FILM COATED ORAL at 22:00

## 2022-01-03 RX ADMIN — ACETAMINOPHEN 650 MG: 325 TABLET, FILM COATED ORAL at 08:30

## 2022-01-03 RX ADMIN — DOCUSATE SODIUM 50 MG AND SENNOSIDES 8.6 MG 2 TABLET: 8.6; 5 TABLET, FILM COATED ORAL at 18:43

## 2022-01-03 RX ADMIN — HYDRALAZINE HYDROCHLORIDE 10 MG: 20 INJECTION INTRAMUSCULAR; INTRAVENOUS at 22:04

## 2022-01-03 RX ADMIN — ATORVASTATIN CALCIUM 80 MG: 80 TABLET, FILM COATED ORAL at 18:43

## 2022-01-03 RX ADMIN — SODIUM CHLORIDE 500 ML: 3 INJECTION, SOLUTION INTRAVENOUS at 16:05

## 2022-01-03 RX ADMIN — DOCUSATE SODIUM 50 MG AND SENNOSIDES 8.6 MG 2 TABLET: 8.6; 5 TABLET, FILM COATED ORAL at 05:57

## 2022-01-03 RX ADMIN — ACETAMINOPHEN 650 MG: 325 TABLET, FILM COATED ORAL at 14:14

## 2022-01-03 RX ADMIN — LABETALOL HYDROCHLORIDE 10 MG: 5 INJECTION, SOLUTION INTRAVENOUS at 13:56

## 2022-01-03 RX ADMIN — ENOXAPARIN SODIUM 40 MG: 40 INJECTION SUBCUTANEOUS at 18:43

## 2022-01-03 ASSESSMENT — ENCOUNTER SYMPTOMS
HEADACHES: 1
BACK PAIN: 0
HEARTBURN: 0
HEADACHES: 0
NECK PAIN: 1
FOCAL WEAKNESS: 1
MYALGIAS: 0
FEVER: 0
SHORTNESS OF BREATH: 0
SORE THROAT: 0
NAUSEA: 0
BACK PAIN: 1
DIZZINESS: 0
PALPITATIONS: 0
SPEECH CHANGE: 1
DOUBLE VISION: 0
WEAKNESS: 1
SENSORY CHANGE: 1
BLURRED VISION: 0
ABDOMINAL PAIN: 0
COUGH: 0
CHILLS: 0

## 2022-01-03 ASSESSMENT — LIFESTYLE VARIABLES: SUBSTANCE_ABUSE: 0

## 2022-01-03 ASSESSMENT — PAIN DESCRIPTION - PAIN TYPE
TYPE: CHRONIC PAIN
TYPE: CHRONIC PAIN
TYPE: ACUTE PAIN
TYPE: CHRONIC PAIN
TYPE: CHRONIC PAIN
TYPE: ACUTE PAIN
TYPE: ACUTE PAIN

## 2022-01-03 NOTE — CARE PLAN
Problem: Optimal Care of the Stroke Patient  Goal: Optimal emergency care for the stroke patient  Outcome: Progressing  Goal: Optimal acute care for the stroke patient  Outcome: Progressing     Problem: Knowledge Deficit - Stroke Education  Goal: Patient's knowledge of stroke and risk factors will improve  Outcome: Progressing     Problem: Psychosocial - Patient Condition  Goal: Patient's ability to verbalize feelings about condition will improve  Outcome: Progressing  Goal: Patient's ability to re-evaluate and adapt role responsibilities will improve  Outcome: Progressing     Problem: Discharge Planning - Stroke  Goal: Ensure Stroke Core Measures are met prior to discharge  Outcome: Progressing     Problem: Neuro Status  Goal: Neuro status will remain stable or improve  Outcome: Progressing     Problem: Hemodynamic Monitoring  Goal: Patient's hemodynamics, fluid balance and neurologic status will be stable or improve  Outcome: Progressing     Problem: Respiratory - Stroke Patient  Goal: Patient will achieve/maintain optimum respiratory rate/effort  Outcome: Progressing     Problem: Dysphagia  Goal: Dysphagia will improve  Outcome: Progressing     Problem: Risk for Aspiration  Goal: Patient's risk for aspiration will be absent or decrease  Outcome: Progressing     Problem: Urinary Elimination  Goal: Establish and maintain regular urinary output  Outcome: Progressing     Problem: Mobility - Stroke  Goal: Patient's capacity to carry out activities will improve  Outcome: Progressing  Goal: Spasticity will be prevented or improved  Outcome: Progressing  Goal: Subluxation will be prevented or improved  Outcome: Progressing   The patient is Watcher - Medium risk of patient condition declining or worsening    Shift Goals  Clinical Goals: improved neuro  Patient Goals: rest  Family Goals: stay updated

## 2022-01-03 NOTE — DISCHARGE PLANNING
Anticipated Discharge Disposition: TBD possibly rehab    Action: Patient discussed in rounds. Patient not medically clear. Rehab is following. Patient is pending therapy evaluations but will likely need placement.     Barriers to Discharge: therapy evals needed    Plan: LSW to follow for therapy recommendations

## 2022-01-03 NOTE — ASSESSMENT & PLAN NOTE
-presented on 1/1/2022  -MRI brain suggestive of right frontal and parietal lobe infarction, right basal ganglia infarction, as well as evidence of petechial hemorrhage   -noted to have worsening lethargy the AM of 1/3  -CT head completed prior to transfer to RICU indicates no acute intracranial hemorrhage, however increase MCA territory. Minimal right to left shift  -3% saline drip protocol.to midagate cerebral edema -DC'd 1/6/2022  -q 4 neuro checks  -Enteral tube for PO intake, speech therapy onboard  -permissive hypertension, expires 1/3/22  -Atorvastatin 80mg  -holding aspirin for hemorrhage   -Neurology already onboard  -PT/OT, Physiatry  -gabapentin for nerve pain   -Case management for placement.  Son interested in having patient transferred near him in [place]   Adam Cantu is a 15 year old male presenting with sore throat that started on 5/30/21 now with sinus congestion and cough.  Pt denies fever.  Denies known Latex allergy or symptoms of Latex sensitivity.  Medications reviewed and updated.

## 2022-01-03 NOTE — PROGRESS NOTES
Report given to Hui VALENTE RN at bedside in IM. Pt taken to CT dept on monitor by Hui ORDAZ and CNA. Anthony good

## 2022-01-03 NOTE — CONSULTS
History & Physical Note    Date of Admission: 1/1/2022  Admission Status: Inpatient  Attending: ADAM Waldrop      Chief Complaint: Stroke    History of Present Illness (HPI):   Dayami is a 66 y.o. 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 Additionally found to have subacute infarction of the right temporal an lateral occipital lobes.   Patient was transferred to RICU on 1/3/2022 after being noted to be more lethargic than prior that same AM for closer monitoring following concerns of mass effect or impending mass effect secondary to edema.    Review of Systems:   Review of Systems   Constitutional: Negative for chills and fever.   HENT: Negative for hearing loss and sore throat.    Eyes: Negative for blurred vision and double vision.   Respiratory: Negative for cough and shortness of breath.    Cardiovascular: Negative for chest pain and palpitations.   Gastrointestinal: Negative for heartburn and nausea.   Genitourinary: Negative for dysuria.   Musculoskeletal: Negative for back pain and myalgias.   Neurological: Positive for weakness. Negative for dizziness and headaches.   Psychiatric/Behavioral: Negative for substance abuse and suicidal ideas.     Past Medical History:   Past Medical History was reviewed with patient.   has no past medical history on file.   Chronic Warfarin therapy secondary to DVTs  Depression    Past Surgical History:    has no past surgical history on file.    Medications:   Prior to Admission Medications   Prescriptions Last Dose Informant Patient Reported? Taking?   atorvastatin (LIPITOR) 80 MG tablet unknown at unknown Patient's Home Pharmacy Yes Yes   Sig: Take 80 mg by mouth every evening.    buPROPion HCl ER, XL, 450 MG TABLET SR 24 HR unknown at unknown Patient's Home Pharmacy Yes Yes   Sig: Take 450 mg by mouth every day.   escitalopram (LEXAPRO) 20 MG tablet unknown at unknown Patient's Home Pharmacy Yes Yes   Sig: Take 20 mg by mouth every day.   losartan (COZAAR) 25 MG Tab unknown at unknowmn Patient's Home Pharmacy Yes Yes   Sig: Take 25 mg by mouth every day.   warfarin (COUMADIN) 5 MG Tab unknown at unknown Patient's Home Pharmacy Yes Yes   Sig: Take 5 mg by mouth every evening.      Facility-Administered Medications: None        Allergies:   No Known Allergies    Family History:   family history is not on file.     Social History:   Tobacco: Former smoker, reports quit in 1976  Alcohol: 2 shots of vodka daily  Recreational drugs (illegal and prescription): denies    Physical Exam:   Vitals:  Temp:  [36.2 °C (97.2 °F)-36.8 °C (98.3 °F)] 36.2 °C (97.2 °F)  Pulse:  [] 80  Resp:  [14-37] 24  BP: (114-188)/() 188/71  SpO2:  [92 %-96 %] 94 %    Physical Exam  Constitutional:       General: He is not in acute distress.  HENT:      Head: Normocephalic.      Mouth/Throat:      Mouth: Mucous membranes are dry.   Eyes:      General: No scleral icterus.     Pupils: Pupils are equal, round, and reactive to light.   Cardiovascular:      Rate and Rhythm: Normal rate and regular rhythm.   Pulmonary:      Effort: Pulmonary effort is normal. No respiratory distress.   Abdominal:      Palpations: Abdomen is soft.      Tenderness: There is no abdominal tenderness. There is no guarding.   Musculoskeletal:      Cervical back: No rigidity.      Right lower leg: No edema.      Left lower leg: No edema.      Comments: 0/5 strength on LUE, 1/5 strength on LLE 5/5 on R Upper and Lower extremity    Skin:     General: Skin is warm and dry.   Neurological:      Mental Status: He is alert and oriented to person, place, and time.      Sensory: Sensory deficit present.      Motor: Weakness present.      Comments:  "Lower left facial droop is present   Psychiatric:         Mood and Affect: Mood normal.         Behavior: Behavior normal.         Labs:   Results for REBECCA THORNTON (MRN [id number]) as of 1/3/2022 11:25   Ref. Range 1/3/2022 08:25   Pre-Albumin Latest Ref Range: 18.0 - 38.0 mg/dL 13.7 (L)   Stat C-Reactive Protein Latest Ref Range: 0.00 - 0.75 mg/dL 4.79 (H)     Imaging:   CT Head W/O  \"IMPRESSION:     Evolving moderate to large right middle cerebral artery territory has increased in size when compared with the previous exam. No evidence of acute intracranial hemorrhage.\"    Previous Data Review: reviewed    Problem Representation: 66 year old man presenting with R MCA stroke which presented on 1/1/22 found to have petechial hemorrhage on MRI, transferred to RICU following increase in lethargy on 1/3 AM concerning for mass effect. CT scan following this change was negative for acute mass effect. 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 Minimal right to left shift  Sodium of 148 on 1/2/2022  Plan  -3% saline drip, at this time will administer through PIV, as patient was already close to goal of 150 without saline therapy anticipate may not need rate higher than 50ml/hr  -Ordering PICC line  -q2 neuro checks  -Enteral tube for PO intake, speech therapy onboard  -permissive hypertension, expires 1/3/22  -Atorvastatin 80mg  -holding aspirin for hemorrhage   -Neurology already onboard  -PT/OT, Physiatry    Chronic anticoagulation- (present on admission)  Assessment & Plan  -was on " chronic warfarin for prior DVT/PEs  -US DVT study on 1/2/2022 negative  -MRI indicative of petechial hemorrhage   -per neurology, holding anticoagulation until 1/13/2022  -per neurology holding aspirin  -per neurology, will continue with Lovenox at VTE prophylaxis dose      Dyslipidemia- (present on admission)  Assessment & Plan  -atorvastatin as above    Primary hypertension- (present on admission)  Assessment & Plan  -permissive hypertension expires on 1/3/2021    Depression- (present on admission)  Assessment & Plan  on lexapro and bupropion at home  -holding lexapro due to associated increase in bleeding risk  -holding bupropion due to lowering of seizure threshold     Quality Measures:  Lines: PIV x2, Enteral tube  Code: FULL  VTE: Lovenox  Diet: Enteral tube feeds  GI prophylaxis: none   Disposition: inpatient for close neuro checks  Antibiotics: none  Bowel regimen: Doc senna  Analgesia: tylenol

## 2022-01-03 NOTE — ASSESSMENT & PLAN NOTE
-He takes lexapro and bupropion at home  -holding lexapro due to associated increase in bleeding risk  -holding bupropion due to lowering of seizure threshold

## 2022-01-03 NOTE — ASSESSMENT & PLAN NOTE
-was on chronic warfarin for prior DVT/PEs  -US DVT study on 1/2/2022 negative  -MRI indicative of petechial hemorrhage   -TTE without evidence of cardioembolic source-  -per neurology,anticoagulation hold until 1/13/2022- consider apixaban 5mg BID   -per neurology, will continue with Lovenox at VTE prophylaxis dose  -Every 4 hours neuro checks  --atorvastatin 80 mg daily for a gaol of <70  - PT/OT/SLT

## 2022-01-03 NOTE — DISCHARGE PLANNING
[facility] Transitional [facility] Coordination    Referral from:  Dr. Vanegas    Insurance Provider on Facesheet: MCR    Potential Rehab Diagnosis: TBD    Chart review indicates patient may have on going medical management and may have therapy needs to possibly meet inpatient rehab facility criteria with the goal of returning to community.    D/C support: TBD     Physiatry consultation forwarded per protocol.     TX pending.      Thank you for the referral.

## 2022-01-03 NOTE — DIETARY
"Nutrition Support Assessment:  Day 2 of admit.  Dayami Morel is a 66 y.o. male with admitting DX of stroke d/t thrombosis of R carotid artery     Current problem list:  1. Primary HTN  2. Dyslipidemia  3. Mild intermittent asthma  4. Depression     Assessment:  Estimated Nutritional Needs based on:   Height: 185.4 cm (6' 0.99\")  Weight: 122 kg (268 lb 11.9 oz)  Weight to Use in Calculations: 119 kg (261 lb 14.5 oz) - via bed scale; suspect closer to dry/actual wt.  Ideal Body Weight: 83.5 kg (184 lb)  Body mass index is 35.46 kg/m²., BMI classification: Obese Class II.     Calculation/Equation: MSJ x 1 - 1.1 =  -  kcal.   Total Calories / day:  - 2280 (Calories / k - 19)  Total Grams Protein / day: 125 - 167 (Grams Protein / k.5 - 2.0 IBW)     Evaluation:   1. Pt failed SLP eval  - SLP rec'd NPO w/ consideration for non-oral source of nutrition.  Consult received for TF recommendations.    2. Gastric Cortrak in place for enteral access.  3. Pt found down after a fall at home w/ subsequent left sided weakness admitted 2022 with acute stroke and found on CT head to have a large subacute right MCA territory ischemic infarct.  CTA head showed complete right ICA occlusion.  No additional PMHx on file.   4. Current clinical picture and MD progress notes reviewed including Neurology notes.    5. Labs from : Sodium: 148, BUN: 32, Total bilirubin: 1.6.  Additional labs and meds reviewed.  6. LBM: PTA.   7. Replete with Fiber is an appropriate formula to meet estimated protein needs.       Malnutrition Risk: Unable to be determined at this time.      Recommendations/Plan:  1. Initiate Replete with Fiber @ 25 mL/hr and advance per protocol to goal rate of 85 mL/hr, providing 2040 kcal, 131 gm protein, and 1693 mL of free water per day.   2. Fluids per MD.  3. Continue to monitor wt.  4. RD continues to monitor labs.  5. Diet upgrades per SLP.    RD follows.               "

## 2022-01-03 NOTE — ASSESSMENT & PLAN NOTE
-permissive hypertension  on 1/3/2021  -3% saline infusion  2022  -Started antihypertensive therapy with home losartan 20 mg daily

## 2022-01-03 NOTE — PROGRESS NOTES
Neurology Progress Note  Neurohospitalist Service, [facility]    Referring Physician: Toby Vanegas M.D.      Interval History: No acute events overnight.  Very lethargic this AM.  MRI completed with large R MCA acute infarct, with associated mass effect.      Review of systems: In addition to what is detailed in the HPI and/or updated in the interval history, all other systems reviewed and are negative.    Past Medical History, Past Surgical History and Social History reviewed and unchanged from prior    Medications:    Current Facility-Administered Medications:   •  ipratropium-albuterol (DUONEB) nebulizer solution, 3 mL, Nebulization, Q4H PRN (RT), Toby Vanegas M.D.  •  Pharmacy Consult: Enteral tube insertion - review meds/change route/product selection, 1 Each, Other, PHARMACY TO DOSE, Samir Acuna D.O.  •  enoxaparin (LOVENOX) inj 40 mg, 40 mg, Subcutaneous, DAILY, JUSTICE LaraO., 40 mg at 01/02/22 1748  •  acetaminophen (Tylenol) tablet 650 mg, 650 mg, Enteral Tube, Q6HRS PRN, JUSTICE LaraO.  •  aspirin (ASA) chewable tab 81 mg, 81 mg, Enteral Tube, DAILY, JUSTICE LaraO., 81 mg at 01/02/22 1749  •  atorvastatin (LIPITOR) tablet 80 mg, 80 mg, Enteral Tube, Q EVENING, JEFFREY Lara.O., 80 mg at 01/02/22 1749  •  ondansetron (ZOFRAN ODT) dispertab 4 mg, 4 mg, Enteral Tube, Q4HRS PRN, JUSTICE LaraO.  •  senna-docusate (PERICOLACE or SENOKOT S) 8.6-50 MG per tablet 2 Tablet, 2 Tablet, Enteral Tube, BID, 2 Tablet at 01/03/22 0557 **AND** polyethylene glycol/lytes (MIRALAX) PACKET 1 Packet, 1 Packet, Enteral Tube, QDAY PRN **AND** magnesium hydroxide (MILK OF MAGNESIA) suspension 30 mL, 30 mL, Enteral Tube, QDAY PRN **AND** bisacodyl (DULCOLAX) suppository 10 mg, 10 mg, Rectal, QDAY PRN, Samir Acuna D.O.  •  Allow for permissive hypertension: SBP up to 220 mmHg/DBP to 120 mmHg; If SBP greater than 220 mmHg or DBP greater than 120 mmHg administer PRN  "antihypertensive medications., , Other, PHARMACY TO DOSE, Toby Vanegas M.D.  •  labetalol (NORMODYNE/TRANDATE) injection 10 mg, 10 mg, Intravenous, Q10 MIN PRN, Toby Vanegas M.D.  •  hydrALAZINE (APRESOLINE) injection 10 mg, 10 mg, Intravenous, Q2HRS PRN, Toby Vanegas M.D.  •  ondansetron (ZOFRAN) syringe/vial injection 4 mg, 4 mg, Intravenous, Q4HRS PRN, Toby Vanegas M.D.    Physical Examination:   /83   Pulse 88   Temp 36.8 °C (98.3 °F) (Temporal)   Resp (!) 23   Ht 1.854 m (6' 1\")   Wt 122 kg (268 lb 11.9 oz)   SpO2 95%   BMI 35.46 kg/m²       General: Patient is struggling to stay awake.  Arouses to tactile, but inattentive, and goes back to sleep  Neck: There is normal range of motion  CV: Regular rate   Extremities:  Warm, dry, and intact, without peripheral lower extremity edema    NEUROLOGICAL EXAM:     Mental status: Oriented to name, follows commands on L  Speech and language: Speech is dysarthric and sparse.  Follows distal commands/  Cranial nerve exam: PERRL, decreased blink to threat from L. R gaze preference, but crosses midline with tracking face, L face droop.  Motor exam: There is sustained antigravity with no drift in RUE, RLE withdraws briskly with antigravity movements at knee, LLE withdraws less briskly on plane of bed.  LUE with no movement  Sensory exam:  Does not react to tactile in L hemibody  Coordination: No ataxia seen on elicited and spontaneous movements      NIHSS: National Institutes of Health Stroke Scale    [1] 1a:Level of Consciousness    0-alert 1-drowsy   2-stupor   3-coma  [1] 1b:LOC Questions                  0-both  1-one      2-neither  [2] 1c:LOC Commands                   0-both  1-one      2-neither  [1] 2: Best Gaze                     0-nl    1-partial  2-forced  [2] 3: Visual Fields                   0-nl    1-partial  2-complete 3-bilat  [2] 4: Facial Paresis                0-nl    1-minor    2-partial  3-full  MOTOR                    "    0-nl  [1] 5: Right Arm           1-drift  [4] 6: Left Arm             2-some effort vs gravity  [2] 7: Right Leg           3-no effort vs gravity  [2] 8: Left Leg             4-no movement                             x-untestable  [0] 9: Limb Ataxia                    0-abs   1-1_limb   2-2+_limbs       x-untestable  [2] 10:Sensory                        0-nl    1-partial  2-dense  [1] 11:Best Language/Aphasia         0-nl    1-mild/mod 2-severe   3-mute  [2] 12:Dysarthria                     0-nl    1-mild/mod 2-severe       x-untestable  [2] 13:Neglect/Inattention            0-none  1-partial  2-complete  [25] TOTAL      Objective Data:    Labs:  Lab Results   Component Value Date/Time    PROTHROMBTM 14.6 01/01/2022 05:55 PM    INR 1.18 (H) 01/01/2022 05:55 PM      Lab Results   Component Value Date/Time    WBC 15.1 (H) 01/02/2022 01:25 AM    RBC 4.82 01/02/2022 01:25 AM    HEMOGLOBIN 15.9 01/02/2022 01:25 AM    HEMATOCRIT 48.7 01/02/2022 01:25 AM    .0 (H) 01/02/2022 01:25 AM    MCH 33.0 01/02/2022 01:25 AM    MCHC 32.6 (L) 01/02/2022 01:25 AM    MPV 10.4 01/02/2022 01:25 AM    NEUTSPOLYS 79.70 (H) 01/02/2022 01:25 AM    LYMPHOCYTES 10.90 (L) 01/02/2022 01:25 AM    MONOCYTES 7.70 01/02/2022 01:25 AM    EOSINOPHILS 0.50 01/02/2022 01:25 AM    BASOPHILS 0.50 01/02/2022 01:25 AM      Lab Results   Component Value Date/Time    SODIUM 148 (H) 01/02/2022 01:25 AM    POTASSIUM 3.7 01/02/2022 01:25 AM    CHLORIDE 110 01/02/2022 01:25 AM    CO2 22 01/02/2022 01:25 AM    GLUCOSE 89 01/02/2022 01:25 AM    BUN 32 (H) 01/02/2022 01:25 AM    CREATININE 1.28 01/02/2022 01:25 AM      Lab Results   Component Value Date/Time    CHOLSTRLTOT 204 (H) 01/02/2022 01:25 AM     (H) 01/02/2022 01:25 AM    HDL 35 (A) 01/02/2022 01:25 AM    TRIGLYCERIDE 182 (H) 01/02/2022 01:25 AM       Lab Results   Component Value Date/Time    ALKPHOSPHAT 97 01/02/2022 01:25 AM    ASTSGOT 48 (H) 01/02/2022 01:25 AM    ALTSGPT 31  01/02/2022 01:25 AM    TBILIRUBIN 1.6 (H) 01/02/2022 01:25 AM        Imaging/Testing:    I interpreted and/or reviewed the patient's neuroimaging    DX-ABDOMEN FOR TUBE PLACEMENT   Final Result      Cortrak feeding tube tip projects in the region of the proximal/mid stomach.      US-EXTREMITY VENOUS LOWER BILAT   Final Result      MR-BRAIN-W/O   Final Result         1. Age-related cerebral atrophy. Mild effacement of the right lateral ventricle.      2. Mild periventricular white matter changes consistent with chronic microvascular ischemic gliosis.      3. Large area of acute infarction involving the right frontal and parietal lobes as well as the right-sided basal ganglia. Subacute area of infarction involving the right temporal and lateral occipital lobes.      4. Petechial hemorrhage noted in the right basal ganglia and involving the cortex in the right temporal and lateral occipital lobes.      5. Right internal carotid artery occlusion or high-grade stenosis.            EC-ECHOCARDIOGRAM COMPLETE W/O CONT   Final Result      DX-SHOULDER 2+ LEFT   Final Result      Negative 2 views of left shoulder.      DX-SHOULDER 2+ RIGHT   Final Result      Moderate subacromial spurring      DX-FOOT-COMPLETE 3+ RIGHT   Final Result      No radiographic evidence of acute displaced fracture or subluxation.      DX-FOOT-2- LEFT   Final Result      No radiographic evidence of acute displaced fracture or subluxation.      DX-ELBOW-COMPLETE 3+ RIGHT   Final Result      No radiographic evidence of acute traumatic injury.      Limited due to rotation of the lateral radiograph attempts. Consider repeat lateral radiograph to help exclude effusion      CT-CHEST,ABDOMEN,PELVIS WITH   Final Result      No significant traumatic abnormality in thorax, abdomen and pelvis CT scans.      Hepatic steatosis      Moderate colonic diverticulosis.      CT-LSPINE W/O PLUS RECONS   Final Result      No CT evidence of acute traumatic injury.       Moderate degenerative disc disease with trace degenerative L5/S1 retrolisthesis      CT-TSPINE W/O PLUS RECONS   Final Result      No CT evidence of acute traumatic abnormality.      CT-CSPINE WITHOUT PLUS RECONS   Final Result      No CT evidence of acute cervical spine abnormality.      CT-HEAD W/O   Final Result      Subacute right temporal, temporo-occipital infarction without hemorrhagic transformation      CT-CTA HEAD WITH & W/O-POST PROCESS   Final Result      Right internal carotid artery occlusion with extension to the MCA which lacks contrast opacification      Right A1 and KATIA contrast opacification secondary to cross-filling from the left      Normal left anterior and bilateral posterior circulation      Findings of all of the CTs were discussed with Dr. Betancourt before completion of this dictation.            CT-CTA NECK WITH & W/O-POST PROCESSING   Final Result      Occluded right internal carotid artery at its origin      Atherosclerosis at origin of bilateral vertebral arteries could obscure stenosis. Arteries are patent without post stenotic dilatation      DX-PELVIS-1 OR 2 VIEWS   Final Result      Negative AP view of the pelvis.      DX-CHEST-LIMITED (1 VIEW)   Final Result      No acute cardiopulmonary disease.      US-ABORTED US PROCEDURE    (Results Pending)       Assessment and Plan:  Dayami Morel is a 66 year old man presenting with R MCA syndrome, found to have an occluded R ICA, and a relatively large completed R MCA infarct on MRI.  There is some petechial hemorrhagic conversion and associated mass effect related to evolving cerebral edema.  He is likely about day 3-4 from his stroke onset, and is currently in peak edema window.  Given lethargy on exam, recommend repeating head CT today to reassess mass effect.  Transfer to ICU for close neuromonitoring and initiation of 3% therapy.   May need decompressive hemicraniectomy in upcoming hours/days.  Would hold on antithrombotic therapy  given possible need for surgical decompression, large infarct, and evidence of hemorrhagic conversion.  Would reinitiate anticoagulation in 2 weeks.    Problem list:  1.  Large R MCA stroke  2.  Pulmonary emboli  3.  Anticoagulated, subtherapeutic    Plan:   - q2h neurochecks, monitor for pupillary and mental status changes that may be suggestive of elevated ICP and impending herniation   - given current lethargy, repeat non-contrast head CT today   - no need for permissive HTN as stroke is completed, long-term goal is 110-130/60-80- mostly at goal without interventions, ok to start PO anti-HTN if necessary   - discontinue ASA therapy given MRI results   - start 3% for goal Na 150-160 to mitigate development of malignant cerebral edema- anticipate additional 2-3 days of therapy while in peak edema window.  PICC line or CVC placement for initial 23% to attain Na goal quickly   - stroke labs:  HgbA1c 5.3 and    - continue atorvastatin 80 mg daily for goal LDL < 70   - TTE without evidence of cardioembolic source- if anticoagulation is not anticipated to be lifelong- will need long-term cardiac monitoring at discharge   - restart anticoagulation in 2 weeks (1/13 start date), consider apixaban 5mg BID    - PT/OT/SLP   - lovenox SQ for DVT chemoppx ok      The evaluation of the patient, and recommended management, was discussed with the attending hospitalist. I have performed a physical exam and reviewed and updated ROS and Plan today (1/3/2022).     Darci Watson MD  Neurohospitalist, Acute Care Services

## 2022-01-03 NOTE — PROGRESS NOTES
Patient missing big toe nails Left and right upon admission wrapped with non adhesive bandaging             Blister, bruising and abrasions on chest upon admission.  Open to air.

## 2022-01-03 NOTE — PROGRESS NOTES
Hospital Medicine Daily Progress Note    Date of Service  1/3/2022    Chief Complaint  Fall down 15 stairs and left sided weakness.    Hospital Course  Alamo Sixty-Six is a 66 y.o. male found down after a fall at home w/ subsequent left sided weakness admitted 1/1/2022 with acute stroke and found on CT head to have a large subacute right MCA territory ischemic infarct.  CTA head showed complete right ICA occlusion. On 1/3 transfer to ICU for closure neuro monitoring in face of possible brain edema and need for 3% NaCl.    Interval Problem Update  1/3: Discussed with Dr Watson, neurology, and Dr Alvarado, lntensivist.  Plan is to transfer to neuro ICU for close neurochecks and initiation of 3% NaCl with a concern of possible brain swelling from large infarct and petechial hemorrhage (noted on MRI yesterday).   Patient awakens to voice but falls to sleep quickly. Remains with left sided hemiparesis and neglect.    1/2:  Awake and alert with slurred speech. He has left side neglect.  Left arm/leg responds to pain but has sensory deficit. MRI brain pending.  Has q 2 hour neurochecks for now.    I have personally seen and examined the patient at bedside. I discussed the plan of care with bedside RN.    Consultants/Specialty  neurology   Intensivist    Code Status  Full Code    Disposition  Patient is not medically cleared for discharge.   Anticipate discharge to to an inpatient rehabilitation hospital.  I have placed the appropriate orders for post-discharge needs.    Review of Systems  Review of Systems   Constitutional: Negative for fever and malaise/fatigue.   HENT: Negative for sore throat.    Respiratory: Negative for shortness of breath.    Cardiovascular: Negative for chest pain and leg swelling.   Gastrointestinal: Negative for abdominal pain.   Musculoskeletal: Positive for back pain and neck pain.   Neurological: Positive for sensory change, speech change, focal weakness and headaches.        Physical  Exam  Temp:  [36.2 °C (97.2 °F)-36.8 °C (98.3 °F)] 36.2 °C (97.2 °F)  Pulse:  [] 80  Resp:  [14-37] 24  BP: (114-188)/() 188/71  SpO2:  [92 %-96 %] 94 %    Physical Exam  Vitals reviewed.   Constitutional:       Appearance: Normal appearance. He is not diaphoretic.   HENT:      Head: Normocephalic and atraumatic.      Nose: Nose normal.      Mouth/Throat:      Mouth: Mucous membranes are moist.      Pharynx: No oropharyngeal exudate.   Eyes:      General: No scleral icterus.        Right eye: No discharge.         Left eye: No discharge.      Extraocular Movements: Extraocular movements intact.      Conjunctiva/sclera: Conjunctivae normal.   Cardiovascular:      Rate and Rhythm: Normal rate and regular rhythm.      Pulses:           Radial pulses are 2+ on the right side and 2+ on the left side.        Dorsalis pedis pulses are 2+ on the right side and 2+ on the left side.      Heart sounds: No murmur heard.      Pulmonary:      Effort: Pulmonary effort is normal. No respiratory distress.      Breath sounds: Normal breath sounds. No wheezing or rales.   Abdominal:      General: Bowel sounds are normal. There is no distension.      Palpations: Abdomen is soft.      Tenderness: There is no abdominal tenderness.   Musculoskeletal:         General: No swelling.      Cervical back: Tenderness (Cspine) present. No muscular tenderness.   Skin:     Coloration: Skin is not jaundiced or pale.      Findings: Bruising (right and left shoulder, right upper shin,  LUQ abdmen with blister/abrasion) present.   Neurological:      Mental Status: He is alert.      Cranial Nerves: Cranial nerve deficit present.      Sensory: Sensory deficit present.      Motor: Weakness present.      Coordination: Coordination abnormal.   Psychiatric:         Mood and Affect: Mood normal.         Speech: Speech is slurred.         Behavior: Behavior is cooperative.         Fluids    Intake/Output Summary (Last 24 hours) at 1/3/2022  1435  Last data filed at 1/3/2022 0900  Gross per 24 hour   Intake 925 ml   Output 125 ml   Net 800 ml       Laboratory  Recent Labs     01/01/22  1755 01/02/22  0125   WBC 15.1* 15.1*   RBC 5.41 4.82   HEMOGLOBIN 18.1* 15.9   HEMATOCRIT 53.7* 48.7   MCV 99.3* 101.0*   MCH 33.5* 33.0   MCHC 33.7 32.6*   RDW 53.1* 54.5*   PLATELETCT 236 182   MPV 10.6 10.4     Recent Labs     01/01/22  1755 01/02/22  0125   SODIUM 147* 148*   POTASSIUM 4.1 3.7   CHLORIDE 106 110   CO2 18* 22   GLUCOSE 98 89   BUN 37* 32*   CREATININE 1.53* 1.28   CALCIUM 9.4 9.1     Recent Labs     01/01/22  1755   APTT 26.8   INR 1.18*         Recent Labs     01/02/22  0125   TRIGLYCERIDE 182*   HDL 35*   *       Imaging  CT-HEAD W/O   Final Result      Evolving moderate to large right middle cerebral artery territory has increased in size when compared with the previous exam. No evidence of acute intracranial hemorrhage.         DX-ABDOMEN FOR TUBE PLACEMENT   Final Result      Cortrak feeding tube tip projects in the region of the proximal/mid stomach.      US-EXTREMITY VENOUS LOWER BILAT   Final Result      MR-BRAIN-W/O   Final Result         1. Age-related cerebral atrophy. Mild effacement of the right lateral ventricle.      2. Mild periventricular white matter changes consistent with chronic microvascular ischemic gliosis.      3. Large area of acute infarction involving the right frontal and parietal lobes as well as the right-sided basal ganglia. Subacute area of infarction involving the right temporal and lateral occipital lobes.      4. Petechial hemorrhage noted in the right basal ganglia and involving the cortex in the right temporal and lateral occipital lobes.      5. Right internal carotid artery occlusion or high-grade stenosis.            EC-ECHOCARDIOGRAM COMPLETE W/O CONT   Final Result      DX-SHOULDER 2+ LEFT   Final Result      Negative 2 views of left shoulder.      DX-SHOULDER 2+ RIGHT   Final Result      Moderate  subacromial spurring      DX-FOOT-COMPLETE 3+ RIGHT   Final Result      No radiographic evidence of acute displaced fracture or subluxation.      DX-FOOT-2- LEFT   Final Result      No radiographic evidence of acute displaced fracture or subluxation.      DX-ELBOW-COMPLETE 3+ RIGHT   Final Result      No radiographic evidence of acute traumatic injury.      Limited due to rotation of the lateral radiograph attempts. Consider repeat lateral radiograph to help exclude effusion      CT-CHEST,ABDOMEN,PELVIS WITH   Final Result      No significant traumatic abnormality in thorax, abdomen and pelvis CT scans.      Hepatic steatosis      Moderate colonic diverticulosis.      CT-LSPINE W/O PLUS RECONS   Final Result      No CT evidence of acute traumatic injury.      Moderate degenerative disc disease with trace degenerative L5/S1 retrolisthesis      CT-TSPINE W/O PLUS RECONS   Final Result      No CT evidence of acute traumatic abnormality.      CT-CSPINE WITHOUT PLUS RECONS   Final Result      No CT evidence of acute cervical spine abnormality.      CT-HEAD W/O   Final Result      Subacute right temporal, temporo-occipital infarction without hemorrhagic transformation      CT-CTA HEAD WITH & W/O-POST PROCESS   Final Result      Right internal carotid artery occlusion with extension to the MCA which lacks contrast opacification      Right A1 and KATIA contrast opacification secondary to cross-filling from the left      Normal left anterior and bilateral posterior circulation      Findings of all of the CTs were discussed with Dr. Betancourt before completion of this dictation.            CT-CTA NECK WITH & W/O-POST PROCESSING   Final Result      Occluded right internal carotid artery at its origin      Atherosclerosis at origin of bilateral vertebral arteries could obscure stenosis. Arteries are patent without post stenotic dilatation      DX-PELVIS-1 OR 2 VIEWS   Final Result      Negative AP view of the pelvis.       DX-CHEST-LIMITED (1 VIEW)   Final Result      No acute cardiopulmonary disease.      US-ABORTED US PROCEDURE    (Results Pending)   IR-PICC LINE PLACEMENT W/ GUIDANCE > AGE 5    (Results Pending)        Assessment/Plan  * Stroke due to thrombosis of right carotid artery (HCC)- (present on admission)  Assessment & Plan  Due to petechial hemorrhage noted on MRI I will stop aspirin  Due to size of stroke, Dr Watson was concerned of possible brain swelling and wanted patient to be on 3% NaCl and close neurochecks in Neuro ICU.  1/3/22 Transfer to neuro ICU and discussed with Dr Alvarado   Atorvastatin high intensity once passes swallow eval or has enteral access.  PT/OT/ST  1/2/22 MRI brain showing large right CVA on my read.  Hold anticoagulation until approval from neurology  Has right occluded carotid per CTA neck as etiology of CVA.  Left side neglect  Physiatry consultation    Depression- (present on admission)  Assessment & Plan  On bupropion and Lexapro outpatient, pending swallow eval if he fails he will need NG tube for p.o. 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  Await instruction from neurology for restarting anticoagulation.  In the mean time check US DVT studies, if positive he would need an IVC filter.  1/2 LE U/S negative for overt DVT, follow weekly U/S for any sign of DVT.    Dyslipidemia- (present on admission)  Assessment & Plan  High-intensity statin pending swallow or enteral access    Primary hypertension- (present on admission)  Assessment & Plan  History of, home regimen losartan 25 mg  daily (holding for now)  Allow permissive hypertension up to 220 systolic, IV antihypertensives with parameters ordered.       VTE prophylaxis: SCDs/TEDs and enoxaparin ppx    I have performed a physical exam and reviewed and updated ROS and Plan today (1/3/2022). In review of yesterday's note (1/2/2022), there are no changes except as documented above.

## 2022-01-03 NOTE — THERAPY
Speech Language Pathology  Daily Treatment     Patient Name: Dayami Sixty-Six  Age:  66 y.o., Sex:  male  Medical Record #: [id number]  Today's Date: 1/3/2022     Precautions  Precautions: (P) Swallow Precautions ( See Comments),Nasogastric Tube  Comments: (P) L sided deficits, Poor sensation    Assessment    RN reporting pt continues to have difficulty with speech output, however is following simple directives.  He was seen for dysphagia tx.  Speech continues to be dysarthric with decreased intensity of voice, however improved following oral care.  Extensive oral care provided, with dried blood noted in oral cavity.  Pt participated in oral care, brushing his own teeth with hand over hand assistance, s/p brushing by SLP.  Pt with coughing noted x2 during oral care, concerning for possible penetration or aspiration, so extensive suctioning provided.  Improved lingual movement noted after oral care, and as stated, improved voice appreciated.  Pt was given 2 single ice chips.  Oral phase continues to be prolonged, with delayed onset of swallow appreciated.  Laryngeal elevation was palpated as weak.  Pt had throat clearing in 1/2 ice chips, concerning for aspiration.  Pt fatigued significantly at this point, so no further trials were given due to high risk for  aspiration.  Pt is not yet at the level to participate in FEES.  Recommend to continue NPO/cortrak.  He is OK for 1-2 single ice chips per hour with RN only as long as he is awake and alert, and sitting upright.  SLP to complete FEES and speech/language and cognitive evaluation as appropriate.        Recommendations:  1) NPO with cortrak  2) OK for 1-2 single ice chips per hour with 1:1 supervision from RN, IF pt is awake, alert and sitting upright  3) Please provide frequent oral care to mitigate potential for aspiration pneumonia   4) Recommend FEES prior to initiation of consistent PO intake    Plan    Continue current treatment plan.    Discharge  "Recommendations: Recommend post-acute placement for additional speech therapy services prior to discharge home       Objective     01/03/22 0905   Vitals   O2 (LPM) 3   O2 Delivery Device Room air w/o2 available   Voice   Comments reduced in volume but clear following oral care   Dysphagia    Diet / Liquid Recommendation NPO;Pre-Feeding Trials with SLP Only   Nutritional Liquid Intake Rating Scale Nothing by mouth   Nutritional Food Intake Rating Scale Tube dependent with minimal attempts of oral intake   Nursing Communication Swallow Precaution Sign Posted at Head of Bed  (Ice chip sign)   Recommended Route of Medication Administration   Medication Administration  Via Gastric Tube   Other Treatments   Other Treatments Provided Extensive oral care   Patient / Family Goals   Patient / Family Goal #1 \"Popsicles\"   Goal #1 Outcome Goal not met   Short Term Goals   Short Term Goal # 1 Pt will be able to consume prefeeding trials with SLP only with no overt S/Sx of aspiration noted   Goal Outcome # 1 Progressing slower than expected   Short Term Goal # 2 Pt will be able to complete 10/10 reps of oral motor and laryngeal/pharyngeal exercises with \"good\" accuracy as judged by SLP   Goal Outcome # 2  Progressing slower than expected         "

## 2022-01-04 ENCOUNTER — APPOINTMENT (OUTPATIENT)
Dept: RADIOLOGY | Facility: MEDICAL CENTER | Age: 67
DRG: 064 | End: 2022-01-04
Attending: HOSPITALIST
Payer: MEDICARE

## 2022-01-04 ENCOUNTER — APPOINTMENT (OUTPATIENT)
Dept: RADIOLOGY | Facility: MEDICAL CENTER | Age: 67
DRG: 064 | End: 2022-01-04
Attending: INTERNAL MEDICINE
Payer: MEDICARE

## 2022-01-04 LAB
ANION GAP SERPL CALC-SCNC: 11 MMOL/L (ref 7–16)
ANION GAP SERPL CALC-SCNC: 13 MMOL/L (ref 7–16)
ANION GAP SERPL CALC-SCNC: 14 MMOL/L (ref 7–16)
ANION GAP SERPL CALC-SCNC: 14 MMOL/L (ref 7–16)
BUN SERPL-MCNC: 16 MG/DL (ref 8–22)
BUN SERPL-MCNC: 17 MG/DL (ref 8–22)
BUN SERPL-MCNC: 18 MG/DL (ref 8–22)
BUN SERPL-MCNC: 19 MG/DL (ref 8–22)
CALCIUM SERPL-MCNC: 9.3 MG/DL (ref 8.5–10.5)
CALCIUM SERPL-MCNC: 9.4 MG/DL (ref 8.5–10.5)
CALCIUM SERPL-MCNC: 9.5 MG/DL (ref 8.5–10.5)
CALCIUM SERPL-MCNC: 9.9 MG/DL (ref 8.5–10.5)
CHLORIDE SERPL-SCNC: 117 MMOL/L (ref 96–112)
CHLORIDE SERPL-SCNC: 117 MMOL/L (ref 96–112)
CHLORIDE SERPL-SCNC: 119 MMOL/L (ref 96–112)
CHLORIDE SERPL-SCNC: 120 MMOL/L (ref 96–112)
CO2 SERPL-SCNC: 22 MMOL/L (ref 20–33)
CO2 SERPL-SCNC: 22 MMOL/L (ref 20–33)
CO2 SERPL-SCNC: 23 MMOL/L (ref 20–33)
CO2 SERPL-SCNC: 25 MMOL/L (ref 20–33)
CREAT SERPL-MCNC: 0.96 MG/DL (ref 0.5–1.4)
CREAT SERPL-MCNC: 0.96 MG/DL (ref 0.5–1.4)
CREAT SERPL-MCNC: 1.07 MG/DL (ref 0.5–1.4)
CREAT SERPL-MCNC: 1.1 MG/DL (ref 0.5–1.4)
GLUCOSE SERPL-MCNC: 117 MG/DL (ref 65–99)
GLUCOSE SERPL-MCNC: 120 MG/DL (ref 65–99)
GLUCOSE SERPL-MCNC: 121 MG/DL (ref 65–99)
GLUCOSE SERPL-MCNC: 129 MG/DL (ref 65–99)
POTASSIUM SERPL-SCNC: 3.4 MMOL/L (ref 3.6–5.5)
POTASSIUM SERPL-SCNC: 3.8 MMOL/L (ref 3.6–5.5)
POTASSIUM SERPL-SCNC: 4 MMOL/L (ref 3.6–5.5)
POTASSIUM SERPL-SCNC: 4.2 MMOL/L (ref 3.6–5.5)
SODIUM SERPL-SCNC: 153 MMOL/L (ref 135–145)
SODIUM SERPL-SCNC: 153 MMOL/L (ref 135–145)
SODIUM SERPL-SCNC: 155 MMOL/L (ref 135–145)
SODIUM SERPL-SCNC: 156 MMOL/L (ref 135–145)

## 2022-01-04 PROCEDURE — 700105 HCHG RX REV CODE 258: Performed by: HOSPITALIST

## 2022-01-04 PROCEDURE — B548ZZA ULTRASONOGRAPHY OF SUPERIOR VENA CAVA, GUIDANCE: ICD-10-PCS | Performed by: HOSPITALIST

## 2022-01-04 PROCEDURE — A9270 NON-COVERED ITEM OR SERVICE: HCPCS | Performed by: STUDENT IN AN ORGANIZED HEALTH CARE EDUCATION/TRAINING PROGRAM

## 2022-01-04 PROCEDURE — 770022 HCHG ROOM/CARE - ICU (200)

## 2022-01-04 PROCEDURE — C1751 CATH, INF, PER/CENT/MIDLINE: HCPCS

## 2022-01-04 PROCEDURE — 92526 ORAL FUNCTION THERAPY: CPT

## 2022-01-04 PROCEDURE — 97163 PT EVAL HIGH COMPLEX 45 MIN: CPT

## 2022-01-04 PROCEDURE — 97167 OT EVAL HIGH COMPLEX 60 MIN: CPT

## 2022-01-04 PROCEDURE — 700102 HCHG RX REV CODE 250 W/ 637 OVERRIDE(OP): Performed by: STUDENT IN AN ORGANIZED HEALTH CARE EDUCATION/TRAINING PROGRAM

## 2022-01-04 PROCEDURE — 700111 HCHG RX REV CODE 636 W/ 250 OVERRIDE (IP): Performed by: HOSPITALIST

## 2022-01-04 PROCEDURE — A9270 NON-COVERED ITEM OR SERVICE: HCPCS | Performed by: NURSE PRACTITIONER

## 2022-01-04 PROCEDURE — 80048 BASIC METABOLIC PNL TOTAL CA: CPT | Mod: 91

## 2022-01-04 PROCEDURE — 302136 NUTRITION PUMP: Performed by: STUDENT IN AN ORGANIZED HEALTH CARE EDUCATION/TRAINING PROGRAM

## 2022-01-04 PROCEDURE — 99223 1ST HOSP IP/OBS HIGH 75: CPT | Performed by: PHYSICAL MEDICINE & REHABILITATION

## 2022-01-04 PROCEDURE — 700102 HCHG RX REV CODE 250 W/ 637 OVERRIDE(OP): Performed by: HOSPITALIST

## 2022-01-04 PROCEDURE — 700101 HCHG RX REV CODE 250: Performed by: STUDENT IN AN ORGANIZED HEALTH CARE EDUCATION/TRAINING PROGRAM

## 2022-01-04 PROCEDURE — 700102 HCHG RX REV CODE 250 W/ 637 OVERRIDE(OP): Performed by: NURSE PRACTITIONER

## 2022-01-04 PROCEDURE — 02HV33Z INSERTION OF INFUSION DEVICE INTO SUPERIOR VENA CAVA, PERCUTANEOUS APPROACH: ICD-10-PCS | Performed by: HOSPITALIST

## 2022-01-04 PROCEDURE — A9270 NON-COVERED ITEM OR SERVICE: HCPCS | Performed by: HOSPITALIST

## 2022-01-04 PROCEDURE — 99291 CRITICAL CARE FIRST HOUR: CPT | Performed by: NURSE PRACTITIONER

## 2022-01-04 PROCEDURE — 99233 SBSQ HOSP IP/OBS HIGH 50: CPT | Performed by: PSYCHIATRY & NEUROLOGY

## 2022-01-04 RX ORDER — SODIUM CHLORIDE 1 G/1
1 TABLET ORAL ONCE
Status: COMPLETED | OUTPATIENT
Start: 2022-01-04 | End: 2022-01-04

## 2022-01-04 RX ORDER — OXYCODONE HYDROCHLORIDE 10 MG/1
10 TABLET ORAL EVERY 4 HOURS PRN
Status: DISCONTINUED | OUTPATIENT
Start: 2022-01-04 | End: 2022-01-09

## 2022-01-04 RX ORDER — OXYCODONE HYDROCHLORIDE 5 MG/1
5 TABLET ORAL EVERY 4 HOURS PRN
Status: DISCONTINUED | OUTPATIENT
Start: 2022-01-04 | End: 2022-01-09

## 2022-01-04 RX ORDER — HYDRALAZINE HYDROCHLORIDE 20 MG/ML
20 INJECTION INTRAMUSCULAR; INTRAVENOUS EVERY 4 HOURS PRN
Status: DISCONTINUED | OUTPATIENT
Start: 2022-01-04 | End: 2022-01-18

## 2022-01-04 RX ORDER — LOSARTAN POTASSIUM 50 MG/1
25 TABLET ORAL DAILY
Status: DISCONTINUED | OUTPATIENT
Start: 2022-01-05 | End: 2022-01-05

## 2022-01-04 RX ORDER — LABETALOL HYDROCHLORIDE 5 MG/ML
10-20 INJECTION, SOLUTION INTRAVENOUS EVERY 4 HOURS PRN
Status: DISCONTINUED | OUTPATIENT
Start: 2022-01-04 | End: 2022-01-18

## 2022-01-04 RX ORDER — GABAPENTIN 400 MG/1
400 CAPSULE ORAL ONCE
Status: COMPLETED | OUTPATIENT
Start: 2022-01-04 | End: 2022-01-04

## 2022-01-04 RX ADMIN — DOCUSATE SODIUM 50 MG AND SENNOSIDES 8.6 MG 2 TABLET: 8.6; 5 TABLET, FILM COATED ORAL at 05:10

## 2022-01-04 RX ADMIN — POTASSIUM BICARBONATE 50 MEQ: 978 TABLET, EFFERVESCENT ORAL at 09:53

## 2022-01-04 RX ADMIN — LABETALOL HYDROCHLORIDE 10 MG: 5 INJECTION, SOLUTION INTRAVENOUS at 09:53

## 2022-01-04 RX ADMIN — Medication 1 G: at 01:15

## 2022-01-04 RX ADMIN — OXYCODONE HYDROCHLORIDE 10 MG: 10 TABLET ORAL at 20:23

## 2022-01-04 RX ADMIN — LABETALOL HYDROCHLORIDE 10 MG: 5 INJECTION, SOLUTION INTRAVENOUS at 18:21

## 2022-01-04 RX ADMIN — ATORVASTATIN CALCIUM 80 MG: 80 TABLET, FILM COATED ORAL at 17:39

## 2022-01-04 RX ADMIN — DOCUSATE SODIUM 50 MG AND SENNOSIDES 8.6 MG 2 TABLET: 8.6; 5 TABLET, FILM COATED ORAL at 17:39

## 2022-01-04 RX ADMIN — SODIUM CHLORIDE 500 ML: 3 INJECTION, SOLUTION INTRAVENOUS at 04:41

## 2022-01-04 RX ADMIN — ACETAMINOPHEN 650 MG: 325 TABLET, FILM COATED ORAL at 04:38

## 2022-01-04 RX ADMIN — OXYCODONE HYDROCHLORIDE 10 MG: 10 TABLET ORAL at 11:14

## 2022-01-04 RX ADMIN — ACETAMINOPHEN 650 MG: 325 TABLET, FILM COATED ORAL at 11:14

## 2022-01-04 RX ADMIN — ENOXAPARIN SODIUM 40 MG: 40 INJECTION SUBCUTANEOUS at 17:40

## 2022-01-04 RX ADMIN — GABAPENTIN 400 MG: 400 CAPSULE ORAL at 20:23

## 2022-01-04 ASSESSMENT — PATIENT HEALTH QUESTIONNAIRE - PHQ9
SUM OF ALL RESPONSES TO PHQ9 QUESTIONS 1 AND 2: 0
2. FEELING DOWN, DEPRESSED, IRRITABLE, OR HOPELESS: NOT AT ALL
1. LITTLE INTEREST OR PLEASURE IN DOING THINGS: NOT AT ALL

## 2022-01-04 ASSESSMENT — COGNITIVE AND FUNCTIONAL STATUS - GENERAL
DAILY ACTIVITIY SCORE: 6
CLIMB 3 TO 5 STEPS WITH RAILING: TOTAL
TURNING FROM BACK TO SIDE WHILE IN FLAT BAD: UNABLE
PERSONAL GROOMING: TOTAL
DRESSING REGULAR LOWER BODY CLOTHING: TOTAL
TOILETING: TOTAL
MOVING FROM LYING ON BACK TO SITTING ON SIDE OF FLAT BED: UNABLE
STANDING UP FROM CHAIR USING ARMS: TOTAL
WALKING IN HOSPITAL ROOM: TOTAL
MOBILITY SCORE: 6
MOVING TO AND FROM BED TO CHAIR: UNABLE
SUGGESTED CMS G CODE MODIFIER DAILY ACTIVITY: CN
DRESSING REGULAR UPPER BODY CLOTHING: TOTAL
HELP NEEDED FOR BATHING: TOTAL
EATING MEALS: TOTAL
SUGGESTED CMS G CODE MODIFIER MOBILITY: CN

## 2022-01-04 ASSESSMENT — ENCOUNTER SYMPTOMS
BLURRED VISION: 0
COUGH: 0
SHORTNESS OF BREATH: 0
FEVER: 0
DEPRESSION: 1
DIZZINESS: 0
HEARTBURN: 0
NAUSEA: 0
BACK PAIN: 1
WEAKNESS: 1
HEADACHES: 0

## 2022-01-04 ASSESSMENT — GAIT ASSESSMENTS: GAIT LEVEL OF ASSIST: UNABLE TO PARTICIPATE

## 2022-01-04 NOTE — THERAPY
Occupational Therapy   Initial Evaluation     Patient Name: Jose Quintanilla  Age:  66 y.o., Sex:  male  Medical Record #: [id number]  Today's Date: 1/4/2022     Precautions  Precautions: Fall Risk,Swallow Precautions ( See Comments),Nasogastric Tube  Comments: L sided deficits    Assessment  Patient is 66 y.o. male with a diagnosis of CVA.  Pt currently limited by decreased functional mobility, activity tolerance, cognition, sensation, strength, AROM, coordination, balance, and pain which are affecting pt's ability to complete ADLs/IADLs at baseline. Pt would benefit from OT services in the acute care setting to maximize functional recovery.     Plan    Recommend Occupational Therapy 4 times per week until therapy goals are met for the following treatments:  Neuro Re-Education / Balance, Self Care/Activities of Daily Living and Therapeutic Activities.       Discharge Recommendations: (P) Recommend post-acute placement for additional occupational therapy services prior to discharge home        01/04/22 0818   Prior Living Situation   Prior Services None   Housing / Facility 2 Story Apartment / Condo   Equipment Owned None   Lives with - Patient's Self Care Capacity Alone and Able to Care For Self   Comments not sure of the accuracy of pt report on PLOF   Prior Level of ADL Function   Self Feeding Unable To Determine At This Time   Dressing Unable To Determine At This Time   ADL Assessment   Grooming Total Assist   Upper Body Dressing Total Assist   Lower Body Dressing Total Assist   Toileting Total Assist   Functional Mobility   Sit to Stand Total Assist   Bed, Chair, Wheelchair Transfer Unable to Participate   Short Term Goals   Short Term Goal # 1 Mod A with washing face with a cloth   Short Term Goal # 2 mod A with UB dressing   Short Term Goal # 3 mod A with ADL txfs

## 2022-01-04 NOTE — PROGRESS NOTES
Spoke with Chica from lab regarding BMP results, only preliminary result available at this time due to instrument issue per lab staff. Sodium result pending.

## 2022-01-04 NOTE — DISCHARGE PLANNING
Anticipated Discharge Disposition: likely SNF with rehab     Action: RN CM met with patient's son Chandra at bedside to discuss discharge planning options.  Per Chandra, patient was living alone in [place] and had moved to this area for a job.  Chandra reports he lives out of area in [place] and his brother lives in [place].  Chandra saw his father during the Christmas holiday and there was discussion regarding getting patient to [facility] with assisted living at that time.  Patient has history or PE that has caused him significant decline in health.  Discussed physiatry recommendations with son and provided him with a list of SNF with rehab in his local area in [place].  Discussed liklihood that family will be responsible for cost of transport to [place], verbalizes understanding.  Chandra will review list and provide choice to CM in morning.       Barriers to discharge: medical clearance; ICU level of care, facility choice and acceptance, transportation arrangements     Plan: HCM to remain available for discharge planning needs and barriers     Care Transition Team Assessment  Emergency Contacts  Chandra Quintanilla (son) [phone]  Thien Dwight (son) [phone]    Information Source: justin Artis and chart review   Orientation Level: Oriented X4  Information Given By: Other (Comments)  Informant's Name: EHR  Who is responsible for making decisions for patient? : Patient    Readmission Evaluation  Is this a readmission?: No    Elopement Risk  Legal Hold: No  Ambulatory or Self Mobile in Wheelchair: No-Not an Elopement Risk  Elopement Risk: Not at Risk for Elopement    Interdisciplinary Discharge Planning  Lives with - Patient's Self Care Capacity: Alone and Able to Care For Self  Patient or legal guardian wants to designate a caregiver: No  Housing / Facility: 2 Story Apartment / Condo  Prior Services: None    Discharge Preparedness  What is your plan after discharge?: Uncertain - pending medical team  collaboration  What are your discharge supports?: Child (adult children live out of state)  Prior Functional Level: Ambulatory,Independent with Activities of Daily Living  Difficulity with ADLs: None  Difficulity with IADLs: None    Functional Assesment  Prior Functional Level: Ambulatory,Independent with Activities of Daily Living    Finances  Financial Barriers to Discharge: No  Prescription Coverage: Yes    Advance Directive  Advance Directive?: None    Domestic Abuse  Have you ever been the victim of abuse or violence?: No  Physical Abuse or Sexual Abuse: No  Verbal Abuse or Emotional Abuse: No  Possible Abuse/Neglect Reported to:: Not Applicable    Psychological Assessment  History of Substance Abuse: None  History of Psychiatric Problems: No  Non-compliant with Treatment: No  Newly Diagnosed Illness: Yes    Discharge Risks or Barriers  Discharge risks or barriers?: Lives alone, no community support  Patient risk factors: Lives alone and no community support,Vulnerable adult    Anticipated Discharge Information  Discharge Disposition: D/T to SNF with Medicare cert in anticipation of skilled care (03)

## 2022-01-04 NOTE — PROGRESS NOTES
Verbal order rcvd from Dr. Min to place melendez catheter. Multiple attempts made to place melendez catheter unsuccessful.   Resident, Dr. Rendon at bedside and aware. Charge Rn Janette notified.

## 2022-01-04 NOTE — PROCEDURES
Vascular Access Team     Date of Insertion: 1/4/22  Arm Circumference: 36  Internal length: 47  External Length: 0  Vein Occupancy %: 45   Reason for PICC: 3%  Labs: on 1/2/22 WBC 15.1, , on 1/4/22 BUN 16, Cr 0.96, GFR >60, INR 1.18 on 1/1/22     Consents confirmed, vessel patency confirmed with ultrasound. Risks and benefits of procedure explained to family member and education regarding central line associated bloodstream infections provided. Questions answered.      PICC placed in RUE per licensed provider order with ultrasound guidance.  5 Fr, double lumen PICC placed in cepalic vein after 1 attempt(s). 2 mL of 1% lidocaine injected intradermally at the insertion site. A 21 gauge microintroducer needle and modified Seldinger technique was used to obtain access to the vein. 47 cm catheter inserted and brisk blood return was observed from each lumen upon aspiration. Line secured at the 0 cm marker. TCS stylet removed and observed to be fully intact. Each lumen flushed using pulsatile method without resistance with 10 mL 0.9% normal saline. PICC line secured with Biopatch and Tegaderm.     PICC tip placement location is confirmed by nurse to be in the Superior Vena Cava (SVC) utilizing 3CG technology. PICC line is appropriate for use at this time. Patient tolerated procedure well, without complications.  Patient condition relayed to primary RN or ordering physician via this post procedure note in the EMR.      Ultrasound images uploaded to PACS and viewable in the EMR - yes  Ultrasound imaged printed and placed in paper chart - no     BeavEx Power PICC ref # [id number], Lot # [id number], Expiration Date 03/31/2022

## 2022-01-04 NOTE — WOUND TEAM
"[facility]  Inpatient Services  Initial Wound and Skin Care Evaluation    Admission Date: 1/1/2022     Last order of IP CONSULT TO WOUND CARE was found on 1/2/2022 from Hospital Encounter on 12/27/2021     HPI, PMH, SH: Reviewed    No past surgical history on file.  Social History     Tobacco Use   • Smoking status: Not on file   • Smokeless tobacco: Not on file   Substance Use Topics   • Alcohol use: Not on file     No chief complaint on file.    Diagnosis: Stroke due to thrombosis of right carotid artery (HCC) [I63.031]    Unit where seen by Wound Team: T601/00     WOUND CONSULT/FOLLOW UP RELATED TO:  bilat hallux, L chest    WOUND HISTORY:  Per H&P: \"Jose Quintanilla is a 66 y.o. male with hypertension, hyperlipidemia, chronic anticoagulation with warfarin secondary to unprovoked prior DVT and saddle embolus, asthma, depression, who presented 1/1/2022 as a trauma after concerns he fell down stairs being found with left-sided hemiparesis and rightward gaze, found to have occluded right carotid artery causing right sided stroke.  Patient reported to EMS that he fell down his stairs at 9 PM the night prior to presentation and he was unable to get himself off the ground.  He reported head trauma, right elbow pain.  Additionally he reports right elbow pain, left chest wall tenderness, right knee pain. history is difficult given acute stroke symptoms however denies any recent infectious symptoms, denies recent fever or chills, cough or dyspnea, nausea vomiting, chest pain.  He denies any history of stroke.  Symptoms started acutely there are no aggravating or alleviating factors.  He was immediately evaluated as a trauma with Dr. Penn bedside with ERP.  Initial work-up showed complete occlusion of the right carotid artery and no contrast going to the right-sided Solomon of [place] causing a subacute right-sided stroke.  ERP discussed case with Dr. Otoole from neurology and the decision was made that he was " "not a candidate for IR therapy.  Recommendation was made for admission to ICU or IMCU with hourly neuro checks and permissive hypertension for now, start aspirin.  Otherwise work-up showed dehydration, BLAKE and rhabdomyolysis with metabolic acidosis, lactic acidosis.  Patient was subsequently referred to hospitalist for admission.\"    WOUND ASSESSMENT/LDA  Wound 01/03/22 Toe, Hallux Bilateral Toenail denuded (Active)     R     L 01/03/22 1120   Site Assessment Red    Periwound Assessment Pink;Red    Margins Defined edges    Closure None    Drainage Amount Scant    Drainage Description Serosanguineous    Treatments Cleansed    Wound Cleansing Normal Saline Irrigation    Periwound Protectant Not Applicable    Dressing Cleansing/Solutions Not Applicable    Dressing Options Adaptic    Dressing Changed Reinforced    Dressing Status Intact    Dressing Change/Treatment Frequency Every 48 hrs, and As Needed    NEXT Dressing Change/Treatment Date 01/05/22    NEXT Weekly Photo (Inpatient Only) 01/10/22    Non-staged Wound Description Full thickness 01/03/22 1120   Wound Length (cm) 1.5 cm R&L 01/03/22 1120   Wound Width (cm) 1.5 cm R L-1.3 01/03/22 1120   Wound Depth (cm) 0.3 cm R&L 01/03/22 1120   Wound Surface Area (cm^2) 2.25 cm^2 01/03/22 1120   Wound Volume (cm^3) 0.675 cm^3 01/03/22 1120   Shape squarish    Wound Odor None    Pulses 2+;DP    Exposed Structures Other (Comments)    Number of days: 0       Wound 01/03/22 Abrasion Chest;Flank Upper;Anterior Left Blister (Active)      01/03/22 1120   Site Assessment Dry;Red    Periwound Assessment Blanchable erythema;Dry    Margins Defined edges    Closure Open to air    Drainage Amount None    Treatments Cleansed    Wound Cleansing Normal Saline Irrigation    Periwound Protectant Not Applicable    Dressing Cleansing/Solutions Not Applicable    Dressing Options Open to Air    NEXT Weekly Photo (Inpatient Only) 01/10/22    Non-staged Wound Description Partial thickness " 01/03/22 1120   Wound Length (cm) 16 cm 01/03/22 1120   Wound Width (cm) 13 cm 01/03/22 1120   Wound Surface Area (cm^2) 208 cm^2 01/03/22 1120   Shape irregular    Wound Odor None    Exposed Structures OMAYRA    Number of days: 0        Vascular:    CJ:   No results found.    Lab Values:    Lab Results   Component Value Date/Time    WBC 15.1 (H) 01/02/2022 01:25 AM    RBC 4.82 01/02/2022 01:25 AM    HEMOGLOBIN 15.9 01/02/2022 01:25 AM    HEMATOCRIT 48.7 01/02/2022 01:25 AM    CREACTPROT 4.79 (H) 01/03/2022 08:25 AM    HBA1C 5.3 01/01/2022 08:36 PM        Culture Results show:  No results found for this or any previous visit (from the past 720 hour(s)).    Pain Level/Medicated:  C/o pain when cleaning blood from between toes on L foot. No c/o pain when finished        INTERVENTIONS BY WOUND TEAM:  Chart and images reviewed. Discussed with bedside RN. All areas of concern (based on picture review, LDA review and discussion with bedside RN) have been thoroughly assessed. Documentation of areas based on significant findings. This RN in to assess patient. Performed standard wound care which includes appropriate positioning, dressing removal and non-selective debridement. Pictures and measurements obtained weekly if/when required.   Toes  Preparation for Dressing removal: Dressing peeled back to assess wounds  Non-selectively Debrided with:  NS and gauze.  Sharp debridement: NA  Jessica wound: Cleansed with NS, dried  Primary Dressing: Adaptic  Secondary (Outer) Dressing: dry gauze and tape    Interdisciplinary consultation: Patient, Bedside RN  EVALUATION / RATIONALE FOR TREATMENT:  Most Recent Date:  1/3/22: Pt has abrasions to L chest, L cheek 8 x 3.5, L forehead 1 x 1 cm and L knee 6 x 2 cm. Petechia present to LUE lateral. Both knees are red and blanching. L 2nd toe tip 0.4 x 0.8 cm purple and tender to touch. L shoulder 5 x 3.5 cm and RLE 6 x 6 cm with ecchymosis.       Goals: Steady decrease in wound area and depth  weekly.    WOUND TEAM PLAN OF CARE ([X] for frequency of wound follow up,):   Nursing to follow orders written for wound care. Contact wound team if area fails to progress, deteriorates or with any questions/concerns  Dressing changes by wound team:                   Follow up 3 times weekly:                NPWT change 3 times weekly:     Follow up 1-2 times weekly:      Follow up Bi-Monthly:                   Follow up as needed:     Other (explain):     NURSING PLAN OF CARE ORDERS (X):  Dressing changes: See Dressing Care orders: x  Skin care: See Skin Care orders: x  RN Prevention Protocol: x  Rectal tube care: See Rectal Tube Care orders:   Other orders:    RSKIN:   CURRENTLY IN PLACE (X), APPLIED THIS VISIT (A), ORDERED (O):   Q shift Jourdan:  X  Q shift pressure point assessments:  X    Surface/Positioning   Pressure redistribution mattress            Low Airloss  x        Bariatric foam      Bariatric MERY     Waffle cushion        Waffle Overlay          Reposition q 2 hours   x   TAPs Turning system     Z David Pillow     Offloading/Redistribution not assessed  Sacral Mepilex (Silicone dressing)     Heel Mepilex (Silicone dressing)         Heel float boots (Prevalon boot)             Float Heels off Bed with Pillows           Respiratory   Silicone O2 tubing X        Gray Foam Ear protectors     Cannula fixation Device (Tender )          High flow offloading Clip    Elastic head band offloading device      Anchorfast                                                         Trach with Optifoam split foam             Containment/Moisture Prevention   Rectal tube or BMS    Purwick/Condom Cath   x     Paiz Catheter    Barrier wipes           Barrier paste       Antifungal tx      Interdry        Mobilization not assessed     Up to chair        Ambulate      PT/OT      Nutrition       Dietician        Diabetes Education      PO     TF  x   TPN     NPO   # days     Other        Anticipated discharge plans:  TBD  LTACH:        SNF/Rehab:                  Home Health Care:           Outpatient Wound Center:            Self/Family Care:        Other:           Vac Discharge Needs:   Not Applicable Pt not on a wound vac:   x    Regular Vac while inpatient, alternative dressing at DC:        Regular Vac in use and continued at DC:            Reg. Vac w/ Skin Sub/Biologic in use. Will need to be changed 2x wkly:      Veraflo Vac while inpatient, ok to transition to Regular Vac on Discharge:           Veraflo Vac while inpatient, will need to remain on Veraflo Vac upon discharge:

## 2022-01-04 NOTE — CARE PLAN
The patient is Watcher - Medium risk of patient condition declining or worsening    Problem: Neuro Status  Goal: Neuro status will remain stable or improve  Note: Neuro exams stable      Problem: Hemodynamic Monitoring  Goal: Patient's hemodynamics, fluid balance and neurologic status will be stable or improve  Note: 3% gtt, q6h BMP     Shift Goals  Clinical Goals: Stable hemodynamics  Patient Goals:   Family Goals: heidy    Progress made toward(s) clinical / shift goals: 3% gtt titrated per protocol goal sodium [id number]    Patient is not progressing towards the following goals:

## 2022-01-04 NOTE — PROGRESS NOTES
Writer rctrinh verbal order to contact Vascular Access to notify of PICC order by Dr Iglesias. Called vascular access three times, unable to contact. Oncoming Ash Ayala notified.

## 2022-01-04 NOTE — PROGRESS NOTES
"ICU Cross-Coverage Note    Received Voalte from ORLIN Geiger at 8:59 PM: Patient had headache and back pain for several hours which was not alleviated by Tylenol. BP was under 160 SBP but patient required IV labetalol; also, neuro exam had been waxing/waning throughout the day per day shift.    I immediately came to the patient's bedside. Discussed the case with ORLIN Ayala. RN also told me that multiple people had attempted to place melendez without success.     BP (!) 188/71   Pulse 80   Temp 36.2 °C (97.2 °F) (Temporal)   Resp (!) 24   Ht 1.854 m (6' 0.99\")   Wt 122 kg (268 lb 11.9 oz)   SpO2 94%   BMI 35.46 kg/m²       PE:    Gen: In somewhat acute distress, laying in bed, moaning at times  HEENT: Mouth open, cortrak in place in R nostril. Headache indicated at bilateral temples and bilateral cheeks.  CV: RRR  Lung: Diminished breath sounds in bilateral bases  Abd: Tenderness upon palpation, though particularly toward R hip area. Possible suprapubic tenderness.  Ext: Bilateral great toes bloody, covered with tape/bandage.  Neuro: Low back tenderness. Strength 0/5 in LUE, 5/5 in RUE. Strength 2/5 in LLE, 5/5 in RLE. Left lower facial droop present. Cranial nerve exam limited; Unable to appreciate EOM (CN 3, 4, 6). Able to perform R shoulder shrug, unable to perform L shoulder shrug. Patellar reflexes 2+ bilaterally. Negative babinski bilaterally.    Assessment and plan: 67 y/o M with R MCA stroke w/ petechial hemorrhage per MRI brain. Transferred 1/3/22 to ICU for risk for cerebral edema/higher level of care. Concern for increasing headache and back pain, possibly increasing edema.    Obtained bladder scan: PVR 86 cc. No concern for retention at this time, so back pain less likely referred from bladder urine retention.    Discussed with Dr. Chin:  -Hold off on repeat CTH w/o contrast for now as the last CTH was performed within 12 hrs ago  -Continue neuro checks, if abrupt change in mental status, low " threshold to perform CTH. If increasing swelling, consult neuro/NSG.  -Continue to monitor for decreased LOC  -Bladder scan q4 hr, straight cath/notify MD if PVR >400cc  -Continue hypertonic saline at current rate.  -Monitor sodium level - stat BMP. Repeat at 3 AM per signout from Dr. Rendon.  -Discontinuing permissive HTN parameters per Neuro note from Dr. Watson on 1/3/22 and per note from Dr. Rendon on 1/3/22    Will continue to monitor closely. Discussed plan with ORLIN Ayala and Charge ORLIN Gordon.    Latoya Hill MD, MPH  [employer] [facility], PGY-2    ---------    Addendum at 9:33 PM:    Spoke with ORLIN Ayala: Able to place melendez cath for patient. Will d/c bladder scans

## 2022-01-04 NOTE — THERAPY
Physical Therapy   Initial Evaluation     Patient Name: Jose Quintanilla  Age:  66 y.o., Sex:  male  Medical Record #: [id number]  Today's Date: 1/4/2022     Precautions  Precautions: Fall Risk;Swallow Precautions ( See Comments);Nasogastric Tube  Comments: L sided deficits    Assessment  Patient is 66 y.o. male that presented to acute after falling down stairs and was unable to get up. Medical dx of R MCA CVA. PMHx significant for HTN, HLD, DVT/PE, asthma, depression. He presented to PT with pain, impaired cognition and communication, impaired motor function, impaired balance and coordination, functional weakness, and decreased activity tolerance which are limiting his ability to safely perform functional mobility. He required total A for mobility and once positioned at EOB demonstrated push with RUE and required assistance to maintain upright sitting posture. He was able to follow commands with R extremities but no volitional movement on L side and no visual tracking beyond midline. Will follow.    Plan    Recommend Physical Therapy 4 times per week until therapy goals are met for the following treatments:  Bed Mobility, Community Re-integration, Equipment, Gait Training, Manual Therapy, Neuro Re-Education / Balance, Self Care/Home Evaluation, Therapeutic Activities and Therapeutic Exercises    DC Equipment Recommendations: Unable to determine at this time  Discharge Recommendations: Recommend post-acute placement for additional physical therapy services prior to discharge home     Objective       01/04/22 0840   Total Time Spent   Total Time Spent (Mins) 19   Charge Group   PT Evaluation PT Evaluation High   Initial Contact Note    Initial Contact Note Order Received and Verified, Physical Therapy Evaluation in Progress with Full Report to Follow.   Precautions   Precautions Fall Risk;Swallow Precautions ( See Comments);Nasogastric Tube   Comments L sided deficits   Vitals   Pulse Oximetry 94 %   O2 Delivery Device  None - Room Air   Pain 0 - 10 Group   Location Back   Therapist Pain Assessment During Activity;Post Activity Pain Same as Prior to Activity;Nurse Notified   Prior Living Situation   Prior Services None   Comments Patient unable to provide subjective. Chart indiates he lives alone. Will need to verify social support and PLOF   Prior Level of Functional Mobility   Bed Mobility Independent   Transfer Status Independent   Ambulation Independent   Distance Ambulation (Feet)   (community)   Comments above assumed per chart, need to verify   History of Falls   History of Falls Yes  (fell down stairs PTA)   Cognition    Cognition / Consciousness X   Speech/ Communication Delayed Responses;Dysarthric;Expressive Aphasia   Level of Consciousness Alert   Safety Awareness Impaired;Impulsive   New Learning Impaired   Attention Impaired   Comments limited verbalizations, perseverative on back pain. L inattention. Decreased insight   Passive ROM Lower Body   Passive ROM Lower Body WDL   Active ROM Lower Body    Active ROM Lower Body  X   Comments RLE limited by pain, no volitional movement LLE   Strength Lower Body   Lower Body Strength  X   Comments as above   Sensation Lower Body   Lower Extremity Sensation   X   Comments patient unable to participate in formal testing   Neurological Concerns   Neurological Concerns Yes   Comments given presentation, medical dx   Coordination Lower Body    Coordination Lower Body  X   Balance Assessment   Sitting Balance (Static) Trace +   Sitting Balance (Dynamic) Trace   Standing Balance (Static) Dependent   Weight Shift Sitting Poor   Weight Shift Standing Absent   Comments pushing with RUE, improved somewhat with facilitation of hand in lap and continued time OOB but required assist throughout   Gait Analysis   Gait Level Of Assist Unable to Participate   Bed Mobility    Supine to Sit Total Assist   Sit to Supine Total Assist   Scooting Total Assist   Functional Mobility   Sit to Stand Total  Assist   Bed, Chair, Wheelchair Transfer Unable to Participate   ICU Target Mobility Level   ICU Mobility - Targeted Level Level 1   How much difficulty does the patient currently have...   Turning over in bed (including adjusting bedclothes, sheets and blankets)? 1   Sitting down on and standing up from a chair with arms (e.g., wheelchair, bedside commode, etc.) 1   Moving from lying on back to sitting on the side of the bed? 1   How much help from another person does the patient currently need...   Moving to and from a bed to a chair (including a wheelchair)? 1   Need to walk in a hospital room? 1   Climbing 3-5 steps with a railing? 1   6 clicks Mobility Score 6   Activity Tolerance   Sitting in Chair NT   Sitting Edge of Bed 12 min with assist   Standing 10 sec with assist   Comments limited by pain   Edema / Skin Assessment   Edema / Skin  Not Assessed   Patient / Family Goals    Patient / Family Goal #1 unable to state   Short Term Goals    Short Term Goal # 1 Patient will move supine<>sitting EOB with bed features and mod A within 6tx in order to get in/out of bed   Short Term Goal # 2 Patient will maintain dynamic sitting balance for 5 min with min A within 6tx in order to perform functional task   Short Term Goal # 3 Patient will transfer with mod A within 6tx in order to achieve functional transfer   Education Group   Education Provided Role of Physical Therapist   Role of Physical Therapist Patient Response Patient;Acceptance;Explanation;Verbal Demonstration   Problem List    Problems Pain;Impaired Bed Mobility;Impaired Transfers;Impaired Ambulation;Functional ROM Deficit;Functional Strength Deficit;Impaired Balance;Impaired Coordination;Decreased Activity Tolerance;Safety Awareness Deficits / Cognition;Limited Knowledge of Post-Op Precautions;Motor Planning / Sequencing   Anticipated Discharge Equipment and Recommendations   DC Equipment Recommendations Unable to determine at this time   Discharge  Recommendations Recommend post-acute placement for additional physical therapy services prior to discharge home   Interdisciplinary Plan of Care Collaboration   IDT Collaboration with  Nursing;Occupational Therapist   Patient Position at End of Therapy In Bed;Bed Alarm On;Call Light within Reach   Collaboration Comments RN aware of visit, response   Session Information   Date / Session Number  1/4-1 (1/4, 1/10)   Priority   (CVA)

## 2022-01-04 NOTE — HOSPITAL COURSE
Dayami is a 66 y.o. 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 Additionally found to have subacute infarction of the right temporal an lateral occipital lobes.   Patient was transferred to RICU on 1/3/2022 after being noted to be more lethargic than prior that same AM for more frequent neuro  monitoring and initiation of 3% saline following concerns of mass effect or impending mass effect secondary to edema-  1/06/22-ending permissive HTN and 3% infusion as stroke is completed, long-term blood pressure goal is 110-130/60-80-            -

## 2022-01-04 NOTE — THERAPY
Speech Language Pathology  Daily Treatment     Patient Name: Jose Quintanilla  Age:  66 y.o., Sex:  male  Medical Record #: [id number]  Today's Date: 1/4/2022     Precautions  Precautions: Fall Risk,Swallow Precautions ( See Comments),Nasogastric Tube  Comments: L sided deficits    Assessment  Patient seen this date for dysphagia tx session. Patient initially awake and alert, still with noted Left visual neglect, but could attend to this clinician on left side with MAX cues. Patient NPO with Graham. RN had just recently performed oral care. Patient consumed PO trials of single ice chips and MTL via tsp. Patient presented with delayed A-P bolus transit leading to delayed initiation of swallow trigger. Patient had intermittent coughing and throat clearing on PO trials of both consistencies and intermittent anterior bolus loss was noted as well. Patient was able to trigger two swallows at times when cued. Patient was able to complete 5-10 reps of Falsetto and Effortful swallow. Patient fatigued very quickly and was unable to sustain alertness by end of session, thus PO trials were discontinued.     Recommend patient continue strict NPO with Cortrak. Please continue frequent oral care. SLP is following.     Plan  Continue current treatment plan.    Discharge Recommendations: Recommend post-acute placement for additional speech therapy services prior to discharge home     Objective     01/04/22 1212   Precautions   Precautions Fall Risk;Swallow Precautions ( See Comments);Nasogastric Tube   Vitals   O2 Delivery Device None - Room Air   Dysphagia    Positioning / Behavior Modification Self Monitoring;Multiple Swallows;Modulate Rate or Bite Size;Cough / Clear after Swallow;Effortful Swallow   Diet / Liquid Recommendation NPO;Pre-Feeding Trials with SLP Only   Nutritional Liquid Intake Rating Scale Nothing by mouth   Nutritional Food Intake Rating Scale Nothing by mouth   Nursing Communication Swallow Precaution Sign Posted at  "Head of Bed   Skilled Intervention Verbal Cueing;Compensatory Strategies   Recommended Route of Medication Administration   Medication Administration  Via Gastric Tube   Short Term Goals   Short Term Goal # 1 Pt will be able to consume prefeeding trials with SLP only with no overt S/Sx of aspiration noted   Goal Outcome # 1 Progressing slower than expected   Short Term Goal # 2 Pt will be able to complete 10/10 reps of oral motor and laryngeal/pharyngeal exercises with \"good\" accuracy as judged by SLP   Goal Outcome # 2  Progressing slower than expected   Anticipated Discharge Needs   Discharge Recommendations Recommend post-acute placement for additional speech therapy services prior to discharge home         "

## 2022-01-04 NOTE — CONSULTS
Physical Medicine and Rehabilitation Consultation          Note adapted from Dr. Dill's original consult note on 1/4    Date of initial consultation: 1/4/2022  Requesting provider: Toby Vanegas MD   Reason for consultation: assess for acute inpatient rehab appropriateness  LOS: 3 Day(s)    Chief complaint: left sided weakness     HPI: The patient is a 66 y.o. 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 Per documetnation, patient had a fall down stairs with head trauma and elbow pain. Initial work up revealed complete occlusion of the right carotid artery on CTA. Neurology consulted for evidence of large subacute R MCA territory infarct. Patient admitted to the ICU,and started on 3% NaCl for concern of brain edema noted on MRI. Addtionally some petechial hemorrhagic conversion noted on MRI. TTE completed without evidence of cardioembolic source. Per neurology, planning to restart secondary stroke ppx with anticoagulation in 2 weeks ( 1/13), he is on a statin.  Functionally, patient has been able to participate with therapy but is functioning at a Total A for mobility.     Since that time, patient's hospitalization has been complicated by aspiration pneumonia, asthma exacerbation, and hypokalemia.  Patient's discharge plan is to go to [place].  Patient has failed to make progress functionally during this hospitalization.     At the time of my visit, patient is very lethargic.  Family member approaches the room at the end of the visit, explains to me that he received a muscle relaxer last night which has made him lethargic this morning.  Patient is able to answer some questions, has significant dysarthria, has significant left-sided weakness, particularly in the left upper extremity which is flaccid.  In discussions with patient and family, plan is for patient  to discharge to a skilled nursing facility, with hopeful transition to [facility], followed by either discharge to son Chandra's house, or assisted living facility versus group home.  Son, Chandra, lives in [place], works full-time days.  Lives in a two-story house.      Social Hx:  Patient lives in [facility] in a 2 story apartment with 2 steps to enter and 1 FOS inside. He lives alone. According to family, he would like to be in [place] where he can be close to his son, Chandra, who is his primary care taker.        THERAPY:  Restrictions: Fall risk, swallow precautions   PT: Functional mobility    PT Note: total A for bed mobility and sit to stand, unable to participate in functional transfer   : Unable to participate in gait, total assist for bed mobility    OT: ADLs   OT Note: total A for bed mobility, grooming, upper and lower body dressing   : Total assist lower body dressing and toileting    SLP:   1/3 SLP Note: NPO with isabell, pending FEES   : Minced and moist solids, mildly thick liquids    IMAGIN/2 MRI Brain   IMPRESSION:        1. Age-related cerebral atrophy. Mild effacement of the right lateral ventricle.     2. Mild periventricular white matter changes consistent with chronic microvascular ischemic gliosis.     3. Large area of acute infarction involving the right frontal and parietal lobes as well as the right-sided basal ganglia. Subacute area of infarction involving the right temporal and lateral occipital lobes.     4. Petechial hemorrhage noted in the right basal ganglia and involving the cortex in the right temporal and lateral occipital lobes.     5. Right internal carotid artery occlusion or high-grade stenosis.      PROCEDURES:  None     PMH:  No past medical history on file.    PSH:  No past surgical history on file.    FHX:  No family history on file.    Medications:  Current Facility-Administered Medications   Medication Dose   • potassium bicarbonate (KLYTE)  "effervescent tablet 50 mEq  50 mEq   • 3% sodium chloride (HYPERTONIC SALINE) 500mL infusion 500 mL  500 mL   • hydrALAZINE (APRESOLINE) injection 10 mg  10 mg   • labetalol (NORMODYNE/TRANDATE) injection 10 mg  10 mg   • ipratropium-albuterol (DUONEB) nebulizer solution  3 mL   • Pharmacy Consult: Enteral tube insertion - review meds/change route/product selection  1 Each   • enoxaparin (LOVENOX) inj 40 mg  40 mg   • acetaminophen (Tylenol) tablet 650 mg  650 mg   • atorvastatin (LIPITOR) tablet 80 mg  80 mg   • ondansetron (ZOFRAN ODT) dispertab 4 mg  4 mg   • senna-docusate (PERICOLACE or SENOKOT S) 8.6-50 MG per tablet 2 Tablet  2 Tablet    And   • polyethylene glycol/lytes (MIRALAX) PACKET 1 Packet  1 Packet    And   • magnesium hydroxide (MILK OF MAGNESIA) suspension 30 mL  30 mL    And   • bisacodyl (DULCOLAX) suppository 10 mg  10 mg   • ondansetron (ZOFRAN) syringe/vial injection 4 mg  4 mg       Allergies:  No Known Allergies      Physical Exam:  Vitals: /74   Pulse 81   Temp 36.4 °C (97.5 °F) (Temporal)   Resp 17   Ht 1.854 m (6' 0.99\")   Wt 122 kg (268 lb 11.9 oz)   SpO2 93%   Gen: NAD  Head: NC/AT  Eyes/ Nose/ Mouth: dry mucous membranes  Cardio: RRR, good distal perfusion, warm extremities  Pulm: normal respiratory effort, no cyanosis   Abd: Soft NTND, negative borborygmi   Ext: No peripheral edema. No calf tenderness. No clubbing.    Mental status:  A&Ox4 (person, place, date, situation) answers questions appropriately follows commands  Speech: fluent, no aphasia or dysarthria    CRANIAL NERVES:  2,3: visual acuity grossly intact, PERRL  3,4,6: EOMI bilaterally, no nystagmus or diplopia  5: sensation intact to light touch bilaterally and symmetric  7: left facial droop  8: hearing grossly intact  9,10: symmetric palate elevation  11: SCM/Trapezius strength 0/5 on left  12: tongue protrudes right      Motor:      Upper Extremity  Myotome R L   Shoulder flexion C5 5 0/5   Elbow flexion C5 5 " 0/5   Wrist extension C6 5 0/5   Elbow extension C7 5 0/5   Finger flexion C8 5 0/5   Finger abduction T1 5 0/5     Lower Extremity Myotome R L   Hip flexion L2 5 2/5   Knee extension L3 5 1/5   Ankle dorsiflexion L4 5 0/5   Toe extension L5 5 1/5   Ankle plantarflexion S1 5 0/5     Sensory:   intact to light touch through out    Tone: no spasticity noted, no cogwheeling noted    Labs: Reviewed and significant for   Recent Labs     01/01/22  1755 01/02/22  0125   RBC 5.41 4.82   HEMOGLOBIN 18.1* 15.9   HEMATOCRIT 53.7* 48.7   PLATELETCT 236 182   PROTHROMBTM 14.6  --    APTT 26.8  --    INR 1.18*  --      Recent Labs     01/03/22  1600 01/03/22  2200 01/04/22  0500   SODIUM 150* 148* 153*   POTASSIUM 3.4* 3.6 3.4*   CHLORIDE 113* 115* 117*   CO2 22 19* 22   GLUCOSE 102* 112* 129*   BUN 18 17 16   CREATININE 1.01 0.93 0.96   CALCIUM 9.5 9.5 9.5     Recent Results (from the past 24 hour(s))   Basic Metabolic Panel    Collection Time: 01/03/22  4:00 PM   Result Value Ref Range    Sodium 150 (H) 135 - 145 mmol/L    Potassium 3.4 (L) 3.6 - 5.5 mmol/L    Chloride 113 (H) 96 - 112 mmol/L    Co2 22 20 - 33 mmol/L    Glucose 102 (H) 65 - 99 mg/dL    Bun 18 8 - 22 mg/dL    Creatinine 1.01 0.50 - 1.40 mg/dL    Calcium 9.5 8.5 - 10.5 mg/dL    Anion Gap 15.0 7.0 - 16.0   ESTIMATED GFR    Collection Time: 01/03/22  4:00 PM   Result Value Ref Range    GFR If African American >60 >60 mL/min/1.73 m 2    GFR If Non African American >60 >60 mL/min/1.73 m 2   Basic Metabolic Panel    Collection Time: 01/03/22 10:00 PM   Result Value Ref Range    Sodium 148 (H) 135 - 145 mmol/L    Potassium 3.6 3.6 - 5.5 mmol/L    Chloride 115 (H) 96 - 112 mmol/L    Co2 19 (L) 20 - 33 mmol/L    Glucose 112 (H) 65 - 99 mg/dL    Bun 17 8 - 22 mg/dL    Creatinine 0.93 0.50 - 1.40 mg/dL    Calcium 9.5 8.5 - 10.5 mg/dL    Anion Gap 14.0 7.0 - 16.0   ESTIMATED GFR    Collection Time: 01/03/22 10:00 PM   Result Value Ref Range    GFR If  >60  >60 mL/min/1.73 m 2    GFR If Non African American >60 >60 mL/min/1.73 m 2   Basic Metabolic Panel    Collection Time: 01/04/22  5:00 AM   Result Value Ref Range    Sodium 153 (H) 135 - 145 mmol/L    Potassium 3.4 (L) 3.6 - 5.5 mmol/L    Chloride 117 (H) 96 - 112 mmol/L    Co2 22 20 - 33 mmol/L    Glucose 129 (H) 65 - 99 mg/dL    Bun 16 8 - 22 mg/dL    Creatinine 0.96 0.50 - 1.40 mg/dL    Calcium 9.5 8.5 - 10.5 mg/dL    Anion Gap 14.0 7.0 - 16.0   ESTIMATED GFR    Collection Time: 01/04/22  5:00 AM   Result Value Ref Range    GFR If African American >60 >60 mL/min/1.73 m 2    GFR If Non African American >60 >60 mL/min/1.73 m 2         ASSESSMENT:  Patient is a 66 y.o. male admitted with L sided weakness due to R MCA CVA     [facility] Code / Diagnosis to Support: 0001.1 - Stroke: Left Body Involvement (Right Brain)    Rehabilitation: Impaired ADLs and mobility  Patient is not candidate for inpatient rehab at this time due to prolonged period of requiring TOTAL A level and has no local DC support.         Additional Recommendations:  R MCA CVA   - greatest deficits are left sided weakness, dysphagia, impaired strength, endurance, balance, and stability   - Spasticity: Baclofen 10mg BID   - ASA/ Statin per neurology  - Neuropathic pain: gabapentin 100mg TID     Discharge:  In discussions with family, best option for Mr. Quintanilla is to travel via ambulance to [place], where he can attend a skilled nursing facility near to his son, Chandra.  Once at skilled nursing, patient is anticipated to improve over the next 8 to 12 weeks, and hopefully be able to tolerate inpatient rehab followed by placement at an assisted living facility, or in [facility].  According to his cousin at bedside, Chandra may be able to pay privately for transport to [place].    -PMR will sign off, please reconsult or reach out via Voalte if further evaluation or medical management is requested      Medical Complexity:  R MCA CVA  Dysphagia   HTN   HLD    Hx of PE  Impaired mobility and ADLs       DVT PPX: SCDs       Thank you for allowing us to participate in the care of this patient.     Patient was seen for 112 minutes on unit/floor of which > 50% of time was spent on counseling and coordination of care regarding the above, including prognosis, risk reduction, benefits of treatment, and options for next stage of care.      Lidya Dill D.O.   Physical Medicine and Rehabilitation     Please note that this dictation was created using voice recognition software. I have made every reasonable attempt to correct obvious errors, but there may be errors of grammar and possibly content that I did not discover before finalizing the note.

## 2022-01-04 NOTE — PROGRESS NOTES
Neurology Progress Note  Neurohospitalist Service, [facility] Neurosciences    Referring Physician: Toby Vanegas M.D.      Interval History: No acute events overnight.  Repeat head CT with stable mass effect, no evidence of impending herniation.  Transferred to RICU for close neuromonitoring, stable exam this AM.  Na 153.    Review of systems: In addition to what is detailed in the HPI and/or updated in the interval history, all other systems reviewed and are negative.    Past Medical History, Past Surgical History and Social History reviewed and unchanged from prior    Medications:    Current Facility-Administered Medications:   •  oxyCODONE immediate-release (ROXICODONE) tablet 5 mg, 5 mg, Enteral Tube, Q4HRS PRN **OR** oxyCODONE immediate release (ROXICODONE) tablet 10 mg, 10 mg, Enteral Tube, Q4HRS PRN, Patricia Jackson M.D., 10 mg at 01/04/22 1114  •  hydrALAZINE (APRESOLINE) injection 20 mg, 20 mg, Intravenous, Q4HRS PRN, Patricia Jackson M.D.  •  labetalol (NORMODYNE/TRANDATE) injection 10-20 mg, 10-20 mg, Intravenous, Q4HRS PRN, Patricia Jackson M.D.  •  3% sodium chloride (HYPERTONIC SALINE) 500mL infusion 500 mL, 500 mL, Intravenous, Continuous, Samir Acuna D.O., Last Rate: 30 mL/hr at 01/04/22 0645, Rate Change at 01/04/22 0645  •  ipratropium-albuterol (DUONEB) nebulizer solution, 3 mL, Nebulization, Q4H PRN (RT), Toby Vanegas M.D.  •  Pharmacy Consult: Enteral tube insertion - review meds/change route/product selection, 1 Each, Other, PHARMACY TO DOSE, Samir Acuna D.O.  •  enoxaparin (LOVENOX) inj 40 mg, 40 mg, Subcutaneous, DAILY, Samir Acuna D.O., 40 mg at 01/03/22 1843  •  acetaminophen (Tylenol) tablet 650 mg, 650 mg, Enteral Tube, Q6HRS PRN, JUSTICE LaraOHawa, 650 mg at 01/04/22 1114  •  atorvastatin (LIPITOR) tablet 80 mg, 80 mg, Enteral Tube, Q EVENING, Samir Acuna D.O., 80 mg at 01/03/22 1843  •  ondansetron (ZOFRAN ODT) dispertab 4 mg, 4 mg, Enteral  "Tube, Q4HRS PRN, JUSTICE LaraO.  •  senna-docusate (PERICOLACE or SENOKOT S) 8.6-50 MG per tablet 2 Tablet, 2 Tablet, Enteral Tube, BID, 2 Tablet at 01/04/22 0510 **AND** polyethylene glycol/lytes (MIRALAX) PACKET 1 Packet, 1 Packet, Enteral Tube, QDAY PRN **AND** magnesium hydroxide (MILK OF MAGNESIA) suspension 30 mL, 30 mL, Enteral Tube, QDAY PRN **AND** bisacodyl (DULCOLAX) suppository 10 mg, 10 mg, Rectal, QDAY PRN, Samir Acuna D.O.  •  ondansetron (ZOFRAN) syringe/vial injection 4 mg, 4 mg, Intravenous, Q4HRS PRN, Toby Vanegas M.D.    Physical Examination:   /66   Pulse 69   Temp 36.1 °C (96.9 °F) (Temporal)   Resp (!) 56   Ht 1.854 m (6' 0.99\")   Wt 122 kg (268 lb 11.9 oz)   SpO2 92%   BMI 35.46 kg/m²       General: Awakens to tactile  Neck: There is normal range of motion  CV: Regular rate   Extremities:  Warm, dry, and intact, without peripheral lower extremity edema    NEUROLOGICAL EXAM:     Mental status: Oriented to name, follows commands on L  Speech and language: Speech is dysarthric and sparse.  Follows distal commands  Cranial nerve exam: PERRL, decreased blink to threat from L. R gaze preference, but crosses midline with tracking face, L face droop.  Motor exam: There is sustained antigravity with no drift in RUE, RLE withdraws briskly with antigravity movements at knee, LLE withdraws less briskly on plane of bed.  LUE with no movement  Sensory exam:  Does not react to tactile in L hemibody  Coordination: No ataxia seen on elicited and spontaneous movements      NIHSS: National Institutes of Health Stroke Scale    [0] 1a:Level of Consciousness    0-alert 1-drowsy   2-stupor   3-coma  [0] 1b:LOC Questions                  0-both  1-one      2-neither  [0] 1c:LOC Commands                   0-both  1-one      2-neither  [1] 2: Best Gaze                     0-nl    1-partial  2-forced  [2] 3: Visual Fields                   0-nl    1-partial  2-complete 3-bilat  [2] 4: Facial " Paresis                0-nl    1-minor    2-partial  3-full  MOTOR                       0-nl  [1] 5: Right Arm           1-drift  [4] 6: Left Arm             2-some effort vs gravity  [2] 7: Right Leg           3-no effort vs gravity  [2] 8: Left Leg             4-no movement                             x-untestable  [0] 9: Limb Ataxia                    0-abs   1-1_limb   2-2+_limbs       x-untestable  [2] 10:Sensory                        0-nl    1-partial  2-dense  [1] 11:Best Language/Aphasia         0-nl    1-mild/mod 2-severe   3-mute  [2] 12:Dysarthria                     0-nl    1-mild/mod 2-severe       x-untestable  [2] 13:Neglect/Inattention            0-none  1-partial  2-complete  [21] TOTAL      Objective Data:    Labs:  Lab Results   Component Value Date/Time    PROTHROMBTM 14.6 01/01/2022 05:55 PM    INR 1.18 (H) 01/01/2022 05:55 PM      Lab Results   Component Value Date/Time    WBC 15.1 (H) 01/02/2022 01:25 AM    RBC 4.82 01/02/2022 01:25 AM    HEMOGLOBIN 15.9 01/02/2022 01:25 AM    HEMATOCRIT 48.7 01/02/2022 01:25 AM    .0 (H) 01/02/2022 01:25 AM    MCH 33.0 01/02/2022 01:25 AM    MCHC 32.6 (L) 01/02/2022 01:25 AM    MPV 10.4 01/02/2022 01:25 AM    NEUTSPOLYS 79.70 (H) 01/02/2022 01:25 AM    LYMPHOCYTES 10.90 (L) 01/02/2022 01:25 AM    MONOCYTES 7.70 01/02/2022 01:25 AM    EOSINOPHILS 0.50 01/02/2022 01:25 AM    BASOPHILS 0.50 01/02/2022 01:25 AM      Lab Results   Component Value Date/Time    SODIUM 153 (H) 01/04/2022 05:00 AM    POTASSIUM 3.4 (L) 01/04/2022 05:00 AM    CHLORIDE 117 (H) 01/04/2022 05:00 AM    CO2 22 01/04/2022 05:00 AM    GLUCOSE 129 (H) 01/04/2022 05:00 AM    BUN 16 01/04/2022 05:00 AM    CREATININE 0.96 01/04/2022 05:00 AM      Lab Results   Component Value Date/Time    CHOLSTRLTOT 204 (H) 01/02/2022 01:25 AM     (H) 01/02/2022 01:25 AM    HDL 35 (A) 01/02/2022 01:25 AM    TRIGLYCERIDE 182 (H) 01/02/2022 01:25 AM       Lab Results   Component Value Date/Time     ALKPHOSPHAT 97 01/02/2022 01:25 AM    ASTSGOT 48 (H) 01/02/2022 01:25 AM    ALTSGPT 31 01/02/2022 01:25 AM    TBILIRUBIN 1.6 (H) 01/02/2022 01:25 AM        Imaging/Testing:    I interpreted and/or reviewed the patient's neuroimaging    IR-PICC LINE PLACEMENT W/ GUIDANCE > AGE 5   Final Result                  Ultrasound-guided PICC placement performed by qualified nursing staff as    above.          CT-HEAD W/O   Final Result      Evolving moderate to large right middle cerebral artery territory has increased in size when compared with the previous exam. No evidence of acute intracranial hemorrhage.         DX-ABDOMEN FOR TUBE PLACEMENT   Final Result      Cortrak feeding tube tip projects in the region of the proximal/mid stomach.      US-EXTREMITY VENOUS LOWER BILAT   Final Result      MR-BRAIN-W/O   Final Result         1. Age-related cerebral atrophy. Mild effacement of the right lateral ventricle.      2. Mild periventricular white matter changes consistent with chronic microvascular ischemic gliosis.      3. Large area of acute infarction involving the right frontal and parietal lobes as well as the right-sided basal ganglia. Subacute area of infarction involving the right temporal and lateral occipital lobes.      4. Petechial hemorrhage noted in the right basal ganglia and involving the cortex in the right temporal and lateral occipital lobes.      5. Right internal carotid artery occlusion or high-grade stenosis.            EC-ECHOCARDIOGRAM COMPLETE W/O CONT   Final Result      DX-SHOULDER 2+ LEFT   Final Result      Negative 2 views of left shoulder.      DX-SHOULDER 2+ RIGHT   Final Result      Moderate subacromial spurring      DX-FOOT-COMPLETE 3+ RIGHT   Final Result      No radiographic evidence of acute displaced fracture or subluxation.      DX-FOOT-2- LEFT   Final Result      No radiographic evidence of acute displaced fracture or subluxation.      DX-ELBOW-COMPLETE 3+ RIGHT   Final Result      No  radiographic evidence of acute traumatic injury.      Limited due to rotation of the lateral radiograph attempts. Consider repeat lateral radiograph to help exclude effusion      CT-CHEST,ABDOMEN,PELVIS WITH   Final Result      No significant traumatic abnormality in thorax, abdomen and pelvis CT scans.      Hepatic steatosis      Moderate colonic diverticulosis.      CT-LSPINE W/O PLUS RECONS   Final Result      No CT evidence of acute traumatic injury.      Moderate degenerative disc disease with trace degenerative L5/S1 retrolisthesis      CT-TSPINE W/O PLUS RECONS   Final Result      No CT evidence of acute traumatic abnormality.      CT-CSPINE WITHOUT PLUS RECONS   Final Result      No CT evidence of acute cervical spine abnormality.      CT-HEAD W/O   Final Result      Subacute right temporal, temporo-occipital infarction without hemorrhagic transformation      CT-CTA HEAD WITH & W/O-POST PROCESS   Final Result      Right internal carotid artery occlusion with extension to the MCA which lacks contrast opacification      Right A1 and KATIA contrast opacification secondary to cross-filling from the left      Normal left anterior and bilateral posterior circulation      Findings of all of the CTs were discussed with Dr. Betancourt before completion of this dictation.            CT-CTA NECK WITH & W/O-POST PROCESSING   Final Result      Occluded right internal carotid artery at its origin      Atherosclerosis at origin of bilateral vertebral arteries could obscure stenosis. Arteries are patent without post stenotic dilatation      DX-PELVIS-1 OR 2 VIEWS   Final Result      Negative AP view of the pelvis.      DX-CHEST-LIMITED (1 VIEW)   Final Result      No acute cardiopulmonary disease.      US-ABORTED US PROCEDURE    (Results Pending)       Assessment and Plan:  Jose Quintanilla is a 66 year old man presenting with R MCA syndrome, found to have an occluded R ICA, and a relatively large completed R MCA infarct on MRI.   There is some petechial hemorrhagic conversion and associated mass effect related to evolving cerebral edema.  He is likely about day 4-5 from his stroke onset, and is currently in peak edema window.    Transfer to ICU for close neuromonitoring and initiation of 3% therapy to mitigate development of malignant cerebral edema.     Would hold on antithrombotic therapy given possible need for surgical decompression, large infarct, and evidence of hemorrhagic conversion.  Would reinitiate anticoagulation in 2 weeks.    Problem list:  1.  Large R MCA stroke  2.  Pulmonary emboli  3.  Anticoagulated, subtherapeutic    Plan:   - q2h neurochecks, monitor for pupillary and mental status changes that may be suggestive of elevated ICP and impending herniation   - no need for permissive HTN as stroke is completed, long-term goal is 110-130/60-80- mostly at goal without interventions, ok to start PO anti-HTN if necessary   - continue 3% for goal Na 150-160 to mitigate development of malignant cerebral edema- anticipate additional 1-2 days of therapy while in peak edema window.     - stroke labs:  HgbA1c 5.3 and    - continue atorvastatin 80 mg daily for goal LDL < 70   - TTE without evidence of cardioembolic source- if anticoagulation is not anticipated to be lifelong- will need long-term cardiac monitoring at discharge   - restart anticoagulation in 2 weeks (1/13 start date), consider apixaban 5mg BID    - PT/OT/SLP   - lovenox SQ for DVT chemoppx ok      The evaluation of the patient, and recommended management, was discussed with the attending intensivist. I have performed a physical exam and reviewed and updated ROS and Plan today (1/4/2022).     Darci Watson MD  Neurohospitalist, Acute Care Services

## 2022-01-04 NOTE — PROGRESS NOTES
Critical Care Progress Note    Date of admission  1/1/2022    Chief Complaint  66 y.o. male admitted 1/1/2022 with acute left-sided weakness    Hospital Course  Dayami is a 66 y.o. 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 Additionally found to have subacute infarction of the right temporal an lateral occipital lobes.   Patient was transferred to RICU on 1/3/2022 after being noted to be more lethargic than prior that same AM for more frequent neuro  monitoring and initiation of 3% saline following concerns of mass effect or impending mass effect secondary to edema-      Interval Problem Update  Reviewed last 24 hour events:  - Transferred to ICU   -Na 153-with 3% saline infusing-with goal of 150-160 to mitigate  cerebral edema.  -He will most likely need 1-2 days of hypertonic therapy   -Continue neuro checks-every 2 hours  -Blood pressure goal (long-term goal) 110-130/60-80-no longer requires permissive hypertension and neurology okay to initiate antihypertensives as needed   +4300 since admission      Review of Systems  Review of Systems   Constitutional: Negative for fever.   HENT: Negative for hearing loss.    Eyes: Negative for blurred vision.   Respiratory: Negative for cough and shortness of breath.    Cardiovascular: Negative for chest pain.   Gastrointestinal: Negative for heartburn and nausea.   Genitourinary: Negative for dysuria.   Musculoskeletal: Positive for back pain.        Bilateral toe and knee pain  Chronic back   Skin: Negative for rash.   Neurological: Positive for weakness. Negative for dizziness and headaches.        Left side weakness   Psychiatric/Behavioral: Positive for depression.        History of         Vital Signs for last 24 hours   Temp:  [36.1 °C (96.9 °F)-36.7 °C (98 °F)] 36.2 °C (97.2 °F)  Pulse:  [] 93  Resp:  [15-56] 30  BP: (119-164)/(65-88) 146/76  SpO2:  [91 %-97 %] 93 %    Hemodynamic parameters for last 24 hours       Respiratory Information for the last 24 hours     Physical Exam   Physical Exam  Vitals and nursing note reviewed.   Constitutional:       Appearance: Normal appearance.   HENT:      Head: Normocephalic and atraumatic.      Mouth/Throat:      Mouth: Mucous membranes are moist.   Eyes:      Pupils: Pupils are equal, round, and reactive to light.   Cardiovascular:      Rate and Rhythm: Normal rate and regular rhythm.   Pulmonary:      Comments: Equal chest rise and fall with resp  Pt without resp distress  Chest:   Breasts: Breasts are symmetrical.       Abdominal:      General: Bowel sounds are normal.      Comments: Soft , flat round, bowel sounds present   Genitourinary:     Comments: Paiz to down drain- clear yellow urine  Musculoskeletal:      Cervical back: Normal range of motion.   Skin:     General: Skin is warm.      Capillary Refill: Capillary refill takes less than 2 seconds.   Neurological:      Comments: Oriented to person, follows commands on L Speech is dysarthric   PERRL,. R gaze preference able to cross midline. L face droop.  LUE- no moevment- no sensation  LLE slgith withdrawal- no sensation  RUE and RLE- antigravity      Psychiatric:         Behavior: Behavior is cooperative.      Comments: dysarthric         Medications  Current Facility-Administered Medications   Medication Dose Route Frequency Provider Last Rate Last Admin   • oxyCODONE immediate-release (ROXICODONE) tablet 5 mg  5 mg Enteral Tube Q4HRS PRN Patricia Jackson M.D.        Or   • oxyCODONE immediate release (ROXICODONE) tablet 10 mg  10 mg Enteral Tube Q4HRS PRN Patricia Jackson M.D.   10 mg at 01/04/22 1114   • hydrALAZINE (APRESOLINE) injection 20 mg  20 mg Intravenous Q4HRS PRJOHN DE SOUZA  GABRIELLE Jackson       • labetalol (NORMODYNE/TRANDATE) injection 10-20 mg  10-20 mg Intravenous Q4HRS PRN Patricia Jackson M.D.       • 3% sodium chloride (HYPERTONIC SALINE) 500mL infusion 500 mL  500 mL Intravenous Continuous Samir Acuna D.O.   Held at 01/04/22 1200   • ipratropium-albuterol (DUONEB) nebulizer solution  3 mL Nebulization Q4H PRN (RT) Toby Vanegas M.D.       • Pharmacy Consult: Enteral tube insertion - review meds/change route/product selection  1 Each Other PHARMACY TO DOSE Samir Acuna D.O.       • enoxaparin (LOVENOX) inj 40 mg  40 mg Subcutaneous DAILY JUSTICE LaraO.   40 mg at 01/03/22 1843   • acetaminophen (Tylenol) tablet 650 mg  650 mg Enteral Tube Q6HRS PRN JUSTICE LaraOHawa   650 mg at 01/04/22 1114   • atorvastatin (LIPITOR) tablet 80 mg  80 mg Enteral Tube Q EVENING JEFFREY Lara.O.   80 mg at 01/03/22 1843   • ondansetron (ZOFRAN ODT) dispertab 4 mg  4 mg Enteral Tube Q4HRS PRN Samir Acuna D.O.       • senna-docusate (PERICOLACE or SENOKOT S) 8.6-50 MG per tablet 2 Tablet  2 Tablet Enteral Tube BID JUSTICE LaraOHawa   2 Tablet at 01/04/22 0510    And   • polyethylene glycol/lytes (MIRALAX) PACKET 1 Packet  1 Packet Enteral Tube QDAY PRN Samir Acuna D.O.        And   • magnesium hydroxide (MILK OF MAGNESIA) suspension 30 mL  30 mL Enteral Tube QDAY PRN Samir Acuna D.O.        And   • bisacodyl (DULCOLAX) suppository 10 mg  10 mg Rectal QDAY PRN JUSTICE LaraO.       • ondansetron (ZOFRAN) syringe/vial injection 4 mg  4 mg Intravenous Q4HRS PRN Toby Vanegas M.D.           Fluids    Intake/Output Summary (Last 24 hours) at 1/4/2022 1516  Last data filed at 1/4/2022 1500  Gross per 24 hour   Intake 2277.5 ml   Output 435 ml   Net 1842.5 ml       Laboratory      Recent Labs     01/01/22  1755   CPKTOTAL 1571*     Recent Labs     01/01/22  2036 01/02/22  0125 01/03/22  2200 01/04/22  0500 01/04/22  1115   SODIUM  --    < > 148* 153* 156*    POTASSIUM  --    < > 3.6 3.4* 4.2   CHLORIDE  --    < > 115* 117* 120*   CO2  --    < > 19* 22 25   BUN  --    < > 17 16 18   CREATININE  --    < > 0.93 0.96 0.96   MAGNESIUM 1.9  --   --   --   --    PHOSPHORUS 4.7*  --   --   --   --    CALCIUM  --    < > 9.5 9.5 9.4    < > = values in this interval not displayed.     Recent Labs     01/01/22 1755 01/01/22 1755 01/02/22  0125 01/03/22  0825 01/03/22  1600 01/03/22  2200 01/04/22  0500 01/04/22  1115   ALTSGPT 35  --  31  --   --   --   --   --    ASTSGOT 54*  --  48*  --   --   --   --   --    ALKPHOSPHAT 119*  --  97  --   --   --   --   --    TBILIRUBIN 1.6*  --  1.6*  --   --   --   --   --    PREALBUMIN  --   --   --  13.7*  --   --   --   --    GLUCOSE 98   < > 89  --    < > 112* 129* 117*    < > = values in this interval not displayed.     Recent Labs     01/01/22 1755 01/02/22 0125   WBC 15.1* 15.1*   NEUTSPOLYS  --  79.70*   LYMPHOCYTES  --  10.90*   MONOCYTES  --  7.70   EOSINOPHILS  --  0.50   BASOPHILS  --  0.50   ASTSGOT 54* 48*   ALTSGPT 35 31   ALKPHOSPHAT 119* 97   TBILIRUBIN 1.6* 1.6*     Recent Labs     01/01/22 1755 01/02/22 0125   RBC 5.41 4.82   HEMOGLOBIN 18.1* 15.9   HEMATOCRIT 53.7* 48.7   PLATELETCT 236 182   PROTHROMBTM 14.6  --    APTT 26.8  --    INR 1.18*  --        Imaging  X-Ray:  I have personally reviewed the images and compared with prior images.    Assessment/Plan  * Stroke due to thrombosis of right carotid artery (HCC)- (present on admission)  Assessment & Plan  -presented on 1/1/2022  -MRI brain suggestive of right frontal and parietal lobe infarction, right basal ganglia infarction, as well as evidence of petechial hemorrhage   -noted to have worsening lethargy the AM of 1/3  -CT head completed prior to transfer to RICU indicates no acute intracranial hemorrhage, however increase MCA territory. Minimal right to left shift  Sodium of 148 on 1/2/2022  Plan  -3% saline drip, at this time will administer through PIV, as  patient was already close to goal of 150 without saline therapy anticipate may not need rate higher than 50ml/hr  -Ordering PICC line  -q2 neuro checks  -Enteral tube for PO intake, speech therapy onboard  -permissive hypertension, expires 1/3/22  -Atorvastatin 80mg  -holding aspirin for hemorrhage   -Neurology already onboard  -PT/OT, Physiatry    Chronic anticoagulation- (present on admission)  Assessment & Plan  -was on chronic warfarin for prior DVT/PEs  -US DVT study on 1/2/2022 negative  -MRI indicative of petechial hemorrhage   -TTE without evidence of cardioembolic source-  -per neurology, holding anticoagulation until 1/13/2022- consider apixaban 5mg BID   -per neurology, will continue with Lovenox at VTE prophylaxis dose  -Every 2 hours neuro checks  -3% saline infusion with a goal of 150-160 to help prevent cerebral edema-he will likely require one to two more days of infusion  -atorvastatin 80 mg daily for a gaol of <70  - PT/OT/SLT        Primary hypertension- (present on admission)  Assessment & Plan  -permissive hypertension expires on 1/3/2021    Dyslipidemia- (present on admission)  Assessment & Plan  -atorvastatin 80 mg daily for a gaol LDL <70    Depression- (present on admission)  Assessment & Plan  -He takes lexapro and bupropion at home  -holding lexapro due to associated increase in bleeding risk  -holding bupropion due to lowering of seizure threshold       VTE:  Lovenox  Ulcer: Not Indicated  Lines: Paiz Catheter  Ongoing indication addressed and PICC line    I have performed a physical exam and reviewed and updated ROS and Plan today (1/4/2022). In review of yesterday's note (1/3/2022), there are no changes except as documented above.     Discussed patient condition and risk of morbidity and/or mortality with Family, RN, RT, Therapies, Pharmacy, Dietary, , Charge nurse / hot rounds, Patient and neurology  The patient remains critically ill.  Critical care time = 40 minutes in  directly providing and coordinating critical care and extensive data review.  No time overlap and excludes procedures.

## 2022-01-05 ENCOUNTER — APPOINTMENT (OUTPATIENT)
Dept: RADIOLOGY | Facility: MEDICAL CENTER | Age: 67
DRG: 064 | End: 2022-01-05
Attending: STUDENT IN AN ORGANIZED HEALTH CARE EDUCATION/TRAINING PROGRAM
Payer: MEDICARE

## 2022-01-05 LAB
ANION GAP SERPL CALC-SCNC: 10 MMOL/L (ref 7–16)
ANION GAP SERPL CALC-SCNC: 11 MMOL/L (ref 7–16)
ANION GAP SERPL CALC-SCNC: 11 MMOL/L (ref 7–16)
ANION GAP SERPL CALC-SCNC: 12 MMOL/L (ref 7–16)
BUN SERPL-MCNC: 18 MG/DL (ref 8–22)
BUN SERPL-MCNC: 18 MG/DL (ref 8–22)
BUN SERPL-MCNC: 19 MG/DL (ref 8–22)
BUN SERPL-MCNC: 20 MG/DL (ref 8–22)
CALCIUM SERPL-MCNC: 9.1 MG/DL (ref 8.5–10.5)
CALCIUM SERPL-MCNC: 9.2 MG/DL (ref 8.5–10.5)
CALCIUM SERPL-MCNC: 9.2 MG/DL (ref 8.5–10.5)
CALCIUM SERPL-MCNC: 9.4 MG/DL (ref 8.5–10.5)
CHLORIDE SERPL-SCNC: 112 MMOL/L (ref 96–112)
CHLORIDE SERPL-SCNC: 113 MMOL/L (ref 96–112)
CHLORIDE SERPL-SCNC: 114 MMOL/L (ref 96–112)
CHLORIDE SERPL-SCNC: 116 MMOL/L (ref 96–112)
CO2 SERPL-SCNC: 22 MMOL/L (ref 20–33)
CO2 SERPL-SCNC: 24 MMOL/L (ref 20–33)
CO2 SERPL-SCNC: 26 MMOL/L (ref 20–33)
CO2 SERPL-SCNC: 27 MMOL/L (ref 20–33)
CREAT SERPL-MCNC: 0.97 MG/DL (ref 0.5–1.4)
CREAT SERPL-MCNC: 0.99 MG/DL (ref 0.5–1.4)
CREAT SERPL-MCNC: 0.99 MG/DL (ref 0.5–1.4)
CREAT SERPL-MCNC: 1 MG/DL (ref 0.5–1.4)
GLUCOSE SERPL-MCNC: 106 MG/DL (ref 65–99)
GLUCOSE SERPL-MCNC: 121 MG/DL (ref 65–99)
GLUCOSE SERPL-MCNC: 123 MG/DL (ref 65–99)
GLUCOSE SERPL-MCNC: 132 MG/DL (ref 65–99)
POTASSIUM SERPL-SCNC: 3.7 MMOL/L (ref 3.6–5.5)
POTASSIUM SERPL-SCNC: 3.8 MMOL/L (ref 3.6–5.5)
POTASSIUM SERPL-SCNC: 3.8 MMOL/L (ref 3.6–5.5)
POTASSIUM SERPL-SCNC: 3.9 MMOL/L (ref 3.6–5.5)
SODIUM SERPL-SCNC: 148 MMOL/L (ref 135–145)
SODIUM SERPL-SCNC: 148 MMOL/L (ref 135–145)
SODIUM SERPL-SCNC: 150 MMOL/L (ref 135–145)
SODIUM SERPL-SCNC: 152 MMOL/L (ref 135–145)

## 2022-01-05 PROCEDURE — 99233 SBSQ HOSP IP/OBS HIGH 50: CPT | Performed by: PSYCHIATRY & NEUROLOGY

## 2022-01-05 PROCEDURE — 80048 BASIC METABOLIC PNL TOTAL CA: CPT | Mod: 91

## 2022-01-05 PROCEDURE — 92526 ORAL FUNCTION THERAPY: CPT

## 2022-01-05 PROCEDURE — 700102 HCHG RX REV CODE 250 W/ 637 OVERRIDE(OP): Performed by: NURSE PRACTITIONER

## 2022-01-05 PROCEDURE — 700111 HCHG RX REV CODE 636 W/ 250 OVERRIDE (IP): Performed by: HOSPITALIST

## 2022-01-05 PROCEDURE — 700102 HCHG RX REV CODE 250 W/ 637 OVERRIDE(OP): Performed by: STUDENT IN AN ORGANIZED HEALTH CARE EDUCATION/TRAINING PROGRAM

## 2022-01-05 PROCEDURE — 99291 CRITICAL CARE FIRST HOUR: CPT | Performed by: NURSE PRACTITIONER

## 2022-01-05 PROCEDURE — 700102 HCHG RX REV CODE 250 W/ 637 OVERRIDE(OP): Performed by: HOSPITALIST

## 2022-01-05 PROCEDURE — 770022 HCHG ROOM/CARE - ICU (200)

## 2022-01-05 PROCEDURE — A9270 NON-COVERED ITEM OR SERVICE: HCPCS | Performed by: STUDENT IN AN ORGANIZED HEALTH CARE EDUCATION/TRAINING PROGRAM

## 2022-01-05 PROCEDURE — A9270 NON-COVERED ITEM OR SERVICE: HCPCS | Performed by: HOSPITALIST

## 2022-01-05 PROCEDURE — A9270 NON-COVERED ITEM OR SERVICE: HCPCS | Performed by: NURSE PRACTITIONER

## 2022-01-05 RX ORDER — GABAPENTIN 400 MG/1
400 CAPSULE ORAL EVERY EVENING
Status: ACTIVE | OUTPATIENT
Start: 2022-01-05 | End: 2022-01-05

## 2022-01-05 RX ORDER — GABAPENTIN 300 MG/1
300 CAPSULE ORAL 2 TIMES DAILY
Status: DISCONTINUED | OUTPATIENT
Start: 2022-01-05 | End: 2022-01-09

## 2022-01-05 RX ORDER — LOSARTAN POTASSIUM 50 MG/1
25 TABLET ORAL DAILY
Status: DISCONTINUED | OUTPATIENT
Start: 2022-01-06 | End: 2022-01-20

## 2022-01-05 RX ADMIN — ACETAMINOPHEN 650 MG: 325 TABLET, FILM COATED ORAL at 09:30

## 2022-01-05 RX ADMIN — GABAPENTIN 300 MG: 300 CAPSULE ORAL at 11:22

## 2022-01-05 RX ADMIN — DOCUSATE SODIUM 50 MG AND SENNOSIDES 8.6 MG 2 TABLET: 8.6; 5 TABLET, FILM COATED ORAL at 17:19

## 2022-01-05 RX ADMIN — LOSARTAN POTASSIUM 25 MG: 50 TABLET, FILM COATED ORAL at 05:05

## 2022-01-05 RX ADMIN — GABAPENTIN 300 MG: 300 CAPSULE ORAL at 17:18

## 2022-01-05 RX ADMIN — OXYCODONE HYDROCHLORIDE 10 MG: 10 TABLET ORAL at 09:29

## 2022-01-05 RX ADMIN — POTASSIUM BICARBONATE 25 MEQ: 978 TABLET, EFFERVESCENT ORAL at 11:22

## 2022-01-05 RX ADMIN — ENOXAPARIN SODIUM 40 MG: 40 INJECTION SUBCUTANEOUS at 17:19

## 2022-01-05 RX ADMIN — OXYCODONE HYDROCHLORIDE 10 MG: 10 TABLET ORAL at 05:45

## 2022-01-05 RX ADMIN — ACETAMINOPHEN 650 MG: 325 TABLET, FILM COATED ORAL at 03:59

## 2022-01-05 RX ADMIN — OXYCODONE HYDROCHLORIDE 10 MG: 10 TABLET ORAL at 17:18

## 2022-01-05 RX ADMIN — OXYCODONE HYDROCHLORIDE 10 MG: 10 TABLET ORAL at 21:43

## 2022-01-05 RX ADMIN — ACETAMINOPHEN 650 MG: 325 TABLET, FILM COATED ORAL at 17:19

## 2022-01-05 RX ADMIN — ATORVASTATIN CALCIUM 80 MG: 80 TABLET, FILM COATED ORAL at 17:19

## 2022-01-05 ASSESSMENT — ENCOUNTER SYMPTOMS
BLURRED VISION: 0
COUGH: 0
ABDOMINAL PAIN: 0
HEARTBURN: 0
WEAKNESS: 1
VOMITING: 0
HEADACHES: 1
DIZZINESS: 0
NAUSEA: 0
BACK PAIN: 1
FEVER: 0
DEPRESSION: 1
CHILLS: 0
SHORTNESS OF BREATH: 0

## 2022-01-05 ASSESSMENT — PAIN DESCRIPTION - PAIN TYPE: TYPE: ACUTE PAIN;CHRONIC PAIN

## 2022-01-05 NOTE — PROGRESS NOTES
Cortrak Placement    Tube Team verified patient name and medical record number prior to tube placement.  Cortrak replaced after pt pulled last one out that was secured with bridle.  Cortrak tube (43 inches, 10 Indonesian) placed at 67 cm in left nare.  Per Cortrak picture, tube appears to be in the stomach.  Cortrak secured with new bridle.    Nursing Instructions: Awaiting KUB to confirm placement before use for medications or feeding. Once placement confirmed, flush tube with 30 ml of water, and then remove and save stylet, in patient medication drawer.

## 2022-01-05 NOTE — DISCHARGE PLANNING
Please review the consult from Dr. Dill regarding post acute recommendations.  TCC will no longer follow.  Please reach out to myself @ [phone] with any questions.

## 2022-01-05 NOTE — CARE PLAN
Problem: Nutritional:  Goal: Nutrition support tolerated and meeting greater than 85% of estimated needs  Outcome: Met    TF replete fiber at goal rate 85 ml/hr per flowsheets.

## 2022-01-05 NOTE — PROGRESS NOTES
Neurology Progress Note  Neurohospitalist Service, [facility]    Referring Physician: Toby Vanegas M.D.      Interval History: No acute events overnight.  Exam stable.  Na at goal > 150.    Review of systems: In addition to what is detailed in the HPI and/or updated in the interval history, all other systems reviewed and are negative.    Past Medical History, Past Surgical History and Social History reviewed and unchanged from prior    Medications:    Current Facility-Administered Medications:   •  gabapentin (NEURONTIN) capsule 400 mg, 400 mg, Enteral Tube, Q EVENING, Patricia Jackson M.D.  •  gabapentin (NEURONTIN) capsule 300 mg, 300 mg, Enteral Tube, BID, Patricia Jackson M.D., 300 mg at 01/05/22 1122  •  [START ON 1/6/2022] losartan (COZAAR) tablet 25 mg, 25 mg, Enteral Tube, DAILY, Patricia Jackson M.D.  •  oxyCODONE immediate-release (ROXICODONE) tablet 5 mg, 5 mg, Enteral Tube, Q4HRS PRN **OR** oxyCODONE immediate release (ROXICODONE) tablet 10 mg, 10 mg, Enteral Tube, Q4HRS PRN, Patricia Jackson M.D., 10 mg at 01/05/22 0929  •  hydrALAZINE (APRESOLINE) injection 20 mg, 20 mg, Intravenous, Q4HRS PRN, Patricia Jackson M.D.  •  labetalol (NORMODYNE/TRANDATE) injection 10-20 mg, 10-20 mg, Intravenous, Q4HRS PRN, Patricia Jackson M.D., 10 mg at 01/04/22 1821  •  3% sodium chloride (HYPERTONIC SALINE) 500mL infusion 500 mL, 500 mL, Intravenous, Continuous, Samir Acuna D.O., Held at 01/04/22 1200  •  ipratropium-albuterol (DUONEB) nebulizer solution, 3 mL, Nebulization, Q4H PRN (RT), Toby Vanegas M.D.  •  Pharmacy Consult: Enteral tube insertion - review meds/change route/product selection, 1 Each, Other, PHARMACY TO DOSE, Samir Acuna D.O.  •  enoxaparin (LOVENOX) inj 40 mg, 40 mg, Subcutaneous, DAILY, Samir Acuna D.O., 40 mg at 01/04/22 6940  •  acetaminophen (Tylenol) tablet 650 mg, 650 mg, Enteral Tube, Q6HRS PRN, Samir Acuna D.O., 650 mg at 01/05/22  "0930  •  atorvastatin (LIPITOR) tablet 80 mg, 80 mg, Enteral Tube, Q EVENING, JUSTICE LaraO., 80 mg at 01/04/22 1739  •  ondansetron (ZOFRAN ODT) dispertab 4 mg, 4 mg, Enteral Tube, Q4HRS PRN, Samir Acuna D.O.  •  senna-docusate (PERICOLACE or SENOKOT S) 8.6-50 MG per tablet 2 Tablet, 2 Tablet, Enteral Tube, BID, 2 Tablet at 01/04/22 1739 **AND** polyethylene glycol/lytes (MIRALAX) PACKET 1 Packet, 1 Packet, Enteral Tube, QDAY PRN **AND** magnesium hydroxide (MILK OF MAGNESIA) suspension 30 mL, 30 mL, Enteral Tube, QDAY PRN **AND** bisacodyl (DULCOLAX) suppository 10 mg, 10 mg, Rectal, QDAY PRN, Samir Acuna D.O.  •  ondansetron (ZOFRAN) syringe/vial injection 4 mg, 4 mg, Intravenous, Q4HRS PRN, Toby Vanegas M.D.    Physical Examination:   /72   Pulse 85   Temp 36.3 °C (97.3 °F) (Temporal)   Resp 20   Ht 1.854 m (6' 0.99\")   Wt 122 kg (268 lb 11.9 oz)   SpO2 89%   BMI 35.46 kg/m²       General: Awakens to tactile  Neck: There is normal range of motion  CV: Regular rate   Extremities:  Warm, dry, and intact, without peripheral lower extremity edema    NEUROLOGICAL EXAM:     Mental status: Oriented to name, follows commands on L  Speech and language: Speech is dysarthric and sparse.  Follows distal commands  Cranial nerve exam: PERRL, decreased blink to threat from L. R gaze preference, but crosses midline with tracking face, L face droop.  Motor exam: There is sustained antigravity with no drift in RUE, RLE withdraws briskly with antigravity movements at knee, LLE withdraws less briskly on plane of bed.  LUE with no movement  Sensory exam:  Does not react to tactile in L hemibody  Coordination: No ataxia seen on elicited and spontaneous movements      NIHSS: National Institutes of Health Stroke Scale    [0] 1a:Level of Consciousness    0-alert 1-drowsy   2-stupor   3-coma  [0] 1b:LOC Questions                  0-both  1-one      2-neither  [0] 1c:LOC Commands                   0-both  " 1-one      2-neither  [1] 2: Best Gaze                     0-nl    1-partial  2-forced  [2] 3: Visual Fields                   0-nl    1-partial  2-complete 3-bilat  [2] 4: Facial Paresis                0-nl    1-minor    2-partial  3-full  MOTOR                       0-nl  [1] 5: Right Arm           1-drift  [4] 6: Left Arm             2-some effort vs gravity  [2] 7: Right Leg           3-no effort vs gravity  [2] 8: Left Leg             4-no movement                             x-untestable  [0] 9: Limb Ataxia                    0-abs   1-1_limb   2-2+_limbs       x-untestable  [2] 10:Sensory                        0-nl    1-partial  2-dense  [1] 11:Best Language/Aphasia         0-nl    1-mild/mod 2-severe   3-mute  [2] 12:Dysarthria                     0-nl    1-mild/mod 2-severe       x-untestable  [2] 13:Neglect/Inattention            0-none  1-partial  2-complete  [21] TOTAL      Objective Data:    Labs:  Lab Results   Component Value Date/Time    PROTHROMBTM 14.6 01/01/2022 05:55 PM    INR 1.18 (H) 01/01/2022 05:55 PM      Lab Results   Component Value Date/Time    WBC 15.1 (H) 01/02/2022 01:25 AM    RBC 4.82 01/02/2022 01:25 AM    HEMOGLOBIN 15.9 01/02/2022 01:25 AM    HEMATOCRIT 48.7 01/02/2022 01:25 AM    .0 (H) 01/02/2022 01:25 AM    MCH 33.0 01/02/2022 01:25 AM    MCHC 32.6 (L) 01/02/2022 01:25 AM    MPV 10.4 01/02/2022 01:25 AM    NEUTSPOLYS 79.70 (H) 01/02/2022 01:25 AM    LYMPHOCYTES 10.90 (L) 01/02/2022 01:25 AM    MONOCYTES 7.70 01/02/2022 01:25 AM    EOSINOPHILS 0.50 01/02/2022 01:25 AM    BASOPHILS 0.50 01/02/2022 01:25 AM      Lab Results   Component Value Date/Time    SODIUM 152 (H) 01/05/2022 11:15 AM    POTASSIUM 3.7 01/05/2022 11:15 AM    CHLORIDE 114 (H) 01/05/2022 11:15 AM    CO2 27 01/05/2022 11:15 AM    GLUCOSE 132 (H) 01/05/2022 11:15 AM    BUN 19 01/05/2022 11:15 AM    CREATININE 0.99 01/05/2022 11:15 AM      Lab Results   Component Value Date/Time    CHOLSTRLTOT 204 (H)  01/02/2022 01:25 AM     (H) 01/02/2022 01:25 AM    HDL 35 (A) 01/02/2022 01:25 AM    TRIGLYCERIDE 182 (H) 01/02/2022 01:25 AM       Lab Results   Component Value Date/Time    ALKPHOSPHAT 97 01/02/2022 01:25 AM    ASTSGOT 48 (H) 01/02/2022 01:25 AM    ALTSGPT 31 01/02/2022 01:25 AM    TBILIRUBIN 1.6 (H) 01/02/2022 01:25 AM        Imaging/Testing:    I interpreted and/or reviewed the patient's neuroimaging    IR-PICC LINE PLACEMENT W/ GUIDANCE > AGE 5   Final Result                  Ultrasound-guided PICC placement performed by qualified nursing staff as    above.          CT-HEAD W/O   Final Result      Evolving moderate to large right middle cerebral artery territory has increased in size when compared with the previous exam. No evidence of acute intracranial hemorrhage.         DX-ABDOMEN FOR TUBE PLACEMENT   Final Result      Cortrak feeding tube tip projects in the region of the proximal/mid stomach.      US-EXTREMITY VENOUS LOWER BILAT   Final Result      MR-BRAIN-W/O   Final Result         1. Age-related cerebral atrophy. Mild effacement of the right lateral ventricle.      2. Mild periventricular white matter changes consistent with chronic microvascular ischemic gliosis.      3. Large area of acute infarction involving the right frontal and parietal lobes as well as the right-sided basal ganglia. Subacute area of infarction involving the right temporal and lateral occipital lobes.      4. Petechial hemorrhage noted in the right basal ganglia and involving the cortex in the right temporal and lateral occipital lobes.      5. Right internal carotid artery occlusion or high-grade stenosis.            EC-ECHOCARDIOGRAM COMPLETE W/O CONT   Final Result      DX-SHOULDER 2+ LEFT   Final Result      Negative 2 views of left shoulder.      DX-SHOULDER 2+ RIGHT   Final Result      Moderate subacromial spurring      DX-FOOT-COMPLETE 3+ RIGHT   Final Result      No radiographic evidence of acute displaced fracture  or subluxation.      DX-FOOT-2- LEFT   Final Result      No radiographic evidence of acute displaced fracture or subluxation.      DX-ELBOW-COMPLETE 3+ RIGHT   Final Result      No radiographic evidence of acute traumatic injury.      Limited due to rotation of the lateral radiograph attempts. Consider repeat lateral radiograph to help exclude effusion      CT-CHEST,ABDOMEN,PELVIS WITH   Final Result      No significant traumatic abnormality in thorax, abdomen and pelvis CT scans.      Hepatic steatosis      Moderate colonic diverticulosis.      CT-LSPINE W/O PLUS RECONS   Final Result      No CT evidence of acute traumatic injury.      Moderate degenerative disc disease with trace degenerative L5/S1 retrolisthesis      CT-TSPINE W/O PLUS RECONS   Final Result      No CT evidence of acute traumatic abnormality.      CT-CSPINE WITHOUT PLUS RECONS   Final Result      No CT evidence of acute cervical spine abnormality.      CT-HEAD W/O   Final Result      Subacute right temporal, temporo-occipital infarction without hemorrhagic transformation      CT-CTA HEAD WITH & W/O-POST PROCESS   Final Result      Right internal carotid artery occlusion with extension to the MCA which lacks contrast opacification      Right A1 and KATIA contrast opacification secondary to cross-filling from the left      Normal left anterior and bilateral posterior circulation      Findings of all of the CTs were discussed with Dr. Betancourt before completion of this dictation.            CT-CTA NECK WITH & W/O-POST PROCESSING   Final Result      Occluded right internal carotid artery at its origin      Atherosclerosis at origin of bilateral vertebral arteries could obscure stenosis. Arteries are patent without post stenotic dilatation      DX-PELVIS-1 OR 2 VIEWS   Final Result      Negative AP view of the pelvis.      DX-CHEST-LIMITED (1 VIEW)   Final Result      No acute cardiopulmonary disease.      US-ABORTED US PROCEDURE    (Results Pending)        Assessment and Plan:  Jose Quintanilla is a 66 year old man presenting with R MCA syndrome, found to have an occluded R ICA, and a relatively large completed R MCA infarct on MRI.  There is some petechial hemorrhagic conversion and associated mass effect related to evolving cerebral edema.  He is likely about day 6 from his stroke onset, and is approaching end of his peak edema window.   He has been transferred to ICU for close neuromonitoring and initiation of 3% therapy to mitigate development of malignant cerebral edema.     Would hold on antithrombotic therapy given possible need for surgical decompression, large infarct, and evidence of hemorrhagic conversion.  Would reinitiate anticoagulation in ~ 1 week.    Problem list:  1.  Large R MCA stroke  2.  Pulmonary emboli  3.  Anticoagulated, subtherapeutic    Plan:   - q2h neurochecks, monitor for pupillary and mental status changes that may be suggestive of elevated ICP and impending herniation   - no need for permissive HTN as stroke is completed, long-term goal is 110-130/60-80- mostly at goal without interventions, ok to start PO anti-HTN if necessary   - continue 3% for goal Na 150-160 to mitigate development of malignant cerebral edema- anticipate additional day of therapy while in peak edema window.     - stroke labs:  HgbA1c 5.3 and    - continue atorvastatin 80 mg daily for goal LDL < 70   - TTE without evidence of cardioembolic source- if anticoagulation is not anticipated to be lifelong- will need long-term cardiac monitoring at discharge   - restart anticoagulation in 2 weeks (1/13 start date), consider apixaban 5mg BID    - PT/OT/SLP   - lovenox SQ for DVT chemoppx ok    The evaluation of the patient, and recommended management, was discussed with the attending intensivist. I have performed a physical exam and reviewed and updated ROS and Plan today (1/5/2022).     Darci Watson MD  Neurohospitalist, Acute Care Services

## 2022-01-05 NOTE — PROGRESS NOTES
Critical Care Progress Note    Date of admission  1/1/2022    Chief Complaint  66 y.o. male admitted 1/1/2022 with acute left-sided weakness    Hospital Course  Dayami is a 66 y.o. 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 Additionally found to have subacute infarction of the right temporal an lateral occipital lobes.   Patient was transferred to RICU on 1/3/2022 after being noted to be more lethargic than prior that same AM for more frequent neuro  monitoring and initiation of 3% saline following concerns of mass effect or impending mass effect secondary to edema-      Interval Problem Update  Reviewed last 24 hour events:  - Transferred to ICU   -Na 153-with 3% saline infusing-with goal of 150-160 to mitigate cerebral edema.  Is likely to continue 1 more day of hypertonic therapy  -Continue neuro checks-every 2 hours  -Blood pressure goal (long-term goal) 110-130/60-80-no longer requires permissive hypertension   -Start patient home Lexapro  -Q 2 hr neuro checks  -gabapentin for nerve pain R lower extrem  - PICC line    Review of Systems  Review of Systems   Constitutional: Negative for chills and fever.   HENT: Negative for hearing loss.    Eyes: Negative for blurred vision.   Respiratory: Negative for cough and shortness of breath.    Cardiovascular: Negative for chest pain.   Gastrointestinal: Negative for abdominal pain, heartburn, nausea and vomiting.   Genitourinary: Negative for dysuria.   Musculoskeletal: Positive for back pain.        Bilateral toe and knee pain  Chronic back pain, right lower leg pain   Skin: Negative for rash.   Neurological: Positive for weakness and headaches. Negative for dizziness.        Left side  weakness  Headache today    Psychiatric/Behavioral: Positive for depression.        History of        Vital Signs for last 24 hours   Temp:  [36.1 °C (97 °F)-36.6 °C (97.8 °F)] 36.3 °C (97.3 °F)  Pulse:  [] 86  Resp:  [16-56] 17  BP: (130-168)/(65-89) 139/78  SpO2:  [91 %-95 %] 93 %    Hemodynamic parameters for last 24 hours       Respiratory Information for the last 24 hours     Physical Exam   Physical Exam  Vitals and nursing note reviewed.   Constitutional:       Appearance: Normal appearance.   HENT:      Head: Normocephalic and atraumatic.      Mouth/Throat:      Mouth: Mucous membranes are moist.   Eyes:      General: Lids are normal.      Pupils: Pupils are equal, round, and reactive to light.   Cardiovascular:      Rate and Rhythm: Normal rate and regular rhythm.      Pulses:           Radial pulses are 2+ on the right side and 2+ on the left side.        Dorsalis pedis pulses are 2+ on the right side and 2+ on the left side.   Pulmonary:      Comments: Equal chest rise and fall with resp  Pt without resp distress  Chest:   Breasts: Breasts are symmetrical.       Abdominal:      General: Bowel sounds are normal.      Comments: Soft , flat round, bowel sounds present   Genitourinary:     Comments: Paiz to down drain- clear yellow urine  Musculoskeletal:      Cervical back: Normal range of motion.   Skin:     General: Skin is warm.      Capillary Refill: Capillary refill takes less than 2 seconds.   Neurological:      GCS: GCS eye subscore is 4. GCS verbal subscore is 5. GCS motor subscore is 6.      Comments: Sleeping.  Wakes to voice . oriented to person, place and event.   Following commands on L side.   Speech is dysarthric   PERRL,. R gaze preference able to cross midline.   Left facial droop  LUE- no moevment- no sensation  LLE slgith withdrawal- no sensation  RUE and RLE- antigravity      Psychiatric:         Behavior: Behavior is cooperative.      Comments: dysarthric          Medications  Current Facility-Administered Medications   Medication Dose Route Frequency Provider Last Rate Last Admin   • gabapentin (NEURONTIN) capsule 400 mg  400 mg Enteral Tube Q EVENING Patricia Jackson M.D.       • oxyCODONE immediate-release (ROXICODONE) tablet 5 mg  5 mg Enteral Tube Q4HRS PRN Patrciia Jackson M.D.        Or   • oxyCODONE immediate release (ROXICODONE) tablet 10 mg  10 mg Enteral Tube Q4HRS PRN Patricia Jackson M.D.   10 mg at 01/05/22 0545   • hydrALAZINE (APRESOLINE) injection 20 mg  20 mg Intravenous Q4HRS PRN Patricia Jackson M.D.       • labetalol (NORMODYNE/TRANDATE) injection 10-20 mg  10-20 mg Intravenous Q4HRS PRN Patricia Jackson M.D.   10 mg at 01/04/22 1821   • losartan (COZAAR) tablet 25 mg  25 mg Oral DAILY Sri Belcher, A.P.R.N.   25 mg at 01/05/22 0505   • 3% sodium chloride (HYPERTONIC SALINE) 500mL infusion 500 mL  500 mL Intravenous Continuous Samir Acuna D.O.   Held at 01/04/22 1200   • ipratropium-albuterol (DUONEB) nebulizer solution  3 mL Nebulization Q4H PRN (RT) Toby Vanegas M.D.       • Pharmacy Consult: Enteral tube insertion - review meds/change route/product selection  1 Each Other PHARMACY TO DOSE Samir Acuna D.O.       • enoxaparin (LOVENOX) inj 40 mg  40 mg Subcutaneous DAILY JEFFREY Lara.O.   40 mg at 01/04/22 1740   • acetaminophen (Tylenol) tablet 650 mg  650 mg Enteral Tube Q6HRS PRN JUSTICE LaraOHawa   650 mg at 01/05/22 0359   • atorvastatin (LIPITOR) tablet 80 mg  80 mg Enteral Tube Q EVENING JUSTICE LaraO.   80 mg at 01/04/22 1739   • ondansetron (ZOFRAN ODT) dispertab 4 mg  4 mg Enteral Tube Q4HRS PRN JUSTICE LaraO.       • senna-docusate (PERICOLACE or SENOKOT S) 8.6-50 MG per tablet 2 Tablet  2 Tablet Enteral Tube BID Samir Acuna D.O.   2 Tablet at 01/04/22 1739    And   • polyethylene glycol/lytes (MIRALAX) PACKET 1 Packet  1 Packet Enteral Tube QDAY PRN Samir Acuna D.O.        And   •  magnesium hydroxide (MILK OF MAGNESIA) suspension 30 mL  30 mL Enteral Tube QDAY PRN Samir Acuna D.O.        And   • bisacodyl (DULCOLAX) suppository 10 mg  10 mg Rectal QDAY PRN JUSTICE LaraO.       • ondansetron (ZOFRAN) syringe/vial injection 4 mg  4 mg Intravenous Q4HRS PRN Toby Vanegas M.D.           Fluids    Intake/Output Summary (Last 24 hours) at 1/5/2022 0746  Last data filed at 1/5/2022 0600  Gross per 24 hour   Intake 2082.5 ml   Output 1380 ml   Net 702.5 ml       Laboratory          Recent Labs     01/04/22  1635 01/04/22  2250 01/05/22  0456   SODIUM 155* 153* 150*   POTASSIUM 3.8 4.0 3.8   CHLORIDE 119* 117* 116*   CO2 23 22 22   BUN 19 17 18   CREATININE 1.10 1.07 1.00   CALCIUM 9.9 9.3 9.2     Recent Labs     01/03/22  0825 01/03/22  1600 01/04/22  1635 01/04/22  2250 01/05/22  0456   PREALBUMIN 13.7*  --   --   --   --    GLUCOSE  --    < > 120* 121* 123*    < > = values in this interval not displayed.         No results for input(s): RBC, HEMOGLOBIN, HEMATOCRIT, PLATELETCT, PROTHROMBTM, APTT, INR, IRON, FERRITIN, TOTIRONBC in the last 72 hours.    Imaging  X-Ray:  I have personally reviewed the images and compared with prior images.    Assessment/Plan  * Stroke due to thrombosis of right carotid artery (HCC)- (present on admission)  Assessment & Plan  -presented on 1/1/2022  -MRI brain suggestive of right frontal and parietal lobe infarction, right basal ganglia infarction, as well as evidence of petechial hemorrhage   -noted to have worsening lethargy the AM of 1/3  -CT head completed prior to transfer to RICU indicates no acute intracranial hemorrhage, however increase MCA territory. Minimal right to left shift  Sodium of 148 on 1/2/2022  Plan  -3% saline drip, at this time will administer through PIV, as patient was already close to goal of 150 without saline therapy anticipate may not need rate higher than 50ml/hr  -Ordering PICC line  -q2 neuro checks  -Enteral tube for PO  intake, speech therapy onboard  -permissive hypertension, expires 1/3/22  -Atorvastatin 80mg  -holding aspirin for hemorrhage   -Neurology already onboard  -PT/OT, Physiatry    Chronic anticoagulation- (present on admission)  Assessment & Plan  -was on chronic warfarin for prior DVT/PEs  -US DVT study on 1/2/2022 negative  -MRI indicative of petechial hemorrhage   -TTE without evidence of cardioembolic source-  -per neurology, holding anticoagulation until 1/13/2022- consider apixaban 5mg BID   -per neurology, will continue with Lovenox at VTE prophylaxis dose  -Every 2 hours neuro checks  -3% saline infusion with a goal of 150-160 to help prevent cerebral edema-he will likely require one to two more days of infusion  -atorvastatin 80 mg daily for a gaol of <70  - PT/OT/SLT        Primary hypertension- (present on admission)  Assessment & Plan  -permissive hypertension expires on 1/3/2021    Dyslipidemia- (present on admission)  Assessment & Plan  -atorvastatin 80 mg daily for a gaol LDL <70    Depression- (present on admission)  Assessment & Plan  -He takes lexapro and bupropion at home  -holding lexapro due to associated increase in bleeding risk  -holding bupropion due to lowering of seizure threshold       VTE:  Lovenox  Ulcer: Not Indicated  Lines: Central Line  Ongoing indication addressed, Paiz Catheter  Ongoing indication addressed and PICC line    I have performed a physical exam and reviewed and updated ROS and Plan today (1/5/2022). In review of yesterday's note (1/4/2022), there are no changes except as documented above.     Discussed patient condition and risk of morbidity and/or mortality with Family, RN, RT, Therapies, Pharmacy, Dietary, , Charge nurse / hot rounds, Patient and neurology  The patient remains critically ill.  Critical care time = 45 minutes in directly providing and coordinating critical care and extensive data review.  No time overlap and excludes procedures.

## 2022-01-05 NOTE — PROGRESS NOTES
Arrived into pt room to find cortrak removed from pt right nare with bridle intact. Removed bridle, Awaiting another cortrak to be inserted. Updated family when they returned to room

## 2022-01-05 NOTE — CARE PLAN
The patient is Watcher - Medium risk of patient condition declining or worsening    Problem: Knowledge Deficit - Stroke Education  Goal: Patient's knowledge of stroke and risk factors will improve  Note: POC discussed with patient's son at bedside.      Problem: Skin Integrity  Goal: Skin integrity is maintained or improved  Note: Wound care performed per order. Patient bathed and repositioned q2h.      Shift Goals  Clinical Goals: Stable neuro exams and hemodynamics  Patient Goals: pain control   Family Goals: updates    Progress made toward(s) clinical / shift goals: Patient received dose of gabapentin, pain in RLE seemed to improve.     Patient is not progressing towards the following goals:

## 2022-01-06 LAB
ANION GAP SERPL CALC-SCNC: 12 MMOL/L (ref 7–16)
ANION GAP SERPL CALC-SCNC: 13 MMOL/L (ref 7–16)
BUN SERPL-MCNC: 19 MG/DL (ref 8–22)
BUN SERPL-MCNC: 21 MG/DL (ref 8–22)
CALCIUM SERPL-MCNC: 9.2 MG/DL (ref 8.5–10.5)
CALCIUM SERPL-MCNC: 9.2 MG/DL (ref 8.5–10.5)
CHLORIDE SERPL-SCNC: 112 MMOL/L (ref 96–112)
CHLORIDE SERPL-SCNC: 113 MMOL/L (ref 96–112)
CO2 SERPL-SCNC: 21 MMOL/L (ref 20–33)
CO2 SERPL-SCNC: 23 MMOL/L (ref 20–33)
CREAT SERPL-MCNC: 1.03 MG/DL (ref 0.5–1.4)
CREAT SERPL-MCNC: 1.03 MG/DL (ref 0.5–1.4)
GLUCOSE SERPL-MCNC: 118 MG/DL (ref 65–99)
GLUCOSE SERPL-MCNC: 91 MG/DL (ref 65–99)
POTASSIUM SERPL-SCNC: 4 MMOL/L (ref 3.6–5.5)
POTASSIUM SERPL-SCNC: 4.3 MMOL/L (ref 3.6–5.5)
SODIUM SERPL-SCNC: 146 MMOL/L (ref 135–145)
SODIUM SERPL-SCNC: 148 MMOL/L (ref 135–145)

## 2022-01-06 PROCEDURE — 97112 NEUROMUSCULAR REEDUCATION: CPT

## 2022-01-06 PROCEDURE — 92526 ORAL FUNCTION THERAPY: CPT

## 2022-01-06 PROCEDURE — 97530 THERAPEUTIC ACTIVITIES: CPT

## 2022-01-06 PROCEDURE — 80048 BASIC METABOLIC PNL TOTAL CA: CPT

## 2022-01-06 PROCEDURE — 36415 COLL VENOUS BLD VENIPUNCTURE: CPT

## 2022-01-06 PROCEDURE — 700111 HCHG RX REV CODE 636 W/ 250 OVERRIDE (IP): Performed by: HOSPITALIST

## 2022-01-06 PROCEDURE — A9270 NON-COVERED ITEM OR SERVICE: HCPCS | Performed by: STUDENT IN AN ORGANIZED HEALTH CARE EDUCATION/TRAINING PROGRAM

## 2022-01-06 PROCEDURE — 99233 SBSQ HOSP IP/OBS HIGH 50: CPT | Performed by: NURSE PRACTITIONER

## 2022-01-06 PROCEDURE — 99233 SBSQ HOSP IP/OBS HIGH 50: CPT | Performed by: HOSPITALIST

## 2022-01-06 PROCEDURE — 700102 HCHG RX REV CODE 250 W/ 637 OVERRIDE(OP): Performed by: STUDENT IN AN ORGANIZED HEALTH CARE EDUCATION/TRAINING PROGRAM

## 2022-01-06 PROCEDURE — A9270 NON-COVERED ITEM OR SERVICE: HCPCS | Performed by: HOSPITALIST

## 2022-01-06 PROCEDURE — 99232 SBSQ HOSP IP/OBS MODERATE 35: CPT | Performed by: PSYCHIATRY & NEUROLOGY

## 2022-01-06 PROCEDURE — 700102 HCHG RX REV CODE 250 W/ 637 OVERRIDE(OP): Performed by: HOSPITALIST

## 2022-01-06 PROCEDURE — 770020 HCHG ROOM/CARE - TELE (206)

## 2022-01-06 PROCEDURE — 700105 HCHG RX REV CODE 258: Performed by: HOSPITALIST

## 2022-01-06 RX ORDER — ATORVASTATIN CALCIUM 80 MG/1
80 TABLET, FILM COATED ORAL NIGHTLY
Status: DISCONTINUED | OUTPATIENT
Start: 2022-01-06 | End: 2022-01-07

## 2022-01-06 RX ORDER — BUPROPION HYDROCHLORIDE 100 MG/1
100 TABLET ORAL 2 TIMES DAILY
Status: DISCONTINUED | OUTPATIENT
Start: 2022-01-06 | End: 2022-01-20

## 2022-01-06 RX ADMIN — SODIUM CHLORIDE 500 ML: 3 INJECTION, SOLUTION INTRAVENOUS at 01:46

## 2022-01-06 RX ADMIN — GABAPENTIN 300 MG: 300 CAPSULE ORAL at 17:01

## 2022-01-06 RX ADMIN — ATORVASTATIN CALCIUM 80 MG: 80 TABLET, FILM COATED ORAL at 17:01

## 2022-01-06 RX ADMIN — ACETAMINOPHEN 650 MG: 325 TABLET, FILM COATED ORAL at 04:43

## 2022-01-06 RX ADMIN — ENOXAPARIN SODIUM 40 MG: 40 INJECTION SUBCUTANEOUS at 17:02

## 2022-01-06 RX ADMIN — ATORVASTATIN CALCIUM 80 MG: 80 TABLET, FILM COATED ORAL at 20:48

## 2022-01-06 RX ADMIN — OXYCODONE HYDROCHLORIDE 10 MG: 10 TABLET ORAL at 01:56

## 2022-01-06 RX ADMIN — GABAPENTIN 300 MG: 300 CAPSULE ORAL at 05:53

## 2022-01-06 RX ADMIN — OXYCODONE HYDROCHLORIDE 10 MG: 10 TABLET ORAL at 11:51

## 2022-01-06 RX ADMIN — LABETALOL HYDROCHLORIDE 10 MG: 5 INJECTION, SOLUTION INTRAVENOUS at 06:00

## 2022-01-06 RX ADMIN — ACETAMINOPHEN 650 MG: 325 TABLET, FILM COATED ORAL at 11:51

## 2022-01-06 RX ADMIN — OXYCODONE HYDROCHLORIDE 10 MG: 10 TABLET ORAL at 21:51

## 2022-01-06 RX ADMIN — OXYCODONE HYDROCHLORIDE 10 MG: 10 TABLET ORAL at 05:56

## 2022-01-06 RX ADMIN — LOSARTAN POTASSIUM 25 MG: 50 TABLET, FILM COATED ORAL at 05:53

## 2022-01-06 RX ADMIN — BUPROPION HYDROCHLORIDE 100 MG: 100 TABLET, FILM COATED ORAL at 21:51

## 2022-01-06 RX ADMIN — DOCUSATE SODIUM 50 MG AND SENNOSIDES 8.6 MG 2 TABLET: 8.6; 5 TABLET, FILM COATED ORAL at 05:53

## 2022-01-06 RX ADMIN — OXYCODONE HYDROCHLORIDE 10 MG: 10 TABLET ORAL at 17:02

## 2022-01-06 ASSESSMENT — GAIT ASSESSMENTS: GAIT LEVEL OF ASSIST: UNABLE TO PARTICIPATE

## 2022-01-06 ASSESSMENT — ENCOUNTER SYMPTOMS
HEADACHES: 1
SENSORY CHANGE: 1
HEMOPTYSIS: 0
FEVER: 0
COUGH: 1
BLURRED VISION: 0
DIZZINESS: 0
CHILLS: 0
HEARTBURN: 0
NAUSEA: 0
COUGH: 0
VOMITING: 0
SPEECH CHANGE: 1
BACK PAIN: 1
ABDOMINAL PAIN: 0
DEPRESSION: 0
ORTHOPNEA: 0
SPUTUM PRODUCTION: 0
WEAKNESS: 1
FOCAL WEAKNESS: 1
PALPITATIONS: 0
SHORTNESS OF BREATH: 0

## 2022-01-06 ASSESSMENT — COGNITIVE AND FUNCTIONAL STATUS - GENERAL
MOVING TO AND FROM BED TO CHAIR: A LOT
SUGGESTED CMS G CODE MODIFIER DAILY ACTIVITY: CN
STANDING UP FROM CHAIR USING ARMS: TOTAL
DAILY ACTIVITIY SCORE: 6
SUGGESTED CMS G CODE MODIFIER MOBILITY: CM
PERSONAL GROOMING: TOTAL
HELP NEEDED FOR BATHING: TOTAL
MOVING FROM LYING ON BACK TO SITTING ON SIDE OF FLAT BED: UNABLE
DRESSING REGULAR LOWER BODY CLOTHING: TOTAL
DRESSING REGULAR UPPER BODY CLOTHING: TOTAL
TURNING FROM BACK TO SIDE WHILE IN FLAT BAD: A LOT
WALKING IN HOSPITAL ROOM: TOTAL
CLIMB 3 TO 5 STEPS WITH RAILING: TOTAL
TOILETING: TOTAL
EATING MEALS: TOTAL
MOBILITY SCORE: 8

## 2022-01-06 ASSESSMENT — PATIENT HEALTH QUESTIONNAIRE - PHQ9
2. FEELING DOWN, DEPRESSED, IRRITABLE, OR HOPELESS: NOT AT ALL
1. LITTLE INTEREST OR PLEASURE IN DOING THINGS: NOT AT ALL
SUM OF ALL RESPONSES TO PHQ9 QUESTIONS 1 AND 2: 0

## 2022-01-06 ASSESSMENT — PAIN DESCRIPTION - PAIN TYPE
TYPE: ACUTE PAIN;CHRONIC PAIN
TYPE: ACUTE PAIN

## 2022-01-06 NOTE — DISCHARGE PLANNING
Received Choice form at 1400  Agency/Facility Name: 1)[facility],2)[facility],3)[facility],4)[facility] (manually)  Referral sent per Choice form at 1400    1530- Spoke To: Bhupinder  Agency/Facility Name: [facility]  Plan or Request: declined / not admitting

## 2022-01-06 NOTE — DISCHARGE PLANNING
Anticipated Discharge Disposition: sub-acute with rehab; family prefers [facility]    Action: RN CM received call from sonChandra, that he would like to discuss discharge planning options and that he had reviewed list of SNFs with rehab in [facility].  RN CM to patient room to speak with son, son not at bedside at this time.  RN CM will check back     Barriers to Discharge: medical clearance; orders to transfer to floor.  SNF acceptance and transportation     Plan: HCM to remain available to assist with discharge planning needs and barriers       1330: RN CM met with patient's sonChandra at bedside to discuss discharge planning.  Patient's son has decided on choices for [facility], choice form filled out and sent to [facility].  Patient's son reports that transportation costs can be incurred by family and will request assistance with transportation as needed.  Chandra also requests PATRICIO for questions, second email sent to betsy CURRY on request at [email or web address] at his request.

## 2022-01-06 NOTE — PROGRESS NOTES
Critical Care Progress Note    Date of admission  1/1/2022    Chief Complaint  66 y.o. male admitted 1/1/2022 with acute left-sided weakness    Hospital Course  Dayami is a 66 y.o. 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 Additionally found to have subacute infarction of the right temporal an lateral occipital lobes.   Patient was transferred to RICU on 1/3/2022 after being noted to be more lethargic than prior that same AM for more frequent neuro  monitoring and initiation of 3% saline following concerns of mass effect or impending mass effect secondary to edema-  1/06/22-ending permissive HTN and 3% infusion as stroke is completed, long-term blood pressure goal is 110-130/60-80-            -       Interval Problem Update  Reviewed last 24 hour events:   -3% saline infusion with every 6 hour and Hx.  Will discuss discontinuing with neurology  -Every 4 neurochecks  -Blood pressure goal (long-term goal) 110-130/60-80-no longer requires permissive hypertension   -Q 2 hr neuro checks  -gabapentin for nerve pain R lower extrem  - PICC line  -Plan to transfer out of ICU if okay with neurosurgery  -PT OT  -Case management help with placement      1117 -update-DC 3% saline drip per neurology.  Patient may transition to floor.  Discussed patient care/treatment with Dr. Vaughan who is the accepting hospital ist.     Review of Systems  Review of Systems   Constitutional: Negative for chills and fever.   HENT: Negative for hearing loss.    Eyes: Negative for blurred vision.   Respiratory: Negative for cough and shortness of breath.    Cardiovascular: Negative for chest pain.   Gastrointestinal: Negative for abdominal pain, heartburn, nausea and  vomiting.   Genitourinary: Negative for dysuria.   Musculoskeletal: Positive for back pain.        Bilateral toe and knee pain  Chronic back pain, right lower leg pain   Skin: Negative for rash.   Neurological: Positive for sensory change, weakness and headaches. Negative for dizziness.        Left side weakness  Cant feel left extrem   Psychiatric/Behavioral: Negative for depression.        History of        Vital Signs for last 24 hours   Temp:  [35.7 °C (96.3 °F)-36.4 °C (97.5 °F)] 36.1 °C (97 °F)  Pulse:  [] 85  Resp:  [10-26] 19  BP: (107-164)/() 139/75  SpO2:  [92 %-97 %] 96 %    Hemodynamic parameters for last 24 hours       Respiratory Information for the last 24 hours     Physical Exam   Physical Exam  Vitals and nursing note reviewed.   Constitutional:       Appearance: Normal appearance.   HENT:      Head: Normocephalic and atraumatic.      Mouth/Throat:      Mouth: Mucous membranes are moist.   Eyes:      General: Lids are normal.      Pupils: Pupils are equal, round, and reactive to light.   Cardiovascular:      Rate and Rhythm: Normal rate and regular rhythm.      Pulses:           Radial pulses are 2+ on the right side and 2+ on the left side.        Dorsalis pedis pulses are 2+ on the right side and 2+ on the left side.   Pulmonary:      Comments: Equal chest rise and fall with resp  Pt without resp distress  Chest:   Breasts: Breasts are symmetrical.       Abdominal:      General: Bowel sounds are normal.      Comments: Soft , flat round, bowel sounds present   Genitourinary:     Comments: Paiz to down drain- clear yellow urine  Musculoskeletal:      Cervical back: Normal range of motion.   Feet:      Comments: Right dressings in place to bilateral toes- clean dry and intact  Skin:     General: Skin is warm.      Capillary Refill: Capillary refill takes less than 2 seconds.      Comments: busing noted to lower extrem- areas to bl knees    Neurological:      Mental Status: He is alert.       GCS: GCS eye subscore is 4. GCS verbal subscore is 5. GCS motor subscore is 6.      Comments: Awake and oriented to person, place and event.   Following commands on L side.   Speech is dysarthric   PERRL. R gaze preference able to cross midline.   Left facial droop  LUE- no moevment- no sensation  LLE slgith withdrawal- no sensation  RUE and RLE- antigravity      Psychiatric:         Behavior: Behavior is cooperative.      Comments: dysarthric         Medications  Current Facility-Administered Medications   Medication Dose Route Frequency Provider Last Rate Last Admin   • gabapentin (NEURONTIN) capsule 300 mg  300 mg Enteral Tube BID Patricia Jackson M.D.   300 mg at 01/06/22 0553   • losartan (COZAAR) tablet 25 mg  25 mg Enteral Tube DAILY Patricia Jackson M.D.   25 mg at 01/06/22 0553   • oxyCODONE immediate-release (ROXICODONE) tablet 5 mg  5 mg Enteral Tube Q4HRS PRN Patricia Jackson M.D.        Or   • oxyCODONE immediate release (ROXICODONE) tablet 10 mg  10 mg Enteral Tube Q4HRS PRN Patricia Jackson M.D.   10 mg at 01/06/22 1151   • hydrALAZINE (APRESOLINE) injection 20 mg  20 mg Intravenous Q4HRS PRN Patricia Jackson M.D.       • labetalol (NORMODYNE/TRANDATE) injection 10-20 mg  10-20 mg Intravenous Q4HRS PRN Patricia Jackson M.D.   10 mg at 01/06/22 0600   • ipratropium-albuterol (DUONEB) nebulizer solution  3 mL Nebulization Q4H PRN (RT) Toby Vanegas M.D.       • Pharmacy Consult: Enteral tube insertion - review meds/change route/product selection  1 Each Other PHARMACY TO DOSE Samir Acuna D.O.       • enoxaparin (LOVENOX) inj 40 mg  40 mg Subcutaneous DAILY JUSTICE LaraOHawa   40 mg at 01/05/22 1719   • acetaminophen (Tylenol) tablet 650 mg  650 mg Enteral Tube Q6HRS PRN Samir Acuna D.O.   650 mg at 01/06/22 1151   • atorvastatin (LIPITOR) tablet 80 mg  80 mg Enteral Tube Q EVENING Samir Acuna D.O.   80 mg at 01/05/22 1719   • ondansetron (ZOFRAN ODT) dispertab 4 mg  4 mg  Enteral Tube Q4HRS PRN Samir Acuna D.O.       • senna-docusate (PERICOLACE or SENOKOT S) 8.6-50 MG per tablet 2 Tablet  2 Tablet Enteral Tube BID Samir Acuna D.O.   2 Tablet at 01/06/22 0553    And   • polyethylene glycol/lytes (MIRALAX) PACKET 1 Packet  1 Packet Enteral Tube QDAY PRN Samir Acuna D.O.        And   • magnesium hydroxide (MILK OF MAGNESIA) suspension 30 mL  30 mL Enteral Tube QDAY PRN Samir Acuna D.O.        And   • bisacodyl (DULCOLAX) suppository 10 mg  10 mg Rectal QDAY PRN Samir Acuna D.O.       • ondansetron (ZOFRAN) syringe/vial injection 4 mg  4 mg Intravenous Q4HRS PRN Toby Vanegas M.D.           Fluids    Intake/Output Summary (Last 24 hours) at 1/6/2022 1453  Last data filed at 1/6/2022 1000  Gross per 24 hour   Intake 1598.84 ml   Output 925 ml   Net 673.84 ml       Laboratory          Recent Labs     01/05/22  1655 01/05/22  2300 01/06/22  0456   SODIUM 148* 148* 148*   POTASSIUM 3.8 3.9 4.3   CHLORIDE 112 113* 113*   CO2 26 24 23   BUN 18 20 21   CREATININE 0.97 0.99 1.03   CALCIUM 9.4 9.1 9.2     Recent Labs     01/05/22  1655 01/05/22  2300 01/06/22  0456   GLUCOSE 106* 121* 118*         No results for input(s): RBC, HEMOGLOBIN, HEMATOCRIT, PLATELETCT, PROTHROMBTM, APTT, INR, IRON, FERRITIN, TOTIRONBC in the last 72 hours.    Imaging  X-Ray:  I have personally reviewed the images and compared with prior images.    Assessment/Plan  * Stroke due to thrombosis of right carotid artery (HCC)- (present on admission)  Assessment & Plan  -presented on 1/1/2022  -MRI brain suggestive of right frontal and parietal lobe infarction, right basal ganglia infarction, as well as evidence of petechial hemorrhage   -noted to have worsening lethargy the AM of 1/3  -CT head completed prior to transfer to RICU indicates no acute intracranial hemorrhage, however increase MCA territory. Minimal right to left shift  -3% saline drip protocol.to midagate cerebral edema -DC'd 1/6/2022  -q  4 neuro checks  -Enteral tube for PO intake, speech therapy onboard  -permissive hypertension, expires 1/3/22  -Atorvastatin 80mg  -holding aspirin for hemorrhage   -Neurology already onboard  -PT/OT, Physiatry  -gabapentin for nerve pain   -Case management for placement.  Son interested in having patient transferred near him in [place]    Chronic anticoagulation- (present on admission)  Assessment & Plan  -was on chronic warfarin for prior DVT/PEs  -US DVT study on 2022 negative  -MRI indicative of petechial hemorrhage   -TTE without evidence of cardioembolic source-  -per neurology,anticoagulation hold until 2022- consider apixaban 5mg BID   -per neurology, will continue with Lovenox at VTE prophylaxis dose  -Every 4 hours neuro checks  --atorvastatin 80 mg daily for a gaol of <70  - PT/OT/SLT        Primary hypertension- (present on admission)  Assessment & Plan  -permissive hypertension  on 1/3/2021  -3% saline infusion  2022  -Started antihypertensive therapy with home losartan 20 mg daily     Dyslipidemia- (present on admission)  Assessment & Plan  -Continue atorvastatin 80 mg daily for a gaol LDL <70    Depression- (present on admission)  Assessment & Plan  -He takes lexapro and bupropion at home  -holding lexapro due to associated increase in bleeding risk  -holding bupropion due to lowering of seizure threshold       VTE:  Lovenox  Ulcer: Not Indicated  Lines: Central Line  Ongoing indication addressed, Paiz Catheter  Ongoing indication addressed and PICC line    I have performed a physical exam and reviewed and updated ROS and Plan today (2022). In review of yesterday's note (2022), there are no changes except as documented above.     Discussed patient condition and risk of morbidity and/or mortality with Family, RN, RT, Therapies, Pharmacy, Dietary, , Charge nurse / hot rounds, Patient and neurology  The patient remains critically ill.  Critical care time  = 40 minutes in directly providing and coordinating critical care and extensive data review.  No time overlap and excludes procedures.

## 2022-01-06 NOTE — THERAPY
Speech Language Pathology  Daily Treatment     Patient Name: Jose Quintanilla  Age:  66 y.o., Sex:  male  Medical Record #: [id number]  Today's Date: 1/6/2022     Precautions  Precautions: Fall Risk,Swallow Precautions ( See Comments),Nasogastric Tube  Comments: L side deficits     Assessment  Patient seen this date for dysphagia tx session. Patient more awake and alert than previous sessions. Patient's son, Chandra, again present at bedside and appeared supportive. Patient still NPO with Cortrak and eager for PO trials today. Patient consumed PO trials of single ice chips, MTL via tsp and cup sip, liquidized purees, pudding, and thins via tsp. Patient consumed PO trials of ice, MTL, pudding, and thins via tsp with no s/sx of aspiration. Patient had cough x2 on liquidized purees. Minimal left-sided labial residue was noted post-swallow which patient did not sense. Slight residue in left lateral sulci noted as well post-swallow, but patient was able to complete a tongue sweep to left when cued. Patient did fatigue by end of session, but if he continues to improve in alertness, cognition, and therapeutic PO trials, he will likely be ready for FEES soon. SLP is following.     Recommend patient continue NPO with Cortrak. Please continue frequent oral care. SLP is following for FEES soon, if patient continues to be able to sustain alertness.     Plan  Continue current treatment plan.    Discharge Recommendations: Recommend post-acute placement for additional speech therapy services prior to discharge home     Objective     01/06/22 1132   Precautions   Precautions Fall Risk;Swallow Precautions ( See Comments);Nasogastric Tube   Vitals   O2 (LPM) 2   O2 Delivery Device Nasal Cannula   Dysphagia    Positioning / Behavior Modification Self Monitoring;Effortful Swallow;Modulate Rate or Bite Size;Multiple Swallows   Oral / Pharyngeal / Laryngeal Exercises   (None completed today)   Diet / Liquid Recommendation NPO;Pre-Feeding Trials  "with SLP Only   Nutritional Liquid Intake Rating Scale Nothing by mouth   Nutritional Food Intake Rating Scale Nothing by mouth   Nursing Communication Swallow Precaution Sign Posted at Head of Bed   Skilled Intervention Verbal Cueing;Compensatory Strategies   Recommended Route of Medication Administration   Medication Administration  Via Gastric Tube   Short Term Goals   Short Term Goal # 1 Pt will be able to consume prefeeding trials with SLP only with no overt S/Sx of aspiration noted   Goal Outcome # 1 Progressing as expected   Short Term Goal # 2 Pt will be able to complete 10/10 reps of oral motor and laryngeal/pharyngeal exercises with \"good\" accuracy as judged by SLP   Goal Outcome # 2  Other (see comments)  (Not completed today )   Anticipated Discharge Needs   Discharge Recommendations Recommend post-acute placement for additional speech therapy services prior to discharge home         "

## 2022-01-06 NOTE — CARE PLAN
The patient is Watcher - Medium risk of patient condition declining or worsening    Shift Goals  Clinical Goals: keep sbp<160, stable neuro exam, pain control  Patient Goals: wants a gingerale  Family Goals: to get dad to [place]      Problem: Optimal Care of the Stroke Patient  Goal: Optimal acute care for the stroke patient  Outcome: Progressing     Problem: Knowledge Deficit - Stroke Education  Goal: Patient's knowledge of stroke and risk factors will improve  Outcome: Progressing     Problem: Psychosocial - Patient Condition  Goal: Patient's ability to verbalize feelings about condition will improve  Outcome: Progressing  Goal: Patient's ability to re-evaluate and adapt role responsibilities will improve  Outcome: Progressing     Problem: Neuro Status  Goal: Neuro status will remain stable or improve  Outcome: Progressing     Problem: Respiratory - Stroke Patient  Goal: Patient will achieve/maintain optimum respiratory rate/effort  Outcome: Progressing

## 2022-01-06 NOTE — PROGRESS NOTES
Neurology Progress Note  Neurohospitalist Service, [facility]    Referring Physician: Toby Vanegas M.D.      Interval History: No acute events overnight.  More alert this AM.  Complaining of back pain.    Review of systems: In addition to what is detailed in the HPI and/or updated in the interval history, all other systems reviewed and are negative.    Past Medical History, Past Surgical History and Social History reviewed and unchanged from prior    Medications:    Current Facility-Administered Medications:   •  gabapentin (NEURONTIN) capsule 300 mg, 300 mg, Enteral Tube, BID, Patricia Jackson M.D., 300 mg at 01/06/22 0553  •  losartan (COZAAR) tablet 25 mg, 25 mg, Enteral Tube, DAILY, Patricia Jackson M.D., 25 mg at 01/06/22 0553  •  oxyCODONE immediate-release (ROXICODONE) tablet 5 mg, 5 mg, Enteral Tube, Q4HRS PRN **OR** oxyCODONE immediate release (ROXICODONE) tablet 10 mg, 10 mg, Enteral Tube, Q4HRS PRN, Patricia Jackson M.D., 10 mg at 01/06/22 0556  •  hydrALAZINE (APRESOLINE) injection 20 mg, 20 mg, Intravenous, Q4HRS PRN, Patricia Jackson M.D.  •  labetalol (NORMODYNE/TRANDATE) injection 10-20 mg, 10-20 mg, Intravenous, Q4HRS PRN, Patricia Jackson M.D., 10 mg at 01/06/22 0600  •  ipratropium-albuterol (DUONEB) nebulizer solution, 3 mL, Nebulization, Q4H PRN (RT), Toby Vanegas M.D.  •  Pharmacy Consult: Enteral tube insertion - review meds/change route/product selection, 1 Each, Other, PHARMACY TO DOSE, Samir Acuna D.O.  •  enoxaparin (LOVENOX) inj 40 mg, 40 mg, Subcutaneous, DAILY, Samir Acuna D.O., 40 mg at 01/05/22 1719  •  acetaminophen (Tylenol) tablet 650 mg, 650 mg, Enteral Tube, Q6HRS PRN, Samir Acuna D.O., 650 mg at 01/06/22 0443  •  atorvastatin (LIPITOR) tablet 80 mg, 80 mg, Enteral Tube, Q EVENING, Samir Acuna D.O., 80 mg at 01/05/22 1719  •  ondansetron (ZOFRAN ODT) dispertab 4 mg, 4 mg, Enteral Tube, Q4HRS PRN, Samir Acuna D.O.  •   "senna-docusate (PERICOLACE or SENOKOT S) 8.6-50 MG per tablet 2 Tablet, 2 Tablet, Enteral Tube, BID, 2 Tablet at 01/06/22 0553 **AND** polyethylene glycol/lytes (MIRALAX) PACKET 1 Packet, 1 Packet, Enteral Tube, QDAY PRN **AND** magnesium hydroxide (MILK OF MAGNESIA) suspension 30 mL, 30 mL, Enteral Tube, QDAY PRN **AND** bisacodyl (DULCOLAX) suppository 10 mg, 10 mg, Rectal, QDAY PRN, Samir Acuna D.O.  •  ondansetron (ZOFRAN) syringe/vial injection 4 mg, 4 mg, Intravenous, Q4HRS PRN, Toby Vanegas M.D.    Physical Examination:   /75   Pulse 85   Temp 36.1 °C (97 °F)   Resp 19   Ht 1.854 m (6' 0.99\")   Wt 122 kg (268 lb 11.9 oz)   SpO2 96%   BMI 35.46 kg/m²       General: Awake, alert, interactive  Neck: There is normal range of motion  CV: Regular rate   Extremities:  Warm, dry, and intact, without peripheral lower extremity edema    NEUROLOGICAL EXAM:     Mental status: Oriented to name, follows commands on L  Speech and language: Speech is dysarthric and sparse.  Follows distal commands  Cranial nerve exam: PERRL, decreased blink to threat from L, tracks face past midline, L face droop.  Motor exam: There is sustained antigravity with no drift in RUE, RLE withdraws briskly with antigravity movements at knee, LLE withdraws less briskly on plane of bed.  LUE with no movement  Sensory exam:  Does not react to tactile in L hemibody, no apparent neglect  Coordination: No ataxia seen on elicited and spontaneous movements      NIHSS: National Institutes of Health Stroke Scale    [0] 1a:Level of Consciousness    0-alert 1-drowsy   2-stupor   3-coma  [0] 1b:LOC Questions                  0-both  1-one      2-neither  [0] 1c:LOC Commands                   0-both  1-one      2-neither  [0] 2: Best Gaze                     0-nl    1-partial  2-forced  [2] 3: Visual Fields                   0-nl    1-partial  2-complete 3-bilat  [2] 4: Facial Paresis                0-nl    1-minor    2-partial  " 3-full  MOTOR                       0-nl  [0] 5: Right Arm           1-drift  [4] 6: Left Arm             2-some effort vs gravity  [2] 7: Right Leg           3-no effort vs gravity  [2] 8: Left Leg             4-no movement                             x-untestable  [0] 9: Limb Ataxia                    0-abs   1-1_limb   2-2+_limbs       x-untestable  [2] 10:Sensory                        0-nl    1-partial  2-dense  [1] 11:Best Language/Aphasia         0-nl    1-mild/mod 2-severe   3-mute  [2] 12:Dysarthria                     0-nl    1-mild/mod 2-severe       x-untestable  [0] 13:Neglect/Inattention            0-none  1-partial  2-complete  [17] TOTAL      Objective Data:    Labs:  Lab Results   Component Value Date/Time    PROTHROMBTM 14.6 01/01/2022 05:55 PM    INR 1.18 (H) 01/01/2022 05:55 PM      Lab Results   Component Value Date/Time    WBC 15.1 (H) 01/02/2022 01:25 AM    RBC 4.82 01/02/2022 01:25 AM    HEMOGLOBIN 15.9 01/02/2022 01:25 AM    HEMATOCRIT 48.7 01/02/2022 01:25 AM    .0 (H) 01/02/2022 01:25 AM    MCH 33.0 01/02/2022 01:25 AM    MCHC 32.6 (L) 01/02/2022 01:25 AM    MPV 10.4 01/02/2022 01:25 AM    NEUTSPOLYS 79.70 (H) 01/02/2022 01:25 AM    LYMPHOCYTES 10.90 (L) 01/02/2022 01:25 AM    MONOCYTES 7.70 01/02/2022 01:25 AM    EOSINOPHILS 0.50 01/02/2022 01:25 AM    BASOPHILS 0.50 01/02/2022 01:25 AM      Lab Results   Component Value Date/Time    SODIUM 148 (H) 01/06/2022 04:56 AM    POTASSIUM 4.3 01/06/2022 04:56 AM    CHLORIDE 113 (H) 01/06/2022 04:56 AM    CO2 23 01/06/2022 04:56 AM    GLUCOSE 118 (H) 01/06/2022 04:56 AM    BUN 21 01/06/2022 04:56 AM    CREATININE 1.03 01/06/2022 04:56 AM      Lab Results   Component Value Date/Time    CHOLSTRLTOT 204 (H) 01/02/2022 01:25 AM     (H) 01/02/2022 01:25 AM    HDL 35 (A) 01/02/2022 01:25 AM    TRIGLYCERIDE 182 (H) 01/02/2022 01:25 AM       Lab Results   Component Value Date/Time    ALKPHOSPHAT 97 01/02/2022 01:25 AM    ASTSGOT 48 (H)  01/02/2022 01:25 AM    ALTSGPT 31 01/02/2022 01:25 AM    TBILIRUBIN 1.6 (H) 01/02/2022 01:25 AM        Imaging/Testing:    I interpreted and/or reviewed the patient's neuroimaging    DX-ABDOMEN FOR TUBE PLACEMENT   Final Result      1.  Feeding tube tip overlies the distal stomach.      IR-PICC LINE PLACEMENT W/ GUIDANCE > AGE 5   Final Result                  Ultrasound-guided PICC placement performed by qualified nursing staff as    above.          CT-HEAD W/O   Final Result      Evolving moderate to large right middle cerebral artery territory has increased in size when compared with the previous exam. No evidence of acute intracranial hemorrhage.         DX-ABDOMEN FOR TUBE PLACEMENT   Final Result      Cortrak feeding tube tip projects in the region of the proximal/mid stomach.      US-EXTREMITY VENOUS LOWER BILAT   Final Result      MR-BRAIN-W/O   Final Result         1. Age-related cerebral atrophy. Mild effacement of the right lateral ventricle.      2. Mild periventricular white matter changes consistent with chronic microvascular ischemic gliosis.      3. Large area of acute infarction involving the right frontal and parietal lobes as well as the right-sided basal ganglia. Subacute area of infarction involving the right temporal and lateral occipital lobes.      4. Petechial hemorrhage noted in the right basal ganglia and involving the cortex in the right temporal and lateral occipital lobes.      5. Right internal carotid artery occlusion or high-grade stenosis.            EC-ECHOCARDIOGRAM COMPLETE W/O CONT   Final Result      DX-SHOULDER 2+ LEFT   Final Result      Negative 2 views of left shoulder.      DX-SHOULDER 2+ RIGHT   Final Result      Moderate subacromial spurring      DX-FOOT-COMPLETE 3+ RIGHT   Final Result      No radiographic evidence of acute displaced fracture or subluxation.      DX-FOOT-2- LEFT   Final Result      No radiographic evidence of acute displaced fracture or subluxation.       DX-ELBOW-COMPLETE 3+ RIGHT   Final Result      No radiographic evidence of acute traumatic injury.      Limited due to rotation of the lateral radiograph attempts. Consider repeat lateral radiograph to help exclude effusion      CT-CHEST,ABDOMEN,PELVIS WITH   Final Result      No significant traumatic abnormality in thorax, abdomen and pelvis CT scans.      Hepatic steatosis      Moderate colonic diverticulosis.      CT-LSPINE W/O PLUS RECONS   Final Result      No CT evidence of acute traumatic injury.      Moderate degenerative disc disease with trace degenerative L5/S1 retrolisthesis      CT-TSPINE W/O PLUS RECONS   Final Result      No CT evidence of acute traumatic abnormality.      CT-CSPINE WITHOUT PLUS RECONS   Final Result      No CT evidence of acute cervical spine abnormality.      CT-HEAD W/O   Final Result      Subacute right temporal, temporo-occipital infarction without hemorrhagic transformation      CT-CTA HEAD WITH & W/O-POST PROCESS   Final Result      Right internal carotid artery occlusion with extension to the MCA which lacks contrast opacification      Right A1 and KATIA contrast opacification secondary to cross-filling from the left      Normal left anterior and bilateral posterior circulation      Findings of all of the CTs were discussed with Dr. Betancourt before completion of this dictation.            CT-CTA NECK WITH & W/O-POST PROCESSING   Final Result      Occluded right internal carotid artery at its origin      Atherosclerosis at origin of bilateral vertebral arteries could obscure stenosis. Arteries are patent without post stenotic dilatation      DX-PELVIS-1 OR 2 VIEWS   Final Result      Negative AP view of the pelvis.      DX-CHEST-LIMITED (1 VIEW)   Final Result      No acute cardiopulmonary disease.      US-ABORTED US PROCEDURE    (Results Pending)       Assessment and Plan:  Jose Quintanilla is a 66 year old man presenting with R MCA syndrome, found to have an occluded R ICA, and a  relatively large completed R MCA infarct on MRI.  There is some petechial hemorrhagic conversion and associated mass effect related to evolving cerebral edema.  He is now day 7 from his stroke onset, and Is at the end of his peak edema window.   Will liberalize neurochecks and Na goals today.  Would reinitiate anticoagulation in ~ 1 week.    Problem list:  1.  Large R MCA stroke  2.  Pulmonary emboli  3.  Anticoagulated, subtherapeutic    Plan:   - q4h neurochecks   - no need for permissive HTN as stroke is completed, long-term goal is 110-130/60-80- mostly at goal without interventions, ok to start PO anti-HTN if necessary   - stop 3%, allow Na to trend down- do not actively correct   - stroke labs:  HgbA1c 5.3 and    - continue atorvastatin 80 mg daily for goal LDL < 70   - TTE without evidence of cardioembolic source- if anticoagulation is not anticipated to be lifelong- will need long-term cardiac monitoring at discharge   - restart anticoagulation in 2 weeks (1/13 start date), consider apixaban 5mg BID    - PT/OT/SLP   - lovenox SQ for DVT chemoppx ok    The evaluation of the patient, and recommended management, was discussed with the attending intensivist. I have performed a physical exam and reviewed and updated ROS and Plan today (1/6/2022).     Darci Watson MD  Neurohospitalist, Acute Care Services

## 2022-01-06 NOTE — THERAPY
"Speech Language Pathology  Daily Treatment     Patient Name: Jose Quintanilla  Age:  66 y.o., Sex:  male  Medical Record #: [id number]  Today's Date: 1/5/2022     Precautions  Precautions: Fall Risk,Swallow Precautions ( See Comments),Nasogastric Tube  Comments: L side deficits     Assessment  Patient seen this date for dysphagia tx session. Patient currently NPO with Cortrak. Patient sleeping, but rousing to verbal and tactile cues, though sleepy throughout session. Patient was able to sustain alertness during tx session more than previous sessions. Patient's son present at bedside as well and all questions answered. Patient was provided oral care via toothette and water. Patient consumed PO trials of single ice chips and MTL via tsp. Patient had no overt s/sx of aspiration on single ice chips, but reduced A-P transit was noted with anterior bolus loss x1. Patient had intermittent coughing on PO trials of MTL via tsp, which is concerning for penetration/aspiration. Patient was able to complete minimal reps of oral motor, laryngeal elevation, and pharyngeal constriction exercises with \"fair\" accuracy today. Patient did fatigue by end of session.     Recommend patient continue strict NPO with Cortrak. Please continue frequent oral care. SLP is following. Patient will need FEES when able/appropriate. SLP is following.      Plan  Continue current treatment plan.    Discharge Recommendations: Recommend post-acute placement for additional speech therapy services prior to discharge home     Objective     01/05/22 4781   Precautions   Precautions Fall Risk;Swallow Precautions ( See Comments);Nasogastric Tube   Comments L side deficits    Vitals   O2 Delivery Device None - Room Air   Dysphagia    Positioning / Behavior Modification Self Monitoring;Multiple Swallows;Modulate Rate or Bite Size;Cough / Clear after Swallow;Effortful Swallow   Oral / Pharyngeal / Laryngeal Exercises Laryngeal Exercises;Pharyngeal Constriction " "Exercises   Diet / Liquid Recommendation NPO;Pre-Feeding Trials with SLP Only   Nutritional Liquid Intake Rating Scale Nothing by mouth   Nutritional Food Intake Rating Scale Nothing by mouth   Nursing Communication Swallow Precaution Sign Posted at Head of Bed   Skilled Intervention Verbal Cueing;Compensatory Strategies   Recommended Route of Medication Administration   Medication Administration  Via Gastric Tube   Short Term Goals   Short Term Goal # 1 Pt will be able to consume prefeeding trials with SLP only with no overt S/Sx of aspiration noted   Goal Outcome # 1 Progressing slower than expected   Short Term Goal # 2 Pt will be able to complete 10/10 reps of oral motor and laryngeal/pharyngeal exercises with \"good\" accuracy as judged by SLP   Goal Outcome # 2  Progressing slower than expected   Anticipated Discharge Needs   Discharge Recommendations Recommend post-acute placement for additional speech therapy services prior to discharge home         "

## 2022-01-06 NOTE — THERAPY
Occupational Therapy  Daily Treatment     Patient Name: Jose Quintanilla  Age:  66 y.o., Sex:  male  Medical Record #: [id number]  Today's Date: 1/6/2022     Precautions  Precautions: (P) Fall Risk,Swallow Precautions ( See Comments)  Comments: L side deficits     Assessment    Pt currently limited by decreased functional mobility, activity tolerance, cognition, sensation, strength, AROM, coordination, balance, and pain which are affecting pt's ability to complete ADLs/IADLs at baseline. Pt would benefit from OT services in the acute care setting to maximize functional recovery.     Plan    Continue current treatment plan.       Discharge Recommendations: (P) Recommend post-acute placement for additional occupational therapy services prior to discharge home       01/06/22 1154   Activities of Daily Living   Grooming Total Assist   Upper Body Dressing Total Assist   Lower Body Dressing Total Assist   Toileting Total Assist   Functional Mobility   Sit to Stand Unable to Participate   Bed, Chair, Wheelchair Transfer Unable to Participate   Short Term Goals   Short Term Goal # 1 Mod A with washing face with a cloth   Goal Outcome # 1 Progressing slower than expected   Short Term Goal # 2 mod A with UB dressing   Goal Outcome # 2 Progressing slower than expected   Short Term Goal # 3 mod A with ADL txfs   Goal Outcome # 3 Progressing slower than expected

## 2022-01-06 NOTE — THERAPY
Physical Therapy   Daily Treatment     Patient Name: Jose Quintanilla  Age:  66 y.o., Sex:  male  Medical Record #: [id number]  Today's Date: 1/6/2022     Precautions  Precautions: Fall Risk;Swallow Precautions ( See Comments)  Comments: L-sided deficits     Assessment    Patient seen for follow up PT treatment session. He demos improved alertness and command following. He has some strength in L ankle compared to eval.  He was able to tolerate sitting EOB for 15 minutes with CGA to maxA depending on facilitation and fatigue.  Provided balance training at EOB as patient is not appropriate for transfers at this time.  He requires total assist for bed mobility 2/2 body habitus.  Will continue to follow and recommend placement for further therapy    Plan    Continue current treatment plan.    DC Equipment Recommendations: Unable to determine at this time  Discharge Recommendations: Recommend post-acute placement for additional physical therapy services prior to discharge home      Subjective    Patient reports that his back hurts     Objective       01/06/22 1130   Precautions   Precautions Fall Risk;Swallow Precautions ( See Comments)   Comments L-sided deficits    Vitals   Pulse 97   Respiration 16   Pulse Oximetry 97 %   Pain 0 - 10 Group   Location Back   Therapist Pain Assessment Post Activity Pain Same as Prior to Activity  (chronic back pain, aggravated by laying in bed)   Cognition    Cognition / Consciousness X   Speech/ Communication Delayed Responses;Dysarthric;Expressive Aphasia   Level of Consciousness Alert   Ability To Follow Commands 1 Step   Safety Awareness Impaired   New Learning Impaired   Attention Impaired   Initiation Other (See Comments)   Comments L inattention, impaired insight, increased time to follow commands    Active ROM Lower Body    Active ROM Lower Body  X   Comments L LE with some active DF/PF   Strength Lower Body   Lower Body Strength  X   Comments R LE grossly 3/5, L LE 2/5 DF/PF    Sensation Lower Body   Lower Extremity Sensation   Not Tested   Sitting Lower Body Exercises   Sitting Lower Body Exercises Yes   Ankle Pumps 1 set of 10;Bilateral   Long Arc Quad 1 set of 10;Right   Comments high fives with postural facilitation    Neuro-Muscular Treatments   Neuro-Muscular Treatments Anterior weight shift;Tactile Cuing;Sequencing;Postural Facilitation;Verbal Cuing;Tapping;Compensatory Strategies   Comments seated balance exercises with facilitation for posture. Able to sit with supervision for 30 second increments   Vision   Visual Field Cut Compensatory techniques   Vision Comments L-inattention, max cueing and facilitation to visually track to midline    Other Treatments   Other Treatments Provided postural facilitation and joint approximation in sitting    Neurological Concerns   Neurological Concerns Yes   Sitting Posture During ADL's Pushes to the Left;Posterior Lean   Balance   Sitting Balance (Static) Poor +   Sitting Balance (Dynamic) Poor -   Skilled Intervention Postural Facilitation;Sequencing;Tactile Cuing;Verbal Cuing   Gait Analysis   Gait Level Of Assist Unable to Participate   Bed Mobility    Supine to Sit Total Assist   Sit to Supine Total Assist   Scooting Total Assist   Rolling Total Assist to Rt.   Skilled Intervention Sequencing;Tactile Cuing;Verbal Cuing   Functional Mobility   Sit to Stand Unable to Participate   Bed, Chair, Wheelchair Transfer Unable to Participate   How much difficulty does the patient currently have...   Turning over in bed (including adjusting bedclothes, sheets and blankets)? 2   Sitting down on and standing up from a chair with arms (e.g., wheelchair, bedside commode, etc.) 1   Moving from lying on back to sitting on the side of the bed? 2   How much help from another person does the patient currently need...   Moving to and from a bed to a chair (including a wheelchair)? 1   Need to walk in a hospital room? 1   Climbing 3-5 steps with a railing? 1    6 clicks Mobility Score 8   Activity Tolerance   Sitting Edge of Bed 15 min    Comments easily fatigues    Short Term Goals    Short Term Goal # 1 Patient will move supine<>sitting EOB with bed features and mod A within 6tx in order to get in/out of bed   Goal Outcome # 1 goal not met   Short Term Goal # 2 Patient will maintain dynamic sitting balance for 5 min with min A within 6tx in order to perform functional task   Goal Outcome # 2 Goal not met   Short Term Goal # 3 Patient will transfer with mod A within 6tx in order to achieve functional transfer   Goal Outcome # 3 Goal not met   Anticipated Discharge Equipment and Recommendations   DC Equipment Recommendations Unable to determine at this time   Discharge Recommendations Recommend post-acute placement for additional physical therapy services prior to discharge home       Moon Capellan, PT, DPT, [facility]

## 2022-01-07 LAB
ANION GAP SERPL CALC-SCNC: 12 MMOL/L (ref 7–16)
ANION GAP SERPL CALC-SCNC: 13 MMOL/L (ref 7–16)
BUN SERPL-MCNC: 23 MG/DL (ref 8–22)
BUN SERPL-MCNC: 25 MG/DL (ref 8–22)
CALCIUM SERPL-MCNC: 8.8 MG/DL (ref 8.5–10.5)
CALCIUM SERPL-MCNC: 9 MG/DL (ref 8.5–10.5)
CHLORIDE SERPL-SCNC: 107 MMOL/L (ref 96–112)
CHLORIDE SERPL-SCNC: 108 MMOL/L (ref 96–112)
CO2 SERPL-SCNC: 22 MMOL/L (ref 20–33)
CO2 SERPL-SCNC: 23 MMOL/L (ref 20–33)
CREAT SERPL-MCNC: 0.96 MG/DL (ref 0.5–1.4)
CREAT SERPL-MCNC: 1.07 MG/DL (ref 0.5–1.4)
GLUCOSE SERPL-MCNC: 115 MG/DL (ref 65–99)
GLUCOSE SERPL-MCNC: 129 MG/DL (ref 65–99)
POTASSIUM SERPL-SCNC: 3.8 MMOL/L (ref 3.6–5.5)
POTASSIUM SERPL-SCNC: 3.9 MMOL/L (ref 3.6–5.5)
SODIUM SERPL-SCNC: 142 MMOL/L (ref 135–145)
SODIUM SERPL-SCNC: 143 MMOL/L (ref 135–145)

## 2022-01-07 PROCEDURE — 700111 HCHG RX REV CODE 636 W/ 250 OVERRIDE (IP): Performed by: HOSPITALIST

## 2022-01-07 PROCEDURE — 770020 HCHG ROOM/CARE - TELE (206)

## 2022-01-07 PROCEDURE — 36415 COLL VENOUS BLD VENIPUNCTURE: CPT

## 2022-01-07 PROCEDURE — 99232 SBSQ HOSP IP/OBS MODERATE 35: CPT | Performed by: PSYCHIATRY & NEUROLOGY

## 2022-01-07 PROCEDURE — 700102 HCHG RX REV CODE 250 W/ 637 OVERRIDE(OP): Performed by: HOSPITALIST

## 2022-01-07 PROCEDURE — 80048 BASIC METABOLIC PNL TOTAL CA: CPT

## 2022-01-07 PROCEDURE — A9270 NON-COVERED ITEM OR SERVICE: HCPCS | Performed by: STUDENT IN AN ORGANIZED HEALTH CARE EDUCATION/TRAINING PROGRAM

## 2022-01-07 PROCEDURE — A9270 NON-COVERED ITEM OR SERVICE: HCPCS | Performed by: HOSPITALIST

## 2022-01-07 PROCEDURE — 99233 SBSQ HOSP IP/OBS HIGH 50: CPT | Performed by: STUDENT IN AN ORGANIZED HEALTH CARE EDUCATION/TRAINING PROGRAM

## 2022-01-07 PROCEDURE — 700102 HCHG RX REV CODE 250 W/ 637 OVERRIDE(OP): Performed by: STUDENT IN AN ORGANIZED HEALTH CARE EDUCATION/TRAINING PROGRAM

## 2022-01-07 PROCEDURE — 700105 HCHG RX REV CODE 258: Performed by: STUDENT IN AN ORGANIZED HEALTH CARE EDUCATION/TRAINING PROGRAM

## 2022-01-07 RX ORDER — SODIUM CHLORIDE 450 MG/100ML
INJECTION, SOLUTION INTRAVENOUS CONTINUOUS
Status: DISCONTINUED | OUTPATIENT
Start: 2022-01-07 | End: 2022-01-12

## 2022-01-07 RX ADMIN — ATORVASTATIN CALCIUM 80 MG: 80 TABLET, FILM COATED ORAL at 18:23

## 2022-01-07 RX ADMIN — GABAPENTIN 300 MG: 300 CAPSULE ORAL at 18:23

## 2022-01-07 RX ADMIN — BUPROPION HYDROCHLORIDE 100 MG: 100 TABLET, FILM COATED ORAL at 18:23

## 2022-01-07 RX ADMIN — DOCUSATE SODIUM 50 MG AND SENNOSIDES 8.6 MG 2 TABLET: 8.6; 5 TABLET, FILM COATED ORAL at 18:23

## 2022-01-07 RX ADMIN — OXYCODONE HYDROCHLORIDE 10 MG: 10 TABLET ORAL at 02:32

## 2022-01-07 RX ADMIN — OXYCODONE HYDROCHLORIDE 10 MG: 10 TABLET ORAL at 14:07

## 2022-01-07 RX ADMIN — BUPROPION HYDROCHLORIDE 100 MG: 100 TABLET, FILM COATED ORAL at 05:01

## 2022-01-07 RX ADMIN — LOSARTAN POTASSIUM 25 MG: 50 TABLET, FILM COATED ORAL at 05:01

## 2022-01-07 RX ADMIN — OXYCODONE HYDROCHLORIDE 10 MG: 10 TABLET ORAL at 09:39

## 2022-01-07 RX ADMIN — OXYCODONE HYDROCHLORIDE 10 MG: 10 TABLET ORAL at 18:23

## 2022-01-07 RX ADMIN — GABAPENTIN 300 MG: 300 CAPSULE ORAL at 05:01

## 2022-01-07 RX ADMIN — SODIUM CHLORIDE: 4.5 INJECTION, SOLUTION INTRAVENOUS at 12:33

## 2022-01-07 RX ADMIN — ENOXAPARIN SODIUM 40 MG: 40 INJECTION SUBCUTANEOUS at 18:23

## 2022-01-07 ASSESSMENT — COGNITIVE AND FUNCTIONAL STATUS - GENERAL
DAILY ACTIVITIY SCORE: 10
TOILETING: TOTAL
PERSONAL GROOMING: A LOT
DRESSING REGULAR LOWER BODY CLOTHING: A LOT
SUGGESTED CMS G CODE MODIFIER MOBILITY: CM
MOVING TO AND FROM BED TO CHAIR: UNABLE
CLIMB 3 TO 5 STEPS WITH RAILING: TOTAL
MOBILITY SCORE: 7
MOVING FROM LYING ON BACK TO SITTING ON SIDE OF FLAT BED: UNABLE
SUGGESTED CMS G CODE MODIFIER DAILY ACTIVITY: CL
DRESSING REGULAR UPPER BODY CLOTHING: A LOT
HELP NEEDED FOR BATHING: TOTAL
EATING MEALS: A LOT
STANDING UP FROM CHAIR USING ARMS: TOTAL
TURNING FROM BACK TO SIDE WHILE IN FLAT BAD: A LOT
WALKING IN HOSPITAL ROOM: TOTAL

## 2022-01-07 ASSESSMENT — ENCOUNTER SYMPTOMS
COUGH: 1
VOMITING: 0
ORTHOPNEA: 0
DIZZINESS: 0
CHILLS: 0
HEMOPTYSIS: 0
FOCAL WEAKNESS: 1
SPUTUM PRODUCTION: 0
PALPITATIONS: 0
SPEECH CHANGE: 1
NAUSEA: 0
WEAKNESS: 1

## 2022-01-07 ASSESSMENT — PAIN DESCRIPTION - PAIN TYPE
TYPE: ACUTE PAIN;CHRONIC PAIN

## 2022-01-07 NOTE — THERAPY
Missed Therapy     Patient Name: Jose Quintanilla  Age:  66 y.o., Sex:  male  Medical Record #: [id number]  Today's Date: 1/7/2022 01/07/22 1350   Interdisciplinary Plan of Care Collaboration   Collaboration Comments Attempted therapy follow up session. Per RN hold on therapy pt is lethargic and difficult to arouse. Will follow up as able.

## 2022-01-07 NOTE — PROGRESS NOTES
Monitor summary: SR/ST , VA 0.14, QRS 0.10, QT 0.32, with PVCs per strip from monitor room.

## 2022-01-07 NOTE — PROGRESS NOTES
Monitor Summary: , RI -0.18, QRS -0.08, QT -0.36, with rare PVC per strip from the monitor room.

## 2022-01-07 NOTE — PROGRESS NOTES
Neurology Progress Note  Neurohospitalist Service, [facility], formerly [facility] Neurosciences    Referring Physician: Toby Vanegas M.D.      Interval History: No acute events overnight. Transferred to Neuroscience floor.  Stable exam.    Review of systems: In addition to what is detailed in the HPI and/or updated in the interval history, all other systems reviewed and are negative.    Past Medical History, Past Surgical History and Social History reviewed and unchanged from prior    Medications:    Current Facility-Administered Medications:   •  atorvastatin (LIPITOR) tablet 80 mg, 80 mg, Oral, Nightly, Wiliam Vaughan M.D., 80 mg at 01/06/22 2048  •  buPROPion (WELLBUTRIN) tablet 100 mg, 100 mg, Enteral Tube, BID, Wiliam Vaughan M.D., 100 mg at 01/07/22 0501  •  gabapentin (NEURONTIN) capsule 300 mg, 300 mg, Enteral Tube, BID, Patricia Jackson M.D., 300 mg at 01/07/22 0501  •  losartan (COZAAR) tablet 25 mg, 25 mg, Enteral Tube, DAILY, Patricia Jackson M.D., 25 mg at 01/07/22 0501  •  oxyCODONE immediate-release (ROXICODONE) tablet 5 mg, 5 mg, Enteral Tube, Q4HRS PRN **OR** oxyCODONE immediate release (ROXICODONE) tablet 10 mg, 10 mg, Enteral Tube, Q4HRS PRN, Patricia Jackson M.D., 10 mg at 01/07/22 0232  •  hydrALAZINE (APRESOLINE) injection 20 mg, 20 mg, Intravenous, Q4HRS PRN, Patricia Jackson M.D.  •  labetalol (NORMODYNE/TRANDATE) injection 10-20 mg, 10-20 mg, Intravenous, Q4HRS PRN, Patricia Jackson M.D., 10 mg at 01/06/22 0600  •  ipratropium-albuterol (DUONEB) nebulizer solution, 3 mL, Nebulization, Q4H PRN (RT), Toby Vanegas M.D.  •  Pharmacy Consult: Enteral tube insertion - review meds/change route/product selection, 1 Each, Other, PHARMACY TO DOSE, Samir Acuna D.O.  •  enoxaparin (LOVENOX) inj 40 mg, 40 mg, Subcutaneous, DAILY, Samir Acuna D.O., 40 mg at 01/06/22 1702  •  acetaminophen (Tylenol) tablet 650 mg, 650 mg, Enteral Tube, Q6HRS PRN, Samir Acuna D.O., 650 mg at 01/06/22 1151  •   "atorvastatin (LIPITOR) tablet 80 mg, 80 mg, Enteral Tube, Q EVENING, JUSTICE LaraO., 80 mg at 01/06/22 1701  •  ondansetron (ZOFRAN ODT) dispertab 4 mg, 4 mg, Enteral Tube, Q4HRS PRN, Samir Acuna D.O.  •  senna-docusate (PERICOLACE or SENOKOT S) 8.6-50 MG per tablet 2 Tablet, 2 Tablet, Enteral Tube, BID, 2 Tablet at 01/06/22 0553 **AND** polyethylene glycol/lytes (MIRALAX) PACKET 1 Packet, 1 Packet, Enteral Tube, QDAY PRN **AND** magnesium hydroxide (MILK OF MAGNESIA) suspension 30 mL, 30 mL, Enteral Tube, QDAY PRN **AND** bisacodyl (DULCOLAX) suppository 10 mg, 10 mg, Rectal, QDAY PRN, Samir Acuna D.O.  •  ondansetron (ZOFRAN) syringe/vial injection 4 mg, 4 mg, Intravenous, Q4HRS PRN, Toby Vanegas M.D.    Physical Examination:   /89   Pulse (!) 105   Temp 37.4 °C (99.3 °F) (Temporal)   Resp 20   Ht 1.854 m (6' 0.99\")   Wt 122 kg (268 lb 11.9 oz)   SpO2 92%   BMI 35.46 kg/m²       General: Awake, alert, interactive  Neck: There is normal range of motion  CV: Regular rate   Extremities:  Warm, dry, and intact, without peripheral lower extremity edema    NEUROLOGICAL EXAM:     Mental status: Oriented to name, follows commands on L  Speech and language: Speech is dysarthric and sparse.  Follows distal commands  Cranial nerve exam: PERRL, decreased blink to threat from L, tracks face past midline, L face droop.  Motor exam: There is sustained antigravity with no drift in RUE, RLE withdraws briskly with antigravity movements at knee, LLE withdraws less briskly on plane of bed.  LUE with no movement  Sensory exam:  Does not react to tactile in L hemibody, no apparent neglect  Coordination: No ataxia seen on elicited and spontaneous movements      NIHSS: National Institutes of Health Stroke Scale    [0] 1a:Level of Consciousness    0-alert 1-drowsy   2-stupor   3-coma  [0] 1b:LOC Questions                  0-both  1-one      2-neither  [0] 1c:LOC Commands                   0-both  1-one      " 2-neither  [0] 2: Best Gaze                     0-nl    1-partial  2-forced  [2] 3: Visual Fields                   0-nl    1-partial  2-complete 3-bilat  [2] 4: Facial Paresis                0-nl    1-minor    2-partial  3-full  MOTOR                       0-nl  [0] 5: Right Arm           1-drift  [4] 6: Left Arm             2-some effort vs gravity  [2] 7: Right Leg           3-no effort vs gravity  [2] 8: Left Leg             4-no movement                             x-untestable  [0] 9: Limb Ataxia                    0-abs   1-1_limb   2-2+_limbs       x-untestable  [2] 10:Sensory                        0-nl    1-partial  2-dense  [1] 11:Best Language/Aphasia         0-nl    1-mild/mod 2-severe   3-mute  [2] 12:Dysarthria                     0-nl    1-mild/mod 2-severe       x-untestable  [0] 13:Neglect/Inattention            0-none  1-partial  2-complete  [17] TOTAL      Objective Data:    Labs:  Lab Results   Component Value Date/Time    PROTHROMBTM 14.6 01/01/2022 05:55 PM    INR 1.18 (H) 01/01/2022 05:55 PM      Lab Results   Component Value Date/Time    WBC 15.1 (H) 01/02/2022 01:25 AM    RBC 4.82 01/02/2022 01:25 AM    HEMOGLOBIN 15.9 01/02/2022 01:25 AM    HEMATOCRIT 48.7 01/02/2022 01:25 AM    .0 (H) 01/02/2022 01:25 AM    MCH 33.0 01/02/2022 01:25 AM    MCHC 32.6 (L) 01/02/2022 01:25 AM    MPV 10.4 01/02/2022 01:25 AM    NEUTSPOLYS 79.70 (H) 01/02/2022 01:25 AM    LYMPHOCYTES 10.90 (L) 01/02/2022 01:25 AM    MONOCYTES 7.70 01/02/2022 01:25 AM    EOSINOPHILS 0.50 01/02/2022 01:25 AM    BASOPHILS 0.50 01/02/2022 01:25 AM      Lab Results   Component Value Date/Time    SODIUM 146 (H) 01/06/2022 08:43 PM    POTASSIUM 4.0 01/06/2022 08:43 PM    CHLORIDE 112 01/06/2022 08:43 PM    CO2 21 01/06/2022 08:43 PM    GLUCOSE 91 01/06/2022 08:43 PM    BUN 19 01/06/2022 08:43 PM    CREATININE 1.03 01/06/2022 08:43 PM      Lab Results   Component Value Date/Time    CHOLSTRLTOT 204 (H) 01/02/2022 01:25 AM    LDL  133 (H) 01/02/2022 01:25 AM    HDL 35 (A) 01/02/2022 01:25 AM    TRIGLYCERIDE 182 (H) 01/02/2022 01:25 AM       Lab Results   Component Value Date/Time    ALKPHOSPHAT 97 01/02/2022 01:25 AM    ASTSGOT 48 (H) 01/02/2022 01:25 AM    ALTSGPT 31 01/02/2022 01:25 AM    TBILIRUBIN 1.6 (H) 01/02/2022 01:25 AM        Imaging/Testing:    I interpreted and/or reviewed the patient's neuroimaging    DX-ABDOMEN FOR TUBE PLACEMENT   Final Result      1.  Feeding tube tip overlies the distal stomach.      IR-PICC LINE PLACEMENT W/ GUIDANCE > AGE 5   Final Result                  Ultrasound-guided PICC placement performed by qualified nursing staff as    above.          CT-HEAD W/O   Final Result      Evolving moderate to large right middle cerebral artery territory has increased in size when compared with the previous exam. No evidence of acute intracranial hemorrhage.         DX-ABDOMEN FOR TUBE PLACEMENT   Final Result      Cortrak feeding tube tip projects in the region of the proximal/mid stomach.      US-EXTREMITY VENOUS LOWER BILAT   Final Result      MR-BRAIN-W/O   Final Result         1. Age-related cerebral atrophy. Mild effacement of the right lateral ventricle.      2. Mild periventricular white matter changes consistent with chronic microvascular ischemic gliosis.      3. Large area of acute infarction involving the right frontal and parietal lobes as well as the right-sided basal ganglia. Subacute area of infarction involving the right temporal and lateral occipital lobes.      4. Petechial hemorrhage noted in the right basal ganglia and involving the cortex in the right temporal and lateral occipital lobes.      5. Right internal carotid artery occlusion or high-grade stenosis.            EC-ECHOCARDIOGRAM COMPLETE W/O CONT   Final Result      DX-SHOULDER 2+ LEFT   Final Result      Negative 2 views of left shoulder.      DX-SHOULDER 2+ RIGHT   Final Result      Moderate subacromial spurring      DX-FOOT-COMPLETE 3+  RIGHT   Final Result      No radiographic evidence of acute displaced fracture or subluxation.      DX-FOOT-2- LEFT   Final Result      No radiographic evidence of acute displaced fracture or subluxation.      DX-ELBOW-COMPLETE 3+ RIGHT   Final Result      No radiographic evidence of acute traumatic injury.      Limited due to rotation of the lateral radiograph attempts. Consider repeat lateral radiograph to help exclude effusion      CT-CHEST,ABDOMEN,PELVIS WITH   Final Result      No significant traumatic abnormality in thorax, abdomen and pelvis CT scans.      Hepatic steatosis      Moderate colonic diverticulosis.      CT-LSPINE W/O PLUS RECONS   Final Result      No CT evidence of acute traumatic injury.      Moderate degenerative disc disease with trace degenerative L5/S1 retrolisthesis      CT-TSPINE W/O PLUS RECONS   Final Result      No CT evidence of acute traumatic abnormality.      CT-CSPINE WITHOUT PLUS RECONS   Final Result      No CT evidence of acute cervical spine abnormality.      CT-HEAD W/O   Final Result      Subacute right temporal, temporo-occipital infarction without hemorrhagic transformation      CT-CTA HEAD WITH & W/O-POST PROCESS   Final Result      Right internal carotid artery occlusion with extension to the MCA which lacks contrast opacification      Right A1 and KATIA contrast opacification secondary to cross-filling from the left      Normal left anterior and bilateral posterior circulation      Findings of all of the CTs were discussed with Dr. Betancourt before completion of this dictation.            CT-CTA NECK WITH & W/O-POST PROCESSING   Final Result      Occluded right internal carotid artery at its origin      Atherosclerosis at origin of bilateral vertebral arteries could obscure stenosis. Arteries are patent without post stenotic dilatation      DX-PELVIS-1 OR 2 VIEWS   Final Result      Negative AP view of the pelvis.      DX-CHEST-LIMITED (1 VIEW)   Final Result      No acute  cardiopulmonary disease.      US-ABORTED US PROCEDURE    (Results Pending)       Assessment and Plan:  Jose Quintanilla is a 66 year old man presenting with R MCA syndrome, found to have an occluded R ICA, and a relatively large completed R MCA infarct on MRI.  There is some petechial hemorrhagic conversion and associated mass effect related to evolving cerebral edema.  He is now day 8 from his stroke onset, and is outside peak edema window.   Would reinitiate anticoagulation in ~ 1 week.    Problem list:  1.  Large R MCA stroke  2.  Pulmonary emboli  3.  Anticoagulated, subtherapeutic    Plan:   - q4h neurochecks   - long-term goal is 110-130/60-80- titrate PO anti-HTN to goal   - stroke labs:  HgbA1c 5.3 and    - continue atorvastatin 80 mg daily for goal LDL < 70   - TTE without evidence of cardioembolic source- if anticoagulation is not anticipated to be lifelong- will need long-term cardiac monitoring at discharge   - restart anticoagulation on 1/13, consider apixaban 5mg BID    - PT/OT/SLP   - lovenox SQ for DVT chemoppx ok   - anticipate long-term care will be in [place].  If remaining in area- will need stroke bridge clinic follow up   - Neurology available for any additional questions or concerns from family or care team.  Please page on-call Neurology as needed.    The evaluation of the patient, and recommended management, was discussed with the attending hospitalist. I have performed a physical exam and reviewed and updated ROS and Plan today (1/7/2022).     Darci Watson MD  Neurohospitalist, Acute Care Services

## 2022-01-07 NOTE — CARE PLAN
The patient is Stable - Low risk of patient condition declining or worsening    Shift Goals  Clinical Goals: pain management, increased neuro  Patient Goals: rest  Family Goals: no family present at this time    Progress made toward(s) clinical / shift goals:  Patient alert and oriented to self, place and situation overnight. PRN pain meds give q4 hours. Patient able to tell me he was in pain with an appropriate pain scale number rating. Patient also stated he was scared to be alone. Reassured that he was not alone and there is an entire team of people that are here to help him recover. Patient moved from previous ICU bed to unit bed. Patient had a BM overnight. Q2 turns in place. Call light in reach, educated on use, bed in lowest and locked position, strip alarm in place, hourly rounding continues.     Patient is not progressing towards the following goals:

## 2022-01-07 NOTE — HOSPITAL COURSE
Mr Quintanilla has past medical history which includes hypertension, hyperlipidemia, DVT/PE and was treated with anticoagulation prior to admission.  Presented on 1/1/2022 after a fall with symptoms of left hemiparesis and rightward gaze.  Patient was evaluated and cleared by trauma.  Neuro imaging was concerning for right-sided carotid occlusion.Mri noted with  Large area of acute infarction involving the right frontal and parietal lobes as well as the right-sided basal ganglia. Subacute area of infarction involving the right temporal and lateral occipital lobes.  Patient was evaluated neurology with recommendations for admission to the ICU, permissive hypertension, and follow-up imaging.  started on anticoagulation on 1/13/2022 .Chest x-ray noted with bilateral interstitial opacities concerning for aspiration pneumonia, he was treated with complete course of Augmentin. Pt also has asthma and was treated for acute asthma exacerbation. SLP following. patient did have tube feeds, modified diet started on 1/17.   Patient is pending placement in [place] to be near family.

## 2022-01-07 NOTE — PROGRESS NOTES
Hospital Medicine Daily Progress Note    Date of Service  1/7/2022    Chief Complaint  Jose Quintanilla is a 66 y.o. male admitted 1/1/2022 with a fall    Hospital Course  Mr Quintanilla has past medical history which includes hypertension, hyperlipidemia, DVT/PE and was treated with anticoagulation prior to admission.  Presented on 1/1/2022 after a fall with symptoms of left hemiparesis and rightward gaze.  Patient was evaluated and cleared by trauma.  Neuro imaging was concerning for right-sided carotid occlusion.  Patient was evaluated neurology with recommendations for admission to the ICU, permissive hypertension, and follow-up imaging.  Patient's mental status worsened on 1/3/2022 when he was started on hypertonic saline.  His alertness has improved and hypertonic saline was stopped on 1/6/2022.  Neurology recommends restarting anticoagulation on 1/13/2022 with consideration for apixaban.    Interval Problem Update  No acute events overnight.  Transferred overnight out of ICU onto neurology floor.  Patient somewhat somnolent at bedside, reports headache and back pain.  NPO, has cortrak with tube feeding.  SLP following, plan for FEES when patient is alert.  Start IV fluids for hydration.  PT/OT recommend SNF.      I have personally seen and examined the patient at bedside. I discussed the plan of care with patient and bedside RN.    Consultants/Specialty  critical care and neurology    Code Status  Full Code    Disposition  Patient is not medically cleared for discharge.   Anticipate discharge to to an inpatient rehabilitation hospital vs SNF.  I have placed the appropriate orders for post-discharge needs.    Review of Systems  Review of Systems   Constitutional: Negative for chills and malaise/fatigue.   Respiratory: Positive for cough. Negative for hemoptysis and sputum production.    Cardiovascular: Negative for chest pain, palpitations and orthopnea.   Gastrointestinal: Negative for nausea and vomiting.   Skin:  Negative for itching and rash.   Neurological: Positive for speech change, focal weakness and weakness. Negative for dizziness.   All other systems reviewed and are negative.       Physical Exam  Temp:  [36.3 °C (97.4 °F)-37.4 °C (99.3 °F)] 37.2 °C (99 °F)  Pulse:  [] 113  Resp:  [18-37] 18  BP: (123-169)/(72-97) 143/72  SpO2:  [90 %-97 %] 96 %    Physical Exam  Constitutional:       General: He is not in acute distress.     Appearance: Normal appearance. He is normal weight.   HENT:      Head: Normocephalic and atraumatic.      Right Ear: External ear normal.      Left Ear: External ear normal.      Nose: Nose normal.      Mouth/Throat:      Mouth: Mucous membranes are moist.      Pharynx: Oropharynx is clear.   Eyes:      Extraocular Movements: Extraocular movements intact.      Conjunctiva/sclera: Conjunctivae normal.      Pupils: Pupils are equal, round, and reactive to light.   Cardiovascular:      Rate and Rhythm: Normal rate and regular rhythm.      Pulses: Normal pulses.   Pulmonary:      Effort: Pulmonary effort is normal.      Breath sounds: Normal breath sounds.   Abdominal:      General: Abdomen is flat. Bowel sounds are normal.      Palpations: Abdomen is soft.   Musculoskeletal:         General: Normal range of motion.      Cervical back: Normal range of motion and neck supple.   Skin:     General: Skin is warm and dry.      Capillary Refill: Capillary refill takes less than 2 seconds.      Coloration: Skin is not jaundiced.   Neurological:      Mental Status: He is alert and oriented to person, place, and time.      Motor: Weakness present.      Comments: Left-sided neglect and hemiparesis   Psychiatric:         Mood and Affect: Mood normal.         Behavior: Behavior normal.         Fluids    Intake/Output Summary (Last 24 hours) at 1/7/2022 1234  Last data filed at 1/7/2022 0400  Gross per 24 hour   Intake 90 ml   Output 950 ml   Net -860 ml       Laboratory      Recent Labs     01/06/22  6433  01/06/22 2043 01/07/22  0945   SODIUM 148* 146* 143   POTASSIUM 4.3 4.0 3.9   CHLORIDE 113* 112 108   CO2 23 21 23   GLUCOSE 118* 91 129*   BUN 21 19 23*   CREATININE 1.03 1.03 0.96   CALCIUM 9.2 9.2 8.8                   Imaging  DX-ABDOMEN FOR TUBE PLACEMENT   Final Result      1.  Feeding tube tip overlies the distal stomach.      IR-PICC LINE PLACEMENT W/ GUIDANCE > AGE 5   Final Result                  Ultrasound-guided PICC placement performed by qualified nursing staff as    above.          CT-HEAD W/O   Final Result      Evolving moderate to large right middle cerebral artery territory has increased in size when compared with the previous exam. No evidence of acute intracranial hemorrhage.         DX-ABDOMEN FOR TUBE PLACEMENT   Final Result      Cortrak feeding tube tip projects in the region of the proximal/mid stomach.      US-EXTREMITY VENOUS LOWER BILAT   Final Result      MR-BRAIN-W/O   Final Result         1. Age-related cerebral atrophy. Mild effacement of the right lateral ventricle.      2. Mild periventricular white matter changes consistent with chronic microvascular ischemic gliosis.      3. Large area of acute infarction involving the right frontal and parietal lobes as well as the right-sided basal ganglia. Subacute area of infarction involving the right temporal and lateral occipital lobes.      4. Petechial hemorrhage noted in the right basal ganglia and involving the cortex in the right temporal and lateral occipital lobes.      5. Right internal carotid artery occlusion or high-grade stenosis.            EC-ECHOCARDIOGRAM COMPLETE W/O CONT   Final Result      DX-SHOULDER 2+ LEFT   Final Result      Negative 2 views of left shoulder.      DX-SHOULDER 2+ RIGHT   Final Result      Moderate subacromial spurring      DX-FOOT-COMPLETE 3+ RIGHT   Final Result      No radiographic evidence of acute displaced fracture or subluxation.      DX-FOOT-2- LEFT   Final Result      No radiographic  evidence of acute displaced fracture or subluxation.      DX-ELBOW-COMPLETE 3+ RIGHT   Final Result      No radiographic evidence of acute traumatic injury.      Limited due to rotation of the lateral radiograph attempts. Consider repeat lateral radiograph to help exclude effusion      CT-CHEST,ABDOMEN,PELVIS WITH   Final Result      No significant traumatic abnormality in thorax, abdomen and pelvis CT scans.      Hepatic steatosis      Moderate colonic diverticulosis.      CT-LSPINE W/O PLUS RECONS   Final Result      No CT evidence of acute traumatic injury.      Moderate degenerative disc disease with trace degenerative L5/S1 retrolisthesis      CT-TSPINE W/O PLUS RECONS   Final Result      No CT evidence of acute traumatic abnormality.      CT-CSPINE WITHOUT PLUS RECONS   Final Result      No CT evidence of acute cervical spine abnormality.      CT-HEAD W/O   Final Result      Subacute right temporal, temporo-occipital infarction without hemorrhagic transformation      CT-CTA HEAD WITH & W/O-POST PROCESS   Final Result      Right internal carotid artery occlusion with extension to the MCA which lacks contrast opacification      Right A1 and KATIA contrast opacification secondary to cross-filling from the left      Normal left anterior and bilateral posterior circulation      Findings of all of the CTs were discussed with Dr. Betancourt before completion of this dictation.            CT-CTA NECK WITH & W/O-POST PROCESSING   Final Result      Occluded right internal carotid artery at its origin      Atherosclerosis at origin of bilateral vertebral arteries could obscure stenosis. Arteries are patent without post stenotic dilatation      DX-PELVIS-1 OR 2 VIEWS   Final Result      Negative AP view of the pelvis.      DX-CHEST-LIMITED (1 VIEW)   Final Result      No acute cardiopulmonary disease.      US-ABORTED US PROCEDURE    (Results Pending)        Assessment/Plan  * Stroke due to thrombosis of right carotid artery  (HCC)- (present on admission)  Assessment & Plan  Extensive stroke due to thrombosis of right carotid artery  Occurred while on anticoagulation, INR slightly subtherapeutic at 1.8  Cytotoxic edema has improved, appreciate neurology recommendations, low sodium to normalize  High intensity statin  Start anticoagulation 1/13  PT/OT/speech    Depression- (present on admission)  Assessment & Plan  Outpatient Wellbutrin and Lexapro    Mild intermittent asthma without complication- (present on admission)  Assessment & Plan  No acute exacerbation    Chronic anticoagulation- (present on admission)  Assessment & Plan  Patient slightly subtherapeutic with INR 1.8 admission  1/2 LE U/S negative for overt DVT, follow weekly U/S for any sign of DVT.  If clinically stable consider restarting anticoagulation 1/13/2022    Dyslipidemia- (present on admission)  Assessment & Plan  Atorvastatin    Primary hypertension- (present on admission)  Assessment & Plan  Goal normotensive  Continue monitoring, consider restarting enteral blood pressure pressure agent if needed.       VTE prophylaxis: enoxaparin ppx

## 2022-01-07 NOTE — THERAPY
Missed Therapy     Patient Name: Jose Quintanilla  Age:  66 y.o., Sex:  male  Medical Record #: [id number]  Today's Date: 1/7/2022    Discussed missed therapy with RN     Attempted to see pt for OT tx. Per RN ask to hold at this time d/t pt being lethargic and difficulty w/ maintaining arousal. Will try again later as able.

## 2022-01-07 NOTE — PROGRESS NOTES
Hospital Medicine Daily Progress Note    Date of Service  1/6/2022    Chief Complaint  Jose Quintanilla is a 66 y.o. male admitted 1/1/2022 with a fall    Hospital Course  Mr Quintanilla has past medical history which includes hypertension, hyperlipidemia, DVT/PE and was treated with anticoagulation prior to admission.  Presented on 1/1/2022 after a fall with symptoms of left hemiparesis and rightward gaze.  Patient was evaluated and cleared by trauma.  Neuro imaging was concerning for right-sided carotid occlusion.  Patient was evaluated neurology with recommendations for admission to the ICU, permissive hypertension, and follow-up imaging.  Patient's mental status worsened on 1/3/2022 when he was started on hypertonic saline.  His alertness has improved and hypertonic saline was stopped on 1/6/2022.  Neurology recommends restarting anticoagulation on 1/13/2022 with consideration for apixaban.    Interval Problem Update  The patient is oriented to person and place, speech is dysarthric but he is in fact joking around with the staff  Patient denies headache, he is anxious to work with physical therapy  Denies difficulty breathing, denies chest pain    I have personally seen and examined the patient at bedside. I discussed the plan of care with patient and bedside RN.    Consultants/Specialty  critical care and neurology    Code Status  Full Code    Disposition  Patient is not medically cleared for discharge.   Anticipate discharge to to an inpatient rehabilitation hospital.  I have placed the appropriate orders for post-discharge needs.    Review of Systems  Review of Systems   Constitutional: Negative for chills and malaise/fatigue.   Respiratory: Positive for cough. Negative for hemoptysis and sputum production.    Cardiovascular: Negative for chest pain, palpitations and orthopnea.   Gastrointestinal: Negative for nausea and vomiting.   Skin: Negative for itching and rash.   Neurological: Positive for speech change,  focal weakness and weakness. Negative for dizziness.   All other systems reviewed and are negative.       Physical Exam  Temp:  [35.7 °C (96.3 °F)-36.3 °C (97.4 °F)] 36.3 °C (97.4 °F)  Pulse:  [] 94  Resp:  [13-38] 18  BP: (107-169)/() 169/97  SpO2:  [93 %-98 %] 97 %    Physical Exam  Constitutional:       General: He is not in acute distress.     Appearance: Normal appearance. He is normal weight.   HENT:      Head: Normocephalic and atraumatic.      Right Ear: External ear normal.      Left Ear: External ear normal.      Nose: Nose normal.      Mouth/Throat:      Mouth: Mucous membranes are moist.      Pharynx: Oropharynx is clear.   Eyes:      Extraocular Movements: Extraocular movements intact.      Conjunctiva/sclera: Conjunctivae normal.      Pupils: Pupils are equal, round, and reactive to light.   Cardiovascular:      Rate and Rhythm: Normal rate and regular rhythm.      Pulses: Normal pulses.   Pulmonary:      Effort: Pulmonary effort is normal.      Breath sounds: Normal breath sounds.   Abdominal:      General: Abdomen is flat. Bowel sounds are normal.      Palpations: Abdomen is soft.   Musculoskeletal:         General: Normal range of motion.      Cervical back: Normal range of motion and neck supple.   Skin:     General: Skin is warm and dry.      Capillary Refill: Capillary refill takes less than 2 seconds.      Coloration: Skin is not jaundiced.   Neurological:      Mental Status: He is alert and oriented to person, place, and time.      Motor: Weakness present.      Comments: Left-sided neglect and hemiparesis   Psychiatric:         Mood and Affect: Mood normal.         Behavior: Behavior normal.         Fluids    Intake/Output Summary (Last 24 hours) at 1/6/2022 1931  Last data filed at 1/6/2022 1800  Gross per 24 hour   Intake 1308.84 ml   Output 725 ml   Net 583.84 ml       Laboratory      Recent Labs     01/05/22  1655 01/05/22  2300 01/06/22  0456   SODIUM 148* 148* 148*   POTASSIUM  3.8 3.9 4.3   CHLORIDE 112 113* 113*   CO2 26 24 23   GLUCOSE 106* 121* 118*   BUN 18 20 21   CREATININE 0.97 0.99 1.03   CALCIUM 9.4 9.1 9.2                   Imaging  DX-ABDOMEN FOR TUBE PLACEMENT   Final Result      1.  Feeding tube tip overlies the distal stomach.      IR-PICC LINE PLACEMENT W/ GUIDANCE > AGE 5   Final Result                  Ultrasound-guided PICC placement performed by qualified nursing staff as    above.          CT-HEAD W/O   Final Result      Evolving moderate to large right middle cerebral artery territory has increased in size when compared with the previous exam. No evidence of acute intracranial hemorrhage.         DX-ABDOMEN FOR TUBE PLACEMENT   Final Result      Cortrak feeding tube tip projects in the region of the proximal/mid stomach.      US-EXTREMITY VENOUS LOWER BILAT   Final Result      MR-BRAIN-W/O   Final Result         1. Age-related cerebral atrophy. Mild effacement of the right lateral ventricle.      2. Mild periventricular white matter changes consistent with chronic microvascular ischemic gliosis.      3. Large area of acute infarction involving the right frontal and parietal lobes as well as the right-sided basal ganglia. Subacute area of infarction involving the right temporal and lateral occipital lobes.      4. Petechial hemorrhage noted in the right basal ganglia and involving the cortex in the right temporal and lateral occipital lobes.      5. Right internal carotid artery occlusion or high-grade stenosis.            EC-ECHOCARDIOGRAM COMPLETE W/O CONT   Final Result      DX-SHOULDER 2+ LEFT   Final Result      Negative 2 views of left shoulder.      DX-SHOULDER 2+ RIGHT   Final Result      Moderate subacromial spurring      DX-FOOT-COMPLETE 3+ RIGHT   Final Result      No radiographic evidence of acute displaced fracture or subluxation.      DX-FOOT-2- LEFT   Final Result      No radiographic evidence of acute displaced fracture or subluxation.       DX-ELBOW-COMPLETE 3+ RIGHT   Final Result      No radiographic evidence of acute traumatic injury.      Limited due to rotation of the lateral radiograph attempts. Consider repeat lateral radiograph to help exclude effusion      CT-CHEST,ABDOMEN,PELVIS WITH   Final Result      No significant traumatic abnormality in thorax, abdomen and pelvis CT scans.      Hepatic steatosis      Moderate colonic diverticulosis.      CT-LSPINE W/O PLUS RECONS   Final Result      No CT evidence of acute traumatic injury.      Moderate degenerative disc disease with trace degenerative L5/S1 retrolisthesis      CT-TSPINE W/O PLUS RECONS   Final Result      No CT evidence of acute traumatic abnormality.      CT-CSPINE WITHOUT PLUS RECONS   Final Result      No CT evidence of acute cervical spine abnormality.      CT-HEAD W/O   Final Result      Subacute right temporal, temporo-occipital infarction without hemorrhagic transformation      CT-CTA HEAD WITH & W/O-POST PROCESS   Final Result      Right internal carotid artery occlusion with extension to the MCA which lacks contrast opacification      Right A1 and KATIA contrast opacification secondary to cross-filling from the left      Normal left anterior and bilateral posterior circulation      Findings of all of the CTs were discussed with Dr. Betancourt before completion of this dictation.            CT-CTA NECK WITH & W/O-POST PROCESSING   Final Result      Occluded right internal carotid artery at its origin      Atherosclerosis at origin of bilateral vertebral arteries could obscure stenosis. Arteries are patent without post stenotic dilatation      DX-PELVIS-1 OR 2 VIEWS   Final Result      Negative AP view of the pelvis.      DX-CHEST-LIMITED (1 VIEW)   Final Result      No acute cardiopulmonary disease.      US-ABORTED US PROCEDURE    (Results Pending)        Assessment/Plan  * Stroke due to thrombosis of right carotid artery (HCC)- (present on admission)  Assessment & Plan  Extensive  stroke due to thrombosis of right carotid artery  Occurred while on anticoagulation, INR slightly subtherapeutic at 1.8  Cytotoxic edema has improved, appreciate neurology recommendations, low sodium to normalize  High intensity statin  Start anticoagulation appropriate interval was recommended by neurology  PT/OT/speech    Depression- (present on admission)  Assessment & Plan  Outpatient Wellbutrin and Lexapro    Mild intermittent asthma without complication- (present on admission)  Assessment & Plan  No acute exacerbation    Chronic anticoagulation- (present on admission)  Assessment & Plan  Patient slightly subtherapeutic with INR 1.8 admission  1/2 LE U/S negative for overt DVT, follow weekly U/S for any sign of DVT.  If clinically stable consider restarting anticoagulation 1/13/2022    Dyslipidemia- (present on admission)  Assessment & Plan  Atorvastatin    Primary hypertension- (present on admission)  Assessment & Plan  Goal normotensive  Continue monitoring, consider restarting enteral blood pressure pressure agent if needed.       VTE prophylaxis: enoxaparin ppx

## 2022-01-08 LAB
ANION GAP SERPL CALC-SCNC: 11 MMOL/L (ref 7–16)
BUN SERPL-MCNC: 25 MG/DL (ref 8–22)
CALCIUM SERPL-MCNC: 8.3 MG/DL (ref 8.5–10.5)
CHLORIDE SERPL-SCNC: 104 MMOL/L (ref 96–112)
CO2 SERPL-SCNC: 24 MMOL/L (ref 20–33)
CREAT SERPL-MCNC: 0.99 MG/DL (ref 0.5–1.4)
GLUCOSE SERPL-MCNC: 127 MG/DL (ref 65–99)
POTASSIUM SERPL-SCNC: 4 MMOL/L (ref 3.6–5.5)
SODIUM SERPL-SCNC: 139 MMOL/L (ref 135–145)

## 2022-01-08 PROCEDURE — A9270 NON-COVERED ITEM OR SERVICE: HCPCS | Performed by: HOSPITALIST

## 2022-01-08 PROCEDURE — 770001 HCHG ROOM/CARE - MED/SURG/GYN PRIV*

## 2022-01-08 PROCEDURE — 80048 BASIC METABOLIC PNL TOTAL CA: CPT

## 2022-01-08 PROCEDURE — 700102 HCHG RX REV CODE 250 W/ 637 OVERRIDE(OP): Performed by: STUDENT IN AN ORGANIZED HEALTH CARE EDUCATION/TRAINING PROGRAM

## 2022-01-08 PROCEDURE — 700111 HCHG RX REV CODE 636 W/ 250 OVERRIDE (IP): Performed by: HOSPITALIST

## 2022-01-08 PROCEDURE — 700102 HCHG RX REV CODE 250 W/ 637 OVERRIDE(OP): Performed by: HOSPITALIST

## 2022-01-08 PROCEDURE — 99232 SBSQ HOSP IP/OBS MODERATE 35: CPT | Performed by: STUDENT IN AN ORGANIZED HEALTH CARE EDUCATION/TRAINING PROGRAM

## 2022-01-08 PROCEDURE — A9270 NON-COVERED ITEM OR SERVICE: HCPCS | Performed by: STUDENT IN AN ORGANIZED HEALTH CARE EDUCATION/TRAINING PROGRAM

## 2022-01-08 PROCEDURE — 51798 US URINE CAPACITY MEASURE: CPT

## 2022-01-08 PROCEDURE — 700105 HCHG RX REV CODE 258: Performed by: STUDENT IN AN ORGANIZED HEALTH CARE EDUCATION/TRAINING PROGRAM

## 2022-01-08 RX ADMIN — DOCUSATE SODIUM 50 MG AND SENNOSIDES 8.6 MG 2 TABLET: 8.6; 5 TABLET, FILM COATED ORAL at 04:50

## 2022-01-08 RX ADMIN — OXYCODONE HYDROCHLORIDE 10 MG: 10 TABLET ORAL at 08:48

## 2022-01-08 RX ADMIN — DOCUSATE SODIUM 50 MG AND SENNOSIDES 8.6 MG 2 TABLET: 8.6; 5 TABLET, FILM COATED ORAL at 16:54

## 2022-01-08 RX ADMIN — GABAPENTIN 300 MG: 300 CAPSULE ORAL at 16:51

## 2022-01-08 RX ADMIN — OXYCODONE HYDROCHLORIDE 10 MG: 10 TABLET ORAL at 16:52

## 2022-01-08 RX ADMIN — OXYCODONE HYDROCHLORIDE 10 MG: 10 TABLET ORAL at 01:06

## 2022-01-08 RX ADMIN — ENOXAPARIN SODIUM 40 MG: 40 INJECTION SUBCUTANEOUS at 16:52

## 2022-01-08 RX ADMIN — LOSARTAN POTASSIUM 25 MG: 50 TABLET, FILM COATED ORAL at 04:49

## 2022-01-08 RX ADMIN — GABAPENTIN 300 MG: 300 CAPSULE ORAL at 04:50

## 2022-01-08 RX ADMIN — ATORVASTATIN CALCIUM 80 MG: 80 TABLET, FILM COATED ORAL at 16:52

## 2022-01-08 RX ADMIN — BUPROPION HYDROCHLORIDE 100 MG: 100 TABLET, FILM COATED ORAL at 04:50

## 2022-01-08 RX ADMIN — BUPROPION HYDROCHLORIDE 100 MG: 100 TABLET, FILM COATED ORAL at 16:52

## 2022-01-08 RX ADMIN — SODIUM CHLORIDE: 4.5 INJECTION, SOLUTION INTRAVENOUS at 16:54

## 2022-01-08 ASSESSMENT — PAIN DESCRIPTION - PAIN TYPE
TYPE: ACUTE PAIN
TYPE: ACUTE PAIN;CHRONIC PAIN

## 2022-01-08 ASSESSMENT — ENCOUNTER SYMPTOMS
DIZZINESS: 0
FOCAL WEAKNESS: 1
NAUSEA: 0
WEAKNESS: 1
PALPITATIONS: 0
SPUTUM PRODUCTION: 0
CHILLS: 0
VOMITING: 0
HEMOPTYSIS: 0
SPEECH CHANGE: 1
COUGH: 1
ORTHOPNEA: 0

## 2022-01-08 NOTE — PROGRESS NOTES
Received report from day shift RN Derek. Assumed care of patient. Pain medication on board, q4-q6. Patient repositioned at changed of shift. Bed in low and locked position. Call light in reach, belongings at bedside. Pt educated on how to call for assistance. Hourly rounding in place.

## 2022-01-08 NOTE — PROGRESS NOTES
[facility] Medicine Daily Progress Note    Date of Service  1/8/2022    Chief Complaint  Jose Quintanilla is a 66 y.o. male admitted 1/1/2022 with a fall    Hospital Course  Mr Quintanilla has past medical history which includes hypertension, hyperlipidemia, DVT/PE and was treated with anticoagulation prior to admission.  Presented on 1/1/2022 after a fall with symptoms of left hemiparesis and rightward gaze.  Patient was evaluated and cleared by trauma.  Neuro imaging was concerning for right-sided carotid occlusion.  Patient was evaluated neurology with recommendations for admission to the ICU, permissive hypertension, and follow-up imaging.  Patient's mental status worsened on 1/3/2022 when he was started on hypertonic saline.  His alertness has improved and hypertonic saline was stopped on 1/6/2022.  Neurology recommends restarting anticoagulation on 1/13/2022 with consideration for apixaban.    Interval Problem Update  No acute events overnight.  Patient very somnolent at bedside, snoring deeply.  Will continue to monitor alertness level.  Continue tube feeding via cortrak.  SLP following, plan for FEES when patient is alert.  On IV fluids.  PT/OT recommend SNF.      I have personally seen and examined the patient at bedside. I discussed the plan of care with patient and bedside RN.    Consultants/Specialty  critical care and neurology    Code Status  Full Code    Disposition  Patient is not medically cleared for discharge.   Anticipate discharge to to an inpatient rehabilitation hospital vs SNF.  I have placed the appropriate orders for post-discharge needs.    Review of Systems  Review of Systems   Constitutional: Negative for chills and malaise/fatigue.   Respiratory: Positive for cough. Negative for hemoptysis and sputum production.    Cardiovascular: Negative for chest pain, palpitations and orthopnea.   Gastrointestinal: Negative for nausea and vomiting.   Skin: Negative for itching and rash.   Neurological:  Positive for speech change, focal weakness and weakness. Negative for dizziness.   All other systems reviewed and are negative.       Physical Exam  Temp:  [36.8 °C (98.3 °F)-37.1 °C (98.8 °F)] 36.9 °C (98.4 °F)  Pulse:  [106-113] 106  Resp:  [16-18] 17  BP: (114-139)/(75-99) 135/82  SpO2:  [91 %-94 %] 93 %    Physical Exam  Constitutional:       General: He is not in acute distress.     Appearance: Normal appearance. He is normal weight.   HENT:      Head: Normocephalic and atraumatic.      Right Ear: External ear normal.      Left Ear: External ear normal.      Nose: Nose normal.      Mouth/Throat:      Mouth: Mucous membranes are moist.      Pharynx: Oropharynx is clear.   Eyes:      Extraocular Movements: Extraocular movements intact.      Conjunctiva/sclera: Conjunctivae normal.      Pupils: Pupils are equal, round, and reactive to light.   Cardiovascular:      Rate and Rhythm: Normal rate and regular rhythm.      Pulses: Normal pulses.   Pulmonary:      Effort: Pulmonary effort is normal.      Breath sounds: Normal breath sounds.   Abdominal:      General: Abdomen is flat. Bowel sounds are normal.      Palpations: Abdomen is soft.   Musculoskeletal:         General: Normal range of motion.      Cervical back: Normal range of motion and neck supple.   Skin:     General: Skin is warm and dry.      Capillary Refill: Capillary refill takes less than 2 seconds.      Coloration: Skin is not jaundiced.   Neurological:      Mental Status: He is alert and oriented to person, place, and time.      Motor: Weakness present.      Comments: Left-sided neglect and hemiparesis   Psychiatric:         Mood and Affect: Mood normal.         Behavior: Behavior normal.         Fluids    Intake/Output Summary (Last 24 hours) at 1/8/2022 1207  Last data filed at 1/8/2022 0400  Gross per 24 hour   Intake 600 ml   Output 1200 ml   Net -600 ml       Laboratory      Recent Labs     01/07/22  0945 01/07/22 2035 01/08/22  0900   SODIUM  143 142 139   POTASSIUM 3.9 3.8 4.0   CHLORIDE 108 107 104   CO2 23 22 24   GLUCOSE 129* 115* 127*   BUN 23* 25* 25*   CREATININE 0.96 1.07 0.99   CALCIUM 8.8 9.0 8.3*                   Imaging  DX-ABDOMEN FOR TUBE PLACEMENT   Final Result      1.  Feeding tube tip overlies the distal stomach.      IR-PICC LINE PLACEMENT W/ GUIDANCE > AGE 5   Final Result                  Ultrasound-guided PICC placement performed by qualified nursing staff as    above.          CT-HEAD W/O   Final Result      Evolving moderate to large right middle cerebral artery territory has increased in size when compared with the previous exam. No evidence of acute intracranial hemorrhage.         DX-ABDOMEN FOR TUBE PLACEMENT   Final Result      Cortrak feeding tube tip projects in the region of the proximal/mid stomach.      US-EXTREMITY VENOUS LOWER BILAT   Final Result      MR-BRAIN-W/O   Final Result         1. Age-related cerebral atrophy. Mild effacement of the right lateral ventricle.      2. Mild periventricular white matter changes consistent with chronic microvascular ischemic gliosis.      3. Large area of acute infarction involving the right frontal and parietal lobes as well as the right-sided basal ganglia. Subacute area of infarction involving the right temporal and lateral occipital lobes.      4. Petechial hemorrhage noted in the right basal ganglia and involving the cortex in the right temporal and lateral occipital lobes.      5. Right internal carotid artery occlusion or high-grade stenosis.            EC-ECHOCARDIOGRAM COMPLETE W/O CONT   Final Result      DX-SHOULDER 2+ LEFT   Final Result      Negative 2 views of left shoulder.      DX-SHOULDER 2+ RIGHT   Final Result      Moderate subacromial spurring      DX-FOOT-COMPLETE 3+ RIGHT   Final Result      No radiographic evidence of acute displaced fracture or subluxation.      DX-FOOT-2- LEFT   Final Result      No radiographic evidence of acute displaced fracture or  subluxation.      DX-ELBOW-COMPLETE 3+ RIGHT   Final Result      No radiographic evidence of acute traumatic injury.      Limited due to rotation of the lateral radiograph attempts. Consider repeat lateral radiograph to help exclude effusion      CT-CHEST,ABDOMEN,PELVIS WITH   Final Result      No significant traumatic abnormality in thorax, abdomen and pelvis CT scans.      Hepatic steatosis      Moderate colonic diverticulosis.      CT-LSPINE W/O PLUS RECONS   Final Result      No CT evidence of acute traumatic injury.      Moderate degenerative disc disease with trace degenerative L5/S1 retrolisthesis      CT-TSPINE W/O PLUS RECONS   Final Result      No CT evidence of acute traumatic abnormality.      CT-CSPINE WITHOUT PLUS RECONS   Final Result      No CT evidence of acute cervical spine abnormality.      CT-HEAD W/O   Final Result      Subacute right temporal, temporo-occipital infarction without hemorrhagic transformation      CT-CTA HEAD WITH & W/O-POST PROCESS   Final Result      Right internal carotid artery occlusion with extension to the MCA which lacks contrast opacification      Right A1 and KATIA contrast opacification secondary to cross-filling from the left      Normal left anterior and bilateral posterior circulation      Findings of all of the CTs were discussed with Dr. Betancourt before completion of this dictation.            CT-CTA NECK WITH & W/O-POST PROCESSING   Final Result      Occluded right internal carotid artery at its origin      Atherosclerosis at origin of bilateral vertebral arteries could obscure stenosis. Arteries are patent without post stenotic dilatation      DX-PELVIS-1 OR 2 VIEWS   Final Result      Negative AP view of the pelvis.      DX-CHEST-LIMITED (1 VIEW)   Final Result      No acute cardiopulmonary disease.      US-ABORTED US PROCEDURE    (Results Pending)        Assessment/Plan  * Stroke due to thrombosis of right carotid artery (HCC)- (present on admission)  Assessment &  Plan  Extensive stroke due to thrombosis of right carotid artery  Occurred while on anticoagulation, INR slightly subtherapeutic at 1.8  Cytotoxic edema has improved, appreciate neurology recommendations, low sodium to normalize  High intensity statin  Start anticoagulation 1/13  PT/OT/speech    Depression- (present on admission)  Assessment & Plan  Outpatient Wellbutrin and Lexapro    Mild intermittent asthma without complication- (present on admission)  Assessment & Plan  No acute exacerbation    Chronic anticoagulation- (present on admission)  Assessment & Plan  Patient slightly subtherapeutic with INR 1.8 admission  1/2 LE U/S negative for overt DVT, follow weekly U/S for any sign of DVT.  If clinically stable consider restarting anticoagulation 1/13/2022    Dyslipidemia- (present on admission)  Assessment & Plan  Atorvastatin    Primary hypertension- (present on admission)  Assessment & Plan  Goal normotensive  Continue monitoring, consider restarting enteral blood pressure pressure agent if needed.       VTE prophylaxis: enoxaparin ppx

## 2022-01-08 NOTE — CARE PLAN
"The patient is Watcher - Medium risk of patient condition declining or worsening    Shift Goals  Clinical Goals: Pain management, improved communication  Patient Goals: pain mangament  Family Goals:  (no family present at this time)    Progress made toward(s) clinical / shift goals:  Patient receiving PRN pain medication q6 hours this shift instead of q4 as he has been difficult to assess and only wakes briefly when shifting positions. Pt A&O x 2-3, intermittently disoriented to place and time. Patient seems fearful of being alone, as while he is awake, he is constantly grabbing for this nurses hand while in room and shakes head \"no\" when RN is leaving the room. Frequent pain assessments and rounding in place. Patient still tolerating tube feeds. 0.45% NS still infusing at 83 mL/hr. Q2 turns in place with frequent repositioning for pain relief as well as cold packs. Call light in reach and educated on use while awake, bed in lowest and locked position.    Patient is not progressing towards the following goals:      "

## 2022-01-08 NOTE — CARE PLAN
The patient is Watcher - Medium risk of patient condition declining or worsening    Shift Goals  Clinical Goals: pain managememnt  Patient Goals: rest  Family Goals: no family present    Progress made toward(s) clinical / shift goals:  Educated patient on pain rating scales, frequent pain assessments in place, medicating per MAR, non pharmaceutical pain relief such as heat pads and positioning in use.        Patient is not progressing towards the following goals:

## 2022-01-09 ENCOUNTER — APPOINTMENT (OUTPATIENT)
Dept: RADIOLOGY | Facility: MEDICAL CENTER | Age: 67
DRG: 064 | End: 2022-01-09
Attending: STUDENT IN AN ORGANIZED HEALTH CARE EDUCATION/TRAINING PROGRAM
Payer: MEDICARE

## 2022-01-09 PROBLEM — J69.0 ASPIRATION PNEUMONIA (HCC): Status: ACTIVE | Noted: 2022-01-09

## 2022-01-09 PROBLEM — A41.9 SEPSIS (HCC): Status: ACTIVE | Noted: 2022-01-09

## 2022-01-09 LAB
ANION GAP SERPL CALC-SCNC: 11 MMOL/L (ref 7–16)
BASOPHILS # BLD AUTO: 0.9 % (ref 0–1.8)
BASOPHILS # BLD: 0.13 K/UL (ref 0–0.12)
BUN SERPL-MCNC: 26 MG/DL (ref 8–22)
CALCIUM SERPL-MCNC: 8.3 MG/DL (ref 8.5–10.5)
CHLORIDE SERPL-SCNC: 104 MMOL/L (ref 96–112)
CO2 SERPL-SCNC: 23 MMOL/L (ref 20–33)
CREAT SERPL-MCNC: 0.96 MG/DL (ref 0.5–1.4)
EOSINOPHIL # BLD AUTO: 0.18 K/UL (ref 0–0.51)
EOSINOPHIL NFR BLD: 1.3 % (ref 0–6.9)
ERYTHROCYTE [DISTWIDTH] IN BLOOD BY AUTOMATED COUNT: 52.3 FL (ref 35.9–50)
GLUCOSE SERPL-MCNC: 118 MG/DL (ref 65–99)
HCT VFR BLD AUTO: 41.4 % (ref 42–52)
HGB BLD-MCNC: 13.6 G/DL (ref 14–18)
IMM GRANULOCYTES # BLD AUTO: 0.13 K/UL (ref 0–0.11)
IMM GRANULOCYTES NFR BLD AUTO: 0.9 % (ref 0–0.9)
LYMPHOCYTES # BLD AUTO: 1 K/UL (ref 1–4.8)
LYMPHOCYTES NFR BLD: 7 % (ref 22–41)
MCH RBC QN AUTO: 32.4 PG (ref 27–33)
MCHC RBC AUTO-ENTMCNC: 32.9 G/DL (ref 33.7–35.3)
MCV RBC AUTO: 98.6 FL (ref 81.4–97.8)
MONOCYTES # BLD AUTO: 1.1 K/UL (ref 0–0.85)
MONOCYTES NFR BLD AUTO: 7.7 % (ref 0–13.4)
NEUTROPHILS # BLD AUTO: 11.78 K/UL (ref 1.82–7.42)
NEUTROPHILS NFR BLD: 82.2 % (ref 44–72)
NRBC # BLD AUTO: 0 K/UL
NRBC BLD-RTO: 0 /100 WBC
PLATELET # BLD AUTO: 120 K/UL (ref 164–446)
PMV BLD AUTO: 13.4 FL (ref 9–12.9)
POTASSIUM SERPL-SCNC: 4.1 MMOL/L (ref 3.6–5.5)
PROCALCITONIN SERPL-MCNC: 0.51 NG/ML
RBC # BLD AUTO: 4.2 M/UL (ref 4.7–6.1)
SODIUM SERPL-SCNC: 138 MMOL/L (ref 135–145)
WBC # BLD AUTO: 14.3 K/UL (ref 4.8–10.8)

## 2022-01-09 PROCEDURE — 700111 HCHG RX REV CODE 636 W/ 250 OVERRIDE (IP): Performed by: STUDENT IN AN ORGANIZED HEALTH CARE EDUCATION/TRAINING PROGRAM

## 2022-01-09 PROCEDURE — 85025 COMPLETE CBC W/AUTO DIFF WBC: CPT

## 2022-01-09 PROCEDURE — 700102 HCHG RX REV CODE 250 W/ 637 OVERRIDE(OP): Performed by: HOSPITALIST

## 2022-01-09 PROCEDURE — 700111 HCHG RX REV CODE 636 W/ 250 OVERRIDE (IP): Performed by: HOSPITALIST

## 2022-01-09 PROCEDURE — 700105 HCHG RX REV CODE 258: Performed by: STUDENT IN AN ORGANIZED HEALTH CARE EDUCATION/TRAINING PROGRAM

## 2022-01-09 PROCEDURE — 700102 HCHG RX REV CODE 250 W/ 637 OVERRIDE(OP): Performed by: STUDENT IN AN ORGANIZED HEALTH CARE EDUCATION/TRAINING PROGRAM

## 2022-01-09 PROCEDURE — 99233 SBSQ HOSP IP/OBS HIGH 50: CPT | Performed by: STUDENT IN AN ORGANIZED HEALTH CARE EDUCATION/TRAINING PROGRAM

## 2022-01-09 PROCEDURE — 87040 BLOOD CULTURE FOR BACTERIA: CPT | Mod: 91

## 2022-01-09 PROCEDURE — 80048 BASIC METABOLIC PNL TOTAL CA: CPT

## 2022-01-09 PROCEDURE — A9270 NON-COVERED ITEM OR SERVICE: HCPCS | Performed by: HOSPITALIST

## 2022-01-09 PROCEDURE — 71045 X-RAY EXAM CHEST 1 VIEW: CPT

## 2022-01-09 PROCEDURE — A9270 NON-COVERED ITEM OR SERVICE: HCPCS | Performed by: STUDENT IN AN ORGANIZED HEALTH CARE EDUCATION/TRAINING PROGRAM

## 2022-01-09 PROCEDURE — 84145 PROCALCITONIN (PCT): CPT

## 2022-01-09 PROCEDURE — 770001 HCHG ROOM/CARE - MED/SURG/GYN PRIV*

## 2022-01-09 RX ORDER — OXYCODONE HYDROCHLORIDE 5 MG/1
5 TABLET ORAL EVERY 6 HOURS PRN
Status: DISCONTINUED | OUTPATIENT
Start: 2022-01-09 | End: 2022-01-09

## 2022-01-09 RX ADMIN — AMPICILLIN SODIUM AND SULBACTAM SODIUM 3 G: 2; 1 INJECTION, POWDER, FOR SOLUTION INTRAMUSCULAR; INTRAVENOUS at 13:33

## 2022-01-09 RX ADMIN — GABAPENTIN 300 MG: 300 CAPSULE ORAL at 04:15

## 2022-01-09 RX ADMIN — DOCUSATE SODIUM 50 MG AND SENNOSIDES 8.6 MG 2 TABLET: 8.6; 5 TABLET, FILM COATED ORAL at 04:14

## 2022-01-09 RX ADMIN — ENOXAPARIN SODIUM 40 MG: 40 INJECTION SUBCUTANEOUS at 17:11

## 2022-01-09 RX ADMIN — ATORVASTATIN CALCIUM 80 MG: 80 TABLET, FILM COATED ORAL at 17:11

## 2022-01-09 RX ADMIN — SODIUM CHLORIDE: 4.5 INJECTION, SOLUTION INTRAVENOUS at 07:11

## 2022-01-09 RX ADMIN — LOSARTAN POTASSIUM 25 MG: 50 TABLET, FILM COATED ORAL at 04:15

## 2022-01-09 RX ADMIN — AMPICILLIN SODIUM AND SULBACTAM SODIUM 3 G: 2; 1 INJECTION, POWDER, FOR SOLUTION INTRAMUSCULAR; INTRAVENOUS at 17:15

## 2022-01-09 RX ADMIN — BUPROPION HYDROCHLORIDE 100 MG: 100 TABLET, FILM COATED ORAL at 04:14

## 2022-01-09 RX ADMIN — BUPROPION HYDROCHLORIDE 100 MG: 100 TABLET, FILM COATED ORAL at 17:48

## 2022-01-09 RX ADMIN — ACETAMINOPHEN 650 MG: 325 TABLET, FILM COATED ORAL at 21:20

## 2022-01-09 RX ADMIN — DOCUSATE SODIUM 50 MG AND SENNOSIDES 8.6 MG 2 TABLET: 8.6; 5 TABLET, FILM COATED ORAL at 17:11

## 2022-01-09 ASSESSMENT — ENCOUNTER SYMPTOMS
PALPITATIONS: 0
COUGH: 1
CHILLS: 0
WEAKNESS: 1
VOMITING: 0
HEMOPTYSIS: 0
FOCAL WEAKNESS: 1
SPEECH CHANGE: 1
DIZZINESS: 0
ORTHOPNEA: 0
SPUTUM PRODUCTION: 0
NAUSEA: 0

## 2022-01-09 ASSESSMENT — PAIN DESCRIPTION - PAIN TYPE
TYPE: ACUTE PAIN

## 2022-01-09 NOTE — CARE PLAN
The patient is {Patient Stability:[id number]}    Shift Goals  Clinical Goals: (P) Stable neuro assessments   Patient Goals: (P) OMAYRA  Family Goals: OMAYRA    Progress made toward(s) clinical / shift goals:  ***    Patient is not progressing towards the following goals:      Problem: Neuro Status  Goal: Neuro status will remain stable or improve  1/9/2022 1313 by Cheyanne De Leon, Student  Outcome: Not Progressing  1/9/2022 1307 by Cheyanne De Leon, Student  Outcome: Not Progressing     Problem: Risk for Aspiration  Goal: Patient's risk for aspiration will be absent or decrease  Outcome: Not Progressing

## 2022-01-09 NOTE — THERAPY
Physical Therapy Contact Note    PT treatment attempted. RN reported patient lethargic and unable to participate. Will re attempt as able and appropriate.    Dina Beebe, PT, DPT  [phone]

## 2022-01-09 NOTE — CARE PLAN
The patient is Unstable - High likelihood or risk of patient condition declining or worsening    Shift Goals  Clinical Goals: (P) Stable neuro assessments   Patient Goals: (P) OMAYRA  Family Goals: OMAYRA    Progress made toward(s) clinical / shift goals:  ***    Patient is not progressing towards the following goals:      Problem: Neuro Status  Goal: Neuro status will remain stable or improve  1/9/2022 1315 by Cheyanne De Leon, Student  Outcome: Not Progressing  1/9/2022 1313 by Cheyanne De Leon, Student  Outcome: Not Progressing  1/9/2022 1307 by Cheyanne De Leon, Student  Outcome: Not Progressing     Problem: Risk for Aspiration  Goal: Patient's risk for aspiration will be absent or decrease  1/9/2022 1315 by Cheyanne De Leon, Student  Outcome: Not Progressing  1/9/2022 1307 by Cheyanne De Leon, Student  Outcome: Not Progressing

## 2022-01-09 NOTE — CARE PLAN
The patient is Unstable - High likelihood or risk of patient condition declining or worsening    Shift Goals  Clinical Goals: (P) Stable neuro assessments   Patient Goals: (P) OMAYRA  Family Goals: OMAYRA    Patient is not progressing towards the following goals:   Problem: Neuro Status  Goal: Neuro status will remain stable or improve  1/9/2022 1555 by Cheyanne De Leon, Student  Outcome: Not Progressing  Assessing the patient's neuro status every four hours to evaluate for any changes in neuro status. Notifying MD when there are changes in neuro status.     Problem: Risk for Aspiration  Goal: Patient's risk for aspiration will be absent or decrease  1/9/2022 1555 by Cheyanne De Leon, Student  Outcome: Not Progressing  Keeping the head of bed elevated at 30 degrees or above. Completed NT suctioning and oropharyngeal suctioning after oral care.       Progress made toward(s) clinical / shift goals:      Problem: Pain - Standard  Goal: Alleviation of pain or a reduction in pain to the patient’s comfort goal  1/9/2022 1555 by Cheyanne De Leon, Student  Outcome: Progressing  Assessing patient's pain and comfort level, and administering pain medication as needed.     Problem: Skin Integrity  Goal: Skin integrity is maintained or improved  1/9/2022 1555 by Cheyanne De Leon, Student  Outcome: Progressing  Assessing the patient's skin integrity. Pillows placed under the patient's olecranon processes. Heel floating boots placed on patient's feet.     Problem: Fall Risk  Goal: Patient will remain free from falls  1/9/2022 1555 by Cheyanne De Leon, Student  Outcome: Progressing  Ensured the patient's call light is within reach. Bed in lowest position and locked.

## 2022-01-09 NOTE — PROGRESS NOTES
[facility] Daily Progress Note    Date of Service  1/9/2022    Chief Complaint  Jose Quintanilla is a 66 y.o. male admitted 1/1/2022 with a fall    Hospital Course  Mr Quintanilla has past medical history which includes hypertension, hyperlipidemia, DVT/PE and was treated with anticoagulation prior to admission.  Presented on 1/1/2022 after a fall with symptoms of left hemiparesis and rightward gaze.  Patient was evaluated and cleared by trauma.  Neuro imaging was concerning for right-sided carotid occlusion.  Patient was evaluated neurology with recommendations for admission to the ICU, permissive hypertension, and follow-up imaging.  Patient's mental status worsened on 1/3/2022 when he was started on hypertonic saline.  His alertness has improved and hypertonic saline was stopped on 1/6/2022.  Neurology recommends restarting anticoagulation on 1/13/2022 with consideration for apixaban.    Interval Problem Update  No acute events overnight.  Patient continues to be very somnolent, he has been receiving a lot of oxycodone which is obscuring clinical picture.  Stop oxycodone and opiates. Monitor mentation.  Patient tachycardic, gurgling noises from pharynx. Tube feeds stopped.  CXR shows possible pneumonia. WBC and procalcitonin elevated. Will start unasyn for suspected aspiration pneumonia and sepsis.  SLP following, plan for FEES when patient is alert.  On IV fluids.  PT/OT recommend SNF, patient is not medically cleared.      I have personally seen and examined the patient at bedside. I discussed the plan of care with patient and bedside RN.    Consultants/Specialty  critical care and neurology    Code Status  Full Code    Disposition  Patient is not medically cleared for discharge.   Anticipate discharge to to an inpatient rehabilitation hospital vs SNF.  I have placed the appropriate orders for post-discharge needs.    Review of Systems  Review of Systems   Constitutional: Negative for chills and  malaise/fatigue.   Respiratory: Positive for cough. Negative for hemoptysis and sputum production.    Cardiovascular: Negative for chest pain, palpitations and orthopnea.   Gastrointestinal: Negative for nausea and vomiting.   Skin: Negative for itching and rash.   Neurological: Positive for speech change, focal weakness and weakness. Negative for dizziness.   All other systems reviewed and are negative.       Physical Exam  Temp:  [36.5 °C (97.7 °F)-37.7 °C (99.9 °F)] 37.7 °C (99.9 °F)  Pulse:  [109-116] 116  Resp:  [18-22] 22  BP: (133-147)/(70-88) 136/77  SpO2:  [91 %-94 %] 91 %    Physical Exam  Constitutional:       General: He is not in acute distress.     Appearance: Normal appearance. He is normal weight.   HENT:      Head: Normocephalic and atraumatic.      Right Ear: External ear normal.      Left Ear: External ear normal.      Nose: Nose normal.      Mouth/Throat:      Mouth: Mucous membranes are moist.      Pharynx: Oropharynx is clear.   Eyes:      Extraocular Movements: Extraocular movements intact.      Conjunctiva/sclera: Conjunctivae normal.      Pupils: Pupils are equal, round, and reactive to light.   Cardiovascular:      Rate and Rhythm: Normal rate and regular rhythm.      Pulses: Normal pulses.   Pulmonary:      Effort: Pulmonary effort is normal. No respiratory distress.      Breath sounds: Rales present. No wheezing.   Abdominal:      General: Abdomen is flat. Bowel sounds are normal.      Palpations: Abdomen is soft.   Musculoskeletal:         General: Normal range of motion.      Cervical back: Normal range of motion and neck supple.   Skin:     General: Skin is warm and dry.      Capillary Refill: Capillary refill takes less than 2 seconds.      Coloration: Skin is not jaundiced.   Neurological:      Mental Status: He is alert and oriented to person, place, and time.      Motor: Weakness present.      Comments: Left-sided neglect and hemiparesis   Psychiatric:         Mood and Affect: Mood  normal.         Behavior: Behavior normal.         Fluids    Intake/Output Summary (Last 24 hours) at 1/9/2022 1201  Last data filed at 1/9/2022 0600  Gross per 24 hour   Intake 1620 ml   Output 0 ml   Net 1620 ml       Laboratory  Recent Labs     01/09/22  0828   WBC 14.3*   RBC 4.20*   HEMOGLOBIN 13.6*   HEMATOCRIT 41.4*   MCV 98.6*   MCH 32.4   MCHC 32.9*   RDW 52.3*   PLATELETCT 120*   MPV 13.4*     Recent Labs     01/07/22 2035 01/08/22  0900 01/09/22  0828   SODIUM 142 139 138   POTASSIUM 3.8 4.0 4.1   CHLORIDE 107 104 104   CO2 22 24 23   GLUCOSE 115* 127* 118*   BUN 25* 25* 26*   CREATININE 1.07 0.99 0.96   CALCIUM 9.0 8.3* 8.3*                   Imaging  DX-CHEST-LIMITED (1 VIEW)   Final Result         Linear opacities in the left lung base could be due to discoid atelectasis or developing infiltrate such as pneumonia or pneumonitis.      DX-ABDOMEN FOR TUBE PLACEMENT   Final Result      1.  Feeding tube tip overlies the distal stomach.      IR-PICC LINE PLACEMENT W/ GUIDANCE > AGE 5   Final Result                  Ultrasound-guided PICC placement performed by qualified nursing staff as    above.          CT-HEAD W/O   Final Result      Evolving moderate to large right middle cerebral artery territory has increased in size when compared with the previous exam. No evidence of acute intracranial hemorrhage.         DX-ABDOMEN FOR TUBE PLACEMENT   Final Result      Cortrak feeding tube tip projects in the region of the proximal/mid stomach.      US-EXTREMITY VENOUS LOWER BILAT   Final Result      MR-BRAIN-W/O   Final Result         1. Age-related cerebral atrophy. Mild effacement of the right lateral ventricle.      2. Mild periventricular white matter changes consistent with chronic microvascular ischemic gliosis.      3. Large area of acute infarction involving the right frontal and parietal lobes as well as the right-sided basal ganglia. Subacute area of infarction involving the right temporal and lateral  occipital lobes.      4. Petechial hemorrhage noted in the right basal ganglia and involving the cortex in the right temporal and lateral occipital lobes.      5. Right internal carotid artery occlusion or high-grade stenosis.            EC-ECHOCARDIOGRAM COMPLETE W/O CONT   Final Result      DX-SHOULDER 2+ LEFT   Final Result      Negative 2 views of left shoulder.      DX-SHOULDER 2+ RIGHT   Final Result      Moderate subacromial spurring      DX-FOOT-COMPLETE 3+ RIGHT   Final Result      No radiographic evidence of acute displaced fracture or subluxation.      DX-FOOT-2- LEFT   Final Result      No radiographic evidence of acute displaced fracture or subluxation.      DX-ELBOW-COMPLETE 3+ RIGHT   Final Result      No radiographic evidence of acute traumatic injury.      Limited due to rotation of the lateral radiograph attempts. Consider repeat lateral radiograph to help exclude effusion      CT-CHEST,ABDOMEN,PELVIS WITH   Final Result      No significant traumatic abnormality in thorax, abdomen and pelvis CT scans.      Hepatic steatosis      Moderate colonic diverticulosis.      CT-LSPINE W/O PLUS RECONS   Final Result      No CT evidence of acute traumatic injury.      Moderate degenerative disc disease with trace degenerative L5/S1 retrolisthesis      CT-TSPINE W/O PLUS RECONS   Final Result      No CT evidence of acute traumatic abnormality.      CT-CSPINE WITHOUT PLUS RECONS   Final Result      No CT evidence of acute cervical spine abnormality.      CT-HEAD W/O   Final Result      Subacute right temporal, temporo-occipital infarction without hemorrhagic transformation      CT-CTA HEAD WITH & W/O-POST PROCESS   Final Result      Right internal carotid artery occlusion with extension to the MCA which lacks contrast opacification      Right A1 and KATIA contrast opacification secondary to cross-filling from the left      Normal left anterior and bilateral posterior circulation      Findings of all of the CTs  were discussed with Dr. Betancourt before completion of this dictation.            CT-CTA NECK WITH & W/O-POST PROCESSING   Final Result      Occluded right internal carotid artery at its origin      Atherosclerosis at origin of bilateral vertebral arteries could obscure stenosis. Arteries are patent without post stenotic dilatation      DX-PELVIS-1 OR 2 VIEWS   Final Result      Negative AP view of the pelvis.      DX-CHEST-LIMITED (1 VIEW)   Final Result      No acute cardiopulmonary disease.      US-ABORTED US PROCEDURE    (Results Pending)        Assessment/Plan  * Stroke due to thrombosis of right carotid artery (HCC)- (present on admission)  Assessment & Plan  Extensive stroke due to thrombosis of right carotid artery  Occurred while on anticoagulation, INR slightly subtherapeutic at 1.8  Cytotoxic edema has improved, appreciate neurology recommendations, low sodium to normalize  High intensity statin  Start anticoagulation 1/13  PT/OT/speech    Aspiration pneumonia (HCC)  Assessment & Plan  Patient tachycardic and tachypneic 1/9  Patient somnolent, on tube feeds  WBC elevated, procalcitonin elevated  CXR shows possible left pneumonia  Start unasyn for suspected aspiration pneumonia  Stop opiates until mentation improves    Sepsis (HCC)  Assessment & Plan  This is Sepsis Not present on admission  SIRS criteria identified on my evaluation include: Tachycardia, with heart rate greater than 90 BPM  Source is lungs  Sepsis protocol initiated  Fluid resuscitation ordered per protocol  IV antibiotics as appropriate for source of sepsis  While organ dysfunction may be noted elsewhere in this problem list or in the chart, degree of organ dysfunction does not meet CMS criteria for severe sepsis      Depression- (present on admission)  Assessment & Plan  Outpatient Wellbutrin and Lexapro    Mild intermittent asthma without complication- (present on admission)  Assessment & Plan  No acute exacerbation    Chronic  anticoagulation- (present on admission)  Assessment & Plan  Patient slightly subtherapeutic with INR 1.8 admission  1/2 LE U/S negative for overt DVT, follow weekly U/S for any sign of DVT.  If clinically stable consider restarting anticoagulation 1/13/2022    Dyslipidemia- (present on admission)  Assessment & Plan  Atorvastatin    Primary hypertension- (present on admission)  Assessment & Plan  Goal normotensive  Continue monitoring, consider restarting enteral blood pressure pressure agent if needed.       VTE prophylaxis: enoxaparin ppx

## 2022-01-09 NOTE — ASSESSMENT & PLAN NOTE
Completed 7 days course of antibiotics   perocal negative   Saturating well on room air   Aspiration precautions, SLP following

## 2022-01-09 NOTE — CARE PLAN
The patient is Watcher - Medium risk of patient condition declining or worsening    Shift Goals  Clinical Goals: Pain management, improved communication  Patient Goals: pain mangament  Family Goals:  (no family present at this time)    Progress made toward(s) clinical / shift goals:  Patient at risk for falls due to stroke, bed alarm on, walker out of sight, nonskid socks on, call light within reach, personal belongings within reach, toileting offered.       Patient is not progressing towards the following goals:

## 2022-01-09 NOTE — CARE PLAN
The patient is Watcher - Medium risk of patient condition declining or worsening    Shift Goals  Clinical Goals: Improve neuro status  Patient Goals: OMAYRA  Family Goals: OMAYRA    Progress made toward(s) clinical / shift goals:  Patient intermittently responds to voice. Unable to assess neuro status. Q2 hour turns in place to maintain skin integrity. CHG bath completed. Adequate urine output. Hourly rounding continues.     Patient is not progressing towards the following goals:      Problem: Knowledge Deficit - Stroke Education  Goal: Patient's knowledge of stroke and risk factors will improve  Outcome: Not Progressing  Note: Patient is lethargic, intermittently responds to voice. Unable to assess patients knowledge of education.      Problem: Psychosocial - Patient Condition  Goal: Patient's ability to verbalize feelings about condition will improve  Outcome: Not Progressing  Note: Patient is lethargic and has not verbalized.  Goal: Patient's ability to re-evaluate and adapt role responsibilities will improve  Outcome: Not Progressing     Problem: Neuro Status  Goal: Neuro status will remain stable or improve  Outcome: Not Progressing     Problem: Self Care  Goal: Patient will have the ability to perform ADLs independently or with assistance (bathe, groom, dress, toilet and feed)  Outcome: Not Progressing     Problem: Knowledge Deficit - Standard  Goal: Patient and family/care givers will demonstrate understanding of plan of care, disease process/condition, diagnostic tests and medications  Outcome: Not Progressing

## 2022-01-09 NOTE — ASSESSMENT & PLAN NOTE
This is Sepsis Not present on admission  SIRS criteria identified on my evaluation include: Tachycardia, with heart rate greater than 90 BPM  Source is lungs  Sepsis protocol initiated  Fluid resuscitation ordered per protocol  IV antibiotics as appropriate for source of sepsis  While organ dysfunction may be noted elsewhere in this problem list or in the chart, degree of organ dysfunction does not meet CMS criteria for severe sepsis    Resolved

## 2022-01-10 LAB
ANION GAP SERPL CALC-SCNC: 12 MMOL/L (ref 7–16)
BUN SERPL-MCNC: 27 MG/DL (ref 8–22)
CALCIUM SERPL-MCNC: 8.3 MG/DL (ref 8.5–10.5)
CHLORIDE SERPL-SCNC: 101 MMOL/L (ref 96–112)
CO2 SERPL-SCNC: 21 MMOL/L (ref 20–33)
CREAT SERPL-MCNC: 1.12 MG/DL (ref 0.5–1.4)
CRP SERPL HS-MCNC: 21.41 MG/DL (ref 0–0.75)
ERYTHROCYTE [DISTWIDTH] IN BLOOD BY AUTOMATED COUNT: 51.1 FL (ref 35.9–50)
GLUCOSE SERPL-MCNC: 112 MG/DL (ref 65–99)
HCT VFR BLD AUTO: 44.1 % (ref 42–52)
HGB BLD-MCNC: 14.8 G/DL (ref 14–18)
MCH RBC QN AUTO: 32.1 PG (ref 27–33)
MCHC RBC AUTO-ENTMCNC: 33.6 G/DL (ref 33.7–35.3)
MCV RBC AUTO: 95.7 FL (ref 81.4–97.8)
PLATELET # BLD AUTO: 135 K/UL (ref 164–446)
PMV BLD AUTO: 13 FL (ref 9–12.9)
POTASSIUM SERPL-SCNC: 3.7 MMOL/L (ref 3.6–5.5)
PREALB SERPL-MCNC: 5.6 MG/DL (ref 18–38)
RBC # BLD AUTO: 4.61 M/UL (ref 4.7–6.1)
SCCMEC + MECA PNL NOSE NAA+PROBE: NEGATIVE
SODIUM SERPL-SCNC: 134 MMOL/L (ref 135–145)
WBC # BLD AUTO: 10.4 K/UL (ref 4.8–10.8)

## 2022-01-10 PROCEDURE — A9270 NON-COVERED ITEM OR SERVICE: HCPCS | Performed by: HOSPITALIST

## 2022-01-10 PROCEDURE — 770001 HCHG ROOM/CARE - MED/SURG/GYN PRIV*

## 2022-01-10 PROCEDURE — 85027 COMPLETE CBC AUTOMATED: CPT

## 2022-01-10 PROCEDURE — 700102 HCHG RX REV CODE 250 W/ 637 OVERRIDE(OP): Performed by: STUDENT IN AN ORGANIZED HEALTH CARE EDUCATION/TRAINING PROGRAM

## 2022-01-10 PROCEDURE — 86140 C-REACTIVE PROTEIN: CPT

## 2022-01-10 PROCEDURE — A9270 NON-COVERED ITEM OR SERVICE: HCPCS | Performed by: STUDENT IN AN ORGANIZED HEALTH CARE EDUCATION/TRAINING PROGRAM

## 2022-01-10 PROCEDURE — 80048 BASIC METABOLIC PNL TOTAL CA: CPT

## 2022-01-10 PROCEDURE — 97112 NEUROMUSCULAR REEDUCATION: CPT

## 2022-01-10 PROCEDURE — 700102 HCHG RX REV CODE 250 W/ 637 OVERRIDE(OP): Performed by: HOSPITALIST

## 2022-01-10 PROCEDURE — 87899 AGENT NOS ASSAY W/OPTIC: CPT

## 2022-01-10 PROCEDURE — 97530 THERAPEUTIC ACTIVITIES: CPT

## 2022-01-10 PROCEDURE — 84134 ASSAY OF PREALBUMIN: CPT

## 2022-01-10 PROCEDURE — 99233 SBSQ HOSP IP/OBS HIGH 50: CPT | Performed by: STUDENT IN AN ORGANIZED HEALTH CARE EDUCATION/TRAINING PROGRAM

## 2022-01-10 PROCEDURE — 36415 COLL VENOUS BLD VENIPUNCTURE: CPT

## 2022-01-10 PROCEDURE — 87641 MR-STAPH DNA AMP PROBE: CPT

## 2022-01-10 PROCEDURE — 700105 HCHG RX REV CODE 258: Performed by: STUDENT IN AN ORGANIZED HEALTH CARE EDUCATION/TRAINING PROGRAM

## 2022-01-10 PROCEDURE — L4398 FOOT DROP SPLINT PRE OTS: HCPCS

## 2022-01-10 PROCEDURE — 700111 HCHG RX REV CODE 636 W/ 250 OVERRIDE (IP): Performed by: HOSPITALIST

## 2022-01-10 PROCEDURE — 700111 HCHG RX REV CODE 636 W/ 250 OVERRIDE (IP): Performed by: STUDENT IN AN ORGANIZED HEALTH CARE EDUCATION/TRAINING PROGRAM

## 2022-01-10 RX ORDER — AMOXICILLIN AND CLAVULANATE POTASSIUM 875; 125 MG/1; MG/1
1 TABLET, FILM COATED ORAL EVERY 12 HOURS
Status: DISCONTINUED | OUTPATIENT
Start: 2022-01-10 | End: 2022-01-13

## 2022-01-10 RX ADMIN — AMPICILLIN SODIUM AND SULBACTAM SODIUM 3 G: 2; 1 INJECTION, POWDER, FOR SOLUTION INTRAMUSCULAR; INTRAVENOUS at 00:32

## 2022-01-10 RX ADMIN — ATORVASTATIN CALCIUM 80 MG: 80 TABLET, FILM COATED ORAL at 17:28

## 2022-01-10 RX ADMIN — BUPROPION HYDROCHLORIDE 100 MG: 100 TABLET, FILM COATED ORAL at 17:28

## 2022-01-10 RX ADMIN — SODIUM CHLORIDE: 4.5 INJECTION, SOLUTION INTRAVENOUS at 10:50

## 2022-01-10 RX ADMIN — AMPICILLIN SODIUM AND SULBACTAM SODIUM 3 G: 2; 1 INJECTION, POWDER, FOR SOLUTION INTRAMUSCULAR; INTRAVENOUS at 06:29

## 2022-01-10 RX ADMIN — BUPROPION HYDROCHLORIDE 100 MG: 100 TABLET, FILM COATED ORAL at 04:32

## 2022-01-10 RX ADMIN — ACETAMINOPHEN 650 MG: 325 TABLET, FILM COATED ORAL at 17:28

## 2022-01-10 RX ADMIN — LOSARTAN POTASSIUM 25 MG: 50 TABLET, FILM COATED ORAL at 04:32

## 2022-01-10 RX ADMIN — AMOXICILLIN AND CLAVULANATE POTASSIUM 1 TABLET: 875; 125 TABLET, FILM COATED ORAL at 17:28

## 2022-01-10 RX ADMIN — ENOXAPARIN SODIUM 40 MG: 40 INJECTION SUBCUTANEOUS at 17:28

## 2022-01-10 ASSESSMENT — PAIN DESCRIPTION - PAIN TYPE
TYPE: ACUTE PAIN
TYPE: ACUTE PAIN

## 2022-01-10 ASSESSMENT — ENCOUNTER SYMPTOMS
VOMITING: 0
PALPITATIONS: 0
CHILLS: 0
SPUTUM PRODUCTION: 0
DIZZINESS: 0
SPEECH CHANGE: 1
ORTHOPNEA: 0
COUGH: 1
FOCAL WEAKNESS: 1
NAUSEA: 0
WEAKNESS: 1
HEMOPTYSIS: 0

## 2022-01-10 ASSESSMENT — COGNITIVE AND FUNCTIONAL STATUS - GENERAL
SUGGESTED CMS G CODE MODIFIER DAILY ACTIVITY: CM
HELP NEEDED FOR BATHING: TOTAL
DAILY ACTIVITIY SCORE: 8
SUGGESTED CMS G CODE MODIFIER MOBILITY: CN
STANDING UP FROM CHAIR USING ARMS: TOTAL
DRESSING REGULAR LOWER BODY CLOTHING: TOTAL
WALKING IN HOSPITAL ROOM: TOTAL
TURNING FROM BACK TO SIDE WHILE IN FLAT BAD: UNABLE
PERSONAL GROOMING: A LOT
EATING MEALS: TOTAL
DRESSING REGULAR UPPER BODY CLOTHING: A LOT
CLIMB 3 TO 5 STEPS WITH RAILING: TOTAL
MOVING FROM LYING ON BACK TO SITTING ON SIDE OF FLAT BED: UNABLE
MOBILITY SCORE: 6
TOILETING: TOTAL
MOVING TO AND FROM BED TO CHAIR: UNABLE

## 2022-01-10 ASSESSMENT — GAIT ASSESSMENTS: GAIT LEVEL OF ASSIST: UNABLE TO PARTICIPATE

## 2022-01-10 NOTE — PROGRESS NOTES
HOSPITALIST CROSS COVER    Called by RN for patient having fever of 102.9, which improved to 101.3 after Tylenol and cool packs. Patient also tachycardic, likely secondary to fever and sepsis. He has received 2 doses of Unasyn today for suspected aspiration PNA. Blood cultures drawn today so far negative      PLAN:  - Continue Unasyn q 6 hr for now  - MRSA swab, strep antigen, sputum culture  - continue Tylenol and alternative tx for fevers

## 2022-01-10 NOTE — PROGRESS NOTES
Hospital Medicine Daily Progress Note    Date of Service  1/10/2022    Chief Complaint  Jose Quintanilla is a 66 y.o. male admitted 1/1/2022 with a fall    Hospital Course  Mr Quintanilla has past medical history which includes hypertension, hyperlipidemia, DVT/PE and was treated with anticoagulation prior to admission.  Presented on 1/1/2022 after a fall with symptoms of left hemiparesis and rightward gaze.  Patient was evaluated and cleared by trauma.  Neuro imaging was concerning for right-sided carotid occlusion.  Patient was evaluated neurology with recommendations for admission to the ICU, permissive hypertension, and follow-up imaging.  Patient's mental status worsened on 1/3/2022 when he was started on hypertonic saline.  His alertness has improved and hypertonic saline was stopped on 1/6/2022.  Neurology recommends restarting anticoagulation on 1/13/2022 with consideration for apixaban.    Interval Problem Update  No acute events overnight.  Patient more alert and responsive with family and nursing at bedside, still somnolent when I come to bedside.  Monitor mentation, seems to be improving since stopping oxycodone.  Can restart oxycodone if patient is awake enough to participate with therapy services.  Patient with fever 102.9 last night.  On unasyn for aspiration pneumonia, okay to transition to augmentin.  Hold tube feeds, restart tomorrow morning.  Cortrak in place, okay to use for meds.  SLP following, plan for FEES when patient is alert.  On IV fluids.  SW working on SNF in [place] per patient/family request.      I have personally seen and examined the patient at bedside. I discussed the plan of care with patient and bedside RN.    Consultants/Specialty  critical care and neurology    Code Status  Full Code    Disposition  Patient is not medically cleared for discharge.   Anticipate discharge to to an inpatient rehabilitation hospital vs SNF.  I have placed the appropriate orders for post-discharge  needs.    Review of Systems  Review of Systems   Constitutional: Negative for chills and malaise/fatigue.   Respiratory: Positive for cough. Negative for hemoptysis and sputum production.    Cardiovascular: Negative for chest pain, palpitations and orthopnea.   Gastrointestinal: Negative for nausea and vomiting.   Skin: Negative for itching and rash.   Neurological: Positive for speech change, focal weakness and weakness. Negative for dizziness.   All other systems reviewed and are negative.       Physical Exam  Temp:  [36.7 °C (98.1 °F)-39.4 °C (102.9 °F)] 37.4 °C (99.3 °F)  Pulse:  [106-120] 106  Resp:  [19-30] 19  BP: (107-151)/(62-80) 107/62  SpO2:  [89 %-96 %] 95 %    Physical Exam  Constitutional:       General: He is not in acute distress.     Appearance: Normal appearance. He is normal weight.   HENT:      Head: Normocephalic and atraumatic.      Right Ear: External ear normal.      Left Ear: External ear normal.      Nose: Nose normal.      Mouth/Throat:      Mouth: Mucous membranes are moist.      Pharynx: Oropharynx is clear.   Eyes:      Extraocular Movements: Extraocular movements intact.      Conjunctiva/sclera: Conjunctivae normal.      Pupils: Pupils are equal, round, and reactive to light.   Cardiovascular:      Rate and Rhythm: Normal rate and regular rhythm.      Pulses: Normal pulses.   Pulmonary:      Effort: Pulmonary effort is normal. No respiratory distress.      Breath sounds: Rales present. No wheezing.   Abdominal:      General: Abdomen is flat. Bowel sounds are normal.      Palpations: Abdomen is soft.   Musculoskeletal:         General: Normal range of motion.      Cervical back: Normal range of motion and neck supple.   Skin:     General: Skin is warm and dry.      Capillary Refill: Capillary refill takes less than 2 seconds.      Coloration: Skin is not jaundiced.   Neurological:      Mental Status: He is alert and oriented to person, place, and time.      Motor: Weakness present.       Comments: Left-sided neglect and hemiparesis   Psychiatric:         Mood and Affect: Mood normal.         Behavior: Behavior normal.         Fluids    Intake/Output Summary (Last 24 hours) at 1/10/2022 1400  Last data filed at 1/10/2022 0800  Gross per 24 hour   Intake 835 ml   Output 200 ml   Net 635 ml       Laboratory  Recent Labs     01/09/22  0828 01/10/22  0200   WBC 14.3* 10.4   RBC 4.20* 4.61*   HEMOGLOBIN 13.6* 14.8   HEMATOCRIT 41.4* 44.1   MCV 98.6* 95.7   MCH 32.4 32.1   MCHC 32.9* 33.6*   RDW 52.3* 51.1*   PLATELETCT 120* 135*   MPV 13.4* 13.0*     Recent Labs     01/08/22  0900 01/09/22  0828 01/10/22  0200   SODIUM 139 138 134*   POTASSIUM 4.0 4.1 3.7   CHLORIDE 104 104 101   CO2 24 23 21   GLUCOSE 127* 118* 112*   BUN 25* 26* 27*   CREATININE 0.99 0.96 1.12   CALCIUM 8.3* 8.3* 8.3*                   Imaging  DX-CHEST-LIMITED (1 VIEW)   Final Result         Linear opacities in the left lung base could be due to discoid atelectasis or developing infiltrate such as pneumonia or pneumonitis.      DX-ABDOMEN FOR TUBE PLACEMENT   Final Result      1.  Feeding tube tip overlies the distal stomach.      IR-PICC LINE PLACEMENT W/ GUIDANCE > AGE 5   Final Result                  Ultrasound-guided PICC placement performed by qualified nursing staff as    above.          CT-HEAD W/O   Final Result      Evolving moderate to large right middle cerebral artery territory has increased in size when compared with the previous exam. No evidence of acute intracranial hemorrhage.         DX-ABDOMEN FOR TUBE PLACEMENT   Final Result      Cortrak feeding tube tip projects in the region of the proximal/mid stomach.      US-EXTREMITY VENOUS LOWER BILAT   Final Result      MR-BRAIN-W/O   Final Result         1. Age-related cerebral atrophy. Mild effacement of the right lateral ventricle.      2. Mild periventricular white matter changes consistent with chronic microvascular ischemic gliosis.      3. Large area of acute  infarction involving the right frontal and parietal lobes as well as the right-sided basal ganglia. Subacute area of infarction involving the right temporal and lateral occipital lobes.      4. Petechial hemorrhage noted in the right basal ganglia and involving the cortex in the right temporal and lateral occipital lobes.      5. Right internal carotid artery occlusion or high-grade stenosis.            EC-ECHOCARDIOGRAM COMPLETE W/O CONT   Final Result      DX-SHOULDER 2+ LEFT   Final Result      Negative 2 views of left shoulder.      DX-SHOULDER 2+ RIGHT   Final Result      Moderate subacromial spurring      DX-FOOT-COMPLETE 3+ RIGHT   Final Result      No radiographic evidence of acute displaced fracture or subluxation.      DX-FOOT-2- LEFT   Final Result      No radiographic evidence of acute displaced fracture or subluxation.      DX-ELBOW-COMPLETE 3+ RIGHT   Final Result      No radiographic evidence of acute traumatic injury.      Limited due to rotation of the lateral radiograph attempts. Consider repeat lateral radiograph to help exclude effusion      CT-CHEST,ABDOMEN,PELVIS WITH   Final Result      No significant traumatic abnormality in thorax, abdomen and pelvis CT scans.      Hepatic steatosis      Moderate colonic diverticulosis.      CT-LSPINE W/O PLUS RECONS   Final Result      No CT evidence of acute traumatic injury.      Moderate degenerative disc disease with trace degenerative L5/S1 retrolisthesis      CT-TSPINE W/O PLUS RECONS   Final Result      No CT evidence of acute traumatic abnormality.      CT-CSPINE WITHOUT PLUS RECONS   Final Result      No CT evidence of acute cervical spine abnormality.      CT-HEAD W/O   Final Result      Subacute right temporal, temporo-occipital infarction without hemorrhagic transformation      CT-CTA HEAD WITH & W/O-POST PROCESS   Final Result      Right internal carotid artery occlusion with extension to the MCA which lacks contrast opacification      Right A1  and KATIA contrast opacification secondary to cross-filling from the left      Normal left anterior and bilateral posterior circulation      Findings of all of the CTs were discussed with Dr. Betancourt before completion of this dictation.            CT-CTA NECK WITH & W/O-POST PROCESSING   Final Result      Occluded right internal carotid artery at its origin      Atherosclerosis at origin of bilateral vertebral arteries could obscure stenosis. Arteries are patent without post stenotic dilatation      DX-PELVIS-1 OR 2 VIEWS   Final Result      Negative AP view of the pelvis.      DX-CHEST-LIMITED (1 VIEW)   Final Result      No acute cardiopulmonary disease.      US-ABORTED US PROCEDURE    (Results Pending)        Assessment/Plan  * Stroke due to thrombosis of right carotid artery (HCC)- (present on admission)  Assessment & Plan  Extensive stroke due to thrombosis of right carotid artery  Occurred while on anticoagulation, INR slightly subtherapeutic at 1.8  Cytotoxic edema has improved, appreciate neurology recommendations, low sodium to normalize  High intensity statin  Start anticoagulation 1/13  PT/OT/speech    Aspiration pneumonia (HCC)  Assessment & Plan  Patient tachycardic and tachypneic 1/9  Patient somnolent, on tube feeds  WBC elevated, procalcitonin elevated  CXR shows possible left pneumonia  Start unasyn for suspected aspiration pneumonia  Stop opiates until mentation improves    Sepsis (HCC)  Assessment & Plan  This is Sepsis Not present on admission  SIRS criteria identified on my evaluation include: Tachycardia, with heart rate greater than 90 BPM  Source is lungs  Sepsis protocol initiated  Fluid resuscitation ordered per protocol  IV antibiotics as appropriate for source of sepsis  While organ dysfunction may be noted elsewhere in this problem list or in the chart, degree of organ dysfunction does not meet CMS criteria for severe sepsis      Depression- (present on admission)  Assessment &  Plan  Outpatient Wellbutrin and Lexapro    Mild intermittent asthma without complication- (present on admission)  Assessment & Plan  No acute exacerbation    Chronic anticoagulation- (present on admission)  Assessment & Plan  Patient slightly subtherapeutic with INR 1.8 admission  1/2 LE U/S negative for overt DVT, follow weekly U/S for any sign of DVT.  If clinically stable consider restarting anticoagulation 1/13/2022    Dyslipidemia- (present on admission)  Assessment & Plan  Atorvastatin    Primary hypertension- (present on admission)  Assessment & Plan  Goal normotensive  Continue monitoring, consider restarting enteral blood pressure pressure agent if needed.       VTE prophylaxis: enoxaparin ppx

## 2022-01-10 NOTE — PROGRESS NOTES
Size large prafo foot drop boot has been applied and fitted to pt's L  LE. Pt is tolerating it well at this time. Pt presents positive CMS both pre and post application of boot.

## 2022-01-10 NOTE — THERAPY
Missed Therapy     Patient Name: Jose Quintanilla  Age:  66 y.o., Sex:  male  Medical Record #: [id number]  Today's Date: 1/10/2022    Discussed missed therapy with RN       01/10/22 0926   Interdisciplinary Plan of Care Collaboration   IDT Collaboration with  Nursing   Collaboration Comments Collaborated with RN regarding dysphagia treatment and FEES orders. Per RN, patient is not medically appropriate to participate in dysphagia therapy at this time. Will hold and follow up as appropriate.

## 2022-01-10 NOTE — DISCHARGE PLANNING
Anticipated Discharge Disposition: SNF in [facility]     Action: RN CM attended IDT rounds and reviewed patient chart.  Family preference for patient to be placed in SNF when medically cleared in [facility], referrals have been sent and are pending.  RN CM had discussion with son, Chandra, last week and he prefers to have his dad closer to him as patient lives alone in [facility] and there is no family in this area.  Patients son reports he can incur the cost of transportation to that area.  Discussion in rounds for SLP eval to determine continuation of cortrak vs peg tube.      Barriers to Discharge: medical clearance; SNF acceptance, bed availability and arrangement of transport     Plan: [facility] to remain available to assist with discharge planning needs and barriers

## 2022-01-10 NOTE — THERAPY
Physical Therapy   Daily Treatment     Patient Name: Jose Quintanilla  Age:  66 y.o., Sex:  male  Medical Record #: [id number]  Today's Date: 1/10/2022     Precautions: Fall Risk;Nasogastric Tube;Swallow Precautions (per SLP)  Comments: subluxed L shoulder, L sided deficits/neglect    Assessment    Pt seen for PT tx session with son present throughout. Pt sleeping upon therapist arrival, awoke to voice. He conts to demonstrate L inattention with R gaze preference. Worked on passive neck rotation to the L in effort to facilitate attention on L side. He conts to require total A for bed mob. He appears to have decreased strength in RLE to participate in mobility only wiggling toes of R foot today vs mvts at hip/knee noted on previous session. Ongoing impairments in balance 2/2 posterior lean and L lateral push. Pt reporting pain in bottom with sitting EOB. Attempted to work on leaning R/L to improve midline balance control as well as anterior wt shifting. No volitional or spontaneous mvt noted throughout LLE today, + babinski. Pts son educated on passive L ankle DF to preserve ankle flexibility, also ordered PRAFO for positioning in bed. PT to cont to follow.    Plan    Continue current treatment plan.    DC Equipment Recommendations: Unable to determine at this time  Discharge Recommendations: Recommend post-acute placement for additional physical therapy services prior to discharge home     Objective     01/10/22 1445   Cognition    Speech/ Communication Delayed Responses   Level of Consciousness Responds to voice   Ability To Follow Commands 1 Step (~50% on R side)   New Learning Impaired   Attention Impaired   Initiation Impaired   Comments L inattention   Passive ROM Lower Body   Comments B HS tightness   Active ROM Lower Body    Comments no volitional or spontaneous mvt in LLE noted with exception of reflexive babinski   Strength Lower Body   Comments R toe flexion/extension, otherwise no mvt in RLE to commad; as above  for LLE   Sensation Lower Body   Comments s/s of pain with HS stretch bilaterally   Vision   Vision Comments ongoing L inattention, no tracking to L past midline, attempted to encourage pt to track his son across midline   Neurological Concerns   Sitting Posture During ADL's Posterior Lean;Pushes to the Left   Babinski Sign Present   Balance   Sitting Balance (Static) Trace   Sitting Balance (Dynamic) Dependent   Weight Shift Sitting Absent   Gait Analysis   Gait Level Of Assist Unable to Participate   Bed Mobility    Supine to Sit Total Assist   Sit to Supine Total Assist   Functional Mobility   Sit to Stand Unable to Participate   Bed, Chair, Wheelchair Transfer Unable to Participate   Short Term Goals    Short Term Goal # 1 Patient will move supine<>sitting EOB with bed features and mod A within 6tx in order to get in/out of bed   Goal Outcome # 1 goal not met   Short Term Goal # 2 Patient will maintain dynamic sitting balance for 5 min with min A within 6tx in order to perform functional task   Goal Outcome # 2 Goal not met   Short Term Goal # 3 Patient will transfer with mod A within 6tx in order to achieve functional transfer   Goal Outcome # 3 Goal not met

## 2022-01-10 NOTE — CARE PLAN
The patient is Watcher - Medium risk of patient condition declining or worsening    Shift Goals  Clinical Goals: Montior temperature, oral suctioning with each encounter  Patient Goals: OMAYRA  Family Goals: OMAYRA    Progress made toward(s) clinical / shift goals:  Patient afebrile at 2000. Rechecked temperature at 2130 and patient was febrile. Pharmacological intervention in place. Cold packs in place. On-call provider notified. Rapid nurse notified during their rounds on the unit. Q2 hour turns in place. CHG bath complete. Provided oral care twice and oral suctioning as needed. Hourly rounding continues.     Patient is not progressing towards the following goals:      Problem: Knowledge Deficit - Stroke Education  Goal: Patient's knowledge of stroke and risk factors will improve  Outcome: Not Progressing  Note: Unable to assess patient's neurological status.      Problem: Neuro Status  Goal: Neuro status will remain stable or improve  Outcome: Not Progressing  Note: Unable to assess patients neurological status     Problem: Hemodynamic Monitoring  Goal: Patient's hemodynamics, fluid balance and neurologic status will be stable or improve  Outcome: Not Progressing     Problem: Mobility - Stroke  Goal: Patient's capacity to carry out activities will improve  Outcome: Not Progressing     Problem: Self Care  Goal: Patient will have the ability to perform ADLs independently or with assistance (bathe, groom, dress, toilet and feed)  Outcome: Not Progressing     Problem: Knowledge Deficit - Standard  Goal: Patient and family/care givers will demonstrate understanding of plan of care, disease process/condition, diagnostic tests and medications  Outcome: Not Progressing

## 2022-01-10 NOTE — DIETARY
"Nutrition support weekly update:  Day 9 of admit.  oJse Quintanilla is a 66 y.o. male with admitting DX of stroke d/t thrombosis of R carotid artery.      Tube feeding initiated on 1/3. Current TF via gastric Cortrak is Replete with Fiber with goal rate 85 ml/hr to provide 2040 kcal, 131 g protein, and 1693 mL of free water per day.     Assessment:  Weight on 1/2: 121.9 kg via bed scale. Weight used in initial needs estimation on 1/1: 119 kg via bed scale. Moderate wt increase noted, likely fluid related. Per I/O's: +5 L fluid. Re-estimate of nutritional needs is not indicated at this time.      Evaluation:   1. TF with Replete with Fiber is currently on hold d/t pt experiencing \"gurgling noises from pharynx\" per MD note.   2. Per MD note, CXR shows possible pneumonia with aspiration and sepsis   3. Per SLP note, pending Fees test but pt is not medically appropriate to participate in dysphagia diet at this time and will follow up as appropriate   4. Labs: Sodium 134, Glucose 112, BUN 27 (trending up since 1/7), CRP 4.79 (on 1/3)  5. Meds: Augmentin (not yet given), Zofran (PRN)   6. Last BM: 1/10  7. Current feeding remains appropriate to meet estimated protein needs.     Malnutrition risk: No new risk identified     Recommendations/Plan:  1. Recommend resuming Replete with Fiber per MD, at 25 mL/hr and advancing per protocol to a goal rate of 85 mL/hr to promote tolerance    2. Fluids per MD  3. Diet initiation per SLP  4. Continue to monitor wt     RD following.                "

## 2022-01-10 NOTE — THERAPY
Occupational Therapy  Daily Treatment     Patient Name: Jose Quintanilla  Age:  66 y.o., Sex:  male  Medical Record #: [id number]  Today's Date: 1/10/2022     Precautions  Precautions: (P) Fall Risk,Nasogastric Tube,Swallow Precautions ( See Comments)  Comments: (P) significant subluxed L shoulder    Assessment    Pt seen for follow up OT tx session, pt continues to require total assist for mobility and ADLs, significant subluxation noted in left shoulder (2 finger lengths) kinesio taping not indicated due to size of patients arm will consider leukotape in further session for further injury prevention of shoulder joint. Pts son at bedside, educated on need to be on patients left side to increase attention and visual scanning towards left to reduce inattention as well as positioning of LUE. Pt would benefit from continued skilled therapy while admitted as well as recommend post-acute placement.    Plan    Continue current treatment plan.    DC Equipment Recommendations: (P) Unable to determine at this time  Discharge Recommendations: (P) Recommend post-acute placement for additional occupational therapy services prior to discharge home    Objective       01/10/22 1501   Precautions   Precautions Fall Risk;Nasogastric Tube;Swallow Precautions ( See Comments)   Comments significant subluxed L shoulder   Cognition    Cognition / Consciousness X   Speech/ Communication Delayed Responses   Level of Consciousness Responds to voice   Ability To Follow Commands 1 Step  (~50%)   Safety Awareness Impaired   New Learning Impaired   Attention Impaired   Initiation Impaired   Active ROM Upper Body   Active ROM Upper Body  X   Flaccid Upper Extremity Left Upper Extremity Flaccid   Strength Upper Body   Upper Body Strength  X   Comments no movement LUE, RUE WFL   Upper Body Muscle Tone   Upper Body Muscle Tone  X   Lt Upper Extremity Muscle Tone Non Functional;Hypotonic   Neuro-Muscular Treatments   Neuro-Muscular Treatments Anterior  weight shift;Joint Approximation;Postural Changes;Postural Facilitation;Verbal Cuing;Weight Shift Right;Weight Shift Left   Balance   Sitting Balance (Static) Trace   Sitting Balance (Dynamic) Dependent   Weight Shift Sitting Absent   Skilled Intervention Verbal Cuing;Facilitation   Bed Mobility    Supine to Sit Total Assist   Sit to Supine Total Assist   Scooting Total Assist   Rolling Total Assist to Lt.   Skilled Intervention Verbal Cuing   Activities of Daily Living   Grooming Total Assist   Upper Body Dressing Total Assist   Lower Body Dressing Total Assist   Skilled Intervention Verbal Cuing;Facilitation   How much help from another person does the patient currently need...   Putting on and taking off regular lower body clothing? 1   Bathing (including washing, rinsing, and drying)? 1   Toileting, which includes using a toilet, bedpan, or urinal? 1   Putting on and taking off regular upper body clothing? 2   Taking care of personal grooming such as brushing teeth? 2   Eating meals? 1   6 Clicks Daily Activity Score 8   Modified [place] (mRS)   Modified Aries Score 5   Functional Mobility   Sit to Stand Unable to Participate   Bed, Chair, Wheelchair Transfer Unable to Participate   Toilet Transfers Unable to Participate   Mobility bed mobility, EOB only, BTB   Skilled Intervention Verbal Cuing   ICU Target Mobility Level   ICU Mobility - Targeted Level Level 2   Visual Perception   Visual Perception  X   Visual Fields Left Field Cut   Neglect Severe Left   Visual Scanning Impaired   Comments similar presentation of tracking   Activity Tolerance   Sitting Edge of Bed 15 min   Short Term Goals   Short Term Goal # 1 Mod A with washing face with a cloth   Goal Outcome # 1 Progressing slower than expected   Short Term Goal # 2 mod A with UB dressing   Goal Outcome # 2 Progressing slower than expected   Short Term Goal # 3 mod A with ADL txfs   Goal Outcome # 3 Progressing slower than expected   Education Group    Education Provided Role of Occupational Therapist;Stroke   Role of Occupational Therapist Patient Response Patient;Family;Acceptance;Explanation;Reinforcement Needed   Stroke Patient Response Patient;Family;Acceptance;Explanation;Reinforcement Needed   Interdisciplinary Plan of Care Collaboration   IDT Collaboration with  Nursing;Family / Caregiver   Patient Position at End of Therapy In Bed;Bed Alarm On;Call Light within Reach;Tray Table within Reach;Phone within Reach   Collaboration Comments RN updated

## 2022-01-10 NOTE — PROGRESS NOTES
CNA recorded vitals at 2000 for this patient. Temperature was 99.5f. This RN reassessed patient at 2113 due to patient feeling warm and diaphoretic. Patient was febrile, 102.9. Gave patient a dose of Tylenol per MAR orders, cold compress to patient forehead, and cold packs to underarms. Notified on-call provider, Rafaela Quinonez. MRSA swab, strep antigen, and sputum culture ordered. Patient had received 2 doses of Unasyn, continuing Q6 hours. Reassessed patient temperature at 2155, 101.3f. Continued with cold packs. Reassessed patient at 2308, temperature 99.4. Continuing to monitor.

## 2022-01-11 LAB — S PNEUM AG UR QL: NEGATIVE

## 2022-01-11 PROCEDURE — 700111 HCHG RX REV CODE 636 W/ 250 OVERRIDE (IP): Performed by: HOSPITALIST

## 2022-01-11 PROCEDURE — 92526 ORAL FUNCTION THERAPY: CPT

## 2022-01-11 PROCEDURE — A9270 NON-COVERED ITEM OR SERVICE: HCPCS | Performed by: HOSPITALIST

## 2022-01-11 PROCEDURE — 700105 HCHG RX REV CODE 258: Performed by: STUDENT IN AN ORGANIZED HEALTH CARE EDUCATION/TRAINING PROGRAM

## 2022-01-11 PROCEDURE — 99233 SBSQ HOSP IP/OBS HIGH 50: CPT | Performed by: STUDENT IN AN ORGANIZED HEALTH CARE EDUCATION/TRAINING PROGRAM

## 2022-01-11 PROCEDURE — 700102 HCHG RX REV CODE 250 W/ 637 OVERRIDE(OP): Performed by: HOSPITALIST

## 2022-01-11 PROCEDURE — 770001 HCHG ROOM/CARE - MED/SURG/GYN PRIV*

## 2022-01-11 PROCEDURE — 307059 PAD,EAR PROTECTOR: Performed by: STUDENT IN AN ORGANIZED HEALTH CARE EDUCATION/TRAINING PROGRAM

## 2022-01-11 PROCEDURE — 700102 HCHG RX REV CODE 250 W/ 637 OVERRIDE(OP): Performed by: STUDENT IN AN ORGANIZED HEALTH CARE EDUCATION/TRAINING PROGRAM

## 2022-01-11 PROCEDURE — A9270 NON-COVERED ITEM OR SERVICE: HCPCS | Performed by: STUDENT IN AN ORGANIZED HEALTH CARE EDUCATION/TRAINING PROGRAM

## 2022-01-11 RX ADMIN — BUPROPION HYDROCHLORIDE 100 MG: 100 TABLET, FILM COATED ORAL at 18:10

## 2022-01-11 RX ADMIN — SODIUM CHLORIDE: 4.5 INJECTION, SOLUTION INTRAVENOUS at 01:27

## 2022-01-11 RX ADMIN — BUPROPION HYDROCHLORIDE 100 MG: 100 TABLET, FILM COATED ORAL at 04:06

## 2022-01-11 RX ADMIN — ENOXAPARIN SODIUM 40 MG: 40 INJECTION SUBCUTANEOUS at 18:10

## 2022-01-11 RX ADMIN — ATORVASTATIN CALCIUM 80 MG: 80 TABLET, FILM COATED ORAL at 18:10

## 2022-01-11 RX ADMIN — ACETAMINOPHEN 650 MG: 325 TABLET, FILM COATED ORAL at 00:09

## 2022-01-11 RX ADMIN — SODIUM CHLORIDE: 4.5 INJECTION, SOLUTION INTRAVENOUS at 15:17

## 2022-01-11 RX ADMIN — ACETAMINOPHEN 650 MG: 325 TABLET, FILM COATED ORAL at 18:10

## 2022-01-11 RX ADMIN — AMOXICILLIN AND CLAVULANATE POTASSIUM 1 TABLET: 875; 125 TABLET, FILM COATED ORAL at 04:06

## 2022-01-11 RX ADMIN — AMOXICILLIN AND CLAVULANATE POTASSIUM 1 TABLET: 875; 125 TABLET, FILM COATED ORAL at 18:10

## 2022-01-11 RX ADMIN — ACETAMINOPHEN 650 MG: 325 TABLET, FILM COATED ORAL at 11:39

## 2022-01-11 ASSESSMENT — PAIN DESCRIPTION - PAIN TYPE: TYPE: ACUTE PAIN

## 2022-01-11 NOTE — CARE PLAN
The patient is Watcher - Medium risk of patient condition declining or worsening    Shift Goals  Clinical Goals: monitor neuro status, maintain skin integrity  Patient Goals: OMAYRA  Family Goals: Give patient a bath if possible    Progress made toward(s) clinical / shift goals:  Patient A&Ox3-4. Q2 hour turns in place. Barrier paste in use. Bed is low and locked. Bed alarm on. Call light within reach. Hourly rounding continues.     Patient is not progressing towards the following goals:      Problem: Bowel Elimination  Goal: Establish and maintain regular bowel function  Outcome: Not Progressing  Note: Patient incontinent to bowel.     Problem: Self Care  Goal: Patient will have the ability to perform ADLs independently or with assistance (bathe, groom, dress, toilet and feed)  Outcome: Not Progressing

## 2022-01-11 NOTE — CARE PLAN
The patient is Watcher - Medium risk of patient condition declining or worsening    Shift Goals  Clinical Goals: Monitor neuro status, safety, maintain skin integrity, monitor infection  Patient Goals: OMAYRA  Family Goals: Discuss POC with MD    Progress made toward(s) clinical / shift goals:  Pt afebrile throughout shift. Family at bedside, updated on POC. Pt worked with PT/OT. Per Dr. Seay, continue to hold tube feeds, reassess in AM 1/11. Okay to use cortrak for meds. Fall and aspiration precautions in place. Will continue hourly rounding.     Patient is not progressing towards the following goals:

## 2022-01-11 NOTE — PROGRESS NOTES
Hospital Medicine Daily Progress Note    Date of Service  1/11/2022    Chief Complaint  Jose Quintanilla is a 66 y.o. male admitted 1/1/2022 with left sided hemiparesis    Hospital Course  Mr Quintanilla has past medical history which includes hypertension, hyperlipidemia, DVT/PE and was treated with anticoagulation prior to admission.  Presented on 1/1/2022 after a fall with symptoms of left hemiparesis and rightward gaze.  Patient was evaluated and cleared by trauma.  Neuro imaging was concerning for right-sided carotid occlusion.  Patient was evaluated neurology with recommendations for admission to the ICU, permissive hypertension, and follow-up imaging.  Patient's mental status worsened on 1/3/2022 when he was started on hypertonic saline.  His alertness has improved and hypertonic saline was stopped on 1/6/2022.  Neurology recommends restarting anticoagulation on 1/13/2022 with consideration for apixaban.      Interval Problem Update  1/11/2022  Vitals remained stable  Mental status improving  On Augmentin for possible aspiration pneumonia  SLP following.  Fees test in a.m.     Patient's son present at bedside.  Updated current clinic condition and treatment plan.    I have personally seen and examined the patient at bedside. I discussed the plan of care with bedside RN, charge RN and .    Consultants/Specialty  neurology    Code Status  Full Code    Disposition  Patient is not medically cleared for discharge.   Anticipate discharge to to skilled nursing facility.  I have placed the appropriate orders for post-discharge needs.    Review of Systems  Review of Systems   Unable to perform ROS: Acuity of condition   Currently denies any discomfort    Physical Exam  Temp:  [36.1 °C (96.9 °F)-37 °C (98.6 °F)] 36.4 °C (97.5 °F)  Pulse:  [] 92  Resp:  [18-20] 18  BP: (109-132)/(65-76) 132/76  SpO2:  [95 %-99 %] 99 %    Physical Exam  HENT:      Head: Normocephalic.      Right Ear: Tympanic membrane normal.       Nose: Nose normal.      Mouth/Throat:      Mouth: Mucous membranes are moist.   Cardiovascular:      Rate and Rhythm: Normal rate and regular rhythm.      Pulses: Normal pulses.      Heart sounds: Normal heart sounds. No murmur heard.      Pulmonary:      Effort: Pulmonary effort is normal. No respiratory distress.      Breath sounds: Normal breath sounds.   Abdominal:      General: Abdomen is flat.   Neurological:      Mental Status: He is alert.      Comments: Left sided hemiparesis         Fluids    Intake/Output Summary (Last 24 hours) at 1/11/2022 1515  Last data filed at 1/11/2022 0800  Gross per 24 hour   Intake 450 ml   Output 0 ml   Net 450 ml       Laboratory  Recent Labs     01/09/22  0828 01/10/22  0200   WBC 14.3* 10.4   RBC 4.20* 4.61*   HEMOGLOBIN 13.6* 14.8   HEMATOCRIT 41.4* 44.1   MCV 98.6* 95.7   MCH 32.4 32.1   MCHC 32.9* 33.6*   RDW 52.3* 51.1*   PLATELETCT 120* 135*   MPV 13.4* 13.0*     Recent Labs     01/09/22  0828 01/10/22  0200   SODIUM 138 134*   POTASSIUM 4.1 3.7   CHLORIDE 104 101   CO2 23 21   GLUCOSE 118* 112*   BUN 26* 27*   CREATININE 0.96 1.12   CALCIUM 8.3* 8.3*                   Imaging  DX-CHEST-LIMITED (1 VIEW)   Final Result         Linear opacities in the left lung base could be due to discoid atelectasis or developing infiltrate such as pneumonia or pneumonitis.      DX-ABDOMEN FOR TUBE PLACEMENT   Final Result      1.  Feeding tube tip overlies the distal stomach.      IR-PICC LINE PLACEMENT W/ GUIDANCE > AGE 5   Final Result                  Ultrasound-guided PICC placement performed by qualified nursing staff as    above.          CT-HEAD W/O   Final Result      Evolving moderate to large right middle cerebral artery territory has increased in size when compared with the previous exam. No evidence of acute intracranial hemorrhage.         DX-ABDOMEN FOR TUBE PLACEMENT   Final Result      Cortrak feeding tube tip projects in the region of the proximal/mid stomach.       US-EXTREMITY VENOUS LOWER BILAT   Final Result      MR-BRAIN-W/O   Final Result         1. Age-related cerebral atrophy. Mild effacement of the right lateral ventricle.      2. Mild periventricular white matter changes consistent with chronic microvascular ischemic gliosis.      3. Large area of acute infarction involving the right frontal and parietal lobes as well as the right-sided basal ganglia. Subacute area of infarction involving the right temporal and lateral occipital lobes.      4. Petechial hemorrhage noted in the right basal ganglia and involving the cortex in the right temporal and lateral occipital lobes.      5. Right internal carotid artery occlusion or high-grade stenosis.            EC-ECHOCARDIOGRAM COMPLETE W/O CONT   Final Result      DX-SHOULDER 2+ LEFT   Final Result      Negative 2 views of left shoulder.      DX-SHOULDER 2+ RIGHT   Final Result      Moderate subacromial spurring      DX-FOOT-COMPLETE 3+ RIGHT   Final Result      No radiographic evidence of acute displaced fracture or subluxation.      DX-FOOT-2- LEFT   Final Result      No radiographic evidence of acute displaced fracture or subluxation.      DX-ELBOW-COMPLETE 3+ RIGHT   Final Result      No radiographic evidence of acute traumatic injury.      Limited due to rotation of the lateral radiograph attempts. Consider repeat lateral radiograph to help exclude effusion      CT-CHEST,ABDOMEN,PELVIS WITH   Final Result      No significant traumatic abnormality in thorax, abdomen and pelvis CT scans.      Hepatic steatosis      Moderate colonic diverticulosis.      CT-LSPINE W/O PLUS RECONS   Final Result      No CT evidence of acute traumatic injury.      Moderate degenerative disc disease with trace degenerative L5/S1 retrolisthesis      CT-TSPINE W/O PLUS RECONS   Final Result      No CT evidence of acute traumatic abnormality.      CT-CSPINE WITHOUT PLUS RECONS   Final Result      No CT evidence of acute cervical spine  abnormality.      CT-HEAD W/O   Final Result      Subacute right temporal, temporo-occipital infarction without hemorrhagic transformation      CT-CTA HEAD WITH & W/O-POST PROCESS   Final Result      Right internal carotid artery occlusion with extension to the MCA which lacks contrast opacification      Right A1 and KATIA contrast opacification secondary to cross-filling from the left      Normal left anterior and bilateral posterior circulation      Findings of all of the CTs were discussed with Dr. Betancourt before completion of this dictation.            CT-CTA NECK WITH & W/O-POST PROCESSING   Final Result      Occluded right internal carotid artery at its origin      Atherosclerosis at origin of bilateral vertebral arteries could obscure stenosis. Arteries are patent without post stenotic dilatation      DX-PELVIS-1 OR 2 VIEWS   Final Result      Negative AP view of the pelvis.      DX-CHEST-LIMITED (1 VIEW)   Final Result      No acute cardiopulmonary disease.      US-ABORTED US PROCEDURE    (Results Pending)        Assessment/Plan  * Stroke due to thrombosis of right carotid artery (HCC)- (present on admission)  Assessment & Plan  Extensive stroke due to thrombosis of right carotid artery  Occurred while on anticoagulation, INR slightly subtherapeutic at 1.8  Cytotoxic edema has improved, appreciate neurology recommendations, low sodium to normalize  High intensity statin  Start anticoagulation 1/13  PT/OT/speech    Aspiration pneumonia (HCC)  Assessment & Plan  Patient tachycardic and tachypneic 1/9  Patient somnolent, on tube feeds  WBC elevated, procalcitonin elevated  CXR shows possible left pneumonia  Start unasyn for suspected aspiration pneumonia  Stop opiates until mentation improves    Sepsis (HCC)  Assessment & Plan  This is Sepsis Not present on admission  SIRS criteria identified on my evaluation include: Tachycardia, with heart rate greater than 90 BPM  Source is lungs  Sepsis protocol  initiated  Fluid resuscitation ordered per protocol  IV antibiotics as appropriate for source of sepsis  While organ dysfunction may be noted elsewhere in this problem list or in the chart, degree of organ dysfunction does not meet CMS criteria for severe sepsis      Depression- (present on admission)  Assessment & Plan  Outpatient Wellbutrin and Lexapro    Mild intermittent asthma without complication- (present on admission)  Assessment & Plan  No acute exacerbation    Chronic anticoagulation- (present on admission)  Assessment & Plan  Patient slightly subtherapeutic with INR 1.8 admission  1/2 LE U/S negative for overt DVT, follow weekly U/S for any sign of DVT.  If clinically stable consider restarting anticoagulation 1/13/2022    Dyslipidemia- (present on admission)  Assessment & Plan  Atorvastatin    Primary hypertension- (present on admission)  Assessment & Plan  Goal normotensive  Continue monitoring, consider restarting enteral blood pressure pressure agent if needed.         VTE prophylaxis: SCDs/TEDs and enoxaparin ppx    I have performed a physical exam and reviewed and updated ROS and Plan today (1/11/2022). In review of yesterday's note (1/10/2022), there are no changes except as documented above.

## 2022-01-11 NOTE — THERAPY
Speech Language Pathology  Daily Treatment     Patient Name: Jose Quintanilla  Age:  66 y.o., Sex:  male  Medical Record #: [id number]  Today's Date: 1/11/2022     Precautions  Precautions: Fall Risk,Nasogastric Tube  Comments: significant subluxed L shoulder, Left sided deficits.     Assessment    Communicated with RN regarding request to see pt related to pt more alert with FEES requested this week to determine if PEG is recommended. Per communication with care team, plan is for pt to transfer to the [facility] to be near his son. Pt seen with his son, Thien, present. Thien stating he lives in the [place] area. Pt with head turned to the right with decreased attention to the left. Pt with head down and requires mod to max cues to visualize SLP and maintain eye opening. At times, pt responding but with eyes closed. Pt with moderate to severe dysarthria with imprecise articulation and low intensity of voice. Pt with moderate to severe slowness in his responses to cues and when verbalizing. Single ice chips, 8, given with moderate to max cues required for one swallow. Swallow with severe delay at greater than 5-10 seconds. No second cued swallow. Pt with weak throat clearing and coughing during the single ice chips. Educ provided to pt and his son regarding possible FEES on Wed. SLP will tx in the AM to assess pt's wakefulness and ability to participate r/t toleration of limited tx the last week.     Plan  NPO, tube feeding, prefeeding tx, possible FEES Wed or Thurs.   Continue current treatment plan.    Discharge Recommendations: Recommend post-acute placement for additional speech therapy services prior to discharge home       01/11/22 1302   Speech / Dysarthria   Comments mod to severe dysarthria with low intensity, slow rate of speech, and imprecise articulation.    Voice   Comments poor to fair intensity 1-2 word level.    Dysphagia    Dysphagia X   Positioning / Behavior Modification Multiple Swallows;Other  "(see Comments)  (sitting at 90 degrees)   Diet / Liquid Recommendation NPO;Pre-Feeding Trials with SLP Only   Nutritional Liquid Intake Rating Scale Nothing by mouth   Nutritional Food Intake Rating Scale Nothing by mouth   Skilled Intervention Verbal Cueing   Comments max cueing for attention and to follow simple directives   Recommended Route of Medication Administration   Medication Administration  Via Gastric Tube   Patient / Family Goals   Patient / Family Goal #1 \"Popsicles\"   Goal #1 Outcome Goal not met   Short Term Goals   Short Term Goal # 1 Pt will be able to consume prefeeding trials with SLP only with no overt S/Sx of aspiration noted   Goal Outcome # 1 Progressing slower than expected   Short Term Goal # 2 Pt will be able to complete 10/10 reps of oral motor and laryngeal/pharyngeal exercises with \"good\" accuracy as judged by SLP   Goal Outcome # 2  Progressing slower than expected         "

## 2022-01-11 NOTE — DISCHARGE PLANNING
Agency/Facility Name: [facility]   Outcome: DPA spoke to  to verify fax number provided in Epic. Correct fax number is [phone]. DPA manually re faxed referral.     Agency/Facility Name: [facility].  Outcome: DPA unable to reach anyone and unable to leave voicemail. DPA to try again soon.

## 2022-01-11 NOTE — DISCHARGE PLANNING
Anticipated Discharge Disposition: likely SNF in [place]    Action: ORLIN WHITTEN met with sonThien and patient at bedside.  Thien requesting list of SNF in [place] area.  RN CM printed list and provided to Thien.  Discussed discharge planning with son, requests pending for choices patient Chandra anderson provided last week.  Thien and Chandra are going to continue looking for other facilities if neither of the pending facilities are able to accept and let HCM know further choices.     Barriers to Discharge: medical clearance, possible peg placement, facility acceptance, bed availability, transportation     Plan: HCM to remain available to assist with discharge planning needs and barriers

## 2022-01-12 LAB
ANION GAP SERPL CALC-SCNC: 11 MMOL/L (ref 7–16)
BASOPHILS # BLD AUTO: 0.3 % (ref 0–1.8)
BASOPHILS # BLD: 0.02 K/UL (ref 0–0.12)
BUN SERPL-MCNC: 19 MG/DL (ref 8–22)
CALCIUM SERPL-MCNC: 8 MG/DL (ref 8.5–10.5)
CHLORIDE SERPL-SCNC: 103 MMOL/L (ref 96–112)
CO2 SERPL-SCNC: 22 MMOL/L (ref 20–33)
CREAT SERPL-MCNC: 0.7 MG/DL (ref 0.5–1.4)
EOSINOPHIL # BLD AUTO: 0.79 K/UL (ref 0–0.51)
EOSINOPHIL NFR BLD: 10 % (ref 0–6.9)
ERYTHROCYTE [DISTWIDTH] IN BLOOD BY AUTOMATED COUNT: 49.6 FL (ref 35.9–50)
GLUCOSE SERPL-MCNC: 111 MG/DL (ref 65–99)
HCT VFR BLD AUTO: 37.6 % (ref 42–52)
HGB BLD-MCNC: 12.9 G/DL (ref 14–18)
IMM GRANULOCYTES # BLD AUTO: 0.08 K/UL (ref 0–0.11)
IMM GRANULOCYTES NFR BLD AUTO: 1 % (ref 0–0.9)
LYMPHOCYTES # BLD AUTO: 0.8 K/UL (ref 1–4.8)
LYMPHOCYTES NFR BLD: 10.1 % (ref 22–41)
MCH RBC QN AUTO: 32.7 PG (ref 27–33)
MCHC RBC AUTO-ENTMCNC: 34.3 G/DL (ref 33.7–35.3)
MCV RBC AUTO: 95.4 FL (ref 81.4–97.8)
MONOCYTES # BLD AUTO: 0.54 K/UL (ref 0–0.85)
MONOCYTES NFR BLD AUTO: 6.8 % (ref 0–13.4)
MORPHOLOGY BLD-IMP: NORMAL
NEUTROPHILS # BLD AUTO: 5.69 K/UL (ref 1.82–7.42)
NEUTROPHILS NFR BLD: 71.8 % (ref 44–72)
NRBC # BLD AUTO: 0 K/UL
NRBC BLD-RTO: 0 /100 WBC
PLATELET # BLD AUTO: 250 K/UL (ref 164–446)
PMV BLD AUTO: 13.1 FL (ref 9–12.9)
POTASSIUM SERPL-SCNC: 3.2 MMOL/L (ref 3.6–5.5)
RBC # BLD AUTO: 3.94 M/UL (ref 4.7–6.1)
SODIUM SERPL-SCNC: 136 MMOL/L (ref 135–145)
WBC # BLD AUTO: 7.9 K/UL (ref 4.8–10.8)

## 2022-01-12 PROCEDURE — 36415 COLL VENOUS BLD VENIPUNCTURE: CPT

## 2022-01-12 PROCEDURE — 92612 ENDOSCOPY SWALLOW (FEES) VID: CPT

## 2022-01-12 PROCEDURE — A9270 NON-COVERED ITEM OR SERVICE: HCPCS | Performed by: STUDENT IN AN ORGANIZED HEALTH CARE EDUCATION/TRAINING PROGRAM

## 2022-01-12 PROCEDURE — 770001 HCHG ROOM/CARE - MED/SURG/GYN PRIV*

## 2022-01-12 PROCEDURE — 97530 THERAPEUTIC ACTIVITIES: CPT | Mod: CQ

## 2022-01-12 PROCEDURE — 700102 HCHG RX REV CODE 250 W/ 637 OVERRIDE(OP): Performed by: HOSPITALIST

## 2022-01-12 PROCEDURE — 700102 HCHG RX REV CODE 250 W/ 637 OVERRIDE(OP): Performed by: STUDENT IN AN ORGANIZED HEALTH CARE EDUCATION/TRAINING PROGRAM

## 2022-01-12 PROCEDURE — A9270 NON-COVERED ITEM OR SERVICE: HCPCS | Performed by: HOSPITALIST

## 2022-01-12 PROCEDURE — 700105 HCHG RX REV CODE 258: Performed by: STUDENT IN AN ORGANIZED HEALTH CARE EDUCATION/TRAINING PROGRAM

## 2022-01-12 PROCEDURE — 97112 NEUROMUSCULAR REEDUCATION: CPT | Mod: CQ

## 2022-01-12 PROCEDURE — 85025 COMPLETE CBC W/AUTO DIFF WBC: CPT

## 2022-01-12 PROCEDURE — 700111 HCHG RX REV CODE 636 W/ 250 OVERRIDE (IP): Performed by: HOSPITALIST

## 2022-01-12 PROCEDURE — 99233 SBSQ HOSP IP/OBS HIGH 50: CPT | Performed by: STUDENT IN AN ORGANIZED HEALTH CARE EDUCATION/TRAINING PROGRAM

## 2022-01-12 PROCEDURE — 92526 ORAL FUNCTION THERAPY: CPT

## 2022-01-12 PROCEDURE — 80048 BASIC METABOLIC PNL TOTAL CA: CPT

## 2022-01-12 RX ORDER — DIPHENHYDRAMINE HCL 25 MG
25 TABLET ORAL EVERY 6 HOURS PRN
Status: DISCONTINUED | OUTPATIENT
Start: 2022-01-12 | End: 2022-01-13

## 2022-01-12 RX ORDER — POTASSIUM CHLORIDE 20 MEQ/1
40 TABLET, EXTENDED RELEASE ORAL EVERY 4 HOURS
Status: COMPLETED | OUTPATIENT
Start: 2022-01-12 | End: 2022-01-12

## 2022-01-12 RX ADMIN — ENOXAPARIN SODIUM 40 MG: 40 INJECTION SUBCUTANEOUS at 16:34

## 2022-01-12 RX ADMIN — AMOXICILLIN AND CLAVULANATE POTASSIUM 1 TABLET: 875; 125 TABLET, FILM COATED ORAL at 04:17

## 2022-01-12 RX ADMIN — LOSARTAN POTASSIUM 25 MG: 50 TABLET, FILM COATED ORAL at 04:16

## 2022-01-12 RX ADMIN — BUPROPION HYDROCHLORIDE 100 MG: 100 TABLET, FILM COATED ORAL at 04:17

## 2022-01-12 RX ADMIN — SODIUM CHLORIDE: 4.5 INJECTION, SOLUTION INTRAVENOUS at 03:31

## 2022-01-12 RX ADMIN — ACETAMINOPHEN 650 MG: 325 TABLET, FILM COATED ORAL at 16:34

## 2022-01-12 RX ADMIN — AMOXICILLIN AND CLAVULANATE POTASSIUM 1 TABLET: 875; 125 TABLET, FILM COATED ORAL at 16:34

## 2022-01-12 RX ADMIN — DOCUSATE SODIUM 50 MG AND SENNOSIDES 8.6 MG 2 TABLET: 8.6; 5 TABLET, FILM COATED ORAL at 04:17

## 2022-01-12 RX ADMIN — ATORVASTATIN CALCIUM 80 MG: 80 TABLET, FILM COATED ORAL at 16:34

## 2022-01-12 RX ADMIN — POTASSIUM CHLORIDE 40 MEQ: 1500 TABLET, EXTENDED RELEASE ORAL at 12:02

## 2022-01-12 RX ADMIN — BUPROPION HYDROCHLORIDE 100 MG: 100 TABLET, FILM COATED ORAL at 16:34

## 2022-01-12 RX ADMIN — POTASSIUM CHLORIDE 40 MEQ: 1500 TABLET, EXTENDED RELEASE ORAL at 14:35

## 2022-01-12 RX ADMIN — ACETAMINOPHEN 650 MG: 325 TABLET, FILM COATED ORAL at 04:21

## 2022-01-12 ASSESSMENT — COGNITIVE AND FUNCTIONAL STATUS - GENERAL
STANDING UP FROM CHAIR USING ARMS: TOTAL
MOBILITY SCORE: 6
WALKING IN HOSPITAL ROOM: TOTAL
SUGGESTED CMS G CODE MODIFIER MOBILITY: CN
MOVING TO AND FROM BED TO CHAIR: UNABLE
CLIMB 3 TO 5 STEPS WITH RAILING: TOTAL
TURNING FROM BACK TO SIDE WHILE IN FLAT BAD: UNABLE
MOVING FROM LYING ON BACK TO SITTING ON SIDE OF FLAT BED: UNABLE

## 2022-01-12 ASSESSMENT — ENCOUNTER SYMPTOMS
COUGH: 0
VOMITING: 0
FEVER: 0
NAUSEA: 0

## 2022-01-12 ASSESSMENT — PAIN DESCRIPTION - PAIN TYPE
TYPE: ACUTE PAIN
TYPE: ACUTE PAIN

## 2022-01-12 ASSESSMENT — GAIT ASSESSMENTS: GAIT LEVEL OF ASSIST: UNABLE TO PARTICIPATE

## 2022-01-12 NOTE — CARE PLAN
The patient is Watcher - Medium risk of patient condition declining or worsening    Shift Goals  Clinical Goals: Maintain skin integrity, safety, monitor neuro status  Patient Goals: rest  Family Goals: OMAYRA    Progress made toward(s) clinical / shift goals:  Patient is A&Ox4. Q2 hour turns in place. Perineal care post each incontinent episode. Bed is low and locked. Bed alarm on. Call light within reach. Hourly rounding continues.     Patient is not progressing towards the following goals:      Problem: Urinary Elimination  Goal: Establish and maintain regular urinary output  Outcome: Not Progressing  Note: Patient is incontinent.      Problem: Bowel Elimination  Goal: Establish and maintain regular bowel function  Outcome: Not Progressing  Note: Patient is incontinent.      Problem: Self Care  Goal: Patient will have the ability to perform ADLs independently or with assistance (bathe, groom, dress, toilet and feed)  Outcome: Not Progressing

## 2022-01-12 NOTE — THERAPY
Speech Language Pathology  Daily Treatment     Patient Name: Jose Quintanilla  Age:  66 y.o., Sex:  male  Medical Record #: [id number]  Today's Date: 1/12/2022     Precautions  Precautions: Fall Risk,Nasogastric Tube  Comments: significant subluxed shoulder on left side. Left inattention     Assessment    Pt seen this AM for prefeeding tx and to determine if pt's wakefulness and attention is at the level for FEES later this AM. Pt maintains wakefulness for greater than 25 minutes without cues. Pt with moderate to severe delay in processing with inconsistent directive following for simple lingual and labial OMEX. Pt with moderate left facial weakness and severe dysarthria at the 1-2 word level. Pt continues with low intensity of voice. Pt did not respond to cues and model to take deep inspiration with phonation on exhalation with mild pressure to low abd to provide tactile cues to phonate during exhale. Pt taking deeper breaths but did not imitate phonation. Oral care provided. Pt given 6 single ice chips with mod to max cues to masticate and to swallow. Pt triggering one swallow in 3-5 seconds with no second swallow. One weak throat clear during session. Pt provided with verbal educ regarding plan for FEES at approx 11 AM. SLP collaborated with RN regarding FEES today.     Plan  NPO/tube feeding, FEES today.   Continue current treatment plan.    Discharge Recommendations: (P) Recommend post-acute placement for additional speech therapy services prior to discharge home       01/12/22 0917   Cognitive-Linguistic   Level of Consciousness Alert   Speech / Dysarthria   Comments mod to severe   Voice   Comments Poor to fair intensity 1-2 word level.    Dysphagia    Dysphagia X   Positioning / Behavior Modification Multiple Swallows;Modulate Rate or Bite Size  (sitting at 90 degrees)   Oral / Pharyngeal / Laryngeal Exercises Lingual Exercises;Labial Exercises   Diet / Liquid Recommendation NPO;Pre-Feeding Trials with SLP Only  "  Nutritional Liquid Intake Rating Scale Nothing by mouth   Nutritional Food Intake Rating Scale Nothing by mouth   Nursing Communication Swallow Precaution Sign Posted at Head of Bed   Skilled Intervention Compensatory Strategies;Verbal Cueing   Recommended Route of Medication Administration   Medication Administration  Via Gastric Tube   Other Treatments   Other Treatments Provided educ regarding pending FEES this am.    Patient / Family Goals   Patient / Family Goal #1 \"Popsicles\"   Goal #1 Outcome Goal not met   Short Term Goals   Short Term Goal # 1 Pt will be able to consume prefeeding trials with SLP only with no overt S/Sx of aspiration noted   Goal Outcome # 1 Progressing slower than expected   Short Term Goal # 2 Pt will be able to complete 10/10 reps of oral motor and laryngeal/pharyngeal exercises with \"good\" accuracy as judged by SLP   Goal Outcome # 2  Progressing slower than expected   Session Information   Priority 3  (5x,lgRMCA,Lneglect, dysarthria, prefdg, NG, pending FEES)         "

## 2022-01-12 NOTE — CARE PLAN
Problem: Dysphagia  Goal: Dysphagia will improve  Outcome: Not Progressing     Problem: Optimal Care of the Stroke Patient  Goal: Optimal acute care for the stroke patient  Outcome: Progressing     Problem: Knowledge Deficit - Stroke Education  Goal: Patient's knowledge of stroke and risk factors will improve  Outcome: Progressing     Problem: Hemodynamic Monitoring  Goal: Patient's hemodynamics, fluid balance and neurologic status will be stable or improve  Outcome: Progressing   The patient is Stable - Low risk of patient condition declining or worsening    Shift Goals  Clinical Goals: Maintain skin integrity, safety, monitor neuro status  Patient Goals: rest  Family Goals: OMAYRA    Progress made toward(s) clinical / shift goals:  Neuro status stable.    Patient is not progressing towards the following goals: Dysphagia continued. SLP following.      Problem: Dysphagia  Goal: Dysphagia will improve  Outcome: Not Progressing

## 2022-01-12 NOTE — THERAPY
"Physical Therapy   Daily Treatment     Patient Name: Jose Quintanilla  Age:  66 y.o., Sex:  male  Medical Record #: [id number]  Today's Date: 1/12/2022       Precautions: Fall Risk;Nasogastric Tube  Comments: significant subluxed shoulder on left side. Left inattention     Assessment    Pt was pleasant and agreeable, presents with delayed responses and expressive aphasia. He required total A to reach EOB, once at EOB pt with strong  on right hand grabbing therapist to initiate movement. Educated on safety as pt seemed fearful of anterior weight shifting at first. Focused on trunk control and seated balance, pt with strong posterior lean and push to the left. Unable to hold self at midline without assist. He required maxA for trunk and performed lateral weight shifting onto RLE where he was able to hold self up with less assist. Pt able to voice wanted therapy tech to come around and \"pick me up\". Educated on importance of balance first, pt than started to initiate leaning anteriorly. Able to clear buttock with maxAx2 for partial STS and unable to reach full upright position. Pt continues to present with weakness on LLE and was able to perform knee/hip flexion on right side supine. Will continue to follow while in house.     Plan    Continue current treatment plan.    DC Equipment Recommendations: Unable to determine at this time  Discharge Recommendations: Recommend post-acute placement for additional physical therapy services prior to discharge home         01/12/22 1005   Vision   Vision Comments unable to track to left past midline.    Neurological Concerns   Sitting Posture During ADL's Pushes to the Left;Posterior Lean   Comments given presentation, medical dx    Balance   Sitting Balance (Static) Trace   Sitting Balance (Dynamic) Dependent   Standing Balance (Static) Dependent   Weight Shift Sitting Absent   Weight Shift Standing Absent   Skilled Intervention Verbal Cuing;Facilitation   Comments unable to hold " self up, pushing to left, and posteriorly. Performed lateral weight shifting and anterior weight shifting.    Gait Analysis   Gait Level Of Assist Unable to Participate   Bed Mobility    Supine to Sit Total Assist   Sit to Supine Total Assist   Scooting Total Assist   Rolling Total Assist to Lt.;Total Assist to Rt.   Skilled Intervention Verbal Cuing   Functional Mobility   Sit to Stand Maximal Assist  (x2)   Skilled Intervention Verbal Cuing;Sequencing   Comments total A pt adamant about standing, initiating weight shifting anteriorly, was able to clear buttock but unable to reach full stand    Short Term Goals    Short Term Goal # 1 Patient will move supine<>sitting EOB with bed features and mod A within 6tx in order to get in/out of bed   Goal Outcome # 1 goal not met   Short Term Goal # 2 Patient will maintain dynamic sitting balance for 5 min with min A within 6tx in order to perform functional task   Goal Outcome # 2 Goal not met   Short Term Goal # 3 Patient will transfer with mod A within 6tx in order to achieve functional transfer   Goal Outcome # 3 Goal not met

## 2022-01-12 NOTE — DISCHARGE PLANNING
Agency/Facility Name: [facility]   Spoke To: Amy   Outcome: Per [facility] fax was not working yesterday. DPA to re fax referral.    Agency/Facility Name: [facility]   Spoke To: Kan   Outcome: Per Kan facility is currently closed for new admission due to covid.     RN CM Notified

## 2022-01-12 NOTE — PROGRESS NOTES
[facility] Medicine Daily Progress Note    Date of Service  1/12/2022  Chief Complaint  Jose Quintanilla is a 66 y.o. male admitted 1/1/2022 with left sided hemiparesis     Hospital Course  Mr Quintanilla has past medical history which includes hypertension, hyperlipidemia, DVT/PE and was treated with anticoagulation prior to admission.  Presented on 1/1/2022 after a fall with symptoms of left hemiparesis and rightward gaze.  Patient was evaluated and cleared by trauma.  Neuro imaging was concerning for right-sided carotid occlusion.  Patient was evaluated neurology with recommendations for admission to the ICU, permissive hypertension, and follow-up imaging.  Patient's mental status worsened on 1/3/2022 when he was started on hypertonic saline.  His alertness has improved and hypertonic saline was stopped on 1/6/2022.  Neurology recommends restarting anticoagulation on 1/13/2022 with consideration for apixaban.        Interval Problem Update  1/11/2022  Vitals remained stable  Mental status improving  On Augmentin for possible aspiration pneumonia  SLP following.  Fees test in a.m.     Patient's son present at bedside.  Updated current clinic condition and treatment plan.   1/12/2022  Vitals remained stable.  KCl added for hypokalemia  He passed fees test  SLP following.  Resume oral diet as per SLP   assisting with placement    I have personally seen and examined the patient at bedside. I discussed the plan of care with bedside RN, charge RN and .     Consultants/Specialty  neurology     Code Status  Full Code     Disposition  Patient is not medically cleared for discharge.   Anticipate discharge to to skilled nursing facility.  I have placed the appropriate orders for post-discharge needs.     Review of Systems  Review of Systems     Review of Systems   Constitutional: Negative for fever.   Respiratory: Negative for cough.    Cardiovascular: Negative for chest pain.   Gastrointestinal: Negative for  nausea and vomiting.   Genitourinary: Negative for dysuria.          Physical Exam  Temp:  [36.1 °C (96.9 °F)-37 °C (98.6 °F)] 36.4 °C (97.5 °F)  Pulse:  [] 92  Resp:  [18-20] 18  BP: (109-132)/(65-76) 132/76  SpO2:  [95 %-99 %] 99 %     Physical Exam  HENT:      Head: Normocephalic.      Right Ear: Tympanic membrane normal.      Nose: Nose normal.      Mouth/Throat:      Mouth: Mucous membranes are moist.   Cardiovascular:      Rate and Rhythm: Normal rate and regular rhythm.      Pulses: Normal pulses.      Heart sounds: Normal heart sounds. No murmur heard.       Pulmonary:      Effort: Pulmonary effort is normal. No respiratory distress.      Breath sounds: Normal breath sounds.   Abdominal:      General: Abdomen is flat.   Neurological:      Mental Status: He is alert.      Comments: Left sided hemiparesis         Fluids    Intake/Output Summary (Last 24 hours) at 1/12/2022 1251  Last data filed at 1/12/2022 0800  Gross per 24 hour   Intake 950 ml   Output 0 ml   Net 950 ml       Laboratory  Recent Labs     01/10/22  0200 01/12/22  0240   WBC 10.4 7.9   RBC 4.61* 3.94*   HEMOGLOBIN 14.8 12.9*   HEMATOCRIT 44.1 37.6*   MCV 95.7 95.4   MCH 32.1 32.7   MCHC 33.6* 34.3   RDW 51.1* 49.6   PLATELETCT 135* 250   MPV 13.0* 13.1*     Recent Labs     01/10/22  0200 01/12/22  0240   SODIUM 134* 136   POTASSIUM 3.7 3.2*   CHLORIDE 101 103   CO2 21 22   GLUCOSE 112* 111*   BUN 27* 19   CREATININE 1.12 0.70   CALCIUM 8.3* 8.0*                   Imaging  DX-CHEST-LIMITED (1 VIEW)   Final Result         Linear opacities in the left lung base could be due to discoid atelectasis or developing infiltrate such as pneumonia or pneumonitis.      DX-ABDOMEN FOR TUBE PLACEMENT   Final Result      1.  Feeding tube tip overlies the distal stomach.      IR-PICC LINE PLACEMENT W/ GUIDANCE > AGE 5   Final Result                  Ultrasound-guided PICC placement performed by qualified nursing staff as    above.          CT-HEAD W/O    Final Result      Evolving moderate to large right middle cerebral artery territory has increased in size when compared with the previous exam. No evidence of acute intracranial hemorrhage.         DX-ABDOMEN FOR TUBE PLACEMENT   Final Result      Cortrak feeding tube tip projects in the region of the proximal/mid stomach.      US-EXTREMITY VENOUS LOWER BILAT   Final Result      MR-BRAIN-W/O   Final Result         1. Age-related cerebral atrophy. Mild effacement of the right lateral ventricle.      2. Mild periventricular white matter changes consistent with chronic microvascular ischemic gliosis.      3. Large area of acute infarction involving the right frontal and parietal lobes as well as the right-sided basal ganglia. Subacute area of infarction involving the right temporal and lateral occipital lobes.      4. Petechial hemorrhage noted in the right basal ganglia and involving the cortex in the right temporal and lateral occipital lobes.      5. Right internal carotid artery occlusion or high-grade stenosis.            EC-ECHOCARDIOGRAM COMPLETE W/O CONT   Final Result      DX-SHOULDER 2+ LEFT   Final Result      Negative 2 views of left shoulder.      DX-SHOULDER 2+ RIGHT   Final Result      Moderate subacromial spurring      DX-FOOT-COMPLETE 3+ RIGHT   Final Result      No radiographic evidence of acute displaced fracture or subluxation.      DX-FOOT-2- LEFT   Final Result      No radiographic evidence of acute displaced fracture or subluxation.      DX-ELBOW-COMPLETE 3+ RIGHT   Final Result      No radiographic evidence of acute traumatic injury.      Limited due to rotation of the lateral radiograph attempts. Consider repeat lateral radiograph to help exclude effusion      CT-CHEST,ABDOMEN,PELVIS WITH   Final Result      No significant traumatic abnormality in thorax, abdomen and pelvis CT scans.      Hepatic steatosis      Moderate colonic diverticulosis.      CT-LSPINE W/O PLUS RECONS   Final Result       No CT evidence of acute traumatic injury.      Moderate degenerative disc disease with trace degenerative L5/S1 retrolisthesis      CT-TSPINE W/O PLUS RECONS   Final Result      No CT evidence of acute traumatic abnormality.      CT-CSPINE WITHOUT PLUS RECONS   Final Result      No CT evidence of acute cervical spine abnormality.      CT-HEAD W/O   Final Result      Subacute right temporal, temporo-occipital infarction without hemorrhagic transformation      CT-CTA HEAD WITH & W/O-POST PROCESS   Final Result      Right internal carotid artery occlusion with extension to the MCA which lacks contrast opacification      Right A1 and KATIA contrast opacification secondary to cross-filling from the left      Normal left anterior and bilateral posterior circulation      Findings of all of the CTs were discussed with Dr. Betancourt before completion of this dictation.            CT-CTA NECK WITH & W/O-POST PROCESSING   Final Result      Occluded right internal carotid artery at its origin      Atherosclerosis at origin of bilateral vertebral arteries could obscure stenosis. Arteries are patent without post stenotic dilatation      DX-PELVIS-1 OR 2 VIEWS   Final Result      Negative AP view of the pelvis.      DX-CHEST-LIMITED (1 VIEW)   Final Result      No acute cardiopulmonary disease.      US-ABORTED US PROCEDURE    (Results Pending)        Assessment/Plan  * Stroke due to thrombosis of right carotid artery (HCC)- (present on admission)  Assessment & Plan  Extensive stroke due to thrombosis of right carotid artery  Occurred while on anticoagulation, INR slightly subtherapeutic at 1.8  Cytotoxic edema has improved, appreciate neurology recommendations, low sodium to normalize  High intensity statin  Start anticoagulation 1/13  PT/OT/speech    Aspiration pneumonia (HCC)  Assessment & Plan  Patient tachycardic and tachypneic 1/9  Patient somnolent, on tube feeds  WBC elevated, procalcitonin elevated  CXR shows possible left  pneumonia  Start unasyn for suspected aspiration pneumonia  Stop opiates until mentation improves    Sepsis (HCC)  Assessment & Plan  This is Sepsis Not present on admission  SIRS criteria identified on my evaluation include: Tachycardia, with heart rate greater than 90 BPM  Source is lungs  Sepsis protocol initiated  Fluid resuscitation ordered per protocol  IV antibiotics as appropriate for source of sepsis  While organ dysfunction may be noted elsewhere in this problem list or in the chart, degree of organ dysfunction does not meet CMS criteria for severe sepsis      Depression- (present on admission)  Assessment & Plan  Outpatient Wellbutrin and Lexapro    Mild intermittent asthma without complication- (present on admission)  Assessment & Plan  No acute exacerbation    Chronic anticoagulation- (present on admission)  Assessment & Plan  Patient slightly subtherapeutic with INR 1.8 admission  1/2 LE U/S negative for overt DVT, follow weekly U/S for any sign of DVT.  If clinically stable consider restarting anticoagulation 1/13/2022    Dyslipidemia- (present on admission)  Assessment & Plan  Atorvastatin    Primary hypertension- (present on admission)  Assessment & Plan  Goal normotensive  Continue monitoring, consider restarting enteral blood pressure pressure agent if needed.         VTE prophylaxis: SCDs/TEDs and enoxaparin ppx    I have performed a physical exam and reviewed and updated ROS and Plan today (1/12/2022). In review of yesterday's note (1/11/2022), there are no changes except as documented above.

## 2022-01-12 NOTE — CARE PLAN
The patient is Stable - Low risk of patient condition declining or worsening    Shift Goals  Clinical Goals: Monitor neuro status, safety, maintain skin integrity  Patient Goals: Comfort  Family Goals: OMAYRA    Progress made toward(s) clinical / shift goals:  Pt AAOx3-4 throughout shift, pt continues to have left sided deficits. Fall and aspiration precautions in place. Pt evaluated by SLP today, plan for FEES in the next few days. Per Dr. Castillo, okay to restart tube feedings. Pt and family updated on POC, all questions answered at this time. Pt moved to a low airloss mattress to help maintain skin integrity. Q2 turns and TAPS system in use. Heel float boots and PRAFO boot in use. Will continue hourly rounding.     Patient is not progressing towards the following goals:

## 2022-01-12 NOTE — THERAPY
Speech Language Pathology   Fiberoptic Endoscopic Evaluation of Swallow     Patient Name: Jose Quintanilla  AGE:  66 y.o., SEX:  male  Medical Record #: [id number]  Today's Date: 1/12/2022     Precautions  Precautions: (P) Fall Risk,Nasogastric Tube,Swallow Precautions ( See Comments)  Comments: (P) significant subluxed shoulder on left side, left inattention.     Assessment    Patient is 66 y.o. male with a diagnosis of large R MCA. See H and P for complete hist.       Discussed the risks, benefits, and alternatives of the FEES procedure. Patient/family acknowledged and agreed to proceed.     Assessment  Flexible Endoscopic Evaluation of Swallowing (FEES) completed at bedside today. The endoscope was passed transnasally via left nare to evaluate the anatomy and physiology of swallowing. Pt tolerated the procedure with no apparent distress. Pt required mod to max cues to follow directives for 2 and 3 swallows per 1/2-1 tsp of NTL, thins, applesauce, and pudding. Pt with mild vallecular and pyriform sinus residue post swallow with good clearance with cued repeat swallows. At times pt with greater delay in triggering of the swallow related to slow processing. Following the FEES study, and as SLP reviewing the FEES with pt's son, pt with small amount of blue visualized in the airway near the end of the study. This relates to Penetration-Aspiration Scale of 7 with probable silent aspiration that occurred during the swallow with aspirated texture not identified since it was not visualized during the study but upon review. Pt was not cued to cough to clear the aspirated material as per Asp/Penetration #7=Material enters the airway, passes below the vocal folds, and is not ejected form the trachea despite effort. DIGEST scoring of swallow was-Safety Grade of PAS 7-8 Grade 1. Efficiency Grade-Grade 1. Overall, DIGEST Score at 1 relating to mild dysphagia. Of concern, pt with significant delayed processing and requiring mod to max  cues during prefeeding tx and during FEES. SLP provided educ to pt, his son, pt's nurs, and the MD regarding considering holding plan for PEG at this time. Three to five single ice chips with nurs/staff each hour with swallow strategies posted. Prefeeding with SLP consisting of 1/2 tsp amounts using a spoon and with double swallow. Plan for reassess with FEES early next week.      Additional factors influencing patient status/progress: delayed processing, dysarthria, cognitive deficits. Pt with very supportive family     Plan    Recommend Speech Therapy 5 times per week until therapy goals are met for the following treatments:  Dysphagia Training.    Discharge Recommendations: (P) Recommend post-acute placement for additional speech therapy services prior to discharge home       01/12/22 1210   FEES Evaluation Prior To Procedure   Respiratory Status Room Air   Onset Date Of Dysphagia   (since admit)   Dysphagia Symptoms Warranting Video Swallow delayed swallow with NG since admit.    Procedure Performed While Patient Sitting In Bed   Seated at (Degrees) 90   A Flexible Endoscope Was Introduced Transnasally In The Left Nare   FEES  Anatomy Assessment   Anatomy For A Functional Swallow:   (WNL)   FEES Laryngeal Adduction Assessment   Breath Holding Adequate   Clearing Throat Adequate   Cough   (NT)   Phonation Adequate   Onset Of Swallow Adequate   FEES Velopharyngeal Assessment    Velopharyngeal Closure:   (NT)   FEES Voice Assessment    Voice:   (Voice WNL for nasality)   FEES Sensation Assessment    Presence of Scope Adequate   Presence of Residue Adequate   Presence of Penetration No Penetration   Presence of Aspiration Absent Response  (probable silent asp during the swallow-see note)   FEES Swallow Function Assessment   Swallow Trigger Impaired   Base of Tongue Retraction Impaired   Pharyngeal Constrictor Movement Impaired   White Out Phase Complete White Out   Epiglottic Movement Functional   Laryngeal  Elevation   (folds adduct)   Cricopharyngeal Function Impaired  (intermittent mild PE segment residue. )   Swallow Observations / Symptoms   Swallow Observations / Symptoms Yes   Vallecular Residue Mildly Thick (2) - (Nectar Thick);Thin (0);Liquidised (3);Pureed (4)   Mildly Thick (2) - (Nectar Thick) Teaspoon;Cup   Thin (0) Teaspoon   Pyriform Sinus Residue Mildly Thick (2) - (Nectar Thick);Thin (0);Liquidised (3);Pureed (4)   Mildly Thick (2) - (Nectar Thick) Teaspoon;Cup   Thin (0) Teaspoon   Posterior Pharyngeal Wall Residue Mildly Thick (2) - (Nectar Thick);Thin (0);Liquidised (3);Pureed (4)   Mildly Thick (2) - (Nectar Thick) Teaspoon;Cup   Thin (0) Teaspoon   Aspiration Suspected During the Swallow   (unsure of texture of silent aspiration-see note)   Compensatory Strategies Attempted   Compensatory Strategies Attempted Yes   Multiple Swallows 2-3 swallows with mod to max cues.    Controlled Bolus Size 1/2 tsp amounts   Additional Comments pt with slow processing.    Patient Ability To Protect Airway   Patient Ability To Protect Airway  Adequate   Penetration Aspiration Scale   Penetration Aspiration Scale 7 - Material passes glottis but is not ejected from airway, visible subglottic stasis despite patient's response  (aspiration seen when viewing the study post FEES)   [place] Pharyngeal Residue Severity   Vallecular Residue Mild, epiglottic ligament visable   Pyriform Sinus Residue  Mild, up wall to ¼ full   Dysphagia Rating   Nutritional Liquid Intake Rating Scale Nothing by mouth   Nutritional Food Intake Rating Scale Tube dependent with consistent oral intake  (prefeeding only with SLP )   Problem List   Problem List Dysphagia;Dysarthia;Cognitive-Linguistic Deficits   Evaluation Recommendations   Swallow Evaluations Recommendations (Yes / No) Yes  (f/u FEES early to mid next week)   Diet / Liquid Recommendation NPO;Pre-Feeding Trials with SLP Only   Medication Administration  Via Gastric Tube   Evaluation  "Recommended Techniques   Swallow Techniques Yes   Techniques Oral Stage Other (Comment)  (feeding by SLP, sitting up 90 degrees, 1/2 tsp amounts by sp)   Techniques Pharyngeal Phase Thicken Liquids For Better Control Of Bolus To Consistency Of Nectar;Pacing:Control Amount Of Presentation;Repeat Swallow 2-3 Times On Each Bolus To Clear Residue;Pacing:Control Rate Of Presentation   Patient / Family Goals   Patient / Family Goal #1 \"Popsicles\"   Goal #1 Outcome Goal not met   Short Term Goals   Short Term Goal # 1 1/12 Pt will consume 1/2 tsp amounts by spoon of applesauce, pudding, and NTL with 2-3 swallows per bolus with moderate verbal cues with SLP only   Goal Outcome # 1 Goal not met   Session Information   Priority 3  (5x,LgRMCA,FEES-prefeeding w/SLP,rescope next week,holdPEG)         "

## 2022-01-12 NOTE — DIETARY
Nutrition Services: Brief Update    Pt has current tubefeed order of Replete with Fiber with goal rate 85 ml/hr to provide 2040 kcals, 131 gm protein, and 1693 ml free water per day. Currently Replete with Fiber formula is running low/out of stock and anticipated to be back in stock on 1/13 and 1/14.     Recommendations/Plan:  1. Replete with Fiber at 85 ml/hr goal remains appropriate. In mean time if stock runs out, switch to Diabetisource AC with goal rate of 80 ml/hr to provide 2304 kcals, 115 gm protein, and and 1574 ml free water.    RD following.

## 2022-01-13 LAB
ANION GAP SERPL CALC-SCNC: 11 MMOL/L (ref 7–16)
BASOPHILS # BLD AUTO: 0.3 % (ref 0–1.8)
BASOPHILS # BLD: 0.03 K/UL (ref 0–0.12)
BUN SERPL-MCNC: 16 MG/DL (ref 8–22)
CALCIUM SERPL-MCNC: 8.4 MG/DL (ref 8.5–10.5)
CHLORIDE SERPL-SCNC: 104 MMOL/L (ref 96–112)
CO2 SERPL-SCNC: 22 MMOL/L (ref 20–33)
CREAT SERPL-MCNC: 0.67 MG/DL (ref 0.5–1.4)
EOSINOPHIL # BLD AUTO: 0.91 K/UL (ref 0–0.51)
EOSINOPHIL NFR BLD: 9.1 % (ref 0–6.9)
ERYTHROCYTE [DISTWIDTH] IN BLOOD BY AUTOMATED COUNT: 49.1 FL (ref 35.9–50)
GLUCOSE SERPL-MCNC: 109 MG/DL (ref 65–99)
HCT VFR BLD AUTO: 39.1 % (ref 42–52)
HGB BLD-MCNC: 13.2 G/DL (ref 14–18)
IMM GRANULOCYTES # BLD AUTO: 0.17 K/UL (ref 0–0.11)
IMM GRANULOCYTES NFR BLD AUTO: 1.7 % (ref 0–0.9)
LYMPHOCYTES # BLD AUTO: 1.19 K/UL (ref 1–4.8)
LYMPHOCYTES NFR BLD: 11.8 % (ref 22–41)
MAGNESIUM SERPL-MCNC: 2 MG/DL (ref 1.5–2.5)
MCH RBC QN AUTO: 32.1 PG (ref 27–33)
MCHC RBC AUTO-ENTMCNC: 33.8 G/DL (ref 33.7–35.3)
MCV RBC AUTO: 95.1 FL (ref 81.4–97.8)
MONOCYTES # BLD AUTO: 0.62 K/UL (ref 0–0.85)
MONOCYTES NFR BLD AUTO: 6.2 % (ref 0–13.4)
NEUTROPHILS # BLD AUTO: 7.13 K/UL (ref 1.82–7.42)
NEUTROPHILS NFR BLD: 70.9 % (ref 44–72)
NRBC # BLD AUTO: 0 K/UL
NRBC BLD-RTO: 0 /100 WBC
PLATELET # BLD AUTO: 317 K/UL (ref 164–446)
PMV BLD AUTO: 12.3 FL (ref 9–12.9)
POTASSIUM SERPL-SCNC: 3.6 MMOL/L (ref 3.6–5.5)
RBC # BLD AUTO: 4.11 M/UL (ref 4.7–6.1)
SODIUM SERPL-SCNC: 137 MMOL/L (ref 135–145)
WBC # BLD AUTO: 10.1 K/UL (ref 4.8–10.8)

## 2022-01-13 PROCEDURE — A9270 NON-COVERED ITEM OR SERVICE: HCPCS | Performed by: HOSPITALIST

## 2022-01-13 PROCEDURE — A9270 NON-COVERED ITEM OR SERVICE: HCPCS | Performed by: STUDENT IN AN ORGANIZED HEALTH CARE EDUCATION/TRAINING PROGRAM

## 2022-01-13 PROCEDURE — 700102 HCHG RX REV CODE 250 W/ 637 OVERRIDE(OP): Performed by: STUDENT IN AN ORGANIZED HEALTH CARE EDUCATION/TRAINING PROGRAM

## 2022-01-13 PROCEDURE — 83735 ASSAY OF MAGNESIUM: CPT

## 2022-01-13 PROCEDURE — 80048 BASIC METABOLIC PNL TOTAL CA: CPT

## 2022-01-13 PROCEDURE — 700102 HCHG RX REV CODE 250 W/ 637 OVERRIDE(OP): Performed by: HOSPITALIST

## 2022-01-13 PROCEDURE — 85025 COMPLETE CBC W/AUTO DIFF WBC: CPT

## 2022-01-13 PROCEDURE — 770001 HCHG ROOM/CARE - MED/SURG/GYN PRIV*

## 2022-01-13 PROCEDURE — 97535 SELF CARE MNGMENT TRAINING: CPT

## 2022-01-13 PROCEDURE — 97112 NEUROMUSCULAR REEDUCATION: CPT

## 2022-01-13 PROCEDURE — 99232 SBSQ HOSP IP/OBS MODERATE 35: CPT | Performed by: STUDENT IN AN ORGANIZED HEALTH CARE EDUCATION/TRAINING PROGRAM

## 2022-01-13 PROCEDURE — 700111 HCHG RX REV CODE 636 W/ 250 OVERRIDE (IP): Performed by: HOSPITALIST

## 2022-01-13 PROCEDURE — 700111 HCHG RX REV CODE 636 W/ 250 OVERRIDE (IP): Performed by: STUDENT IN AN ORGANIZED HEALTH CARE EDUCATION/TRAINING PROGRAM

## 2022-01-13 RX ORDER — FAMOTIDINE 20 MG/1
10 TABLET, FILM COATED ORAL 2 TIMES DAILY
Status: DISCONTINUED | OUTPATIENT
Start: 2022-01-13 | End: 2022-01-14

## 2022-01-13 RX ORDER — DIPHENHYDRAMINE HYDROCHLORIDE 50 MG/ML
12.5 INJECTION INTRAMUSCULAR; INTRAVENOUS ONCE
Status: COMPLETED | OUTPATIENT
Start: 2022-01-13 | End: 2022-01-13

## 2022-01-13 RX ORDER — DIPHENHYDRAMINE HCL 25 MG
25 TABLET ORAL EVERY 6 HOURS PRN
Status: DISCONTINUED | OUTPATIENT
Start: 2022-01-13 | End: 2022-01-14

## 2022-01-13 RX ADMIN — APIXABAN 5 MG: 5 TABLET, FILM COATED ORAL at 17:44

## 2022-01-13 RX ADMIN — CEFTRIAXONE SODIUM 2 G: 10 INJECTION, POWDER, FOR SOLUTION INTRAVENOUS at 14:45

## 2022-01-13 RX ADMIN — DIPHENHYDRAMINE HYDROCHLORIDE 12.5 MG: 50 INJECTION INTRAMUSCULAR; INTRAVENOUS at 14:45

## 2022-01-13 RX ADMIN — ENOXAPARIN SODIUM 40 MG: 40 INJECTION SUBCUTANEOUS at 16:14

## 2022-01-13 RX ADMIN — DOCUSATE SODIUM 50 MG AND SENNOSIDES 8.6 MG 2 TABLET: 8.6; 5 TABLET, FILM COATED ORAL at 04:20

## 2022-01-13 RX ADMIN — FAMOTIDINE 10 MG: 20 TABLET ORAL at 17:44

## 2022-01-13 RX ADMIN — BUPROPION HYDROCHLORIDE 100 MG: 100 TABLET, FILM COATED ORAL at 16:14

## 2022-01-13 RX ADMIN — LOSARTAN POTASSIUM 25 MG: 50 TABLET, FILM COATED ORAL at 04:20

## 2022-01-13 RX ADMIN — BUPROPION HYDROCHLORIDE 100 MG: 100 TABLET, FILM COATED ORAL at 04:20

## 2022-01-13 RX ADMIN — ATORVASTATIN CALCIUM 80 MG: 80 TABLET, FILM COATED ORAL at 16:14

## 2022-01-13 RX ADMIN — AMOXICILLIN AND CLAVULANATE POTASSIUM 1 TABLET: 875; 125 TABLET, FILM COATED ORAL at 04:20

## 2022-01-13 RX ADMIN — DIPHENHYDRAMINE HYDROCHLORIDE 25 MG: 25 TABLET ORAL at 07:44

## 2022-01-13 ASSESSMENT — COGNITIVE AND FUNCTIONAL STATUS - GENERAL
TOILETING: TOTAL
DRESSING REGULAR UPPER BODY CLOTHING: A LOT
DRESSING REGULAR LOWER BODY CLOTHING: TOTAL
SUGGESTED CMS G CODE MODIFIER DAILY ACTIVITY: CM
DAILY ACTIVITIY SCORE: 8
EATING MEALS: TOTAL
PERSONAL GROOMING: A LOT
HELP NEEDED FOR BATHING: TOTAL

## 2022-01-13 NOTE — DISCHARGE PLANNING
Agency/Facility Name: [facility]   Outcome: DPA left voicemail for admissions regarding referral. DPA requested call back.

## 2022-01-13 NOTE — CARE PLAN
The patient is Stable - Low risk of patient condition declining or worsening    Shift Goals  Clinical Goals: turns, monitor neuro status  Patient Goals: rest  Family Goals: OMAYRA    Progress made toward(s) clinical / shift goals:    Problem: Neuro Status  Goal: Neuro status will remain stable or improve  Outcome: Progressing     Problem: Pain - Standard  Goal: Alleviation of pain or a reduction in pain to the patient’s comfort goal  Outcome: Progressing     Problem: Fall Risk  Goal: Patient will remain free from falls  Outcome: Progressing   Bed alarm on. Patient educated to use call light.   Problem: Skin Integrity  Goal: Skin integrity is maintained or improved  Outcome: Progressing   Turns q 2 hrs

## 2022-01-13 NOTE — PROGRESS NOTES
[facility] Medicine Daily Progress Note    Date of Service  1/13/2022  Chief Complaint  Jose Quintanilla is a 66 y.o. male admitted 1/1/2022 with left sided hemiparesis     Hospital Course  Mr Quintanilla has past medical history which includes hypertension, hyperlipidemia, DVT/PE and was treated with anticoagulation prior to admission.  Presented on 1/1/2022 after a fall with symptoms of left hemiparesis and rightward gaze.  Patient was evaluated and cleared by trauma.  Neuro imaging was concerning for right-sided carotid occlusion.  Patient was evaluated neurology with recommendations for admission to the ICU, permissive hypertension, and follow-up imaging.  Patient's mental status worsened on 1/3/2022 when he was started on hypertonic saline.  His alertness has improved and hypertonic saline was stopped on 1/6/2022.  Neurology recommends restarting anticoagulation on 1/13/2022 with consideration for apixaban.        Interval Problem Update  1/11/2022  Vitals remained stable  Mental status improving  On Augmentin for possible aspiration pneumonia  SLP following.  Fees test in a.m.     Patient's son present at bedside.  Updated current clinic condition and treatment plan.   1/12/2022  Vitals remained stable.  KCl added for hypokalemia  He passed fees test  SLP following.  Resume oral diet as per SLP   assisting with placement     1/13/2022  Vitals remained stable  Labs unremarkable  SLP/PT/OT following   assisting with placement  I have personally seen and examined the patient at bedside. I discussed the plan of care with bedside RN, charge RN and .     Consultants/Specialty  neurology     Code Status  Full Code     Disposition  Patient is not medically cleared for discharge.   Anticipate discharge to to skilled nursing facility.  I have placed the appropriate orders for post-discharge needs.     Review of Systems  Review of Systems      Review of Systems   Constitutional: Negative for  fever.   Respiratory: Negative for cough.    Cardiovascular: Negative for chest pain.   Gastrointestinal: Negative for nausea and vomiting.   Genitourinary: Negative for dysuria.            Physical Exam  Temp:  [36.1 °C (96.9 °F)-37 °C (98.6 °F)] 36.4 °C (97.5 °F)  Pulse:  [] 92  Resp:  [18-20] 18  BP: (109-132)/(65-76) 132/76  SpO2:  [95 %-99 %] 99 %     Physical Exam  HENT:      Head: Normocephalic.      Right Ear: Tympanic membrane normal.      Nose: Nose normal.      Mouth/Throat:      Mouth: Mucous membranes are moist.   Cardiovascular:      Rate and Rhythm: Normal rate and regular rhythm.      Pulses: Normal pulses.      Heart sounds: Normal heart sounds. No murmur heard.       Pulmonary:      Effort: Pulmonary effort is normal. No respiratory distress.      Breath sounds: Normal breath sounds.   Abdominal:      General: Abdomen is flat.   Neurological:      Mental Status: He is alert.      Comments: Left sided hemiparesis         Intake/Output Summary (Last 24 hours) at 1/13/2022 1135  Last data filed at 1/13/2022 0800  Gross per 24 hour   Intake 1801 ml   Output --   Net 1801 ml       Laboratory  Recent Labs     01/12/22  0240 01/13/22  0244   WBC 7.9 10.1   RBC 3.94* 4.11*   HEMOGLOBIN 12.9* 13.2*   HEMATOCRIT 37.6* 39.1*   MCV 95.4 95.1   MCH 32.7 32.1   MCHC 34.3 33.8   RDW 49.6 49.1   PLATELETCT 250 317   MPV 13.1* 12.3     Recent Labs     01/12/22  0240 01/13/22  0244   SODIUM 136 137   POTASSIUM 3.2* 3.6   CHLORIDE 103 104   CO2 22 22   GLUCOSE 111* 109*   BUN 19 16   CREATININE 0.70 0.67   CALCIUM 8.0* 8.4*                   Imaging  DX-CHEST-LIMITED (1 VIEW)   Final Result         Linear opacities in the left lung base could be due to discoid atelectasis or developing infiltrate such as pneumonia or pneumonitis.      DX-ABDOMEN FOR TUBE PLACEMENT   Final Result      1.  Feeding tube tip overlies the distal stomach.      IR-PICC LINE PLACEMENT W/ GUIDANCE > AGE 5   Final Result                   Ultrasound-guided PICC placement performed by qualified nursing staff as    above.          CT-HEAD W/O   Final Result      Evolving moderate to large right middle cerebral artery territory has increased in size when compared with the previous exam. No evidence of acute intracranial hemorrhage.         DX-ABDOMEN FOR TUBE PLACEMENT   Final Result      Cortrak feeding tube tip projects in the region of the proximal/mid stomach.      US-EXTREMITY VENOUS LOWER BILAT   Final Result      MR-BRAIN-W/O   Final Result         1. Age-related cerebral atrophy. Mild effacement of the right lateral ventricle.      2. Mild periventricular white matter changes consistent with chronic microvascular ischemic gliosis.      3. Large area of acute infarction involving the right frontal and parietal lobes as well as the right-sided basal ganglia. Subacute area of infarction involving the right temporal and lateral occipital lobes.      4. Petechial hemorrhage noted in the right basal ganglia and involving the cortex in the right temporal and lateral occipital lobes.      5. Right internal carotid artery occlusion or high-grade stenosis.            EC-ECHOCARDIOGRAM COMPLETE W/O CONT   Final Result      DX-SHOULDER 2+ LEFT   Final Result      Negative 2 views of left shoulder.      DX-SHOULDER 2+ RIGHT   Final Result      Moderate subacromial spurring      DX-FOOT-COMPLETE 3+ RIGHT   Final Result      No radiographic evidence of acute displaced fracture or subluxation.      DX-FOOT-2- LEFT   Final Result      No radiographic evidence of acute displaced fracture or subluxation.      DX-ELBOW-COMPLETE 3+ RIGHT   Final Result      No radiographic evidence of acute traumatic injury.      Limited due to rotation of the lateral radiograph attempts. Consider repeat lateral radiograph to help exclude effusion      CT-CHEST,ABDOMEN,PELVIS WITH   Final Result      No significant traumatic abnormality in thorax, abdomen and pelvis CT scans.       Hepatic steatosis      Moderate colonic diverticulosis.      CT-LSPINE W/O PLUS RECONS   Final Result      No CT evidence of acute traumatic injury.      Moderate degenerative disc disease with trace degenerative L5/S1 retrolisthesis      CT-TSPINE W/O PLUS RECONS   Final Result      No CT evidence of acute traumatic abnormality.      CT-CSPINE WITHOUT PLUS RECONS   Final Result      No CT evidence of acute cervical spine abnormality.      CT-HEAD W/O   Final Result      Subacute right temporal, temporo-occipital infarction without hemorrhagic transformation      CT-CTA HEAD WITH & W/O-POST PROCESS   Final Result      Right internal carotid artery occlusion with extension to the MCA which lacks contrast opacification      Right A1 and KATIA contrast opacification secondary to cross-filling from the left      Normal left anterior and bilateral posterior circulation      Findings of all of the CTs were discussed with Dr. Betancourt before completion of this dictation.            CT-CTA NECK WITH & W/O-POST PROCESSING   Final Result      Occluded right internal carotid artery at its origin      Atherosclerosis at origin of bilateral vertebral arteries could obscure stenosis. Arteries are patent without post stenotic dilatation      DX-PELVIS-1 OR 2 VIEWS   Final Result      Negative AP view of the pelvis.      DX-CHEST-LIMITED (1 VIEW)   Final Result      No acute cardiopulmonary disease.      US-ABORTED US PROCEDURE    (Results Pending)        Assessment/Plan  * Stroke due to thrombosis of right carotid artery (HCC)- (present on admission)  Assessment & Plan  Extensive stroke due to thrombosis of right carotid artery  Occurred while on anticoagulation, INR slightly subtherapeutic at 1.8  Cytotoxic edema has improved, appreciate neurology recommendations, low sodium to normalize  High intensity statin  Start anticoagulation 1/13  PT/OT/speech    Aspiration pneumonia (HCC)  Assessment & Plan  Patient tachycardic and  tachypneic 1/9  Patient somnolent, on tube feeds  WBC elevated, procalcitonin elevated  CXR shows possible left pneumonia  Start unasyn for suspected aspiration pneumonia  Stop opiates until mentation improves    Sepsis (HCC)  Assessment & Plan  This is Sepsis Not present on admission  SIRS criteria identified on my evaluation include: Tachycardia, with heart rate greater than 90 BPM  Source is lungs  Sepsis protocol initiated  Fluid resuscitation ordered per protocol  IV antibiotics as appropriate for source of sepsis  While organ dysfunction may be noted elsewhere in this problem list or in the chart, degree of organ dysfunction does not meet CMS criteria for severe sepsis      Depression- (present on admission)  Assessment & Plan  Outpatient Wellbutrin and Lexapro    Mild intermittent asthma without complication- (present on admission)  Assessment & Plan  No acute exacerbation    Chronic anticoagulation- (present on admission)  Assessment & Plan  Patient slightly subtherapeutic with INR 1.8 admission  1/2 LE U/S negative for overt DVT, follow weekly U/S for any sign of DVT.  If clinically stable consider restarting anticoagulation 1/13/2022    Dyslipidemia- (present on admission)  Assessment & Plan  Atorvastatin    Primary hypertension- (present on admission)  Assessment & Plan  Goal normotensive  Continue monitoring, consider restarting enteral blood pressure pressure agent if needed.         VTE prophylaxis: SCDs/TEDs and enoxaparin ppx    I have performed a physical exam and reviewed and updated ROS and Plan today (1/13/2022). In review of yesterday's note (1/12/2022), there are no changes except as documented above.

## 2022-01-13 NOTE — CARE PLAN
Problem: Knowledge Deficit - Stroke Education  Goal: Patient's knowledge of stroke and risk factors will improve  Outcome: Progressing     Problem: Discharge Planning - Stroke  Goal: Patient’s continuum of care needs will be met  Outcome: Progressing     Problem: Neuro Status  Goal: Neuro status will remain stable or improve  Outcome: Progressing   The patient is Stable - Low risk of patient condition declining or worsening    Shift Goals  Clinical Goals: maintain skin integrity, mobility  Patient Goals: rest  Family Goals: n/a    Progress made toward(s) clinical / shift goals:  Understands poc. Stable neuro status. SNF referrals pending.    Patient is not progressing towards the following goals:       Walk in Private Auto

## 2022-01-14 ENCOUNTER — APPOINTMENT (OUTPATIENT)
Dept: RADIOLOGY | Facility: MEDICAL CENTER | Age: 67
DRG: 064 | End: 2022-01-14
Attending: STUDENT IN AN ORGANIZED HEALTH CARE EDUCATION/TRAINING PROGRAM
Payer: MEDICARE

## 2022-01-14 LAB
ANION GAP SERPL CALC-SCNC: 10 MMOL/L (ref 7–16)
BACTERIA BLD CULT: NORMAL
BACTERIA BLD CULT: NORMAL
BASOPHILS # BLD AUTO: 0.4 % (ref 0–1.8)
BASOPHILS # BLD: 0.05 K/UL (ref 0–0.12)
BUN SERPL-MCNC: 17 MG/DL (ref 8–22)
CALCIUM SERPL-MCNC: 8.5 MG/DL (ref 8.5–10.5)
CHLORIDE SERPL-SCNC: 104 MMOL/L (ref 96–112)
CO2 SERPL-SCNC: 23 MMOL/L (ref 20–33)
CREAT SERPL-MCNC: 0.69 MG/DL (ref 0.5–1.4)
EOSINOPHIL # BLD AUTO: 0.85 K/UL (ref 0–0.51)
EOSINOPHIL NFR BLD: 6.5 % (ref 0–6.9)
ERYTHROCYTE [DISTWIDTH] IN BLOOD BY AUTOMATED COUNT: 50.3 FL (ref 35.9–50)
GLUCOSE SERPL-MCNC: 119 MG/DL (ref 65–99)
HCT VFR BLD AUTO: 39.4 % (ref 42–52)
HGB BLD-MCNC: 13.3 G/DL (ref 14–18)
IMM GRANULOCYTES # BLD AUTO: 0.37 K/UL (ref 0–0.11)
IMM GRANULOCYTES NFR BLD AUTO: 2.8 % (ref 0–0.9)
LYMPHOCYTES # BLD AUTO: 1.6 K/UL (ref 1–4.8)
LYMPHOCYTES NFR BLD: 12.2 % (ref 22–41)
MCH RBC QN AUTO: 32.4 PG (ref 27–33)
MCHC RBC AUTO-ENTMCNC: 33.8 G/DL (ref 33.7–35.3)
MCV RBC AUTO: 95.9 FL (ref 81.4–97.8)
MONOCYTES # BLD AUTO: 0.45 K/UL (ref 0–0.85)
MONOCYTES NFR BLD AUTO: 3.4 % (ref 0–13.4)
NEUTROPHILS # BLD AUTO: 9.81 K/UL (ref 1.82–7.42)
NEUTROPHILS NFR BLD: 74.7 % (ref 44–72)
NRBC # BLD AUTO: 0 K/UL
NRBC BLD-RTO: 0 /100 WBC
PLATELET # BLD AUTO: 308 K/UL (ref 164–446)
PMV BLD AUTO: 11.6 FL (ref 9–12.9)
POTASSIUM SERPL-SCNC: 3.8 MMOL/L (ref 3.6–5.5)
PROCALCITONIN SERPL-MCNC: 0.15 NG/ML
RBC # BLD AUTO: 4.11 M/UL (ref 4.7–6.1)
SIGNIFICANT IND 70042: NORMAL
SIGNIFICANT IND 70042: NORMAL
SITE SITE: NORMAL
SITE SITE: NORMAL
SODIUM SERPL-SCNC: 137 MMOL/L (ref 135–145)
SOURCE SOURCE: NORMAL
SOURCE SOURCE: NORMAL
WBC # BLD AUTO: 13.1 K/UL (ref 4.8–10.8)

## 2022-01-14 PROCEDURE — 700102 HCHG RX REV CODE 250 W/ 637 OVERRIDE(OP): Performed by: HOSPITALIST

## 2022-01-14 PROCEDURE — A9270 NON-COVERED ITEM OR SERVICE: HCPCS | Performed by: HOSPITALIST

## 2022-01-14 PROCEDURE — A9270 NON-COVERED ITEM OR SERVICE: HCPCS | Performed by: STUDENT IN AN ORGANIZED HEALTH CARE EDUCATION/TRAINING PROGRAM

## 2022-01-14 PROCEDURE — 700111 HCHG RX REV CODE 636 W/ 250 OVERRIDE (IP): Performed by: STUDENT IN AN ORGANIZED HEALTH CARE EDUCATION/TRAINING PROGRAM

## 2022-01-14 PROCEDURE — 80048 BASIC METABOLIC PNL TOTAL CA: CPT

## 2022-01-14 PROCEDURE — 92526 ORAL FUNCTION THERAPY: CPT

## 2022-01-14 PROCEDURE — 71045 X-RAY EXAM CHEST 1 VIEW: CPT

## 2022-01-14 PROCEDURE — 97530 THERAPEUTIC ACTIVITIES: CPT | Mod: CQ

## 2022-01-14 PROCEDURE — 85025 COMPLETE CBC W/AUTO DIFF WBC: CPT

## 2022-01-14 PROCEDURE — 700102 HCHG RX REV CODE 250 W/ 637 OVERRIDE(OP): Performed by: STUDENT IN AN ORGANIZED HEALTH CARE EDUCATION/TRAINING PROGRAM

## 2022-01-14 PROCEDURE — 84145 PROCALCITONIN (PCT): CPT

## 2022-01-14 PROCEDURE — 99232 SBSQ HOSP IP/OBS MODERATE 35: CPT | Performed by: STUDENT IN AN ORGANIZED HEALTH CARE EDUCATION/TRAINING PROGRAM

## 2022-01-14 PROCEDURE — 770001 HCHG ROOM/CARE - MED/SURG/GYN PRIV*

## 2022-01-14 RX ORDER — HYDROXYZINE HYDROCHLORIDE 10 MG/1
10 TABLET, FILM COATED ORAL 2 TIMES DAILY
Status: DISCONTINUED | OUTPATIENT
Start: 2022-01-14 | End: 2022-01-15

## 2022-01-14 RX ORDER — PREDNISONE 20 MG/1
40 TABLET ORAL DAILY
Status: DISCONTINUED | OUTPATIENT
Start: 2022-01-14 | End: 2022-01-15

## 2022-01-14 RX ORDER — FAMOTIDINE 20 MG/1
10 TABLET, FILM COATED ORAL 2 TIMES DAILY
Status: DISCONTINUED | OUTPATIENT
Start: 2022-01-14 | End: 2022-01-20

## 2022-01-14 RX ORDER — DIPHENHYDRAMINE HCL 25 MG
25 TABLET ORAL EVERY 6 HOURS PRN
Status: DISCONTINUED | OUTPATIENT
Start: 2022-01-14 | End: 2022-01-15

## 2022-01-14 RX ORDER — PREDNISONE 20 MG/1
20 TABLET ORAL DAILY
Status: DISCONTINUED | OUTPATIENT
Start: 2022-01-14 | End: 2022-01-14

## 2022-01-14 RX ADMIN — DIPHENHYDRAMINE HYDROCHLORIDE 25 MG: 25 TABLET ORAL at 00:57

## 2022-01-14 RX ADMIN — APIXABAN 5 MG: 5 TABLET, FILM COATED ORAL at 17:28

## 2022-01-14 RX ADMIN — BUPROPION HYDROCHLORIDE 100 MG: 100 TABLET, FILM COATED ORAL at 17:27

## 2022-01-14 RX ADMIN — DIPHENHYDRAMINE HYDROCHLORIDE 25 MG: 25 TABLET ORAL at 17:40

## 2022-01-14 RX ADMIN — HYDROXYZINE HYDROCHLORIDE 10 MG: 10 TABLET ORAL at 17:28

## 2022-01-14 RX ADMIN — PREDNISONE 40 MG: 20 TABLET ORAL at 14:20

## 2022-01-14 RX ADMIN — CEFTRIAXONE SODIUM 2 G: 10 INJECTION, POWDER, FOR SOLUTION INTRAVENOUS at 04:14

## 2022-01-14 RX ADMIN — APIXABAN 5 MG: 5 TABLET, FILM COATED ORAL at 04:13

## 2022-01-14 RX ADMIN — LOSARTAN POTASSIUM 25 MG: 50 TABLET, FILM COATED ORAL at 04:13

## 2022-01-14 RX ADMIN — DOCUSATE SODIUM 50 MG AND SENNOSIDES 8.6 MG 2 TABLET: 8.6; 5 TABLET, FILM COATED ORAL at 04:13

## 2022-01-14 RX ADMIN — FAMOTIDINE 10 MG: 20 TABLET ORAL at 04:13

## 2022-01-14 RX ADMIN — DIPHENHYDRAMINE HYDROCHLORIDE 25 MG: 25 TABLET ORAL at 10:32

## 2022-01-14 RX ADMIN — ATORVASTATIN CALCIUM 80 MG: 80 TABLET, FILM COATED ORAL at 17:28

## 2022-01-14 RX ADMIN — BUPROPION HYDROCHLORIDE 100 MG: 100 TABLET, FILM COATED ORAL at 04:13

## 2022-01-14 RX ADMIN — FAMOTIDINE 10 MG: 20 TABLET ORAL at 17:28

## 2022-01-14 ASSESSMENT — COGNITIVE AND FUNCTIONAL STATUS - GENERAL
WALKING IN HOSPITAL ROOM: TOTAL
STANDING UP FROM CHAIR USING ARMS: TOTAL
MOVING TO AND FROM BED TO CHAIR: UNABLE
MOBILITY SCORE: 6
SUGGESTED CMS G CODE MODIFIER MOBILITY: CN
MOVING FROM LYING ON BACK TO SITTING ON SIDE OF FLAT BED: UNABLE
TURNING FROM BACK TO SIDE WHILE IN FLAT BAD: UNABLE
CLIMB 3 TO 5 STEPS WITH RAILING: TOTAL

## 2022-01-14 ASSESSMENT — PAIN DESCRIPTION - PAIN TYPE: TYPE: ACUTE PAIN

## 2022-01-14 ASSESSMENT — GAIT ASSESSMENTS: GAIT LEVEL OF ASSIST: UNABLE TO PARTICIPATE

## 2022-01-14 NOTE — THERAPY
Physical Therapy   Daily Treatment     Patient Name: Jose Quintanilla  Age:  66 y.o., Sex:  male  Medical Record #: [id number]  Today's Date: 1/14/2022     Precautions  Precautions: (P) Fall Risk;Nasogastric Tube;Swallow Precautions ( See Comments)  Comments: (P) significant subluxed shoulder on left side, left inattention     Assessment    Pt seemed more lethargic today and less attempting to verbalize. Followed simple one step commands but continues to be delayed. He requires total A for all mobility to reach EOB. Once at EOB unable to hold himself up without maxA and presents with posterior lean and lean to the left. HR elevated to 140s, in the 120s upon arrival. Deferred further activity and pt sustaining 125 back in supine, rn notified and aware. Will continue to follow while in house, discussed with supervising PT regarding plan of care at this time.   Plan    Treatment plan modified to 3 times per week until therapy goals are met for the following treatments:  Bed Mobility, Neuro Re-Education / Balance, Self Care/Home Evaluation, Therapeutic Activities and Therapeutic Exercises.    DC Equipment Recommendations: Unable to determine at this time  Discharge Recommendations: (P) Recommend post-acute placement for additional physical therapy services prior to discharge home         01/14/22 0955   Vision   Vision Comments unable to track to the left past midline    Neurological Concerns   Sitting Posture During ADL's Pushes to the Left   Balance   Sitting Balance (Static) Trace   Sitting Balance (Dynamic) Dependent   Comments continues to require maxA for all trunk control and balance, posterior lean and push to the left. did not trial standing today    Gait Analysis   Gait Level Of Assist Unable to Participate   Bed Mobility    Supine to Sit Total Assist   Sit to Supine Total Assist   Scooting Total Assist   Rolling Total Assist to Lt.   Comments total A for all mobility 2 person, poor initiation    Functional Mobility    Sit to Stand Unable to Participate   Short Term Goals    Short Term Goal # 1 Patient will move supine<>sitting EOB with bed features and mod A within 6tx in order to get in/out of bed   Goal Outcome # 1 goal not met   Short Term Goal # 2 Patient will maintain dynamic sitting balance for 5 min with min A within 6tx in order to perform functional task   Goal Outcome # 2 Goal not met   Short Term Goal # 3 Patient will transfer with mod A within 6tx in order to achieve functional transfer   Goal Outcome # 3 Goal not met

## 2022-01-14 NOTE — CARE PLAN
The patient is Stable - Low risk of patient condition declining or worsening         Shift Goals  Clinical Goals: maintain skin integrity  Patient Goals: rest  Family Goals: OMAYRA  Progress made toward(s) clinical / shift goals:   Problem: Neuro Status  Goal: Neuro status will remain stable or improve  Outcome: Progressing   Patient Aox3    Patient is not progressing towards the following goals:    Problem: Skin Integrity  Goal: Skin integrity is maintained or improved  Outcome: Not Progressing   Patient skin red all over significant in bilateral hands and feet.

## 2022-01-14 NOTE — PROGRESS NOTES
[facility] Medicine Daily Progress Note    Date of Service  1/14/2022  Chief Complaint  Jose Quintanilla is a 66 y.o. male admitted 1/1/2022 with left sided hemiparesis     Hospital Course  Mr Quintanilla has past medical history which includes hypertension, hyperlipidemia, DVT/PE and was treated with anticoagulation prior to admission.  Presented on 1/1/2022 after a fall with symptoms of left hemiparesis and rightward gaze.  Patient was evaluated and cleared by trauma.  Neuro imaging was concerning for right-sided carotid occlusion.  Patient was evaluated neurology with recommendations for admission to the ICU, permissive hypertension, and follow-up imaging.  Patient's mental status worsened on 1/3/2022 when he was started on hypertonic saline.  His alertness has improved and hypertonic saline was stopped on 1/6/2022.  Neurology recommends restarting anticoagulation on 1/13/2022 with consideration for apixaban.        Interval Problem Update  1/11/2022  Vitals remained stable  Mental status improving  On Augmentin for possible aspiration pneumonia  SLP following.  Fees test in a.m.     Patient's son present at bedside.  Updated current clinic condition and treatment plan.   1/12/2022  Vitals remained stable.  KCl added for hypokalemia  He passed fees test  SLP following.  Resume oral diet as per SLP   assisting with placement     1/13/2022  Vitals remained stable  Labs unremarkable  SLP/PT/OT following   assisting with placement  I have personally seen and examined the patient at bedside. I discussed the plan of care with bedside RN, charge RN and .     1/14/2022  Vital stable stable.  He does have diffuse erythema, improved after Benadryl.  Likely from Augmentin.  Switched to Rocephin  Chest x-ray noted with bilateral interstitial opacities.  Labs noted with leukocytosis.  Pro-Isai negative  Patient on antibiotic for possible aspiration pneumonia  SLP following see if he can tolerate oral  diet  PT /ot following   assisting placement  Consultants/Specialty  neurology     Code Status  Full Code     Disposition  Patient is not medically cleared for discharge.   Anticipate discharge to to skilled nursing facility.  I have placed the appropriate orders for post-discharge needs.     Review of Systems  Review of Systems      Review of Systems   Constitutional: Negative for fever.   Respiratory: Negative for cough.    Cardiovascular: Negative for chest pain.   Gastrointestinal: Negative for nausea and vomiting.   Genitourinary: Negative for dysuria.            Physical Exam  Temp:  [36.1 °C (96.9 °F)-37 °C (98.6 °F)] 36.4 °C (97.5 °F)  Pulse:  [] 92  Resp:  [18-20] 18  BP: (109-132)/(65-76) 132/76  SpO2:  [95 %-99 %] 99 %     Physical Exam  HENT:      Head: Normocephalic.      Right Ear: Tympanic membrane normal.      Nose: Nose normal.      Mouth/Throat:      Mouth: Mucous membranes are moist.   Cardiovascular:      Rate and Rhythm: Normal rate and regular rhythm.      Pulses: Normal pulses.      Heart sounds: Normal heart sounds. No murmur heard.       Pulmonary:      Effort: Pulmonary effort is normal. No respiratory distress.      Breath sounds: Normal breath sounds.   Abdominal:      General: Abdomen is flat.   Neurological:      Mental Status: He is alert.      Comments: Left sided hemiparesis         Fluids    Intake/Output Summary (Last 24 hours) at 1/14/2022 1142  Last data filed at 1/14/2022 0800  Gross per 24 hour   Intake 2135 ml   Output --   Net 2135 ml       Laboratory  Recent Labs     01/12/22  0240 01/13/22  0244 01/14/22  0427   WBC 7.9 10.1 13.1*   RBC 3.94* 4.11* 4.11*   HEMOGLOBIN 12.9* 13.2* 13.3*   HEMATOCRIT 37.6* 39.1* 39.4*   MCV 95.4 95.1 95.9   MCH 32.7 32.1 32.4   MCHC 34.3 33.8 33.8   RDW 49.6 49.1 50.3*   PLATELETCT 250 317 308   MPV 13.1* 12.3 11.6     Recent Labs     01/12/22  0240 01/13/22  0244 01/14/22  0427   SODIUM 136 137 137   POTASSIUM 3.2* 3.6 3.8    CHLORIDE 103 104 104   CO2 22 22 23   GLUCOSE 111* 109* 119*   BUN 19 16 17   CREATININE 0.70 0.67 0.69   CALCIUM 8.0* 8.4* 8.5                   Imaging  DX-CHEST-LIMITED (1 VIEW)   Final Result      1.  Patchy bilateral interstitial opacities. Underlying infection is possible.   2.  Stable enlargement of the cardiomediastinal silhouette.      DX-CHEST-LIMITED (1 VIEW)   Final Result         Linear opacities in the left lung base could be due to discoid atelectasis or developing infiltrate such as pneumonia or pneumonitis.      DX-ABDOMEN FOR TUBE PLACEMENT   Final Result      1.  Feeding tube tip overlies the distal stomach.      IR-PICC LINE PLACEMENT W/ GUIDANCE > AGE 5   Final Result                  Ultrasound-guided PICC placement performed by qualified nursing staff as    above.          CT-HEAD W/O   Final Result      Evolving moderate to large right middle cerebral artery territory has increased in size when compared with the previous exam. No evidence of acute intracranial hemorrhage.         DX-ABDOMEN FOR TUBE PLACEMENT   Final Result      Cortrak feeding tube tip projects in the region of the proximal/mid stomach.      US-EXTREMITY VENOUS LOWER BILAT   Final Result      MR-BRAIN-W/O   Final Result         1. Age-related cerebral atrophy. Mild effacement of the right lateral ventricle.      2. Mild periventricular white matter changes consistent with chronic microvascular ischemic gliosis.      3. Large area of acute infarction involving the right frontal and parietal lobes as well as the right-sided basal ganglia. Subacute area of infarction involving the right temporal and lateral occipital lobes.      4. Petechial hemorrhage noted in the right basal ganglia and involving the cortex in the right temporal and lateral occipital lobes.      5. Right internal carotid artery occlusion or high-grade stenosis.            EC-ECHOCARDIOGRAM COMPLETE W/O CONT   Final Result      DX-SHOULDER 2+ LEFT   Final  Result      Negative 2 views of left shoulder.      DX-SHOULDER 2+ RIGHT   Final Result      Moderate subacromial spurring      DX-FOOT-COMPLETE 3+ RIGHT   Final Result      No radiographic evidence of acute displaced fracture or subluxation.      DX-FOOT-2- LEFT   Final Result      No radiographic evidence of acute displaced fracture or subluxation.      DX-ELBOW-COMPLETE 3+ RIGHT   Final Result      No radiographic evidence of acute traumatic injury.      Limited due to rotation of the lateral radiograph attempts. Consider repeat lateral radiograph to help exclude effusion      CT-CHEST,ABDOMEN,PELVIS WITH   Final Result      No significant traumatic abnormality in thorax, abdomen and pelvis CT scans.      Hepatic steatosis      Moderate colonic diverticulosis.      CT-LSPINE W/O PLUS RECONS   Final Result      No CT evidence of acute traumatic injury.      Moderate degenerative disc disease with trace degenerative L5/S1 retrolisthesis      CT-TSPINE W/O PLUS RECONS   Final Result      No CT evidence of acute traumatic abnormality.      CT-CSPINE WITHOUT PLUS RECONS   Final Result      No CT evidence of acute cervical spine abnormality.      CT-HEAD W/O   Final Result      Subacute right temporal, temporo-occipital infarction without hemorrhagic transformation      CT-CTA HEAD WITH & W/O-POST PROCESS   Final Result      Right internal carotid artery occlusion with extension to the MCA which lacks contrast opacification      Right A1 and KATIA contrast opacification secondary to cross-filling from the left      Normal left anterior and bilateral posterior circulation      Findings of all of the CTs were discussed with Dr. Betancourt before completion of this dictation.            CT-CTA NECK WITH & W/O-POST PROCESSING   Final Result      Occluded right internal carotid artery at its origin      Atherosclerosis at origin of bilateral vertebral arteries could obscure stenosis. Arteries are patent without post stenotic  dilatation      DX-PELVIS-1 OR 2 VIEWS   Final Result      Negative AP view of the pelvis.      DX-CHEST-LIMITED (1 VIEW)   Final Result      No acute cardiopulmonary disease.      US-ABORTED US PROCEDURE    (Results Pending)        Assessment/Plan  * Stroke due to thrombosis of right carotid artery (HCC)- (present on admission)  Assessment & Plan  Extensive stroke due to thrombosis of right carotid artery  Occurred while on anticoagulation, INR slightly subtherapeutic at 1.8  Cytotoxic edema has improved, appreciate neurology recommendations, low sodium to normalize  High intensity statin  Start anticoagulation 1/13  PT/OT/speech    Aspiration pneumonia (HCC)  Assessment & Plan  On rocephine    Sepsis (HCC)  Assessment & Plan  This is Sepsis Not present on admission  SIRS criteria identified on my evaluation include: Tachycardia, with heart rate greater than 90 BPM  Source is lungs  Sepsis protocol initiated  Fluid resuscitation ordered per protocol  IV antibiotics as appropriate for source of sepsis  While organ dysfunction may be noted elsewhere in this problem list or in the chart, degree of organ dysfunction does not meet CMS criteria for severe sepsis      Depression- (present on admission)  Assessment & Plan  Outpatient Wellbutrin and Lexapro    Mild intermittent asthma without complication- (present on admission)  Assessment & Plan  No acute exacerbation    Chronic anticoagulation- (present on admission)  Assessment & Plan  On eliquis    Dyslipidemia- (present on admission)  Assessment & Plan  Atorvastatin    Primary hypertension- (present on admission)  Assessment & Plan  Goal normotensive  Continue monitoring, consider restarting enteral blood pressure pressure agent if needed.       VTE prophylaxis: SCDs/TEDs and therapeutic anticoagulation with eliquis    I have performed a physical exam and reviewed and updated ROS and Plan today (1/14/2022). In review of yesterday's note (1/13/2022), there are no  changes except as documented above.

## 2022-01-14 NOTE — THERAPY
Occupational Therapy  Daily Treatment     Patient Name: Jose Quintanilla  Age:  66 y.o., Sex:  male  Medical Record #: [id number]  Today's Date: 1/13/2022     Precautions  Precautions: (P) Fall Risk,Nasogastric Tube,Swallow Precautions ( See Comments)  Comments: significant subluxed shoulder on left side, left inattention.     Assessment    Pt seen for follow up OT tx session, pt making slow progress with functional mobility and ADLs, still currently requiring total assist for functional mobility as well as most ADLs, pt following some commands and able to gesture about reaction to back redness, will likely reduce frequency at this time but continue to see for skilled therapy as pt continues to make slow but steady progress, continue to recommend post-acute placement.    Plan    Treatment plan modified to 3 times per week until therapy goals are met for the following treatments:  Adaptive Equipment, Neuro Re-Education / Balance, Self Care/Activities of Daily Living, Therapeutic Activities and Therapeutic Exercises.    DC Equipment Recommendations: (P) Unable to determine at this time  Discharge Recommendations: (P) Recommend post-acute placement for additional occupational therapy services prior to discharge home    Objective       01/13/22 1335   Precautions   Precautions Fall Risk;Nasogastric Tube;Swallow Precautions ( See Comments)   Cognition    Cognition / Consciousness X   Speech/ Communication Delayed Responses;Expressive Aphasia   Level of Consciousness Alert   Ability To Follow Commands 1 Step   Safety Awareness Impaired   New Learning Impaired   Attention Impaired   Initiation Impaired   Comments significantly lethargic   Active ROM Upper Body   Active ROM Upper Body  X   Flaccid Upper Extremity Left Upper Extremity Flaccid   Comments no movement LUE   Strength Upper Body   Upper Body Strength  X   Comments no LUE movement   Upper Body Muscle Tone   Upper Body Muscle Tone  X   Lt Upper Extremity Muscle Tone Non  Functional;Hypotonic   Neuro-Muscular Treatments   Neuro-Muscular Treatments Anterior weight shift;Joint Approximation;Postural Changes;Postural Facilitation;Verbal Cuing;Weight Shift Right;Weight Shift Left   Other Treatments   Other Treatments Provided Significant redness on back and left wrist, when questioning if patient feels pain or itchiness, pt made scratching gesture with right hand   Balance   Sitting Balance (Static) Trace   Sitting Balance (Dynamic) Dependent   Standing Balance (Static) Dependent   Weight Shift Sitting Absent   Weight Shift Standing Absent   Skilled Intervention Verbal Cuing;Facilitation   Comments required posterior support to maintain balance   Bed Mobility    Supine to Sit Total Assist   Sit to Supine Total Assist   Scooting Total Assist   Rolling Total Assist to Rt.;Total Assist to Lt.   Skilled Intervention Verbal Cuing   Activities of Daily Living   Grooming Seated;Maximal Assist   Upper Body Dressing Total Assist   Lower Body Dressing Total Assist   Skilled Intervention Verbal Cuing;Facilitation   How much help from another person does the patient currently need...   Putting on and taking off regular lower body clothing? 1   Bathing (including washing, rinsing, and drying)? 1   Toileting, which includes using a toilet, bedpan, or urinal? 1   Putting on and taking off regular upper body clothing? 2   Taking care of personal grooming such as brushing teeth? 2   Eating meals? 1   6 Clicks Daily Activity Score 8   Modified Hendricks (mRS)   Modified Hendricks Score 5   Functional Mobility   Sit to Stand Maximal Assist  (x2)   Bed, Chair, Wheelchair Transfer Unable to Participate   Toilet Transfers Unable to Participate   Mobility bed mobility, EOB, STS, BTB   Skilled Intervention Verbal Cuing;Sequencing   ICU Target Mobility Level   ICU Mobility - Targeted Level Level 2   Short Term Goals   Short Term Goal # 1 Mod A with washing face with a cloth   Goal Outcome # 1 Progressing as expected    Short Term Goal # 2 mod A with UB dressing   Goal Outcome # 2 Progressing slower than expected   Short Term Goal # 3 mod A with ADL txfs   Goal Outcome # 3 Progressing slower than expected   Education Group   Education Provided Role of Occupational Therapist;Stroke   Role of Occupational Therapist Patient Response Patient;Acceptance;Explanation   Stroke Patient Response Patient;Acceptance;Explanation;Reinforcement Needed   Interdisciplinary Plan of Care Collaboration   IDT Collaboration with  Nursing;Physician;Therapy Tech   Patient Position at End of Therapy In Bed;Bed Alarm On;Call Light within Reach;Tray Table within Reach;Phone within Reach   Collaboration Comments RN updated

## 2022-01-14 NOTE — THERAPY
"Speech Language Pathology  Daily Treatment     Patient Name: Jose Quintanilla  Age:  66 y.o., Sex:  male  Medical Record #: [id number]  Today's Date: 1/14/2022     Precautions  Precautions: (P) Fall Risk,Nasogastric Tube,Swallow Precautions ( See Comments)  Comments: (P)  (significan subluxed shoulder left side, left inattention)    Assessment    Pt with reddened skin and rash on face and chest and receiving Benadryl per RN. SLP collaborated with MD and RN regarding pt with more sleepiness and was not at the level for oral intake today. Pt stating that he was \"itching\" and \"tired.\" Pt with severe dysarthria and whisper phonation today. Oral care completed and oral moisturizer applied to oral cavity and lips. Pt with dry oral cavity. Pt required max cues and stimulation for simple tongue OMEX. With cues and model, pt did complete 6 prolonged vowels with good vocal intensity that he was not able to imitate last session on 1/12. Pt did not respond when family name were targeted for vocal intensity. No oral boluses this session with MD to change medication for his current allergic reaction related to less ability to participate in tx. Of concern, pt did well on the FEES 1/12 when he is able to sustain his attention and dble swallow which has been very inconsistent during treatment. Plan is for re-scope next week.     Plan  NPO and NG for nutrition, prefeeding with SLP with 1/2 tsp amounts of NTL and applesauce by spoon and dble swallow, 3-5 single ice chips per hour with staff. Re-scope early next week.   Continue current treatment plan.    Discharge Recommendations: (P) Recommend post-acute placement for additional speech therapy services prior to discharge home       01/14/22 1051   Speech / Dysarthria   Comments severe to profound dysarthria-more sleepy this session.    Voice   Comments poor to fair intensity prolonged vowels with model and at the 1- word level.    Dysphagia    Dysphagia X   Positioning / Behavior " "Modification Multiple Swallows   Oral / Pharyngeal / Laryngeal Exercises Lingual Exercises;Laryngeal Exercises   Diet / Liquid Recommendation NPO;Pre-Feeding Trials with SLP Only   Nutritional Liquid Intake Rating Scale Nothing by mouth   Nutritional Food Intake Rating Scale Tube dependent with minimal attempts of oral intake  (1-1 feeding only with SLP, 3-5 single ice chips w/nurs)   Nursing Communication Swallow Precaution Sign Posted at Head of Bed  (regarding ice chips only )   Skilled Intervention Compensatory Strategies;Verbal Cueing   Recommended Route of Medication Administration   Medication Administration  Via Gastric Tube   Patient / Family Goals   Patient / Family Goal #1 \"Popsicles\"   Goal #1 Outcome Goal not met   Short Term Goals   Short Term Goal # 1 1/12 Pt will consume 1/2 tsp amounts by spoon of applesauce, pudding, and NTL with 2-3 swallows per bolus with moderate verbal cues with SLP only   Goal Outcome # 1 Goal not met   Short Term Goal # 2 Pt will be able to complete 10/10 reps of oral motor and laryngeal/pharyngeal exercises with \"good\" accuracy as judged by SLP   Goal Outcome # 2  Progressing slower than expected   Short Term Goal # 3 Pt will imitate prolonge vowels, falsetto, or one syllable words with fair intensity with max cues and model    Goal Outcome  # 3 Progressing slower than expected   Session Information   Priority 3  (5x,LgRMCA,FEES-prefdg 1/2tspamounts,rescope next wk)         "

## 2022-01-15 PROBLEM — T78.40XA ALLERGY: Status: ACTIVE | Noted: 2022-01-15

## 2022-01-15 LAB
ANION GAP SERPL CALC-SCNC: 10 MMOL/L (ref 7–16)
BASOPHILS # BLD AUTO: 0.4 % (ref 0–1.8)
BASOPHILS # BLD: 0.07 K/UL (ref 0–0.12)
BUN SERPL-MCNC: 19 MG/DL (ref 8–22)
CALCIUM SERPL-MCNC: 8.7 MG/DL (ref 8.5–10.5)
CHLORIDE SERPL-SCNC: 102 MMOL/L (ref 96–112)
CO2 SERPL-SCNC: 23 MMOL/L (ref 20–33)
CREAT SERPL-MCNC: 0.63 MG/DL (ref 0.5–1.4)
EOSINOPHIL # BLD AUTO: 0.69 K/UL (ref 0–0.51)
EOSINOPHIL NFR BLD: 4 % (ref 0–6.9)
ERYTHROCYTE [DISTWIDTH] IN BLOOD BY AUTOMATED COUNT: 49.7 FL (ref 35.9–50)
GLUCOSE SERPL-MCNC: 139 MG/DL (ref 65–99)
HCT VFR BLD AUTO: 39.3 % (ref 42–52)
HGB BLD-MCNC: 13.3 G/DL (ref 14–18)
IMM GRANULOCYTES # BLD AUTO: 0.69 K/UL (ref 0–0.11)
IMM GRANULOCYTES NFR BLD AUTO: 4 % (ref 0–0.9)
LYMPHOCYTES # BLD AUTO: 1.91 K/UL (ref 1–4.8)
LYMPHOCYTES NFR BLD: 10.9 % (ref 22–41)
MCH RBC QN AUTO: 32.2 PG (ref 27–33)
MCHC RBC AUTO-ENTMCNC: 33.8 G/DL (ref 33.7–35.3)
MCV RBC AUTO: 95.2 FL (ref 81.4–97.8)
MONOCYTES # BLD AUTO: 0.49 K/UL (ref 0–0.85)
MONOCYTES NFR BLD AUTO: 2.8 % (ref 0–13.4)
NEUTROPHILS # BLD AUTO: 13.6 K/UL (ref 1.82–7.42)
NEUTROPHILS NFR BLD: 77.9 % (ref 44–72)
NRBC # BLD AUTO: 0 K/UL
NRBC BLD-RTO: 0 /100 WBC
PLATELET # BLD AUTO: 325 K/UL (ref 164–446)
PMV BLD AUTO: 11.4 FL (ref 9–12.9)
POTASSIUM SERPL-SCNC: 4.2 MMOL/L (ref 3.6–5.5)
RBC # BLD AUTO: 4.13 M/UL (ref 4.7–6.1)
SODIUM SERPL-SCNC: 135 MMOL/L (ref 135–145)
WBC # BLD AUTO: 17.5 K/UL (ref 4.8–10.8)

## 2022-01-15 PROCEDURE — 99232 SBSQ HOSP IP/OBS MODERATE 35: CPT | Performed by: STUDENT IN AN ORGANIZED HEALTH CARE EDUCATION/TRAINING PROGRAM

## 2022-01-15 PROCEDURE — 94760 N-INVAS EAR/PLS OXIMETRY 1: CPT

## 2022-01-15 PROCEDURE — 700111 HCHG RX REV CODE 636 W/ 250 OVERRIDE (IP): Performed by: STUDENT IN AN ORGANIZED HEALTH CARE EDUCATION/TRAINING PROGRAM

## 2022-01-15 PROCEDURE — 700101 HCHG RX REV CODE 250: Performed by: STUDENT IN AN ORGANIZED HEALTH CARE EDUCATION/TRAINING PROGRAM

## 2022-01-15 PROCEDURE — 700102 HCHG RX REV CODE 250 W/ 637 OVERRIDE(OP): Performed by: STUDENT IN AN ORGANIZED HEALTH CARE EDUCATION/TRAINING PROGRAM

## 2022-01-15 PROCEDURE — 700102 HCHG RX REV CODE 250 W/ 637 OVERRIDE(OP): Performed by: HOSPITALIST

## 2022-01-15 PROCEDURE — 770001 HCHG ROOM/CARE - MED/SURG/GYN PRIV*

## 2022-01-15 PROCEDURE — A9270 NON-COVERED ITEM OR SERVICE: HCPCS | Performed by: STUDENT IN AN ORGANIZED HEALTH CARE EDUCATION/TRAINING PROGRAM

## 2022-01-15 PROCEDURE — 80048 BASIC METABOLIC PNL TOTAL CA: CPT

## 2022-01-15 PROCEDURE — A9270 NON-COVERED ITEM OR SERVICE: HCPCS | Performed by: HOSPITALIST

## 2022-01-15 PROCEDURE — 94640 AIRWAY INHALATION TREATMENT: CPT

## 2022-01-15 PROCEDURE — 85025 COMPLETE CBC W/AUTO DIFF WBC: CPT

## 2022-01-15 RX ORDER — DIPHENHYDRAMINE HCL 25 MG
25 TABLET ORAL EVERY 6 HOURS PRN
Status: DISCONTINUED | OUTPATIENT
Start: 2022-01-15 | End: 2022-01-16

## 2022-01-15 RX ORDER — DIPHENHYDRAMINE HCL 25 MG
25 TABLET ORAL EVERY 12 HOURS
Status: DISCONTINUED | OUTPATIENT
Start: 2022-01-15 | End: 2022-01-15

## 2022-01-15 RX ORDER — DIPHENHYDRAMINE HCL 25 MG
25 TABLET ORAL EVERY 12 HOURS
Status: DISCONTINUED | OUTPATIENT
Start: 2022-01-15 | End: 2022-01-16

## 2022-01-15 RX ORDER — IPRATROPIUM BROMIDE AND ALBUTEROL SULFATE 2.5; .5 MG/3ML; MG/3ML
3 SOLUTION RESPIRATORY (INHALATION)
Status: DISCONTINUED | OUTPATIENT
Start: 2022-01-15 | End: 2022-01-19

## 2022-01-15 RX ORDER — PREDNISONE 20 MG/1
40 TABLET ORAL DAILY
Status: COMPLETED | OUTPATIENT
Start: 2022-01-16 | End: 2022-01-18

## 2022-01-15 RX ADMIN — DIPHENHYDRAMINE HYDROCHLORIDE 25 MG: 25 TABLET ORAL at 17:13

## 2022-01-15 RX ADMIN — BUPROPION HYDROCHLORIDE 100 MG: 100 TABLET, FILM COATED ORAL at 17:13

## 2022-01-15 RX ADMIN — APIXABAN 5 MG: 5 TABLET, FILM COATED ORAL at 17:14

## 2022-01-15 RX ADMIN — IPRATROPIUM BROMIDE AND ALBUTEROL SULFATE 3 ML: .5; 2.5 SOLUTION RESPIRATORY (INHALATION) at 12:31

## 2022-01-15 RX ADMIN — APIXABAN 5 MG: 5 TABLET, FILM COATED ORAL at 04:43

## 2022-01-15 RX ADMIN — FAMOTIDINE 10 MG: 20 TABLET ORAL at 04:44

## 2022-01-15 RX ADMIN — HYDROXYZINE HYDROCHLORIDE 10 MG: 10 TABLET ORAL at 04:43

## 2022-01-15 RX ADMIN — CEFTRIAXONE SODIUM 2 G: 10 INJECTION, POWDER, FOR SOLUTION INTRAVENOUS at 04:44

## 2022-01-15 RX ADMIN — PREDNISONE 40 MG: 20 TABLET ORAL at 04:43

## 2022-01-15 RX ADMIN — BUPROPION HYDROCHLORIDE 100 MG: 100 TABLET, FILM COATED ORAL at 04:43

## 2022-01-15 RX ADMIN — ATORVASTATIN CALCIUM 80 MG: 80 TABLET, FILM COATED ORAL at 17:14

## 2022-01-15 RX ADMIN — FAMOTIDINE 10 MG: 20 TABLET ORAL at 17:13

## 2022-01-15 RX ADMIN — LOSARTAN POTASSIUM 25 MG: 50 TABLET, FILM COATED ORAL at 04:43

## 2022-01-15 ASSESSMENT — PAIN DESCRIPTION - PAIN TYPE: TYPE: ACUTE PAIN

## 2022-01-15 NOTE — PROGRESS NOTES
Hospital Medicine Daily Progress Note    Date of Service  1/15/2022    Chief Complaint  Jose Quintanilla is a 66 y.o. male admitted 1/1/2022 with left sided hemiparesis     Hospital Course  Mr Quintanilla has past medical history which includes hypertension, hyperlipidemia, DVT/PE and was treated with anticoagulation prior to admission.  Presented on 1/1/2022 after a fall with symptoms of left hemiparesis and rightward gaze.  Patient was evaluated and cleared by trauma.  Neuro imaging was concerning for right-sided carotid occlusion.  Patient was evaluated neurology with recommendations for admission to the ICU, permissive hypertension, and follow-up imaging.  Patient's mental status worsened on 1/3/2022 when he was started on hypertonic saline.  His alertness has improved and hypertonic saline was stopped on 1/6/2022.  Neurology recommends restarting anticoagulation on 1/13/2022 with consideration for apixaban.        Interval Problem Update  1/11/2022  Vitals remained stable  Mental status improving  On Augmentin for possible aspiration pneumonia  SLP following.  Fees test in a.m.     Patient's son present at bedside.  Updated current clinic condition and treatment plan.   1/12/2022  Vitals remained stable.  KCl added for hypokalemia  He passed fees test  SLP following.  Resume oral diet as per SLP   assisting with placement     1/13/2022  Vitals remained stable  Labs unremarkable  SLP/PT/OT following   assisting with placement       1/14/2022  Vital stable stable.  He does have diffuse erythema, improved after Benadryl.  Likely from Augmentin.  Switched to Rocephin  Chest x-ray noted with bilateral interstitial opacities.  Labs noted with leukocytosis.  Pro-Isai negative  Patient on antibiotic for possible aspiration pneumonia  SLP following see if he can tolerate oral diet  PT /ot following   assisting placement    1/15/2022  Vitals stable . On room air   Labs reviewed .  Reactive  leucocytosis   Rash improving with steroid and benadryl    Completed course of antibiotics for Pneumona    Updates given and plan of care discussed with patient's son who was at bedside.    Consultants/Specialty  neurology     Code Status  Full Code     Disposition  Patient is not medically cleared for discharge.   Anticipate discharge to to skilled nursing facility.  I have placed the appropriate orders for post-discharge needs.     Review of Systems  Review of Systems      Review of Systems   Constitutional: Negative for fever.   Respiratory: Negative for cough.    Cardiovascular: Negative for chest pain.   Gastrointestinal: Negative for nausea and vomiting.   Genitourinary: Negative for dysuria.            Physical Exam  Temp:  [36.1 °C (96.9 °F)-37 °C (98.6 °F)] 36.4 °C (97.5 °F)  Pulse:  [] 92  Resp:  [18-20] 18  BP: (109-132)/(65-76) 132/76  SpO2:  [95 %-99 %] 99 %     Physical Exam  HENT:      Head: Normocephalic.      Right Ear: Tympanic membrane normal.      Nose: Nose normal.      Mouth/Throat:      Mouth: Mucous membranes are moist.   Cardiovascular:      Rate and Rhythm: Normal rate and regular rhythm.      Pulses: Normal pulses.      Heart sounds: Normal heart sounds. No murmur heard.       Pulmonary:      Effort: Pulmonary effort is normal. No respiratory distress.      Breath sounds: Normal breath sounds.   Abdominal:      General: Abdomen is flat.   Neurological:      Mental Status: He is alert.      Comments: Left sided hemiparesis    Fluids    Intake/Output Summary (Last 24 hours) at 1/15/2022 1311  Last data filed at 1/15/2022 0600  Gross per 24 hour   Intake 1470 ml   Output --   Net 1470 ml       Laboratory  Recent Labs     01/13/22  0244 01/14/22  0427 01/15/22  0445   WBC 10.1 13.1* 17.5*   RBC 4.11* 4.11* 4.13*   HEMOGLOBIN 13.2* 13.3* 13.3*   HEMATOCRIT 39.1* 39.4* 39.3*   MCV 95.1 95.9 95.2   MCH 32.1 32.4 32.2   MCHC 33.8 33.8 33.8   RDW 49.1 50.3* 49.7   PLATELETCT 317 308 325   MPV  12.3 11.6 11.4     Recent Labs     01/13/22  0244 01/14/22  0427 01/15/22  0445   SODIUM 137 137 135   POTASSIUM 3.6 3.8 4.2   CHLORIDE 104 104 102   CO2 22 23 23   GLUCOSE 109* 119* 139*   BUN 16 17 19   CREATININE 0.67 0.69 0.63   CALCIUM 8.4* 8.5 8.7                   Imaging  DX-CHEST-LIMITED (1 VIEW)   Final Result      1.  Patchy bilateral interstitial opacities. Underlying infection is possible.   2.  Stable enlargement of the cardiomediastinal silhouette.      DX-CHEST-LIMITED (1 VIEW)   Final Result         Linear opacities in the left lung base could be due to discoid atelectasis or developing infiltrate such as pneumonia or pneumonitis.      DX-ABDOMEN FOR TUBE PLACEMENT   Final Result      1.  Feeding tube tip overlies the distal stomach.      IR-PICC LINE PLACEMENT W/ GUIDANCE > AGE 5   Final Result                  Ultrasound-guided PICC placement performed by qualified nursing staff as    above.          CT-HEAD W/O   Final Result      Evolving moderate to large right middle cerebral artery territory has increased in size when compared with the previous exam. No evidence of acute intracranial hemorrhage.         DX-ABDOMEN FOR TUBE PLACEMENT   Final Result      Cortrak feeding tube tip projects in the region of the proximal/mid stomach.      US-EXTREMITY VENOUS LOWER BILAT   Final Result      MR-BRAIN-W/O   Final Result         1. Age-related cerebral atrophy. Mild effacement of the right lateral ventricle.      2. Mild periventricular white matter changes consistent with chronic microvascular ischemic gliosis.      3. Large area of acute infarction involving the right frontal and parietal lobes as well as the right-sided basal ganglia. Subacute area of infarction involving the right temporal and lateral occipital lobes.      4. Petechial hemorrhage noted in the right basal ganglia and involving the cortex in the right temporal and lateral occipital lobes.      5. Right internal carotid artery  occlusion or high-grade stenosis.            EC-ECHOCARDIOGRAM COMPLETE W/O CONT   Final Result      DX-SHOULDER 2+ LEFT   Final Result      Negative 2 views of left shoulder.      DX-SHOULDER 2+ RIGHT   Final Result      Moderate subacromial spurring      DX-FOOT-COMPLETE 3+ RIGHT   Final Result      No radiographic evidence of acute displaced fracture or subluxation.      DX-FOOT-2- LEFT   Final Result      No radiographic evidence of acute displaced fracture or subluxation.      DX-ELBOW-COMPLETE 3+ RIGHT   Final Result      No radiographic evidence of acute traumatic injury.      Limited due to rotation of the lateral radiograph attempts. Consider repeat lateral radiograph to help exclude effusion      CT-CHEST,ABDOMEN,PELVIS WITH   Final Result      No significant traumatic abnormality in thorax, abdomen and pelvis CT scans.      Hepatic steatosis      Moderate colonic diverticulosis.      CT-LSPINE W/O PLUS RECONS   Final Result      No CT evidence of acute traumatic injury.      Moderate degenerative disc disease with trace degenerative L5/S1 retrolisthesis      CT-TSPINE W/O PLUS RECONS   Final Result      No CT evidence of acute traumatic abnormality.      CT-CSPINE WITHOUT PLUS RECONS   Final Result      No CT evidence of acute cervical spine abnormality.      CT-HEAD W/O   Final Result      Subacute right temporal, temporo-occipital infarction without hemorrhagic transformation      CT-CTA HEAD WITH & W/O-POST PROCESS   Final Result      Right internal carotid artery occlusion with extension to the MCA which lacks contrast opacification      Right A1 and KATIA contrast opacification secondary to cross-filling from the left      Normal left anterior and bilateral posterior circulation      Findings of all of the CTs were discussed with Dr. Betancourt before completion of this dictation.            CT-CTA NECK WITH & W/O-POST PROCESSING   Final Result      Occluded right internal carotid artery at its origin       Atherosclerosis at origin of bilateral vertebral arteries could obscure stenosis. Arteries are patent without post stenotic dilatation      DX-PELVIS-1 OR 2 VIEWS   Final Result      Negative AP view of the pelvis.      DX-CHEST-LIMITED (1 VIEW)   Final Result      No acute cardiopulmonary disease.      US-ABORTED US PROCEDURE    (Results Pending)        Assessment/Plan  * Stroke due to thrombosis of right carotid artery (HCC)- (present on admission)  Assessment & Plan  Extensive stroke due to thrombosis of right carotid artery  Occurred while on anticoagulation, INR slightly subtherapeutic at 1.8  Cytotoxic edema has improved, appreciate neurology recommendations, low sodium to normalize  High intensity statin  Start anticoagulation 1/13  PT/OT/speech    Allergy  Assessment & Plan  Diffuse erythema  Likely pencil renaldo induced /drug allergy   Improving with steroid and benadryl     Aspiration pneumonia (HCC)  Assessment & Plan  On rocephine    Sepsis (HCC)  Assessment & Plan  This is Sepsis Not present on admission  SIRS criteria identified on my evaluation include: Tachycardia, with heart rate greater than 90 BPM  Source is lungs  Sepsis protocol initiated  Fluid resuscitation ordered per protocol  IV antibiotics as appropriate for source of sepsis  While organ dysfunction may be noted elsewhere in this problem list or in the chart, degree of organ dysfunction does not meet CMS criteria for severe sepsis    Resolved    Depression- (present on admission)  Assessment & Plan  Outpatient Wellbutrin and Lexapro    Mild intermittent asthma without complication- (present on admission)  Assessment & Plan  No acute exacerbation  On duo neb PRN    Chronic anticoagulation- (present on admission)  Assessment & Plan  On eliquis    Dyslipidemia- (present on admission)  Assessment & Plan  Atorvastatin    Primary hypertension- (present on admission)  Assessment & Plan  Goal normotensive  Continue monitoring, consider restarting  enteral blood pressure pressure agent if needed.       VTE prophylaxis: SCDs/TEDs and therapeutic anticoagulation with eliquis     I have performed a physical exam and reviewed and updated ROS and Plan today (1/15/2022). In review of yesterday's note (1/14/2022), there are no changes except as documented above.

## 2022-01-15 NOTE — CARE PLAN
The patient is Stable - Low risk of patient condition declining or worsening    Shift Goals  Clinical Goals: skin integrity  Patient Goals: rest  Family Goals: OMAYRA    Progress made toward(s) clinical / shift goals:  Q2 turns in place, bed locked and in lowest position, low air loss mattress, updated on POC    Patient is not progressing towards the following goals:

## 2022-01-15 NOTE — ASSESSMENT & PLAN NOTE
Diffuse erythema  Likely penicillin induced /drug allergy   Improving with steroid and benadryl   resolved

## 2022-01-15 NOTE — THERAPY
Missed Therapy     Patient Name: Jose Quintanilla  Age:  66 y.o., Sex:  male  Medical Record #: [id number]  Today's Date: 1/15/2022    Discussed missed therapy with RN       01/15/22 1048   Treatment Variance   Reason For Missed Therapy Medical - Patient not Able To Participate   Total Time Spent   Total Time Spent (Mins) 5   Interdisciplinary Plan of Care Collaboration   IDT Collaboration with  Nursing   Collaboration Comments This SLP spoke with RN regarding possible repeat FEES, as message was given to this SLP that patient may be able to go to SNF in [place] if Cortrak is able to be removed. Per RN, patient still sleepy d/t Benadryl for allergic reaction and patient does not have bed available yet at SNF. RN also reports patient's son expected repeat FEES to happen early-mid next week, as he wants to give him more time to improve his swallowing. SLP will hold FEES at this time and repeat FEES will be attempted next week as able and appropriate. Thank you.

## 2022-01-16 LAB
ANION GAP SERPL CALC-SCNC: 12 MMOL/L (ref 7–16)
ANISOCYTOSIS BLD QL SMEAR: ABNORMAL
BASOPHILS # BLD AUTO: 0 % (ref 0–1.8)
BASOPHILS # BLD: 0 K/UL (ref 0–0.12)
BUN SERPL-MCNC: 20 MG/DL (ref 8–22)
CALCIUM SERPL-MCNC: 8.8 MG/DL (ref 8.5–10.5)
CHLORIDE SERPL-SCNC: 101 MMOL/L (ref 96–112)
CO2 SERPL-SCNC: 24 MMOL/L (ref 20–33)
CREAT SERPL-MCNC: 0.68 MG/DL (ref 0.5–1.4)
CRP SERPL HS-MCNC: 2 MG/DL (ref 0–0.75)
DOHLE BOD BLD QL SMEAR: NORMAL
EOSINOPHIL # BLD AUTO: 0.97 K/UL (ref 0–0.51)
EOSINOPHIL NFR BLD: 6 % (ref 0–6.9)
ERYTHROCYTE [DISTWIDTH] IN BLOOD BY AUTOMATED COUNT: 50.6 FL (ref 35.9–50)
ERYTHROCYTE [SEDIMENTATION RATE] IN BLOOD BY WESTERGREN METHOD: 67 MM/HOUR (ref 0–20)
GLUCOSE SERPL-MCNC: 122 MG/DL (ref 65–99)
HCT VFR BLD AUTO: 38.7 % (ref 42–52)
HGB BLD-MCNC: 13.2 G/DL (ref 14–18)
LYMPHOCYTES # BLD AUTO: 1.93 K/UL (ref 1–4.8)
LYMPHOCYTES NFR BLD: 12 % (ref 22–41)
MACROCYTES BLD QL SMEAR: ABNORMAL
MANUAL DIFF BLD: NORMAL
MCH RBC QN AUTO: 32.6 PG (ref 27–33)
MCHC RBC AUTO-ENTMCNC: 34.1 G/DL (ref 33.7–35.3)
MCV RBC AUTO: 95.6 FL (ref 81.4–97.8)
MICROCYTES BLD QL SMEAR: ABNORMAL
MONOCYTES # BLD AUTO: 0.82 K/UL (ref 0–0.85)
MONOCYTES NFR BLD AUTO: 5.1 % (ref 0–13.4)
MORPHOLOGY BLD-IMP: NORMAL
MYELOCYTES NFR BLD MANUAL: 3.4 %
NEUTROPHILS # BLD AUTO: 11.83 K/UL (ref 1.82–7.42)
NEUTROPHILS NFR BLD: 73.5 % (ref 44–72)
NRBC # BLD AUTO: 0 K/UL
NRBC BLD-RTO: 0 /100 WBC
OVALOCYTES BLD QL SMEAR: NORMAL
PLATELET # BLD AUTO: 377 K/UL (ref 164–446)
PLATELET BLD QL SMEAR: NORMAL
PMV BLD AUTO: 11.2 FL (ref 9–12.9)
POIKILOCYTOSIS BLD QL SMEAR: NORMAL
POLYCHROMASIA BLD QL SMEAR: NORMAL
POTASSIUM SERPL-SCNC: 3.8 MMOL/L (ref 3.6–5.5)
RBC # BLD AUTO: 4.05 M/UL (ref 4.7–6.1)
RBC BLD AUTO: PRESENT
SODIUM SERPL-SCNC: 137 MMOL/L (ref 135–145)
TOXIC GRANULES BLD QL SMEAR: SLIGHT
WBC # BLD AUTO: 16.1 K/UL (ref 4.8–10.8)

## 2022-01-16 PROCEDURE — A9270 NON-COVERED ITEM OR SERVICE: HCPCS | Performed by: HOSPITALIST

## 2022-01-16 PROCEDURE — 700101 HCHG RX REV CODE 250: Performed by: STUDENT IN AN ORGANIZED HEALTH CARE EDUCATION/TRAINING PROGRAM

## 2022-01-16 PROCEDURE — 80048 BASIC METABOLIC PNL TOTAL CA: CPT

## 2022-01-16 PROCEDURE — A9270 NON-COVERED ITEM OR SERVICE: HCPCS | Performed by: STUDENT IN AN ORGANIZED HEALTH CARE EDUCATION/TRAINING PROGRAM

## 2022-01-16 PROCEDURE — 700111 HCHG RX REV CODE 636 W/ 250 OVERRIDE (IP): Performed by: STUDENT IN AN ORGANIZED HEALTH CARE EDUCATION/TRAINING PROGRAM

## 2022-01-16 PROCEDURE — 770001 HCHG ROOM/CARE - MED/SURG/GYN PRIV*

## 2022-01-16 PROCEDURE — 85652 RBC SED RATE AUTOMATED: CPT

## 2022-01-16 PROCEDURE — 700102 HCHG RX REV CODE 250 W/ 637 OVERRIDE(OP): Performed by: HOSPITALIST

## 2022-01-16 PROCEDURE — 700102 HCHG RX REV CODE 250 W/ 637 OVERRIDE(OP): Performed by: STUDENT IN AN ORGANIZED HEALTH CARE EDUCATION/TRAINING PROGRAM

## 2022-01-16 PROCEDURE — 85007 BL SMEAR W/DIFF WBC COUNT: CPT

## 2022-01-16 PROCEDURE — 94760 N-INVAS EAR/PLS OXIMETRY 1: CPT

## 2022-01-16 PROCEDURE — 85027 COMPLETE CBC AUTOMATED: CPT

## 2022-01-16 PROCEDURE — 94640 AIRWAY INHALATION TREATMENT: CPT

## 2022-01-16 PROCEDURE — 99232 SBSQ HOSP IP/OBS MODERATE 35: CPT | Performed by: STUDENT IN AN ORGANIZED HEALTH CARE EDUCATION/TRAINING PROGRAM

## 2022-01-16 PROCEDURE — 86140 C-REACTIVE PROTEIN: CPT

## 2022-01-16 RX ORDER — BENZOCAINE/MENTHOL 6 MG-10 MG
LOZENGE MUCOUS MEMBRANE 2 TIMES DAILY
Status: DISCONTINUED | OUTPATIENT
Start: 2022-01-16 | End: 2022-01-25 | Stop reason: HOSPADM

## 2022-01-16 RX ORDER — BUDESONIDE AND FORMOTEROL FUMARATE DIHYDRATE 80; 4.5 UG/1; UG/1
2 AEROSOL RESPIRATORY (INHALATION)
Status: DISCONTINUED | OUTPATIENT
Start: 2022-01-16 | End: 2022-01-25 | Stop reason: HOSPADM

## 2022-01-16 RX ORDER — IPRATROPIUM BROMIDE AND ALBUTEROL SULFATE 2.5; .5 MG/3ML; MG/3ML
3 SOLUTION RESPIRATORY (INHALATION)
Status: DISCONTINUED | OUTPATIENT
Start: 2022-01-16 | End: 2022-01-18

## 2022-01-16 RX ADMIN — IPRATROPIUM BROMIDE AND ALBUTEROL SULFATE 3 ML: .5; 2.5 SOLUTION RESPIRATORY (INHALATION) at 20:04

## 2022-01-16 RX ADMIN — APIXABAN 5 MG: 5 TABLET, FILM COATED ORAL at 06:00

## 2022-01-16 RX ADMIN — ATORVASTATIN CALCIUM 80 MG: 80 TABLET, FILM COATED ORAL at 17:58

## 2022-01-16 RX ADMIN — BUPROPION HYDROCHLORIDE 100 MG: 100 TABLET, FILM COATED ORAL at 17:58

## 2022-01-16 RX ADMIN — DIPHENHYDRAMINE HYDROCHLORIDE 25 MG: 25 TABLET ORAL at 05:59

## 2022-01-16 RX ADMIN — APIXABAN 5 MG: 5 TABLET, FILM COATED ORAL at 17:58

## 2022-01-16 RX ADMIN — FAMOTIDINE 10 MG: 20 TABLET ORAL at 06:00

## 2022-01-16 RX ADMIN — DOCUSATE SODIUM 50 MG AND SENNOSIDES 8.6 MG 2 TABLET: 8.6; 5 TABLET, FILM COATED ORAL at 05:59

## 2022-01-16 RX ADMIN — IPRATROPIUM BROMIDE AND ALBUTEROL SULFATE 3 ML: .5; 2.5 SOLUTION RESPIRATORY (INHALATION) at 22:23

## 2022-01-16 RX ADMIN — PREDNISONE 40 MG: 20 TABLET ORAL at 05:59

## 2022-01-16 RX ADMIN — FAMOTIDINE 10 MG: 20 TABLET ORAL at 17:58

## 2022-01-16 RX ADMIN — BUDESONIDE AND FORMOTEROL FUMARATE DIHYDRATE 2 PUFF: 80; 4.5 AEROSOL RESPIRATORY (INHALATION) at 11:51

## 2022-01-16 RX ADMIN — HYDROCORTISONE: 10 CREAM TOPICAL at 18:00

## 2022-01-16 RX ADMIN — IPRATROPIUM BROMIDE AND ALBUTEROL SULFATE 3 ML: .5; 2.5 SOLUTION RESPIRATORY (INHALATION) at 11:50

## 2022-01-16 RX ADMIN — BUPROPION HYDROCHLORIDE 100 MG: 100 TABLET, FILM COATED ORAL at 05:59

## 2022-01-16 RX ADMIN — BUDESONIDE AND FORMOTEROL FUMARATE DIHYDRATE 2 PUFF: 80; 4.5 AEROSOL RESPIRATORY (INHALATION) at 20:13

## 2022-01-16 RX ADMIN — HYDROCORTISONE: 10 CREAM TOPICAL at 12:43

## 2022-01-16 RX ADMIN — IPRATROPIUM BROMIDE AND ALBUTEROL SULFATE 3 ML: .5; 2.5 SOLUTION RESPIRATORY (INHALATION) at 16:45

## 2022-01-16 ASSESSMENT — PAIN DESCRIPTION - PAIN TYPE
TYPE: ACUTE PAIN
TYPE: ACUTE PAIN

## 2022-01-16 NOTE — CARE PLAN
The patient is Stable - Low risk of patient condition declining or worsening    Shift Goals  Clinical Goals: Respiratory comfort  Patient Goals: rest  Family Goals: safety    Progress made toward(s) clinical / shift goals:    Problem: Optimal Care of the Stroke Patient  Goal: Optimal acute care for the stroke patient  Outcome: Progressing     Problem: Skin Integrity  Goal: Skin integrity is maintained or improved  Outcome: Progressing     Problem: Fall Risk  Goal: Patient will remain free from falls  Outcome: Progressing     Problem: Pain - Standard  Goal: Alleviation of pain or a reduction in pain to the patient’s comfort goal  Outcome: Progressing       Patient is not progressing towards the following goals:

## 2022-01-16 NOTE — DISCHARGE PLANNING
Agency/Facility Name: [facility]  Outcome: DPA resent referrals for review per SW request.

## 2022-01-16 NOTE — DISCHARGE PLANNING
Anticipated Discharge Disposition: SNF     Action: ETTA received a call from Chandra patient's son requesting updates on referrals. CHELSEYW provided him with info that [facility] declined due to admission hold for covid. Chandra reports that he knows some physician's in [place] that have been sending referrals to [facility] and that they do not have an admission hold. chandra requests referral be resent and he will reach out to [facility] to see if they are able to accept patient.    Barriers to Discharge: accepting SNF, bed availability,     Plan: HCM to f/u on referrals     1115: ETTA received a call back from patient's son chandra. Chandra reports he spoke to physician contacts @ [facility]. Patient will be followed by Dr. Ranjan Sampson ([phone]) and Dr. Corky Harris from the center after dc. Chandra requests referral be resent @ the attn of [facility]  @ [facility] with the note that Dr. Sampson and Dr. Harris are following patient. [facility]'s phone # with admissions is [phone].    CHELSEYW manually faxed referral to [phone] as requested noting that  and Dr. Harris are following and referring patient.

## 2022-01-16 NOTE — PROGRESS NOTES
Hospital Medicine Daily Progress Note    Date of Service  1/16/2022  Chief Complaint  Jose Quintanilla is a 66 y.o. male admitted 1/1/2022 with left sided hemiparesis     Hospital Course  Mr Quintanilla has past medical history which includes hypertension, hyperlipidemia, DVT/PE and was treated with anticoagulation prior to admission.  Presented on 1/1/2022 after a fall with symptoms of left hemiparesis and rightward gaze.  Patient was evaluated and cleared by trauma.  Neuro imaging was concerning for right-sided carotid occlusion.  Patient was evaluated neurology with recommendations for admission to the ICU, permissive hypertension, and follow-up imaging.  Patient's mental status worsened on 1/3/2022 when he was started on hypertonic saline.  His alertness has improved and hypertonic saline was stopped on 1/6/2022.  Neurology recommends restarting anticoagulation on 1/13/2022 with consideration for apixaban.        Interval Problem Update  1/11/2022  Vitals remained stable  Mental status improving  On Augmentin for possible aspiration pneumonia  SLP following.  Fees test in a.m.     Patient's son present at bedside.  Updated current clinic condition and treatment plan.   1/12/2022  Vitals remained stable.  KCl added for hypokalemia  He passed fees test  SLP following.  Resume oral diet as per SLP   assisting with placement     1/13/2022  Vitals remained stable  Labs unremarkable  SLP/PT/OT following   assisting with placement        1/14/2022  Vital stable stable.  He does have diffuse erythema, improved after Benadryl.  Likely from Augmentin.  Switched to Rocephin  Chest x-ray noted with bilateral interstitial opacities.  Labs noted with leukocytosis.  Pro-Isai negative  Patient on antibiotic for possible aspiration pneumonia  SLP following see if he can tolerate oral diet  PT /ot following   assisting placement     1/15/2022  Vitals stable . On room air   Labs reviewed .  Reactive  leucocytosis   Rash improving with steroid and benadryl    Completed course of antibiotics for Pneumona   Updates given and plan of care discussed with patient's son who was at bedside.  1/16/2022  Vitals stable . On room air   Labs reviewed .  Reactive leucocytosis   Rash improving .  Completed course of antibiotics for Pneumona  Noted wheezing . On DUO - Neb and steroid    SLP following for repeat swallow eval .   assisting with placement       Consultants/Specialty  neurology     Code Status  Full Code     Disposition  Patient is not medically cleared for discharge.   Anticipate discharge to to skilled nursing facility.  I have placed the appropriate orders for post-discharge needs.     Review of Systems  Review of Systems      Review of Systems   Constitutional: Negative for fever.   Respiratory: Negative for cough.    Cardiovascular: Negative for chest pain.   Gastrointestinal: Negative for nausea and vomiting.   Genitourinary: Negative for dysuria.            Physical Exam  Temp:  [36.1 °C (96.9 °F)-37 °C (98.6 °F)] 36.4 °C (97.5 °F)  Pulse:  [] 92  Resp:  [18-20] 18  BP: (109-132)/(65-76) 132/76  SpO2:  [95 %-99 %] 99 %     Physical Exam  HENT:      Head: Normocephalic.      Right Ear: Tympanic membrane normal.      Nose: Nose normal.      Mouth/Throat:      Mouth: Mucous membranes are moist.   Cardiovascular:      Rate and Rhythm: Normal rate and regular rhythm.      Pulses: Normal pulses.      Heart sounds: Normal heart sounds. No murmur heard.       Pulmonary:      Effort: Pulmonary effort is normal. No respiratory distress.      Breath sounds: Normal breath sounds.   Abdominal:      General: Abdomen is flat.   Neurological:      Mental Status: He is alert.      Comments: Left sided hemiparesis    Fluids  No intake or output data in the 24 hours ending 01/16/22 1106    Laboratory  Recent Labs     01/14/22  0427 01/15/22  0445 01/16/22  0420   WBC 13.1* 17.5* 16.1*   RBC 4.11* 4.13* 4.05*    HEMOGLOBIN 13.3* 13.3* 13.2*   HEMATOCRIT 39.4* 39.3* 38.7*   MCV 95.9 95.2 95.6   MCH 32.4 32.2 32.6   MCHC 33.8 33.8 34.1   RDW 50.3* 49.7 50.6*   PLATELETCT 308 325 377   MPV 11.6 11.4 11.2     Recent Labs     01/14/22  0427 01/15/22  0445 01/16/22  0420   SODIUM 137 135 137   POTASSIUM 3.8 4.2 3.8   CHLORIDE 104 102 101   CO2 23 23 24   GLUCOSE 119* 139* 122*   BUN 17 19 20   CREATININE 0.69 0.63 0.68   CALCIUM 8.5 8.7 8.8                   Imaging  DX-CHEST-LIMITED (1 VIEW)   Final Result      1.  Patchy bilateral interstitial opacities. Underlying infection is possible.   2.  Stable enlargement of the cardiomediastinal silhouette.      DX-CHEST-LIMITED (1 VIEW)   Final Result         Linear opacities in the left lung base could be due to discoid atelectasis or developing infiltrate such as pneumonia or pneumonitis.      DX-ABDOMEN FOR TUBE PLACEMENT   Final Result      1.  Feeding tube tip overlies the distal stomach.      IR-PICC LINE PLACEMENT W/ GUIDANCE > AGE 5   Final Result                  Ultrasound-guided PICC placement performed by qualified nursing staff as    above.          CT-HEAD W/O   Final Result      Evolving moderate to large right middle cerebral artery territory has increased in size when compared with the previous exam. No evidence of acute intracranial hemorrhage.         DX-ABDOMEN FOR TUBE PLACEMENT   Final Result      Cortrak feeding tube tip projects in the region of the proximal/mid stomach.      US-EXTREMITY VENOUS LOWER BILAT   Final Result      MR-BRAIN-W/O   Final Result         1. Age-related cerebral atrophy. Mild effacement of the right lateral ventricle.      2. Mild periventricular white matter changes consistent with chronic microvascular ischemic gliosis.      3. Large area of acute infarction involving the right frontal and parietal lobes as well as the right-sided basal ganglia. Subacute area of infarction involving the right temporal and lateral occipital lobes.       4. Petechial hemorrhage noted in the right basal ganglia and involving the cortex in the right temporal and lateral occipital lobes.      5. Right internal carotid artery occlusion or high-grade stenosis.            EC-ECHOCARDIOGRAM COMPLETE W/O CONT   Final Result      DX-SHOULDER 2+ LEFT   Final Result      Negative 2 views of left shoulder.      DX-SHOULDER 2+ RIGHT   Final Result      Moderate subacromial spurring      DX-FOOT-COMPLETE 3+ RIGHT   Final Result      No radiographic evidence of acute displaced fracture or subluxation.      DX-FOOT-2- LEFT   Final Result      No radiographic evidence of acute displaced fracture or subluxation.      DX-ELBOW-COMPLETE 3+ RIGHT   Final Result      No radiographic evidence of acute traumatic injury.      Limited due to rotation of the lateral radiograph attempts. Consider repeat lateral radiograph to help exclude effusion      CT-CHEST,ABDOMEN,PELVIS WITH   Final Result      No significant traumatic abnormality in thorax, abdomen and pelvis CT scans.      Hepatic steatosis      Moderate colonic diverticulosis.      CT-LSPINE W/O PLUS RECONS   Final Result      No CT evidence of acute traumatic injury.      Moderate degenerative disc disease with trace degenerative L5/S1 retrolisthesis      CT-TSPINE W/O PLUS RECONS   Final Result      No CT evidence of acute traumatic abnormality.      CT-CSPINE WITHOUT PLUS RECONS   Final Result      No CT evidence of acute cervical spine abnormality.      CT-HEAD W/O   Final Result      Subacute right temporal, temporo-occipital infarction without hemorrhagic transformation      CT-CTA HEAD WITH & W/O-POST PROCESS   Final Result      Right internal carotid artery occlusion with extension to the MCA which lacks contrast opacification      Right A1 and KATIA contrast opacification secondary to cross-filling from the left      Normal left anterior and bilateral posterior circulation      Findings of all of the CTs were discussed with  Dr. Betancourt before completion of this dictation.            CT-CTA NECK WITH & W/O-POST PROCESSING   Final Result      Occluded right internal carotid artery at its origin      Atherosclerosis at origin of bilateral vertebral arteries could obscure stenosis. Arteries are patent without post stenotic dilatation      DX-PELVIS-1 OR 2 VIEWS   Final Result      Negative AP view of the pelvis.      DX-CHEST-LIMITED (1 VIEW)   Final Result      No acute cardiopulmonary disease.      US-ABORTED US PROCEDURE    (Results Pending)        Assessment/Plan  * Stroke due to thrombosis of right carotid artery (HCC)- (present on admission)  Assessment & Plan  Extensive stroke due to thrombosis of right carotid artery  Occurred while on anticoagulation, INR slightly subtherapeutic at 1.8  Cytotoxic edema has improved, appreciate neurology recommendations, low sodium to normalize  High intensity statin  Start anticoagulation 1/13  PT/OT/speech    Allergy  Assessment & Plan  Diffuse erythema  Likely pencil renaldo induced /drug allergy   Improving with steroid and benadryl     Aspiration pneumonia (HCC)  Assessment & Plan  Completed 7 days course pf antibiotics   perocal negative     Sepsis (HCC)  Assessment & Plan  This is Sepsis Not present on admission  SIRS criteria identified on my evaluation include: Tachycardia, with heart rate greater than 90 BPM  Source is lungs  Sepsis protocol initiated  Fluid resuscitation ordered per protocol  IV antibiotics as appropriate for source of sepsis  While organ dysfunction may be noted elsewhere in this problem list or in the chart, degree of organ dysfunction does not meet CMS criteria for severe sepsis    Resolved    Depression- (present on admission)  Assessment & Plan  Outpatient Wellbutrin and Lexapro    Mild intermittent asthma without complication- (present on admission)  Assessment & Plan  No acute exacerbation  On duo neb PRN    Chronic anticoagulation- (present on admission)  Assessment &  Plan  On eliquis    Dyslipidemia- (present on admission)  Assessment & Plan  Atorvastatin    Primary hypertension- (present on admission)  Assessment & Plan  Goal normotensive  Continue monitoring, consider restarting enteral blood pressure pressure agent if needed.         VTE prophylaxis: SCDs/TEDs and therapeutic anticoagulation with eliquis    I have performed a physical exam and reviewed and updated ROS and Plan today (1/16/2022). In review of yesterday's note (1/15/2022), there are no changes except as documented above.

## 2022-01-16 NOTE — PROGRESS NOTES
"Placed request to RT for PRN nebulizer treatment for wheezing per MAR at 2100.  After no response from RT, follow up call placed at 2300.  RT stated that they would assess as soon as available.  Another follow up text sent at 0530 asking if the patient was assessed by RT and response was \"no\".  Additional query documented in [employer].   "

## 2022-01-16 NOTE — PROGRESS NOTES
Assumed care of pt at 1900. Pt alert and oriented x3, disoriented to time.  Difficult to assess due to expressive aphasia.  Nods and gestures appropriately.  Denies pain or discomfort.  Bed low & locked, alarm on.  Reviewed plan for evening.  Hourly rounding continues.

## 2022-01-17 PROBLEM — J45.901 MODERATE ASTHMA WITH ACUTE EXACERBATION: Status: ACTIVE | Noted: 2022-01-17

## 2022-01-17 PROBLEM — J45.20 MILD INTERMITTENT ASTHMA WITHOUT COMPLICATION: Status: RESOLVED | Noted: 2022-01-02 | Resolved: 2022-01-17

## 2022-01-17 LAB — PREALB SERPL-MCNC: 19.9 MG/DL (ref 18–38)

## 2022-01-17 PROCEDURE — 770001 HCHG ROOM/CARE - MED/SURG/GYN PRIV*

## 2022-01-17 PROCEDURE — 700102 HCHG RX REV CODE 250 W/ 637 OVERRIDE(OP): Performed by: HOSPITALIST

## 2022-01-17 PROCEDURE — 99233 SBSQ HOSP IP/OBS HIGH 50: CPT | Performed by: STUDENT IN AN ORGANIZED HEALTH CARE EDUCATION/TRAINING PROGRAM

## 2022-01-17 PROCEDURE — 94640 AIRWAY INHALATION TREATMENT: CPT

## 2022-01-17 PROCEDURE — 84134 ASSAY OF PREALBUMIN: CPT

## 2022-01-17 PROCEDURE — A9270 NON-COVERED ITEM OR SERVICE: HCPCS | Performed by: HOSPITALIST

## 2022-01-17 PROCEDURE — 700101 HCHG RX REV CODE 250: Performed by: STUDENT IN AN ORGANIZED HEALTH CARE EDUCATION/TRAINING PROGRAM

## 2022-01-17 PROCEDURE — A9270 NON-COVERED ITEM OR SERVICE: HCPCS | Performed by: STUDENT IN AN ORGANIZED HEALTH CARE EDUCATION/TRAINING PROGRAM

## 2022-01-17 PROCEDURE — 700111 HCHG RX REV CODE 636 W/ 250 OVERRIDE (IP): Performed by: STUDENT IN AN ORGANIZED HEALTH CARE EDUCATION/TRAINING PROGRAM

## 2022-01-17 PROCEDURE — 700102 HCHG RX REV CODE 250 W/ 637 OVERRIDE(OP): Performed by: STUDENT IN AN ORGANIZED HEALTH CARE EDUCATION/TRAINING PROGRAM

## 2022-01-17 PROCEDURE — 92612 ENDOSCOPY SWALLOW (FEES) VID: CPT

## 2022-01-17 RX ADMIN — FAMOTIDINE 10 MG: 20 TABLET ORAL at 04:57

## 2022-01-17 RX ADMIN — BUDESONIDE AND FORMOTEROL FUMARATE DIHYDRATE 2 PUFF: 80; 4.5 AEROSOL RESPIRATORY (INHALATION) at 19:10

## 2022-01-17 RX ADMIN — FAMOTIDINE 10 MG: 20 TABLET ORAL at 16:49

## 2022-01-17 RX ADMIN — DOCUSATE SODIUM 50 MG AND SENNOSIDES 8.6 MG 2 TABLET: 8.6; 5 TABLET, FILM COATED ORAL at 16:48

## 2022-01-17 RX ADMIN — PREDNISONE 40 MG: 20 TABLET ORAL at 04:56

## 2022-01-17 RX ADMIN — APIXABAN 5 MG: 5 TABLET, FILM COATED ORAL at 04:56

## 2022-01-17 RX ADMIN — IPRATROPIUM BROMIDE AND ALBUTEROL SULFATE 3 ML: .5; 2.5 SOLUTION RESPIRATORY (INHALATION) at 02:33

## 2022-01-17 RX ADMIN — IPRATROPIUM BROMIDE AND ALBUTEROL SULFATE 3 ML: .5; 2.5 SOLUTION RESPIRATORY (INHALATION) at 15:45

## 2022-01-17 RX ADMIN — ACETAMINOPHEN 650 MG: 325 TABLET, FILM COATED ORAL at 02:49

## 2022-01-17 RX ADMIN — BUPROPION HYDROCHLORIDE 100 MG: 100 TABLET, FILM COATED ORAL at 04:56

## 2022-01-17 RX ADMIN — APIXABAN 5 MG: 5 TABLET, FILM COATED ORAL at 16:49

## 2022-01-17 RX ADMIN — IPRATROPIUM BROMIDE AND ALBUTEROL SULFATE 3 ML: .5; 2.5 SOLUTION RESPIRATORY (INHALATION) at 08:00

## 2022-01-17 RX ADMIN — IPRATROPIUM BROMIDE AND ALBUTEROL SULFATE 3 ML: .5; 2.5 SOLUTION RESPIRATORY (INHALATION) at 23:36

## 2022-01-17 RX ADMIN — HYDROCORTISONE: 10 CREAM TOPICAL at 16:49

## 2022-01-17 RX ADMIN — IPRATROPIUM BROMIDE AND ALBUTEROL SULFATE 3 ML: .5; 2.5 SOLUTION RESPIRATORY (INHALATION) at 11:08

## 2022-01-17 RX ADMIN — ATORVASTATIN CALCIUM 80 MG: 80 TABLET, FILM COATED ORAL at 16:48

## 2022-01-17 RX ADMIN — BUDESONIDE AND FORMOTEROL FUMARATE DIHYDRATE 2 PUFF: 80; 4.5 AEROSOL RESPIRATORY (INHALATION) at 08:00

## 2022-01-17 RX ADMIN — HYDROCORTISONE: 10 CREAM TOPICAL at 04:57

## 2022-01-17 RX ADMIN — BUPROPION HYDROCHLORIDE 100 MG: 100 TABLET, FILM COATED ORAL at 16:48

## 2022-01-17 RX ADMIN — IPRATROPIUM BROMIDE AND ALBUTEROL SULFATE 3 ML: .5; 2.5 SOLUTION RESPIRATORY (INHALATION) at 19:09

## 2022-01-17 ASSESSMENT — PAIN DESCRIPTION - PAIN TYPE: TYPE: ACUTE PAIN

## 2022-01-17 NOTE — DIETARY
Nutrition support weekly update:  Day 16 of admit.  Jose Quintanilla is a 66 y.o. male with admitting DX of stroke d/t thrombosis of right carotid artery.      Tube feeding initiated on 1/2. Current TF via gastric cortrak is Replete with Fiber at 85 ml/hr to provide 2040 kcals, 131 gm protein, and 1693 ml free water. Current free water order is 150 ml TID, provides 2143 ml free water (TF + water flushes).    Assessment:  Weight 1/2  Re-estimate of nutritional needs as indicated 121.9 kg via bed scale. No updated weight. Pt is +9.3L fluids per I/O.     Evaluation:   1. Tube feeding is running at goal.  2. Pt is pending SNF acceptance in [place] and possible transport.  3. MD notes pt is on RA, SLP planning for repeat FEES today.   4. Skin: New wound of ear, wound team not seen since 1/3. Generalized edema noted.  5. Labs: glucose 122  6. Meds: lipitor, pepcid, prednisone, colace, zofran prn, bowel protocol  7. Last BM: 1/16  8. Current feeding remains appropriate.      Malnutrition risk: Malnutrition criteria not met.    Recommendations/Plan:  1. Continue TF formula and rate.  2. Fluids per MD.    RD following.

## 2022-01-17 NOTE — ASSESSMENT & PLAN NOTE
On room air   He is allergic to cat.  On Advair at home  Duoneb Q4H  Solumedrol 40 mg daily X 5 days   on symbicort

## 2022-01-17 NOTE — CARE PLAN
The patient is Stable - Low risk of patient condition declining or worsening    Shift Goals  Clinical Goals: safety; skin integrity  Patient Goals: Eat  Family Goals: OMAYRA    Progress made toward(s) clinical / shift goals:  Pt is resting comfortably in bed with fall precautions in place. SLP saw patient nd will be upgrading diet.    Patient is not progressing towards the following goals:    N/a

## 2022-01-17 NOTE — CARE PLAN
The patient is Watcher - Medium risk of patient condition declining or worsening    Shift Goals  Clinical Goals: maintain skin integrity  Patient Goals: go fishing  Family Goals: OMAYRA    Progress made toward(s) clinical / shift goals:    Problem: Optimal Care of the Stroke Patient  Goal: Optimal acute care for the stroke patient  Outcome: Progressing     Problem: Psychosocial - Patient Condition  Goal: Patient's ability to verbalize feelings about condition will improve  Outcome: Progressing     Problem: Neuro Status  Goal: Neuro status will remain stable or improve  Outcome: Progressing     Problem: Skin Integrity  Goal: Skin integrity is maintained or improved  Outcome: Progressing       Patient is not progressing towards the following goals:

## 2022-01-17 NOTE — PROGRESS NOTES
[facility] Medicine Daily Progress Note    Date of Service  1/17/2022  Chief Complaint  Jose Quintanilla is a 66 y.o. male admitted 1/1/2022 with left sided hemiparesis     Hospital Course  Mr Quintanilla has past medical history which includes hypertension, hyperlipidemia, DVT/PE and was treated with anticoagulation prior to admission.  Presented on 1/1/2022 after a fall with symptoms of left hemiparesis and rightward gaze.  Patient was evaluated and cleared by trauma.  Neuro imaging was concerning for right-sided carotid occlusion.Mri noted with  Large area of acute infarction involving the right frontal and parietal lobes as well as the right-sided basal ganglia. Subacute area of infarction involving the right temporal and lateral occipital lobes.  Patient was evaluated neurology with recommendations for admission to the ICU, permissive hypertension, and follow-up imaging.  started on anticoagulation on 1/13/2022 .Chest x-ray noted with bilateral interstitial opacities. Started treatment for acute asthma exacerbation. SLP following.           Interval Problem Update  1/11/2022  Vitals remained stable  Mental status improving  On Augmentin for possible aspiration pneumonia  SLP following.  Fees test in a.m.     Patient's son present at bedside.  Updated current clinic condition and treatment plan.   1/12/2022  Vitals remained stable.  KCl added for hypokalemia  He passed fees test  SLP following.  Resume oral diet as per SLP   assisting with placement     1/13/2022  Vitals remained stable  Labs unremarkable  SLP/PT/OT following   assisting with placement        1/14/2022  Vital stable stable.  He does have diffuse erythema, improved after Benadryl.  Likely from Augmentin.  Switched to Rocephin  Chest x-ray noted with bilateral interstitial opacities.  Labs noted with leukocytosis.  Pro-Isai negative  Patient on antibiotic for possible aspiration pneumonia  SLP following see if he can tolerate oral  diet  PT /ot following   assisting placement     1/15/2022  Vitals stable . On room air   Labs reviewed .  Reactive leucocytosis   Rash improving with steroid and benadryl    Completed course of antibiotics for Pneumona   Updates given and plan of care discussed with patient's son who was at bedside.  1/16/2022  Vitals stable . On room air   Labs reviewed .  Reactive leucocytosis   Rash improving .  Completed course of antibiotics for Pneumona  Noted wheezing . On DUO - Neb and steroid    SLP following for repeat swallow eval .   assisting with placement     1/17/2022  Vitals remained stable.  On room air  At rest significantly improved.  Labs reviewed.  Unremarkable  He is started on DuoNeb and is treated for acute asthma exacerbation  SLP  planning for fees test today .   working for placement in [place].      Consultants/Specialty  neurology     Code Status  Full Code     Disposition  Patient is not medically cleared for discharge.   Anticipate discharge to to skilled nursing facility.  I have placed the appropriate orders for post-discharge needs.     Review of Systems  Review of Systems      Review of Systems   Constitutional: Negative for fever.   Respiratory: Negative for cough.    Cardiovascular: Negative for chest pain.   Gastrointestinal: Negative for nausea and vomiting.   Genitourinary: Negative for dysuria.            Physical Exam  Temp:  [36.1 °C (96.9 °F)-37 °C (98.6 °F)] 36.4 °C (97.5 °F)  Pulse:  [] 92  Resp:  [18-20] 18  BP: (109-132)/(65-76) 132/76  SpO2:  [95 %-99 %] 99 %     Physical Exam  HENT:      Head: Normocephalic.      Right Ear: Tympanic membrane normal.      Nose: Nose normal.      Mouth/Throat:      Mouth: Mucous membranes are moist.   Cardiovascular:      Rate and Rhythm: Normal rate and regular rhythm.      Pulses: Normal pulses.      Heart sounds: Normal heart sounds. No murmur heard.       Pulmonary:      Effort: Pulmonary effort is  normal. No respiratory distress.      Breath sounds: Normal breath sounds.   Abdominal:      General: Abdomen is flat.   Neurological:      Mental Status: He is alert.      Comments: Left sided hemiparesis    Fluids    Intake/Output Summary (Last 24 hours) at 1/17/2022 1040  Last data filed at 1/17/2022 0416  Gross per 24 hour   Intake 150 ml   Output 500 ml   Net -350 ml       Laboratory  Recent Labs     01/15/22  0445 01/16/22  0420   WBC 17.5* 16.1*   RBC 4.13* 4.05*   HEMOGLOBIN 13.3* 13.2*   HEMATOCRIT 39.3* 38.7*   MCV 95.2 95.6   MCH 32.2 32.6   MCHC 33.8 34.1   RDW 49.7 50.6*   PLATELETCT 325 377   MPV 11.4 11.2     Recent Labs     01/15/22  0445 01/16/22  0420   SODIUM 135 137   POTASSIUM 4.2 3.8   CHLORIDE 102 101   CO2 23 24   GLUCOSE 139* 122*   BUN 19 20   CREATININE 0.63 0.68   CALCIUM 8.7 8.8                   Imaging  DX-CHEST-LIMITED (1 VIEW)   Final Result      1.  Patchy bilateral interstitial opacities. Underlying infection is possible.   2.  Stable enlargement of the cardiomediastinal silhouette.      DX-CHEST-LIMITED (1 VIEW)   Final Result         Linear opacities in the left lung base could be due to discoid atelectasis or developing infiltrate such as pneumonia or pneumonitis.      DX-ABDOMEN FOR TUBE PLACEMENT   Final Result      1.  Feeding tube tip overlies the distal stomach.      IR-PICC LINE PLACEMENT W/ GUIDANCE > AGE 5   Final Result                  Ultrasound-guided PICC placement performed by qualified nursing staff as    above.          CT-HEAD W/O   Final Result      Evolving moderate to large right middle cerebral artery territory has increased in size when compared with the previous exam. No evidence of acute intracranial hemorrhage.         DX-ABDOMEN FOR TUBE PLACEMENT   Final Result      Cortrak feeding tube tip projects in the region of the proximal/mid stomach.      US-EXTREMITY VENOUS LOWER BILAT   Final Result      MR-BRAIN-W/O   Final Result         1. Age-related  cerebral atrophy. Mild effacement of the right lateral ventricle.      2. Mild periventricular white matter changes consistent with chronic microvascular ischemic gliosis.      3. Large area of acute infarction involving the right frontal and parietal lobes as well as the right-sided basal ganglia. Subacute area of infarction involving the right temporal and lateral occipital lobes.      4. Petechial hemorrhage noted in the right basal ganglia and involving the cortex in the right temporal and lateral occipital lobes.      5. Right internal carotid artery occlusion or high-grade stenosis.            EC-ECHOCARDIOGRAM COMPLETE W/O CONT   Final Result      DX-SHOULDER 2+ LEFT   Final Result      Negative 2 views of left shoulder.      DX-SHOULDER 2+ RIGHT   Final Result      Moderate subacromial spurring      DX-FOOT-COMPLETE 3+ RIGHT   Final Result      No radiographic evidence of acute displaced fracture or subluxation.      DX-FOOT-2- LEFT   Final Result      No radiographic evidence of acute displaced fracture or subluxation.      DX-ELBOW-COMPLETE 3+ RIGHT   Final Result      No radiographic evidence of acute traumatic injury.      Limited due to rotation of the lateral radiograph attempts. Consider repeat lateral radiograph to help exclude effusion      CT-CHEST,ABDOMEN,PELVIS WITH   Final Result      No significant traumatic abnormality in thorax, abdomen and pelvis CT scans.      Hepatic steatosis      Moderate colonic diverticulosis.      CT-LSPINE W/O PLUS RECONS   Final Result      No CT evidence of acute traumatic injury.      Moderate degenerative disc disease with trace degenerative L5/S1 retrolisthesis      CT-TSPINE W/O PLUS RECONS   Final Result      No CT evidence of acute traumatic abnormality.      CT-CSPINE WITHOUT PLUS RECONS   Final Result      No CT evidence of acute cervical spine abnormality.      CT-HEAD W/O   Final Result      Subacute right temporal, temporo-occipital infarction without  hemorrhagic transformation      CT-CTA HEAD WITH & W/O-POST PROCESS   Final Result      Right internal carotid artery occlusion with extension to the MCA which lacks contrast opacification      Right A1 and KATIA contrast opacification secondary to cross-filling from the left      Normal left anterior and bilateral posterior circulation      Findings of all of the CTs were discussed with Dr. Betancourt before completion of this dictation.            CT-CTA NECK WITH & W/O-POST PROCESSING   Final Result      Occluded right internal carotid artery at its origin      Atherosclerosis at origin of bilateral vertebral arteries could obscure stenosis. Arteries are patent without post stenotic dilatation      DX-PELVIS-1 OR 2 VIEWS   Final Result      Negative AP view of the pelvis.      DX-CHEST-LIMITED (1 VIEW)   Final Result      No acute cardiopulmonary disease.      US-ABORTED US PROCEDURE    (Results Pending)        Assessment/Plan  * Stroke due to thrombosis of right carotid artery (HCC)- (present on admission)  Assessment & Plan  Extensive stroke due to thrombosis of right carotid artery  Occurred while on anticoagulation, INR slightly subtherapeutic at 1.8  Cytotoxic edema has improved, appreciate neurology recommendations, low sodium to normalize  High intensity statin  Start anticoagulation 1/13  PT/OT/speech    Moderate asthma with acute exacerbation  Assessment & Plan  On room air   He is allergic to cat.  On Advair at home  Duoneb Q4H  Solumedrol 40 mg daily X 5 days   on symbicort    Allergy  Assessment & Plan  Diffuse erythema  Likely pencil renaldo induced /drug allergy   Improving with steroid and benadryl     Aspiration pneumonia (HCC)  Assessment & Plan  Completed 7 days course pf antibiotics   perocal negative     Sepsis (HCC)  Assessment & Plan  This is Sepsis Not present on admission  SIRS criteria identified on my evaluation include: Tachycardia, with heart rate greater than 90 BPM  Source is lungs  Sepsis  protocol initiated  Fluid resuscitation ordered per protocol  IV antibiotics as appropriate for source of sepsis  While organ dysfunction may be noted elsewhere in this problem list or in the chart, degree of organ dysfunction does not meet CMS criteria for severe sepsis    Resolved    Depression- (present on admission)  Assessment & Plan  Outpatient Wellbutrin and Lexapro    Chronic anticoagulation- (present on admission)  Assessment & Plan  On eliquis    Dyslipidemia- (present on admission)  Assessment & Plan  Atorvastatin    Primary hypertension- (present on admission)  Assessment & Plan  Goal normotensive  Continue monitoring, consider restarting enteral blood pressure pressure agent if needed.         VTE prophylaxis: SCDs/TEDs and enoxaparin ppx    I have performed a physical exam and reviewed and updated ROS and Plan today (1/17/2022). In review of yesterday's note (1/16/2022), there are no changes except as documented above.

## 2022-01-17 NOTE — CONSULTS
Reason for PC Consult: Advance Care Planning    Consulted by: Dr. Castillo    Assessment:  General: Mr Quintanilla is a 67 yo male with past medical history of hypertension, hyperlipidemia, DVT/PE and was treated with anticoagulation prior to admission.  Presented on 1/1/2022 after a fall with symptoms of left hemiparesis and rightward gaze.  Neuro imaging was concerning for right-sided carotid occlusion.  Patient was evaluated neurology with recommendations for admission to the ICU, permissive hypertension, and follow-up imaging.  Patient's mental status worsened on 1/3/2022 when he was started on hypertonic saline.  His alertness has improved and hypertonic saline was stopped on 1/6/2022.  Neurology recommends restarting anticoagulation on 1/13/2022 with consideration for apixaban. Also noted wheezing with Aspiration pna and treated with antibiotics. Fees completed and will follow up with repeat FEES.     Social: Pt lives on his own in a apartment, retired form working for the [employer] in [place] and [facility]. He has two son's Thien Artis. Chandra is working on getting his father transferred down to [facility].    Consults: Critical care, Neurolgy    Dyspnea: No-    Last BM: 01/15/22-    Pain: Yes- Neuropathy in right leg  Depression: Mood appropriate for situation-    Dementia: No;       Spiritual:  Is Druze or spirituality important for coping with this illness? No-    Has a  or spiritual provider visit been requested? No    Palliative Performance Scale: 40 %    Advance Directive: Advance Directive, GABBIE Garcia filled out an Advance Directive at this encounter naming Chandra as her DPOA-HC. Under statements of desires patient chose #2, #3, and #5, meaning that he would not want life-prolonging treatments to be provided if there was no reasonable hope of recovery, long-term survival, or if the patient was in an irreversible coma. Patient would also want DPOA-HC to consider quality of  life, relief of suffering, and preservation of function.    DPOA: Yes- Chandra Quintanilla  POLST: No-      Code Status: DNR-      Outcome:  Introduced self and role of Palliative care. Assessed pt's understanding of pt's current medical status, overall kyle picture, and options for future care. Met with pt's son and DIL out side of the romm,, Chandra was reasonable and understood the care that his dad needed and was working on helping getting him transferred to a SNF in [place] so his dad could be near them. He understood that his father will have a long peck but is hopefull. Met with pt and although pt has a hard time expressing himself his son can manage to understand what he is saying.     Explored pt's values, beliefs and preferences in order to identify GOC. They would like to go to a SNF closer to where Chandra lives. ORLIN WHITTEN updated and sending the referral. Pt was interested in filling out a Advance directive and selected his son as his DPOA and #2,#3 and #5. He also was clear about his wishes about CPR and not to be resuscitated and or intubated. Stated that he wants comfort if his heart were to stop. Verbalized understanding with his son's support, and he was able to initial the AD and sign the POLST for DNR, selective treatment and artifical nutrition only if it adds to his improvement. Dr. Castillo made aware of the code status change as well as the RN.     Active listening, reflection, reminiscing, validation and normalization and empathic support provided throughout the encounter. All questions answered and PC contact information provided.     Recommendations: I do not recommend an ethics or hospice consult at this time because pt wants to continue to treatment for his stroke with outpt care at a SNF. .    Updated: MD and RN    Plan: Full code changed to DNR/DNI and POLST completed and signed and placed over head of bed to be d/c with.     Thank you for allowing Palliative Care to participate in this patient's care.  Please feel free to call [phone] with any questions or concerns.

## 2022-01-17 NOTE — PROGRESS NOTES
Assumed care of pt at 1900. Pt alert and oriented, though difficult to assess due to expressive aphasia.  Wheezing improved with scheduled nebulizer treatments.  Skin improved with hydrocortisone cream.  Denies pain or discomfort.  Bed low & locked, alarm on.  Reviewed plan for evening.  Hourly rounding continues.

## 2022-01-17 NOTE — DISCHARGE PLANNING
Received Choice form at 1150  Agency/Facility Name: [facility]  Referral sent per Choice form @ 1150      CM informed

## 2022-01-17 NOTE — DISCHARGE PLANNING
Anticipated Discharge Disposition:   SNF    Action:   Chart review complete    Discussed patient during rounds. Pending FEES. Per [place] patient has been declined as there are no beds with [facility].     Spoke with patients justin Artis at [phone] and patient stated he would like RN CM to follow up with other choices. Choices have been sent to [facility].    ORLIN CM called [facility] @ [phone].  Patient has also been declined from [facility].     RN CM will continue to follow.     Barriers to Discharge:   SNF acceptance  Possible transportation     Plan:   HCM will continue to follow and assist with discharge needs.

## 2022-01-17 NOTE — CARE PLAN
The patient is Stable - Low risk of patient condition declining or worsening    Shift Goals  Clinical Goals: Improve respiratory status  Patient Goals: Comfort  Family Goals: safety    Progress made toward(s) clinical / shift goals:    Problem: Neuro Status  Goal: Neuro status will remain stable or improve  Outcome: Progressing  Note: Pt's neuro status remains stable.      Problem: Respiratory - Stroke Patient  Goal: Patient will achieve/maintain optimum respiratory rate/effort  Outcome: Progressing  Note: Wheezing improving with nebulizer treatments.

## 2022-01-17 NOTE — CARE PLAN
Problem: Bronchoconstriction  Goal: Improve in air movement and diminished wheezing  Description: Target End Date:  2 to 3 days    1.  Implement inhaled treatments  2.  Evaluate and manage medication effects  1/17/2022 0425 by Darion Parrish, RRT  Outcome: Progressing  Flowsheets (Taken 1/17/2022 0425)  Bronchodilator Goals/Outcome: Diminished Wheezing and Volume of Air Movement Increased  Bronchodilator Indications: History / Diagnosis  1/17/2022 0411 by Darion Parrish, RRT  Outcome: Progressing

## 2022-01-18 LAB
ANION GAP SERPL CALC-SCNC: 10 MMOL/L (ref 7–16)
ANISOCYTOSIS BLD QL SMEAR: ABNORMAL
BASOPHILS # BLD AUTO: 1.7 % (ref 0–1.8)
BASOPHILS # BLD: 0.29 K/UL (ref 0–0.12)
BUN SERPL-MCNC: 18 MG/DL (ref 8–22)
CALCIUM SERPL-MCNC: 8.7 MG/DL (ref 8.5–10.5)
CHLORIDE SERPL-SCNC: 103 MMOL/L (ref 96–112)
CO2 SERPL-SCNC: 24 MMOL/L (ref 20–33)
CREAT SERPL-MCNC: 0.62 MG/DL (ref 0.5–1.4)
EOSINOPHIL # BLD AUTO: 0.88 K/UL (ref 0–0.51)
EOSINOPHIL NFR BLD: 5.2 % (ref 0–6.9)
ERYTHROCYTE [DISTWIDTH] IN BLOOD BY AUTOMATED COUNT: 51.7 FL (ref 35.9–50)
GLUCOSE SERPL-MCNC: 97 MG/DL (ref 65–99)
HCT VFR BLD AUTO: 37.6 % (ref 42–52)
HGB BLD-MCNC: 12.8 G/DL (ref 14–18)
LYMPHOCYTES # BLD AUTO: 2.64 K/UL (ref 1–4.8)
LYMPHOCYTES NFR BLD: 15.5 % (ref 22–41)
MACROCYTES BLD QL SMEAR: ABNORMAL
MANUAL DIFF BLD: NORMAL
MCH RBC QN AUTO: 32.5 PG (ref 27–33)
MCHC RBC AUTO-ENTMCNC: 34 G/DL (ref 33.7–35.3)
MCV RBC AUTO: 95.4 FL (ref 81.4–97.8)
MICROCYTES BLD QL SMEAR: ABNORMAL
MONOCYTES # BLD AUTO: 1.02 K/UL (ref 0–0.85)
MONOCYTES NFR BLD AUTO: 6 % (ref 0–13.4)
MORPHOLOGY BLD-IMP: NORMAL
MYELOCYTES NFR BLD MANUAL: 0.9 %
NEUTROPHILS # BLD AUTO: 12.02 K/UL (ref 1.82–7.42)
NEUTROPHILS NFR BLD: 70.7 % (ref 44–72)
NRBC # BLD AUTO: 0 K/UL
NRBC BLD-RTO: 0 /100 WBC
OVALOCYTES BLD QL SMEAR: NORMAL
PLATELET # BLD AUTO: 255 K/UL (ref 164–446)
PLATELET BLD QL SMEAR: NORMAL
PMV BLD AUTO: 11 FL (ref 9–12.9)
POIKILOCYTOSIS BLD QL SMEAR: NORMAL
POLYCHROMASIA BLD QL SMEAR: NORMAL
POTASSIUM SERPL-SCNC: 4 MMOL/L (ref 3.6–5.5)
RBC # BLD AUTO: 3.94 M/UL (ref 4.7–6.1)
RBC BLD AUTO: PRESENT
SODIUM SERPL-SCNC: 137 MMOL/L (ref 135–145)
WBC # BLD AUTO: 17 K/UL (ref 4.8–10.8)

## 2022-01-18 PROCEDURE — 97112 NEUROMUSCULAR REEDUCATION: CPT | Mod: CO

## 2022-01-18 PROCEDURE — 700101 HCHG RX REV CODE 250: Performed by: STUDENT IN AN ORGANIZED HEALTH CARE EDUCATION/TRAINING PROGRAM

## 2022-01-18 PROCEDURE — 80048 BASIC METABOLIC PNL TOTAL CA: CPT

## 2022-01-18 PROCEDURE — 97535 SELF CARE MNGMENT TRAINING: CPT | Mod: CO

## 2022-01-18 PROCEDURE — A9270 NON-COVERED ITEM OR SERVICE: HCPCS | Performed by: HOSPITALIST

## 2022-01-18 PROCEDURE — 770001 HCHG ROOM/CARE - MED/SURG/GYN PRIV*

## 2022-01-18 PROCEDURE — 700102 HCHG RX REV CODE 250 W/ 637 OVERRIDE(OP): Performed by: HOSPITALIST

## 2022-01-18 PROCEDURE — A9270 NON-COVERED ITEM OR SERVICE: HCPCS | Performed by: STUDENT IN AN ORGANIZED HEALTH CARE EDUCATION/TRAINING PROGRAM

## 2022-01-18 PROCEDURE — 700102 HCHG RX REV CODE 250 W/ 637 OVERRIDE(OP): Performed by: STUDENT IN AN ORGANIZED HEALTH CARE EDUCATION/TRAINING PROGRAM

## 2022-01-18 PROCEDURE — 94640 AIRWAY INHALATION TREATMENT: CPT

## 2022-01-18 PROCEDURE — 85027 COMPLETE CBC AUTOMATED: CPT

## 2022-01-18 PROCEDURE — 700111 HCHG RX REV CODE 636 W/ 250 OVERRIDE (IP): Performed by: STUDENT IN AN ORGANIZED HEALTH CARE EDUCATION/TRAINING PROGRAM

## 2022-01-18 PROCEDURE — 97112 NEUROMUSCULAR REEDUCATION: CPT | Mod: CQ

## 2022-01-18 PROCEDURE — 85007 BL SMEAR W/DIFF WBC COUNT: CPT

## 2022-01-18 PROCEDURE — 97530 THERAPEUTIC ACTIVITIES: CPT | Mod: CQ

## 2022-01-18 PROCEDURE — 99232 SBSQ HOSP IP/OBS MODERATE 35: CPT | Performed by: STUDENT IN AN ORGANIZED HEALTH CARE EDUCATION/TRAINING PROGRAM

## 2022-01-18 RX ORDER — BISACODYL 10 MG
10 SUPPOSITORY, RECTAL RECTAL
Status: DISCONTINUED | OUTPATIENT
Start: 2022-01-18 | End: 2022-01-20

## 2022-01-18 RX ORDER — CLONIDINE HYDROCHLORIDE 0.1 MG/1
0.1 TABLET ORAL EVERY 6 HOURS PRN
Status: DISCONTINUED | OUTPATIENT
Start: 2022-01-18 | End: 2022-01-19

## 2022-01-18 RX ORDER — IPRATROPIUM BROMIDE AND ALBUTEROL SULFATE 2.5; .5 MG/3ML; MG/3ML
3 SOLUTION RESPIRATORY (INHALATION)
Status: DISCONTINUED | OUTPATIENT
Start: 2022-01-18 | End: 2022-01-19

## 2022-01-18 RX ORDER — AMOXICILLIN 250 MG
2 CAPSULE ORAL 2 TIMES DAILY PRN
Status: DISCONTINUED | OUTPATIENT
Start: 2022-01-18 | End: 2022-01-20

## 2022-01-18 RX ORDER — POLYETHYLENE GLYCOL 3350 17 G/17G
1 POWDER, FOR SOLUTION ORAL
Status: DISCONTINUED | OUTPATIENT
Start: 2022-01-18 | End: 2022-01-20

## 2022-01-18 RX ADMIN — PREDNISONE 40 MG: 20 TABLET ORAL at 04:57

## 2022-01-18 RX ADMIN — BUDESONIDE AND FORMOTEROL FUMARATE DIHYDRATE 2 PUFF: 80; 4.5 AEROSOL RESPIRATORY (INHALATION) at 19:25

## 2022-01-18 RX ADMIN — FAMOTIDINE 10 MG: 20 TABLET ORAL at 04:57

## 2022-01-18 RX ADMIN — BUDESONIDE AND FORMOTEROL FUMARATE DIHYDRATE 2 PUFF: 80; 4.5 AEROSOL RESPIRATORY (INHALATION) at 07:13

## 2022-01-18 RX ADMIN — BUPROPION HYDROCHLORIDE 100 MG: 100 TABLET, FILM COATED ORAL at 18:34

## 2022-01-18 RX ADMIN — LOSARTAN POTASSIUM 25 MG: 50 TABLET, FILM COATED ORAL at 04:56

## 2022-01-18 RX ADMIN — HYDROCORTISONE: 10 CREAM TOPICAL at 04:57

## 2022-01-18 RX ADMIN — HYDROCORTISONE: 10 CREAM TOPICAL at 18:34

## 2022-01-18 RX ADMIN — FAMOTIDINE 10 MG: 20 TABLET ORAL at 18:34

## 2022-01-18 RX ADMIN — APIXABAN 5 MG: 5 TABLET, FILM COATED ORAL at 18:34

## 2022-01-18 RX ADMIN — IPRATROPIUM BROMIDE AND ALBUTEROL SULFATE 3 ML: .5; 2.5 SOLUTION RESPIRATORY (INHALATION) at 07:13

## 2022-01-18 RX ADMIN — ATORVASTATIN CALCIUM 80 MG: 80 TABLET, FILM COATED ORAL at 18:35

## 2022-01-18 RX ADMIN — IPRATROPIUM BROMIDE AND ALBUTEROL SULFATE 3 ML: .5; 2.5 SOLUTION RESPIRATORY (INHALATION) at 11:00

## 2022-01-18 RX ADMIN — IPRATROPIUM BROMIDE AND ALBUTEROL SULFATE 3 ML: .5; 2.5 SOLUTION RESPIRATORY (INHALATION) at 16:18

## 2022-01-18 RX ADMIN — APIXABAN 5 MG: 5 TABLET, FILM COATED ORAL at 04:57

## 2022-01-18 RX ADMIN — BUPROPION HYDROCHLORIDE 100 MG: 100 TABLET, FILM COATED ORAL at 04:57

## 2022-01-18 RX ADMIN — IPRATROPIUM BROMIDE AND ALBUTEROL SULFATE 3 ML: .5; 2.5 SOLUTION RESPIRATORY (INHALATION) at 19:24

## 2022-01-18 ASSESSMENT — COGNITIVE AND FUNCTIONAL STATUS - GENERAL
PERSONAL GROOMING: A LOT
DAILY ACTIVITIY SCORE: 8
TURNING FROM BACK TO SIDE WHILE IN FLAT BAD: UNABLE
WALKING IN HOSPITAL ROOM: TOTAL
MOVING TO AND FROM BED TO CHAIR: UNABLE
CLIMB 3 TO 5 STEPS WITH RAILING: TOTAL
HELP NEEDED FOR BATHING: TOTAL
SUGGESTED CMS G CODE MODIFIER MOBILITY: CN
MOBILITY SCORE: 6
SUGGESTED CMS G CODE MODIFIER DAILY ACTIVITY: CM
EATING MEALS: TOTAL
TOILETING: TOTAL
DRESSING REGULAR UPPER BODY CLOTHING: A LOT
STANDING UP FROM CHAIR USING ARMS: TOTAL
MOVING FROM LYING ON BACK TO SITTING ON SIDE OF FLAT BED: UNABLE
DRESSING REGULAR LOWER BODY CLOTHING: TOTAL

## 2022-01-18 ASSESSMENT — ENCOUNTER SYMPTOMS
SHORTNESS OF BREATH: 0
ABDOMINAL PAIN: 0
NAUSEA: 0
FEVER: 1
INSOMNIA: 1
HEADACHES: 0
FOCAL WEAKNESS: 1
VOMITING: 0
BLURRED VISION: 0
MYALGIAS: 0

## 2022-01-18 ASSESSMENT — LIFESTYLE VARIABLES: SUBSTANCE_ABUSE: 0

## 2022-01-18 ASSESSMENT — GAIT ASSESSMENTS: GAIT LEVEL OF ASSIST: UNABLE TO PARTICIPATE

## 2022-01-18 ASSESSMENT — PAIN DESCRIPTION - PAIN TYPE: TYPE: ACUTE PAIN

## 2022-01-18 NOTE — DISCHARGE PLANNING
Agency/Facility Name: [facility]  Spoke To: nobody  Outcome: Phone just rang but unable to leave message.      CM informed

## 2022-01-18 NOTE — THERAPY
Speech Language Pathology   Fiberoptic Endoscopic Evaluation of Swallow     Patient Name: Jose Quintanilla  AGE:  66 y.o., SEX:  male  Medical Record #: [id number]  Today's Date: 1/17/2022     Precautions  Precautions: (P) Fall Risk,Swallow Precautions ( See Comments),Nasogastric Tube  Comments: (P) left sided deficits, poor sensation     Diet Prior to Evaluation:    NGT/Cortrak    Pertinent Information:  Oxygen Requirements: Room Air  Behavior:  Alert, Cooperative  Secretions Management: WNL  Dentition: Good    Discussed the risks, benefits, and alternatives of the FEES procedure. Patient acknowledged and agreed to proceed.     Assessment  Flexible Endoscopic Evaluation of Swallowing (FEES) completed at bedside today. The endoscope was passed transnasally via right nare to evaluate the anatomy and physiology of swallowing. Pt tolerated the procedure with no apparent distress.      Cortrak: in situ to L nare  PO trials: ice chips, thin liquids, mildly thick liquids, liquidized, minced & moist, soft & bite sized, mixed consistency    Oral Phase: Functional mastication and piecemeal swallowing.    Pharyngeal Phase:  Swallow trigger at level of vallecula with MTL and level of pyriform sinuses with thins. Left> right sided vallecular, lateral channel and pyriform sinus residue with MTL, liquidized, and thins. Vallecular residue appreciated with minced and moist. Prespill to the level of the pyriform sinuses and through the interarytenoid space occurred with juice from mixed before the swallow. Delayed epiglottal inversion led to possible penetration before the swallow with large cup sip of MTL, evidenced by immediate cough response (unable to visualize laryngeal vestibule/airway 2/2 pt coughing scope out of place). No other episodes of penetration/aspiration visualized.     Compensatory Strategies: Compensatory strategies of control bolus size, 3 second prep, double swallow and head turn to left assessed. Small sips improved  bolus control and prevented penetration/aspiration before the swallow.  3-second prep was effective in improving bolus control and timeliness of epiglottal inversion. Multiple swallows was effective in clearing pharyngeal residue. Head turn to left reduced left sided pharyngeal residue.     Penetration Aspiration Scale:    Consistency PAS Score Timing   Ice Chips 1 N/A   Thin Liquid 1 N/A   Mildly Thick Liquid 2 Pre swallow   Liquidized 1 N/A   Pudding N/A N/A   Minced & Moist 1 N/A   Soft & Bite Sized 1 N/A   Mixed Consistency 2 Pre swallow     1     No contrast enters airway  2     Contrast enters the airway, remains above the vocal folds, and is ejected from the airway (not seen in the airway at the end of the swallow).  3     Contrast enters the airway, remains above the vocal folds, and is not ejected from the airway (is seen in the airway after the swallow).  4     Contrast enters the airway, contacts the vocal folds, and is ejected from the airway.  5     Contrast enters the airway, contacts the vocal folds, and is not ejected from the airway  6     Contrast enters the airway, crosses the plane of the vocal folds, and is ejected from the airway.  7     Contrast enters the airway, crosses the plane of the vocal folds, and is not ejected from the airway despite effort.  8     Contrast enters the airway, crosses the plane of the vocal folds, is not ejected from the airway and there is no response to aspiration.    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 Swallow safety is preserved; swallow efficiency is preserved. Risk for aspiration PNA is low and low for malnutrition/dehydration. A modified diet is indicated at this time.. Swallow prognosis is good given strong family support, delayed but intact direction following, apparent intact sensation of laryngeal vestibule and pharynx, functional oropharyngeal swallow and adequate airway  protection. Pt appears to be a good candidate for behavioral swallow rehabilitation.     Although no penetration/aspiration visualized with thins, recommend MTL given inconsistent direction following, continued confusion and improved bolus control with MTL. Anticipate advancement of diet with improved mentation and direction following.     Recommendations  1. Minced and moist/mildly thick liquids.  2. Swallowing Instructions & Precautions:   Supervision: 1:1 feeding with constant supervision  Positioning: Fully upright and midline during oral intake  Medication: Whole with puree  Strategies: Small bites/sips, Slow rate of intake, Multiple swallows (x 2) per bite/sip , no straws  Oral Care: Q6h   3. Pt okay for 5-7 single ice chips per hour between meals with nursing, do not leave at bedside.   4. Diligent oral care and mobilization per PT/OT recs to mitigate risk for aspiration pneumonia  5. Do not remove Cortrak until pt has tolerated 2-3 meals without difficulty, s/sx of aspiration or s/sx of respiratory distress.     SLP following.    Plan    Recommend Speech Therapy 5 times per week until therapy goals are met for the following treatments:  Dysphagia Training and Patient / Family / Caregiver Education.    Discharge Recommendations: (P) Recommend post-acute placement for additional speech therapy services prior to discharge home    Subjective    Pt alert, followed directions with mild delay and participated fully.      Objective       01/17/22 1145   FEES  Anatomy Assessment   Anatomy For A Functional Swallow: Other (Comments)  (WNL)   FEES Laryngeal Adduction Assessment   Breath Holding Adequate   Clearing Throat Adequate   Cough Adequate   Phonation Adequate   Onset Of Swallow Adequate   FEES Velopharyngeal Assessment    Velopharyngeal Closure: Adequate   FEES Voice Assessment    Voice: Other (Comments)  (WNL)   FEES Sensation Assessment    Presence of Scope Adequate   Presence of Residue Adequate   Presence of  Penetration No Penetration   Presence of Aspiration No Aspiration   FEES Swallow Function Assessment   Swallow Trigger Functional;Level of the Valleculae;Level of the Pyriform Sinuses   Base of Tongue Retraction Impaired   Pharyngeal Constrictor Movement Functional   White Out Phase Complete White Out   Epiglottic Movement Functional   Laryngeal Elevation Functional   Cricopharyngeal Function Functional   Swallow Observations / Symptoms   Swallow Observations / Symptoms Yes   Vallecular Residue Mildly Thick (2) - (Nectar Thick);Thin (0);Minced & Moist (5) - (Dysphagia II)   Mildly Thick (2) - (Nectar Thick) Teaspoon;Cup   Thin (0) Teaspoon;Cup   Pyriform Sinus Residue Mildly Thick (2) - (Nectar Thick);Thin (0);Minced & Moist (5) - (Dysphagia II)   Mildly Thick (2) - (Nectar Thick) Teaspoon;Cup   Thin (0) Teaspoon;Cup   Penetration Before the Swallow Mildly Thick (2) - (Nectar Thick)  (large cupsip)   Compensatory Strategies Attempted   Compensatory Strategies Attempted Yes   Left Head Turn effective in reducing left sided pharyngeal residue   Multiple Swallows effective in reducing pharyngeal residue   Controlled Bolus Size improved bolus control, prevented penetration before the swallow   Three Second Prep effective in improving bolus control and timeliness of epiglottal inversion   Patient Ability To Protect Airway   Patient Ability To Protect Airway  Adequate   Penetration Aspiration Scale   Penetration Aspiration Scale 2 - Material enters airway but remains above vocal folds, Ejected from airway, no stasis   Leandra Pharyngeal Residue Severity   Vallecular Residue Mild, epiglottic ligament visable   Pyriform Sinus Residue  Mild, up wall to ¼ full   Dysphagia Rating   Nutritional Liquid Intake Rating Scale Thickened beverages (mildly thick unless otherwise specified)   Nutritional Food Intake Rating Scale Total oral diet with multiple consistencies but requiring special preparation or compensations   Problem List  "  Problem List Dysphagia   Evaluation Recommendations   Swallow Evaluations Recommendations (Yes / No) Yes   Diet / Liquid Recommendation Mildly Thick (2) - (Nectar Thick);Minced & Moist (5) - (Dysphagia II)   Medication Administration  Float Whole with Puree   Recommended Supervision Direct   Evaluation Recommended Techniques   Swallow Techniques Yes   Techniques Oral Stage Check For Pocketing;Chew Each Bite Thoroughly   Techniques Pharyngeal Phase Thicken Liquids For Better Control Of Bolus To Consistency Of Nectar;Repeat Swallow 2-3 Times On Each Bolus To Clear Residue;Pacing:Control Amount Of Presentation;No Straw Drinking;Discourage Talking While Eating   Techniques Esophageal Stage Upright Positioning Or Side Lying During Feeding   Patient / Family Goals   Patient / Family Goal #1 \"Popsicles\"   Goal #1 Outcome Goal not met   Short Term Goals   Short Term Goal # 1 1/12 Pt will consume 1/2 tsp amounts by spoon of applesauce, pudding, and NTL with 2-3 swallows per bolus with moderate verbal cues with SLP only   Goal Outcome # 1 Goal met   Short Term Goal # 4 NEW 1/17: Pt will consume a diet of MM5/MT2 with no s/sx of aspiration and min cues.          "

## 2022-01-18 NOTE — THERAPY
Physical Therapy   Daily Treatment     Patient Name: Jose Quintanilla  Age:  66 y.o., Sex:  male  Medical Record #: [id number]  Today's Date: 1/18/2022     Precautions: Fall Risk;Swallow Precautions ( See Comments);Nasogastric Tube  Comments: L side deficits    Assessment    Pt was pleasant and agreeable to therapy session, more vocal today. He continues to require total A for mobility. Continue to focus on trunk control and balance, requiring maxA and unable to hold self for more than 5 seconds at a time. Continued to practice visual tracking which he was able to complete racking past midline to the left today with vc and extra time. During activity pt was more verbal practicing his speech therapy exercises. Performed PROM to BLE at this time, deferred standing due to continued to practice on balance. Will follow while in house.     Plan    Continue current treatment plan.    DC Equipment Recommendations: Unable to determine at this time  Discharge Recommendations: Recommend post-acute placement for additional physical therapy services prior to discharge home         01/18/22 0955   Neuro-Muscular Treatments   Comments seated balance at EOB, finding midline, anterior weight shifting activities. Max to modA for all trunk control at this time    Vision   Vision Comments Pt improved able to track past midline to left   Balance   Sitting Balance (Static) Trace   Sitting Balance (Dynamic) Dependent   Comments deferred standing due to poor balance and safety    Gait Analysis   Gait Level Of Assist Unable to Participate   Bed Mobility    Supine to Sit Total Assist   Sit to Supine Total Assist   Scooting Total Assist   Rolling Total Assist to Lt.;Total Assist to Rt.   Skilled Intervention Verbal Cuing   Comments poor initiation of all mobility.    Functional Mobility   Sit to Stand Unable to Participate   Short Term Goals    Short Term Goal # 1 Patient will move supine<>sitting EOB with bed features and mod A within 6tx in order  to get in/out of bed   Goal Outcome # 1 goal not met   Short Term Goal # 2 Patient will maintain dynamic sitting balance for 5 min with min A within 6tx in order to perform functional task   Goal Outcome # 2 Goal not met   Short Term Goal # 3 Patient will transfer with mod A within 6tx in order to achieve functional transfer   Goal Outcome # 3 Goal not met

## 2022-01-18 NOTE — CARE PLAN
The patient is Stable - Low risk of patient condition declining or worsening    Shift Goals  Clinical Goals: maintain skin integrity  Patient Goals: jokes   Family Goals: OMAYRA    Progress made toward(s) clinical / shift goals:    Problem: Psychosocial - Patient Condition  Goal: Patient's ability to verbalize feelings about condition will improve  Outcome: Progressing     Problem: Neuro Status  Goal: Neuro status will remain stable or improve  Outcome: Progressing     Problem: Respiratory - Stroke Patient  Goal: Patient will achieve/maintain optimum respiratory rate/effort  Outcome: Progressing     Problem: Skin Integrity  Goal: Skin integrity is maintained or improved  Outcome: Progressing       Patient is not progressing towards the following goals:

## 2022-01-18 NOTE — DIETARY
Nutrition Services: Brief Update    Text received in am from SLP, pt passed eval and oral diet started with minced and moist, MTL with 1:1 feeds. Pt ate 50-75% of dinner 1/17 and discussed with RN, pt ate ~100% today at breakfast.    Recommendations/Plan:  1. D/c TF orderset as pt is eating >50% of meals.  2. Diet per SLP. Supplements not needed at his time.    No nutrition intervention needed at this time. RD to sign off, please c/s as needed.

## 2022-01-18 NOTE — THERAPY
Occupational Therapy  Daily Treatment     Patient Name: Jose Quintanilla  Age:  66 y.o., Sex:  male  Medical Record #: [id number]  Today's Date: 1/18/2022       Precautions: Fall Risk,Swallow Precautions ( See Comments),Nasogastric Tube  Comments: L side deficits    Assessment    Pt seen for OT tx. Continues to be limited by decreased activity tolerance, balance deficits, inattention and poor safety awareness impacting ability to complete ADLs and ADL transfers independently. LUE continues to be flaccid w/ no active or purposeful movement and subluxed. Following commands appropriately during session. Required mod-max A seated balance EOB d/t heavy posterior lean. Will continue to benefit from OT services while in house.     Plan    Continue current treatment plan.    DC Equipment Recommendations: Unable to determine at this time  Discharge Recommendations: Recommend post-acute placement for additional occupational therapy services prior to discharge home       01/18/22 1001   Cognition    Cognition / Consciousness X   Safety Awareness Impaired   New Learning Impaired   Attention Impaired   Active ROM Upper Body   Active ROM Upper Body  X   Flaccid Upper Extremity Left Upper Extremity Flaccid   Strength Upper Body   Upper Body Strength  X   Comments no functional movement LUE    Balance   Sitting Balance (Static) Trace   Sitting Balance (Dynamic) Dependent   Weight Shift Sitting Absent   Bed Mobility    Supine to Sit Total Assist   Sit to Supine Total Assist   Activities of Daily Living   Grooming Moderate Assist;Seated   Upper Body Dressing Maximal Assist   Lower Body Dressing Total Assist   Toileting Total Assist   Functional Mobility   Sit to Stand Unable to Participate   Short Term Goals   Short Term Goal # 1 Mod A with washing face with a cloth   Goal Outcome # 1 Progressing as expected   Short Term Goal # 2 mod A with UB dressing   Goal Outcome # 2 Progressing slower than expected   Short Term Goal # 3 mod A with ADL  txfs   Goal Outcome # 3 Progressing slower than expected   Anticipated Discharge Equipment and Recommendations   DC Equipment Recommendations Unable to determine at this time   Discharge Recommendations Recommend post-acute placement for additional occupational therapy services prior to discharge home

## 2022-01-18 NOTE — DISCHARGE PLANNING
Agency/Facility Name: [facility]  Spoke To: admissions  Outcome: Called to follow up on referral. New fax number provided [phone]. Manually faxed referral      CM informed

## 2022-01-18 NOTE — DISCHARGE PLANNING
Anticipated Discharge Disposition: SNF in [place]    Action: Patient discussed in IDT rounds. Per medical team patient is eating well and will no longer need tube feeds. Per MD patient's cortrak will be discontinued.     Patient pending SNF placement in [place].     Barriers to Discharge: SNF acceptance    Plan: Follow up with medical team and DPA.

## 2022-01-18 NOTE — PROGRESS NOTES
Assumed care of pt at 1900. Pt alert and oriented.  Denies pain or discomfort.  Up in cardiac chair for bedside report.  Pt making jokes and in good spirits.  Bed low & locked, alarm on.  Reviewed plan for evening.  Hourly rounding continues.

## 2022-01-19 PROCEDURE — 700101 HCHG RX REV CODE 250: Performed by: STUDENT IN AN ORGANIZED HEALTH CARE EDUCATION/TRAINING PROGRAM

## 2022-01-19 PROCEDURE — 700102 HCHG RX REV CODE 250 W/ 637 OVERRIDE(OP): Performed by: HOSPITALIST

## 2022-01-19 PROCEDURE — A9270 NON-COVERED ITEM OR SERVICE: HCPCS | Performed by: STUDENT IN AN ORGANIZED HEALTH CARE EDUCATION/TRAINING PROGRAM

## 2022-01-19 PROCEDURE — 700102 HCHG RX REV CODE 250 W/ 637 OVERRIDE(OP): Performed by: STUDENT IN AN ORGANIZED HEALTH CARE EDUCATION/TRAINING PROGRAM

## 2022-01-19 PROCEDURE — A9270 NON-COVERED ITEM OR SERVICE: HCPCS | Performed by: HOSPITALIST

## 2022-01-19 PROCEDURE — 99232 SBSQ HOSP IP/OBS MODERATE 35: CPT | Performed by: STUDENT IN AN ORGANIZED HEALTH CARE EDUCATION/TRAINING PROGRAM

## 2022-01-19 PROCEDURE — 94640 AIRWAY INHALATION TREATMENT: CPT

## 2022-01-19 PROCEDURE — 770001 HCHG ROOM/CARE - MED/SURG/GYN PRIV*

## 2022-01-19 RX ORDER — BACLOFEN 10 MG/1
10 TABLET ORAL 2 TIMES DAILY
Status: DISCONTINUED | OUTPATIENT
Start: 2022-01-19 | End: 2022-01-20

## 2022-01-19 RX ORDER — GABAPENTIN 100 MG/1
100 CAPSULE ORAL 3 TIMES DAILY
Status: DISCONTINUED | OUTPATIENT
Start: 2022-01-19 | End: 2022-01-20

## 2022-01-19 RX ORDER — IPRATROPIUM BROMIDE AND ALBUTEROL SULFATE 2.5; .5 MG/3ML; MG/3ML
3 SOLUTION RESPIRATORY (INHALATION)
Status: DISCONTINUED | OUTPATIENT
Start: 2022-01-19 | End: 2022-01-25 | Stop reason: HOSPADM

## 2022-01-19 RX ORDER — GABAPENTIN 100 MG/1
100 CAPSULE ORAL 3 TIMES DAILY
Status: DISCONTINUED | OUTPATIENT
Start: 2022-01-19 | End: 2022-01-19

## 2022-01-19 RX ORDER — CLONIDINE HYDROCHLORIDE 0.1 MG/1
0.1 TABLET ORAL EVERY 6 HOURS PRN
Status: DISCONTINUED | OUTPATIENT
Start: 2022-01-19 | End: 2022-01-20

## 2022-01-19 RX ADMIN — HYDROCORTISONE: 10 CREAM TOPICAL at 04:51

## 2022-01-19 RX ADMIN — APIXABAN 5 MG: 5 TABLET, FILM COATED ORAL at 04:45

## 2022-01-19 RX ADMIN — IPRATROPIUM BROMIDE AND ALBUTEROL SULFATE 3 ML: .5; 2.5 SOLUTION RESPIRATORY (INHALATION) at 07:28

## 2022-01-19 RX ADMIN — GABAPENTIN 100 MG: 100 CAPSULE ORAL at 19:41

## 2022-01-19 RX ADMIN — ACETAMINOPHEN 650 MG: 325 TABLET, FILM COATED ORAL at 16:30

## 2022-01-19 RX ADMIN — BUPROPION HYDROCHLORIDE 100 MG: 100 TABLET, FILM COATED ORAL at 04:46

## 2022-01-19 RX ADMIN — APIXABAN 5 MG: 5 TABLET, FILM COATED ORAL at 16:30

## 2022-01-19 RX ADMIN — FAMOTIDINE 10 MG: 20 TABLET ORAL at 16:29

## 2022-01-19 RX ADMIN — FAMOTIDINE 10 MG: 20 TABLET ORAL at 04:46

## 2022-01-19 RX ADMIN — BACLOFEN 10 MG: 10 TABLET ORAL at 19:41

## 2022-01-19 RX ADMIN — ATORVASTATIN CALCIUM 80 MG: 80 TABLET, FILM COATED ORAL at 16:30

## 2022-01-19 RX ADMIN — BUPROPION HYDROCHLORIDE 100 MG: 100 TABLET, FILM COATED ORAL at 16:29

## 2022-01-19 RX ADMIN — LOSARTAN POTASSIUM 25 MG: 50 TABLET, FILM COATED ORAL at 04:46

## 2022-01-19 ASSESSMENT — ENCOUNTER SYMPTOMS
MYALGIAS: 0
FOCAL WEAKNESS: 1
ABDOMINAL PAIN: 0
NAUSEA: 0
HEADACHES: 0
SHORTNESS OF BREATH: 0
INSOMNIA: 1
BLURRED VISION: 0
VOMITING: 0
FEVER: 0

## 2022-01-19 ASSESSMENT — PAIN DESCRIPTION - PAIN TYPE
TYPE: ACUTE PAIN

## 2022-01-19 ASSESSMENT — LIFESTYLE VARIABLES: SUBSTANCE_ABUSE: 0

## 2022-01-19 NOTE — CARE PLAN
The patient is Stable - Low risk of patient condition declining or worsening    Shift Goals  Clinical Goals: stable vitals and neuro status  Patient Goals: rest  Family Goals: OMAYRA    Progress made toward(s) clinical / shift goals:    Problem: Knowledge Deficit - Stroke Education  Goal: Patient's knowledge of stroke and risk factors will improve  Outcome: Progressing     Problem: Psychosocial - Patient Condition  Goal: Patient's ability to verbalize feelings about condition will improve  Outcome: Progressing  Goal: Patient's ability to re-evaluate and adapt role responsibilities will improve  Outcome: Progressing     Problem: Neuro Status  Goal: Neuro status will remain stable or improve  Outcome: Progressing     Problem: Hemodynamic Monitoring  Goal: Patient's hemodynamics, fluid balance and neurologic status will be stable or improve  Outcome: Progressing     Problem: Urinary Elimination  Goal: Establish and maintain regular urinary output  Outcome: Progressing       Patient is not progressing towards the following goals:  Problem: Mobility - Stroke  Goal: Patient's capacity to carry out activities will improve  Outcome: Not Met     Problem: Self Care  Goal: Patient will have the ability to perform ADLs independently or with assistance (bathe, groom, dress, toilet and feed)  Outcome: Not Met

## 2022-01-19 NOTE — CARE PLAN
The patient is Stable - Low risk of patient condition declining or worsening    Shift Goals  Clinical Goals: Complete meals  Patient Goals: Get Cortrak out  Family Goals: OMAYRA    Progress made toward(s) clinical / shift goals:    Problem: Knowledge Deficit - Stroke Education  Goal: Patient's knowledge of stroke and risk factors will improve  Outcome: Progressing  Note: Pt demonstrates understanding of plan of care.      Problem: Neuro Status  Goal: Neuro status will remain stable or improve  Outcome: Progressing  Note: Pt's neuro status remains stable.      Problem: Risk for Aspiration  Goal: Patient's risk for aspiration will be absent or decrease  Outcome: Progressing  Note: Pt continues to swallow food twice with every bite, on modified diet, Cortrak removed today.

## 2022-01-19 NOTE — DISCHARGE PLANNING
Anticipated Discharge Disposition: SNF    Action: [place] SNF's have declined. With patient's insurance one must assume the [place] SNF's are declining due to patient being out of state and covid restrictions. Reviewed with leadership who said we must begin the process of discharging patient to a local SNF as we have been trying for over a week to find a [facility] SNF.     Long conversation with son, Chandra, who expressed understanding but wants to make more phone calls to try and get him into a [facility] facility. Chandra knows some physicians who are trying to help with this process. Explained to Chandra several times that we must try locally now. He agreed to look a local choice lists while we expand the referrals in the [place] area.     Chandra emailed a list of [facility] facilities but referrals have already been sent to those. Emailed him a comprehensive list of [place] SNF's including [facility], telling him an HCM will call him late this afternoon or tomorrow at the latest for choices.      Barriers to Discharge: SNF choice location.     Plan: Follow up with Chandra this afternoon or tomorrow morning.     1400- received text from Dr. Robles who spoke to Chandra. Chandra said [facility] had declined due to cortrak which has now been DC'd. Requested DPA resend referral.

## 2022-01-19 NOTE — WOUND TEAM
[facility]  Inpatient Services  Wound and Skin Care Brief Rounding Evaluation    Admission Date: 1/1/2022     Last order of IP CONSULT TO WOUND CARE was found on 1/2/2022 from Hospital Encounter on 12/27/2021     HPI, PMH, SH: Reviewed    No chief complaint on file.    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 Chart and images reviewed. Jourdan Santos. RN prevention protocol open (Y). This discussed with bedside RNFreedom. This RN in to assess patient. Pt has TAPs draw sheets in room but no taps wedges. Heel float boots in room as well but not in use. Pillows used for turning. .  Preventative measures in place/implemented as follows below. No further follow up unless indicated and consulted.          RSKIN:   CURRENTLY IN PLACE (X), APPLIED THIS VISIT (A), ORDERED (O):   Q shift Jourdan:  X  Q shift pressure point assessments:  X    Surface/Positioning   Pressure redistribution mattress            Low Airloss      X    Bariatric foam      Bariatric MERY     Waffle cushion        Waffle Overlay          Reposition q 2 hours      TAPs Turning system     Z David Pillow     Offloading/Redistribution   Sacral Mepilex (Silicone dressing)     Heel Mepilex (Silicone dressing)         Heel float boots (Prevalon boot)    A         Float Heels off Bed with Pillows           Respiratory ROOM AIR  Silicone O2 tubing         Gray Foam Ear protectors     Cannula fixation Device (Tender )          High flow offloading Clip    Elastic head band offloading device      Anchorfast                                                         Trach with Optifoam split foam             Containment/Moisture Prevention     Rectal tube or BMS    Purwick/Condom Cath      X, Falling off  Paiz Catheter    Barrier wipes X          Barrier paste    X   Antifungal tx      Interdry        Mobilization OMAYRA      Up  to chair        Ambulate      PT/OT      Nutrition       Dietician        Diabetes Education      PO X - Level 5    TF     TPN     NPO   # days     Other

## 2022-01-19 NOTE — PROGRESS NOTES
Hospital Medicine Daily Progress Note    Date of Service  1/19/2022    Chief Complaint  Jose Quintanilla is a 66 y.o. male admitted 1/1/2022 with left sided hemiparesis    Hospital Course  Mr Quintanilla has past medical history which includes hypertension, hyperlipidemia, DVT/PE and was treated with anticoagulation prior to admission.  Presented on 1/1/2022 after a fall with symptoms of left hemiparesis and rightward gaze.  Patient was evaluated and cleared by trauma.  Neuro imaging was concerning for right-sided carotid occlusion.Mri noted with  Large area of acute infarction involving the right frontal and parietal lobes as well as the right-sided basal ganglia. Subacute area of infarction involving the right temporal and lateral occipital lobes.  Patient was evaluated neurology with recommendations for admission to the ICU, permissive hypertension, and follow-up imaging.  started on anticoagulation on 1/13/2022 .Chest x-ray noted with bilateral interstitial opacities concerning for aspiration pneumonia, he was treated with complete course of Augmentin. Pt also has asthma and was treated for acute asthma exacerbation. SLP following. patient did have tube feeds, modified diet started on 1/17.   Patient is pending placement in [place] to be near family.          Interval Problem Update  Pt seen and examined, no acute complaints, wants to stand up and get out of bed  cortrak removed, tolerating modified diet   Hemodynamically stable   Pt is medically stable, pending acceptance to SNF/Rehab     Spoke with Patient, pts cousin at bedside and son Chandra via telephone. They are open to expanding search to local however son would prefer [place] so he can be close to family. Will place rehab consultation as well. Spoke with CM, will continue to work on placement in [place] but will also search locally.     I have personally seen and examined the patient at bedside. I discussed the plan of care with patient, family, bedside  RN and .    Consultants/Specialty  critical care, neurology, palliative care and physiatry    Code Status  DNAR/DNI    Disposition  Patient is medically cleared for discharge.   Anticipate discharge to to skilled nursing facility.  I have placed the appropriate orders for post-discharge needs.    Review of Systems  Review of Systems   Constitutional: Positive for malaise/fatigue. Negative for fever.   HENT: Negative for congestion.    Eyes: Negative for blurred vision.   Respiratory: Negative for shortness of breath.    Cardiovascular: Negative for chest pain.   Gastrointestinal: Negative for abdominal pain, nausea and vomiting.   Musculoskeletal: Negative for myalgias.   Neurological: Positive for focal weakness. Negative for headaches.   Psychiatric/Behavioral: Negative for substance abuse. The patient has insomnia.         Physical Exam  Temp:  [36.1 °C (97 °F)-36.6 °C (97.9 °F)] 36.2 °C (97.1 °F)  Pulse:  [85-97] 87  Resp:  [16-18] 18  BP: (127-150)/(66-85) 128/74  SpO2:  [95 %-99 %] 95 %    Physical Exam  Vitals and nursing note reviewed.   Constitutional:       Appearance: He is obese. He is ill-appearing. He is not toxic-appearing.   HENT:      Head: Normocephalic and atraumatic.      Nose:      Comments: cortrak in place     Mouth/Throat:      Mouth: Mucous membranes are moist.      Pharynx: Oropharynx is clear.   Eyes:      Conjunctiva/sclera: Conjunctivae normal.   Cardiovascular:      Rate and Rhythm: Normal rate and regular rhythm.      Heart sounds: No murmur heard.      Pulmonary:      Effort: Pulmonary effort is normal.      Comments: Course breath sounds  Abdominal:      General: Bowel sounds are normal.      Palpations: Abdomen is soft.   Musculoskeletal:      Cervical back: Neck supple.      Right lower leg: No edema.      Left lower leg: No edema.   Skin:     General: Skin is dry.   Neurological:      Mental Status: He is oriented to person, place, and time.      Cranial Nerves:  Cranial nerve deficit (left facial droop) present.      Motor: Weakness (left hemiparesis) present.   Psychiatric:         Mood and Affect: Mood normal.         Behavior: Behavior normal.         Fluids    Intake/Output Summary (Last 24 hours) at 1/19/2022 1341  Last data filed at 1/18/2022 2350  Gross per 24 hour   Intake --   Output 300 ml   Net -300 ml       Laboratory  Recent Labs     01/18/22  0330   WBC 17.0*   RBC 3.94*   HEMOGLOBIN 12.8*   HEMATOCRIT 37.6*   MCV 95.4   MCH 32.5   MCHC 34.0   RDW 51.7*   PLATELETCT 255   MPV 11.0     Recent Labs     01/18/22  0330   SODIUM 137   POTASSIUM 4.0   CHLORIDE 103   CO2 24   GLUCOSE 97   BUN 18   CREATININE 0.62   CALCIUM 8.7                   Imaging  DX-CHEST-LIMITED (1 VIEW)   Final Result      1.  Patchy bilateral interstitial opacities. Underlying infection is possible.   2.  Stable enlargement of the cardiomediastinal silhouette.      DX-CHEST-LIMITED (1 VIEW)   Final Result         Linear opacities in the left lung base could be due to discoid atelectasis or developing infiltrate such as pneumonia or pneumonitis.      DX-ABDOMEN FOR TUBE PLACEMENT   Final Result      1.  Feeding tube tip overlies the distal stomach.      IR-PICC LINE PLACEMENT W/ GUIDANCE > AGE 5   Final Result                  Ultrasound-guided PICC placement performed by qualified nursing staff as    above.          CT-HEAD W/O   Final Result      Evolving moderate to large right middle cerebral artery territory has increased in size when compared with the previous exam. No evidence of acute intracranial hemorrhage.         DX-ABDOMEN FOR TUBE PLACEMENT   Final Result      Cortrak feeding tube tip projects in the region of the proximal/mid stomach.      US-EXTREMITY VENOUS LOWER BILAT   Final Result      MR-BRAIN-W/O   Final Result         1. Age-related cerebral atrophy. Mild effacement of the right lateral ventricle.      2. Mild periventricular white matter changes consistent with  chronic microvascular ischemic gliosis.      3. Large area of acute infarction involving the right frontal and parietal lobes as well as the right-sided basal ganglia. Subacute area of infarction involving the right temporal and lateral occipital lobes.      4. Petechial hemorrhage noted in the right basal ganglia and involving the cortex in the right temporal and lateral occipital lobes.      5. Right internal carotid artery occlusion or high-grade stenosis.            EC-ECHOCARDIOGRAM COMPLETE W/O CONT   Final Result      DX-SHOULDER 2+ LEFT   Final Result      Negative 2 views of left shoulder.      DX-SHOULDER 2+ RIGHT   Final Result      Moderate subacromial spurring      DX-FOOT-COMPLETE 3+ RIGHT   Final Result      No radiographic evidence of acute displaced fracture or subluxation.      DX-FOOT-2- LEFT   Final Result      No radiographic evidence of acute displaced fracture or subluxation.      DX-ELBOW-COMPLETE 3+ RIGHT   Final Result      No radiographic evidence of acute traumatic injury.      Limited due to rotation of the lateral radiograph attempts. Consider repeat lateral radiograph to help exclude effusion      CT-CHEST,ABDOMEN,PELVIS WITH   Final Result      No significant traumatic abnormality in thorax, abdomen and pelvis CT scans.      Hepatic steatosis      Moderate colonic diverticulosis.      CT-LSPINE W/O PLUS RECONS   Final Result      No CT evidence of acute traumatic injury.      Moderate degenerative disc disease with trace degenerative L5/S1 retrolisthesis      CT-TSPINE W/O PLUS RECONS   Final Result      No CT evidence of acute traumatic abnormality.      CT-CSPINE WITHOUT PLUS RECONS   Final Result      No CT evidence of acute cervical spine abnormality.      CT-HEAD W/O   Final Result      Subacute right temporal, temporo-occipital infarction without hemorrhagic transformation      CT-CTA HEAD WITH & W/O-POST PROCESS   Final Result      Right internal carotid artery occlusion with  extension to the MCA which lacks contrast opacification      Right A1 and KATIA contrast opacification secondary to cross-filling from the left      Normal left anterior and bilateral posterior circulation      Findings of all of the CTs were discussed with Dr. Betancourt before completion of this dictation.            CT-CTA NECK WITH & W/O-POST PROCESSING   Final Result      Occluded right internal carotid artery at its origin      Atherosclerosis at origin of bilateral vertebral arteries could obscure stenosis. Arteries are patent without post stenotic dilatation      DX-PELVIS-1 OR 2 VIEWS   Final Result      Negative AP view of the pelvis.      DX-CHEST-LIMITED (1 VIEW)   Final Result      No acute cardiopulmonary disease.      US-ABORTED US PROCEDURE    (Results Pending)        Assessment/Plan  * Stroke due to thrombosis of right carotid artery (HCC)- (present on admission)  Assessment & Plan  Extensive stroke due to thrombosis of right carotid artery  Occurred while on anticoagulation with warfarin, INR slightly subtherapeutic at 1.8  Cytotoxic edema has improved, appreciate neurology recommendations, low sodium to normalize  High intensity statin  On eliquis 5 mg BID  PT/OT/speech  Needs rehab, pending placement in [place]   Continue inpatient therapies to prevent further deconditioning     Moderate asthma with acute exacerbation  Assessment & Plan  On room air   He is allergic to cat.  On Advair at home  Duoneb Q4H  Solumedrol 40 mg daily X 5 days   on symbicort    Allergy  Assessment & Plan  Diffuse erythema  Likely penicillin induced /drug allergy   Improving with steroid and benadryl   resolved    Aspiration pneumonia (HCC)  Assessment & Plan  Completed 7 days course of antibiotics   perocal negative   Saturating well on room air   Aspiration precautions, SLP following     Sepsis (HCC)  Assessment & Plan  This is Sepsis Not present on admission  SIRS criteria identified on my evaluation include: Tachycardia,  with heart rate greater than 90 BPM  Source is lungs  Sepsis protocol initiated  Fluid resuscitation ordered per protocol  IV antibiotics as appropriate for source of sepsis  While organ dysfunction may be noted elsewhere in this problem list or in the chart, degree of organ dysfunction does not meet CMS criteria for severe sepsis    Resolved    Depression- (present on admission)  Assessment & Plan  Outpatient Wellbutrin and Lexapro    Chronic anticoagulation- (present on admission)  Assessment & Plan  On eliquis    Dyslipidemia- (present on admission)  Assessment & Plan  Atorvastatin    Primary hypertension- (present on admission)  Assessment & Plan  Goal normotensive  Continue monitoring, consider restarting enteral blood pressure pressure agent if needed.       VTE prophylaxis: therapeutic anticoagulation with Eliquis    I have performed a physical exam and reviewed and updated ROS and Plan today (1/19/2022). In review of yesterday's note (1/18/2022), there are no changes except as documented above.

## 2022-01-19 NOTE — PROGRESS NOTES
Hospital Medicine Daily Progress Note    Date of Service  1/18/2022    Chief Complaint  Jose Quintanilla is a 66 y.o. male admitted 1/1/2022 with left sided hemiparesis    Hospital Course  Mr Quintanilla has past medical history which includes hypertension, hyperlipidemia, DVT/PE and was treated with anticoagulation prior to admission.  Presented on 1/1/2022 after a fall with symptoms of left hemiparesis and rightward gaze.  Patient was evaluated and cleared by trauma.  Neuro imaging was concerning for right-sided carotid occlusion.Mri noted with  Large area of acute infarction involving the right frontal and parietal lobes as well as the right-sided basal ganglia. Subacute area of infarction involving the right temporal and lateral occipital lobes.  Patient was evaluated neurology with recommendations for admission to the ICU, permissive hypertension, and follow-up imaging.  started on anticoagulation on 1/13/2022 .Chest x-ray noted with bilateral interstitial opacities concerning for aspiration pneumonia, he was treated with complete course of Augmentin. Pt also has asthma and was treated for acute asthma exacerbation. SLP following. patient did have tube feeds, modified diet started on 1/17.   Patient is pending placement in [place] to be near family.          Interval Problem Update  Pt seen and examined, slept poorly overnight, sleeping intermittently throughout the day   Tolerated dinner, breakfast and lunch, will remove cortrak today, modified diet per speech   Vitals stable   Persistent leukocytosis, no clear source or other systemic signs of infection  Pt is medically stable, pending acceptance to SNF/Rehab     I have personally seen and examined the patient at bedside. I discussed the plan of care with patient, family, bedside RN and .    Consultants/Specialty  critical care, neurology, palliative care and physiatry    Code Status  DNAR/DNI    Disposition  Patient is medically cleared for discharge.    Anticipate discharge to to skilled nursing facility.  I have placed the appropriate orders for post-discharge needs.    Review of Systems  Review of Systems   Constitutional: Positive for fever and malaise/fatigue.   HENT: Negative for congestion.    Eyes: Negative for blurred vision.   Respiratory: Negative for shortness of breath.    Cardiovascular: Negative for chest pain.   Gastrointestinal: Negative for abdominal pain, nausea and vomiting.   Musculoskeletal: Negative for myalgias.   Neurological: Positive for focal weakness. Negative for headaches.   Psychiatric/Behavioral: Negative for substance abuse. The patient has insomnia.         Physical Exam  Temp:  [36.3 °C (97.3 °F)-36.5 °C (97.7 °F)] 36.5 °C (97.7 °F)  Pulse:  [86-91] 91  Resp:  [18] 18  BP: (116-152)/(66-82) 116/66  SpO2:  [97 %-100 %] 97 %    Physical Exam  Vitals and nursing note reviewed.   Constitutional:       Appearance: He is obese. He is ill-appearing. He is not toxic-appearing.   HENT:      Head: Normocephalic and atraumatic.      Nose:      Comments: cortrak in place     Mouth/Throat:      Mouth: Mucous membranes are moist.      Pharynx: Oropharynx is clear.   Eyes:      Conjunctiva/sclera: Conjunctivae normal.   Cardiovascular:      Rate and Rhythm: Normal rate and regular rhythm.      Heart sounds: No murmur heard.      Pulmonary:      Effort: Pulmonary effort is normal.      Comments: Course breath sounds  Abdominal:      General: Bowel sounds are normal.      Palpations: Abdomen is soft.   Musculoskeletal:      Cervical back: Neck supple.      Right lower leg: No edema.      Left lower leg: No edema.   Skin:     General: Skin is dry.   Neurological:      Mental Status: He is oriented to person, place, and time.      Cranial Nerves: Cranial nerve deficit present.      Motor: Weakness (left hemiparesis) present.   Psychiatric:         Mood and Affect: Mood normal.         Behavior: Behavior normal.         Fluids    Intake/Output  Summary (Last 24 hours) at 1/18/2022 1618  Last data filed at 1/18/2022 1300  Gross per 24 hour   Intake 580 ml   Output 550 ml   Net 30 ml       Laboratory  Recent Labs     01/16/22  0420 01/18/22  0330   WBC 16.1* 17.0*   RBC 4.05* 3.94*   HEMOGLOBIN 13.2* 12.8*   HEMATOCRIT 38.7* 37.6*   MCV 95.6 95.4   MCH 32.6 32.5   MCHC 34.1 34.0   RDW 50.6* 51.7*   PLATELETCT 377 255   MPV 11.2 11.0     Recent Labs     01/16/22  0420 01/18/22  0330   SODIUM 137 137   POTASSIUM 3.8 4.0   CHLORIDE 101 103   CO2 24 24   GLUCOSE 122* 97   BUN 20 18   CREATININE 0.68 0.62   CALCIUM 8.8 8.7                   Imaging  DX-CHEST-LIMITED (1 VIEW)   Final Result      1.  Patchy bilateral interstitial opacities. Underlying infection is possible.   2.  Stable enlargement of the cardiomediastinal silhouette.      DX-CHEST-LIMITED (1 VIEW)   Final Result         Linear opacities in the left lung base could be due to discoid atelectasis or developing infiltrate such as pneumonia or pneumonitis.      DX-ABDOMEN FOR TUBE PLACEMENT   Final Result      1.  Feeding tube tip overlies the distal stomach.      IR-PICC LINE PLACEMENT W/ GUIDANCE > AGE 5   Final Result                  Ultrasound-guided PICC placement performed by qualified nursing staff as    above.          CT-HEAD W/O   Final Result      Evolving moderate to large right middle cerebral artery territory has increased in size when compared with the previous exam. No evidence of acute intracranial hemorrhage.         DX-ABDOMEN FOR TUBE PLACEMENT   Final Result      Cortrak feeding tube tip projects in the region of the proximal/mid stomach.      US-EXTREMITY VENOUS LOWER BILAT   Final Result      MR-BRAIN-W/O   Final Result         1. Age-related cerebral atrophy. Mild effacement of the right lateral ventricle.      2. Mild periventricular white matter changes consistent with chronic microvascular ischemic gliosis.      3. Large area of acute infarction involving the right frontal  and parietal lobes as well as the right-sided basal ganglia. Subacute area of infarction involving the right temporal and lateral occipital lobes.      4. Petechial hemorrhage noted in the right basal ganglia and involving the cortex in the right temporal and lateral occipital lobes.      5. Right internal carotid artery occlusion or high-grade stenosis.            EC-ECHOCARDIOGRAM COMPLETE W/O CONT   Final Result      DX-SHOULDER 2+ LEFT   Final Result      Negative 2 views of left shoulder.      DX-SHOULDER 2+ RIGHT   Final Result      Moderate subacromial spurring      DX-FOOT-COMPLETE 3+ RIGHT   Final Result      No radiographic evidence of acute displaced fracture or subluxation.      DX-FOOT-2- LEFT   Final Result      No radiographic evidence of acute displaced fracture or subluxation.      DX-ELBOW-COMPLETE 3+ RIGHT   Final Result      No radiographic evidence of acute traumatic injury.      Limited due to rotation of the lateral radiograph attempts. Consider repeat lateral radiograph to help exclude effusion      CT-CHEST,ABDOMEN,PELVIS WITH   Final Result      No significant traumatic abnormality in thorax, abdomen and pelvis CT scans.      Hepatic steatosis      Moderate colonic diverticulosis.      CT-LSPINE W/O PLUS RECONS   Final Result      No CT evidence of acute traumatic injury.      Moderate degenerative disc disease with trace degenerative L5/S1 retrolisthesis      CT-TSPINE W/O PLUS RECONS   Final Result      No CT evidence of acute traumatic abnormality.      CT-CSPINE WITHOUT PLUS RECONS   Final Result      No CT evidence of acute cervical spine abnormality.      CT-HEAD W/O   Final Result      Subacute right temporal, temporo-occipital infarction without hemorrhagic transformation      CT-CTA HEAD WITH & W/O-POST PROCESS   Final Result      Right internal carotid artery occlusion with extension to the MCA which lacks contrast opacification      Right A1 and KATIA contrast opacification  secondary to cross-filling from the left      Normal left anterior and bilateral posterior circulation      Findings of all of the CTs were discussed with Dr. Betancourt before completion of this dictation.            CT-CTA NECK WITH & W/O-POST PROCESSING   Final Result      Occluded right internal carotid artery at its origin      Atherosclerosis at origin of bilateral vertebral arteries could obscure stenosis. Arteries are patent without post stenotic dilatation      DX-PELVIS-1 OR 2 VIEWS   Final Result      Negative AP view of the pelvis.      DX-CHEST-LIMITED (1 VIEW)   Final Result      No acute cardiopulmonary disease.      US-ABORTED US PROCEDURE    (Results Pending)        Assessment/Plan  * Stroke due to thrombosis of right carotid artery (HCC)- (present on admission)  Assessment & Plan  Extensive stroke due to thrombosis of right carotid artery  Occurred while on anticoagulation with warfarin, INR slightly subtherapeutic at 1.8  Cytotoxic edema has improved, appreciate neurology recommendations, low sodium to normalize  High intensity statin  On eliquis 5 mg BID  PT/OT/speech  Needs rehab, pending placement in [place]   Continue inpatient therapies to prevent further deconditioning     Moderate asthma with acute exacerbation  Assessment & Plan  On room air   He is allergic to cat.  On Advair at home  Duoneb Q4H  Solumedrol 40 mg daily X 5 days   on symbicort    Allergy  Assessment & Plan  Diffuse erythema  Likely penicillin induced /drug allergy   Improving with steroid and benadryl   resolved    Aspiration pneumonia (HCC)  Assessment & Plan  Completed 7 days course of antibiotics   perocal negative   Saturating well on room air   Aspiration precautions, SLP following     Sepsis ([facility])  Assessment & Plan  This is Sepsis Not present on admission  SIRS criteria identified on my evaluation include: Tachycardia, with heart rate greater than 90 BPM  Source is lungs  Sepsis protocol initiated  Fluid resuscitation  ordered per protocol  IV antibiotics as appropriate for source of sepsis  While organ dysfunction may be noted elsewhere in this problem list or in the chart, degree of organ dysfunction does not meet CMS criteria for severe sepsis    Resolved    Depression- (present on admission)  Assessment & Plan  Outpatient Wellbutrin and Lexapro    Chronic anticoagulation- (present on admission)  Assessment & Plan  On eliquis    Dyslipidemia- (present on admission)  Assessment & Plan  Atorvastatin    Primary hypertension- (present on admission)  Assessment & Plan  Goal normotensive  Continue monitoring, consider restarting enteral blood pressure pressure agent if needed.       VTE prophylaxis: therapeutic anticoagulation with Eliquis    I have performed a physical exam and reviewed and updated ROS and Plan today (1/18/2022). In review of yesterday's note (1/17/2022), there are no changes except as documented above.

## 2022-01-20 LAB
ANION GAP SERPL CALC-SCNC: 12 MMOL/L (ref 7–16)
BASOPHILS # BLD AUTO: 1.1 % (ref 0–1.8)
BASOPHILS # BLD: 0.16 K/UL (ref 0–0.12)
BUN SERPL-MCNC: 15 MG/DL (ref 8–22)
CALCIUM SERPL-MCNC: 9 MG/DL (ref 8.5–10.5)
CHLORIDE SERPL-SCNC: 104 MMOL/L (ref 96–112)
CO2 SERPL-SCNC: 21 MMOL/L (ref 20–33)
CREAT SERPL-MCNC: 0.74 MG/DL (ref 0.5–1.4)
EOSINOPHIL # BLD AUTO: 1.03 K/UL (ref 0–0.51)
EOSINOPHIL NFR BLD: 7.1 % (ref 0–6.9)
ERYTHROCYTE [DISTWIDTH] IN BLOOD BY AUTOMATED COUNT: 54.1 FL (ref 35.9–50)
GLUCOSE SERPL-MCNC: 97 MG/DL (ref 65–99)
HCT VFR BLD AUTO: 40.1 % (ref 42–52)
HGB BLD-MCNC: 13.3 G/DL (ref 14–18)
IMM GRANULOCYTES # BLD AUTO: 0.56 K/UL (ref 0–0.11)
IMM GRANULOCYTES NFR BLD AUTO: 3.9 % (ref 0–0.9)
LYMPHOCYTES # BLD AUTO: 3.02 K/UL (ref 1–4.8)
LYMPHOCYTES NFR BLD: 21 % (ref 22–41)
MCH RBC QN AUTO: 32.4 PG (ref 27–33)
MCHC RBC AUTO-ENTMCNC: 33.2 G/DL (ref 33.7–35.3)
MCV RBC AUTO: 97.6 FL (ref 81.4–97.8)
MONOCYTES # BLD AUTO: 1.03 K/UL (ref 0–0.85)
MONOCYTES NFR BLD AUTO: 7.1 % (ref 0–13.4)
NEUTROPHILS # BLD AUTO: 8.61 K/UL (ref 1.82–7.42)
NEUTROPHILS NFR BLD: 59.8 % (ref 44–72)
NRBC # BLD AUTO: 0 K/UL
NRBC BLD-RTO: 0 /100 WBC
PLATELET # BLD AUTO: 248 K/UL (ref 164–446)
PMV BLD AUTO: 10.6 FL (ref 9–12.9)
POTASSIUM SERPL-SCNC: 4.3 MMOL/L (ref 3.6–5.5)
RBC # BLD AUTO: 4.11 M/UL (ref 4.7–6.1)
SODIUM SERPL-SCNC: 137 MMOL/L (ref 135–145)
WBC # BLD AUTO: 14.4 K/UL (ref 4.8–10.8)

## 2022-01-20 PROCEDURE — 85025 COMPLETE CBC W/AUTO DIFF WBC: CPT

## 2022-01-20 PROCEDURE — 92526 ORAL FUNCTION THERAPY: CPT

## 2022-01-20 PROCEDURE — A9270 NON-COVERED ITEM OR SERVICE: HCPCS | Performed by: STUDENT IN AN ORGANIZED HEALTH CARE EDUCATION/TRAINING PROGRAM

## 2022-01-20 PROCEDURE — 700102 HCHG RX REV CODE 250 W/ 637 OVERRIDE(OP): Performed by: HOSPITALIST

## 2022-01-20 PROCEDURE — 94640 AIRWAY INHALATION TREATMENT: CPT

## 2022-01-20 PROCEDURE — 700102 HCHG RX REV CODE 250 W/ 637 OVERRIDE(OP): Performed by: STUDENT IN AN ORGANIZED HEALTH CARE EDUCATION/TRAINING PROGRAM

## 2022-01-20 PROCEDURE — A9270 NON-COVERED ITEM OR SERVICE: HCPCS | Performed by: HOSPITALIST

## 2022-01-20 PROCEDURE — 99232 SBSQ HOSP IP/OBS MODERATE 35: CPT | Performed by: STUDENT IN AN ORGANIZED HEALTH CARE EDUCATION/TRAINING PROGRAM

## 2022-01-20 PROCEDURE — 80048 BASIC METABOLIC PNL TOTAL CA: CPT

## 2022-01-20 PROCEDURE — 770001 HCHG ROOM/CARE - MED/SURG/GYN PRIV*

## 2022-01-20 PROCEDURE — 97530 THERAPEUTIC ACTIVITIES: CPT | Mod: CQ

## 2022-01-20 PROCEDURE — 97535 SELF CARE MNGMENT TRAINING: CPT | Mod: CO

## 2022-01-20 RX ORDER — ACETAMINOPHEN 325 MG/1
650 TABLET ORAL EVERY 6 HOURS PRN
Status: DISCONTINUED | OUTPATIENT
Start: 2022-01-20 | End: 2022-01-25 | Stop reason: HOSPADM

## 2022-01-20 RX ORDER — LOSARTAN POTASSIUM 50 MG/1
25 TABLET ORAL DAILY
Status: DISCONTINUED | OUTPATIENT
Start: 2022-01-21 | End: 2022-01-25 | Stop reason: HOSPADM

## 2022-01-20 RX ORDER — CLONIDINE HYDROCHLORIDE 0.1 MG/1
0.1 TABLET ORAL EVERY 6 HOURS PRN
Status: DISCONTINUED | OUTPATIENT
Start: 2022-01-20 | End: 2022-01-25 | Stop reason: HOSPADM

## 2022-01-20 RX ORDER — AMOXICILLIN 250 MG
2 CAPSULE ORAL 2 TIMES DAILY PRN
Status: DISCONTINUED | OUTPATIENT
Start: 2022-01-20 | End: 2022-01-25 | Stop reason: HOSPADM

## 2022-01-20 RX ORDER — ONDANSETRON 4 MG/1
4 TABLET, ORALLY DISINTEGRATING ORAL EVERY 4 HOURS PRN
Status: DISCONTINUED | OUTPATIENT
Start: 2022-01-20 | End: 2022-01-25 | Stop reason: HOSPADM

## 2022-01-20 RX ORDER — BACLOFEN 10 MG/1
10 TABLET ORAL NIGHTLY
Status: DISCONTINUED | OUTPATIENT
Start: 2022-01-20 | End: 2022-01-21

## 2022-01-20 RX ORDER — BACLOFEN 10 MG/1
10 TABLET ORAL NIGHTLY
Status: DISCONTINUED | OUTPATIENT
Start: 2022-01-20 | End: 2022-01-20

## 2022-01-20 RX ORDER — ATORVASTATIN CALCIUM 80 MG/1
80 TABLET, FILM COATED ORAL EVERY EVENING
Status: DISCONTINUED | OUTPATIENT
Start: 2022-01-21 | End: 2022-01-25 | Stop reason: HOSPADM

## 2022-01-20 RX ORDER — GABAPENTIN 100 MG/1
100 CAPSULE ORAL 2 TIMES DAILY
Status: DISCONTINUED | OUTPATIENT
Start: 2022-01-21 | End: 2022-01-25 | Stop reason: HOSPADM

## 2022-01-20 RX ORDER — POLYETHYLENE GLYCOL 3350 17 G/17G
1 POWDER, FOR SOLUTION ORAL
Status: DISCONTINUED | OUTPATIENT
Start: 2022-01-20 | End: 2022-01-25 | Stop reason: HOSPADM

## 2022-01-20 RX ORDER — GABAPENTIN 100 MG/1
100 CAPSULE ORAL NIGHTLY
Status: DISCONTINUED | OUTPATIENT
Start: 2022-01-20 | End: 2022-01-20

## 2022-01-20 RX ORDER — BISACODYL 10 MG
10 SUPPOSITORY, RECTAL RECTAL
Status: DISCONTINUED | OUTPATIENT
Start: 2022-01-20 | End: 2022-01-25 | Stop reason: HOSPADM

## 2022-01-20 RX ORDER — FAMOTIDINE 20 MG/1
10 TABLET, FILM COATED ORAL 2 TIMES DAILY
Status: DISCONTINUED | OUTPATIENT
Start: 2022-01-21 | End: 2022-01-25 | Stop reason: HOSPADM

## 2022-01-20 RX ORDER — BUPROPION HYDROCHLORIDE 100 MG/1
100 TABLET ORAL 2 TIMES DAILY
Status: DISCONTINUED | OUTPATIENT
Start: 2022-01-21 | End: 2022-01-25 | Stop reason: HOSPADM

## 2022-01-20 RX ORDER — GABAPENTIN 100 MG/1
100 CAPSULE ORAL 2 TIMES DAILY
Status: DISCONTINUED | OUTPATIENT
Start: 2022-01-20 | End: 2022-01-20

## 2022-01-20 RX ADMIN — BUPROPION HYDROCHLORIDE 100 MG: 100 TABLET, FILM COATED ORAL at 18:09

## 2022-01-20 RX ADMIN — APIXABAN 5 MG: 5 TABLET, FILM COATED ORAL at 18:09

## 2022-01-20 RX ADMIN — GABAPENTIN 100 MG: 100 CAPSULE ORAL at 04:53

## 2022-01-20 RX ADMIN — BUPROPION HYDROCHLORIDE 100 MG: 100 TABLET, FILM COATED ORAL at 04:53

## 2022-01-20 RX ADMIN — HYDROCORTISONE: 10 CREAM TOPICAL at 04:53

## 2022-01-20 RX ADMIN — LOSARTAN POTASSIUM 25 MG: 50 TABLET, FILM COATED ORAL at 04:53

## 2022-01-20 RX ADMIN — FAMOTIDINE 10 MG: 20 TABLET ORAL at 18:09

## 2022-01-20 RX ADMIN — GABAPENTIN 100 MG: 100 CAPSULE ORAL at 18:09

## 2022-01-20 RX ADMIN — BUDESONIDE AND FORMOTEROL FUMARATE DIHYDRATE 2 PUFF: 80; 4.5 AEROSOL RESPIRATORY (INHALATION) at 10:41

## 2022-01-20 RX ADMIN — BACLOFEN 10 MG: 10 TABLET ORAL at 04:55

## 2022-01-20 RX ADMIN — ATORVASTATIN CALCIUM 80 MG: 80 TABLET, FILM COATED ORAL at 18:09

## 2022-01-20 RX ADMIN — BACLOFEN 10 MG: 10 TABLET ORAL at 20:16

## 2022-01-20 RX ADMIN — ACETAMINOPHEN 650 MG: 325 TABLET, FILM COATED ORAL at 00:39

## 2022-01-20 RX ADMIN — FAMOTIDINE 10 MG: 20 TABLET ORAL at 04:54

## 2022-01-20 RX ADMIN — APIXABAN 5 MG: 5 TABLET, FILM COATED ORAL at 04:55

## 2022-01-20 ASSESSMENT — COGNITIVE AND FUNCTIONAL STATUS - GENERAL
WALKING IN HOSPITAL ROOM: TOTAL
DRESSING REGULAR UPPER BODY CLOTHING: A LOT
MOVING FROM LYING ON BACK TO SITTING ON SIDE OF FLAT BED: UNABLE
DAILY ACTIVITIY SCORE: 8
MOBILITY SCORE: 6
STANDING UP FROM CHAIR USING ARMS: TOTAL
MOVING TO AND FROM BED TO CHAIR: UNABLE
SUGGESTED CMS G CODE MODIFIER MOBILITY: CN
CLIMB 3 TO 5 STEPS WITH RAILING: TOTAL
TOILETING: TOTAL
PERSONAL GROOMING: A LOT
SUGGESTED CMS G CODE MODIFIER DAILY ACTIVITY: CM
TURNING FROM BACK TO SIDE WHILE IN FLAT BAD: UNABLE
DRESSING REGULAR LOWER BODY CLOTHING: TOTAL
HELP NEEDED FOR BATHING: TOTAL
EATING MEALS: TOTAL

## 2022-01-20 ASSESSMENT — ENCOUNTER SYMPTOMS
MYALGIAS: 0
FEVER: 0
HEADACHES: 0
FOCAL WEAKNESS: 1
BLURRED VISION: 0
SHORTNESS OF BREATH: 0
NAUSEA: 0
VOMITING: 0
INSOMNIA: 1
ABDOMINAL PAIN: 0

## 2022-01-20 ASSESSMENT — LIFESTYLE VARIABLES: SUBSTANCE_ABUSE: 0

## 2022-01-20 ASSESSMENT — PAIN DESCRIPTION - PAIN TYPE
TYPE: ACUTE PAIN

## 2022-01-20 ASSESSMENT — GAIT ASSESSMENTS: GAIT LEVEL OF ASSIST: UNABLE TO PARTICIPATE

## 2022-01-20 NOTE — CARE PLAN
The patient is Stable - Low risk of patient condition declining or worsening    Shift Goals  Clinical Goals: maintain skin integrity  Patient Goals: eat  Family Goals: Lin    Progress made toward(s) clinical / shift goals:  Pt educated on care plan, stroke condition and interventions such as skin breakdown prevention, fall and aspiration precautions. Pt has stable neuro status with no changes. Pt has aspiration precautions in place and verbalizes needing to swallow twice while eating. Pt is able to move RU and RL extremities and is now able to mobilize LLE with concentration he can wiggle his toes and bed his knee up and down and lift slightly off bed. Pt shows no signs of skin breakdown with barrier paste and wipes in use. Pt has fall precautions in place, bed in low and locked position, call light in reach and calls appropriately. Pt has decreased pain with rest, warm blanket and repositioning as well as medications pt has fallen asleep after being woken up every time.    Problem: Knowledge Deficit - Stroke Education  Goal: Patient's knowledge of stroke and risk factors will improve  1/20/2022 0140 by Bernarda Craig R.N.  Outcome: Progressing  1/20/2022 0135 by Bernarda Craig R.N.  Outcome: Progressing     Problem: Neuro Status  Goal: Neuro status will remain stable or improve  1/20/2022 0140 by Bernarda Craig R.N.  Outcome: Progressing  1/20/2022 0135 by Bernarda Craig R.N.  Outcome: Progressing     Problem: Risk for Aspiration  Goal: Patient's risk for aspiration will be absent or decrease  1/20/2022 0140 by Bernarda Craig R.N.  Outcome: Progressing  1/20/2022 0135 by Bernarda Craig R.JOHN.  Outcome: Progressing     Problem: Mobility - Stroke  Goal: Patient's capacity to carry out activities will improve  1/20/2022 0140 by Bernarda Craig R.JOHN.  Outcome: Progressing  1/20/2022 0135 by Bernarda Craig R.N.  Outcome: Progressing     Problem: Skin Integrity  Goal:  Skin integrity is maintained or improved  1/20/2022 0140 by Bernarda Craig R.N.  Outcome: Progressing  1/20/2022 0135 by Bernarda Craig R.N.  Outcome: Progressing     Problem: Fall Risk  Goal: Patient will remain free from falls  1/20/2022 0140 by Bernarda Craig R.N.  Outcome: Progressing  1/20/2022 0135 by Bernarda Craig R.N.  Outcome: Progressing     Problem: Pain - Standard  Goal: Alleviation of pain or a reduction in pain to the patient’s comfort goal  1/20/2022 0140 by Bernarda Craig R.N.  Outcome: Progressing  1/20/2022 0135 by Bernarda Craig R.N.  Outcome: Progressing

## 2022-01-20 NOTE — DISCHARGE PLANNING
Anticipated Discharge Disposition: SNF    Action:  Supervisor Kennedy Tello requested that this CM send a blanket referral to local SNFs while [place] SNFs are pending.  Requested DPA Daina to send blanket referral.    Barriers to Discharge: SNF acceptance    Plan: Will need to follow up with SNFs.

## 2022-01-20 NOTE — DISCHARGE PLANNING
[facility]itional [facility] Coordination    Aware of repeat PMR referral from Dr. Magali Robles.  Initial PMR consult completed 1/4/22.    Forwarding to PMR for follow up review.

## 2022-01-20 NOTE — PROGRESS NOTES
[facility] Medicine Daily Progress Note    Date of Service  1/20/2022    Chief Complaint  Jose Quintanilla is a 66 y.o. male admitted 1/1/2022 with left sided hemiparesis    Hospital Course  Mr Quintanilla has past medical history which includes hypertension, hyperlipidemia, DVT/PE and was treated with anticoagulation prior to admission.  Presented on 1/1/2022 after a fall with symptoms of left hemiparesis and rightward gaze.  Patient was evaluated and cleared by trauma.  Neuro imaging was concerning for right-sided carotid occlusion.Mri noted with  Large area of acute infarction involving the right frontal and parietal lobes as well as the right-sided basal ganglia. Subacute area of infarction involving the right temporal and lateral occipital lobes.  Patient was evaluated neurology with recommendations for admission to the ICU, permissive hypertension, and follow-up imaging.  started on anticoagulation on 1/13/2022 .Chest x-ray noted with bilateral interstitial opacities concerning for aspiration pneumonia, he was treated with complete course of Augmentin. Pt also has asthma and was treated for acute asthma exacerbation. SLP following. patient did have tube feeds, modified diet started on 1/17.   Patient is pending placement in [place] to be near family.          Interval Problem Update  Pt seen and examined, no issues overnight, more lethargic today, likely due to lack of sleep over last several days as well as addition of gabapentin and baclofen for nerve pain/spasms   Decreased gabapentin to BID and baclofen to nightly   Hemodynamically stable   Pt is medically stable, pending acceptance to SNF    [place] plan is to DC to [place], CM will send referral locally as well. Discharge pending acceptance. Not a rehab candidate due to level of care needed.      I have personally seen and examined the patient at bedside. I discussed the plan of care with patient, family, bedside RN and case  manager.    Consultants/Specialty  critical care, neurology, palliative care and physiatry    Code Status  DNAR/DNI    Disposition  Patient is medically cleared for discharge.   Anticipate discharge to to skilled nursing facility.  I have placed the appropriate orders for post-discharge needs.    Review of Systems  Review of Systems   Constitutional: Positive for malaise/fatigue. Negative for fever.   HENT: Negative for congestion.    Eyes: Negative for blurred vision.   Respiratory: Negative for shortness of breath.    Cardiovascular: Negative for chest pain.   Gastrointestinal: Negative for abdominal pain, nausea and vomiting.   Musculoskeletal: Negative for myalgias.   Neurological: Positive for focal weakness. Negative for headaches.   Psychiatric/Behavioral: Negative for substance abuse. The patient has insomnia.         Physical Exam  Temp:  [36.1 °C (97 °F)-36.6 °C (97.8 °F)] 36.4 °C (97.5 °F)  Pulse:  [81-89] 81  Resp:  [17-18] 17  BP: (128-154)/(74-99) 128/74  SpO2:  [91 %-97 %] 93 %    Physical Exam  Vitals and nursing note reviewed.   Constitutional:       Appearance: He is obese. He is ill-appearing. He is not toxic-appearing.   HENT:      Head: Normocephalic and atraumatic.      Mouth/Throat:      Mouth: Mucous membranes are moist.      Pharynx: Oropharynx is clear.   Eyes:      Conjunctiva/sclera: Conjunctivae normal.   Cardiovascular:      Rate and Rhythm: Normal rate and regular rhythm.      Heart sounds: No murmur heard.      Pulmonary:      Effort: Pulmonary effort is normal.      Comments: Course breath sounds  Abdominal:      General: Bowel sounds are normal.      Palpations: Abdomen is soft.   Musculoskeletal:      Cervical back: Neck supple.      Right lower leg: No edema.      Left lower leg: No edema.   Skin:     General: Skin is dry.   Neurological:      Mental Status: He is oriented to person, place, and time.      Cranial Nerves: Cranial nerve deficit (left facial droop) present.       Motor: Weakness (left hemiparesis) present.   Psychiatric:         Mood and Affect: Mood normal.         Behavior: Behavior normal.         Fluids    Intake/Output Summary (Last 24 hours) at 1/20/2022 1357  Last data filed at 1/20/2022 0455  Gross per 24 hour   Intake 390 ml   Output --   Net 390 ml       Laboratory  Recent Labs     01/18/22 0330 01/20/22 0225   WBC 17.0* 14.4*   RBC 3.94* 4.11*   HEMOGLOBIN 12.8* 13.3*   HEMATOCRIT 37.6* 40.1*   MCV 95.4 97.6   MCH 32.5 32.4   MCHC 34.0 33.2*   RDW 51.7* 54.1*   PLATELETCT 255 248   MPV 11.0 10.6     Recent Labs     01/18/22 0330 01/20/22 0225   SODIUM 137 137   POTASSIUM 4.0 4.3   CHLORIDE 103 104   CO2 24 21   GLUCOSE 97 97   BUN 18 15   CREATININE 0.62 0.74   CALCIUM 8.7 9.0                   Imaging  DX-CHEST-LIMITED (1 VIEW)   Final Result      1.  Patchy bilateral interstitial opacities. Underlying infection is possible.   2.  Stable enlargement of the cardiomediastinal silhouette.      DX-CHEST-LIMITED (1 VIEW)   Final Result         Linear opacities in the left lung base could be due to discoid atelectasis or developing infiltrate such as pneumonia or pneumonitis.      DX-ABDOMEN FOR TUBE PLACEMENT   Final Result      1.  Feeding tube tip overlies the distal stomach.      IR-PICC LINE PLACEMENT W/ GUIDANCE > AGE 5   Final Result                  Ultrasound-guided PICC placement performed by qualified nursing staff as    above.          CT-HEAD W/O   Final Result      Evolving moderate to large right middle cerebral artery territory has increased in size when compared with the previous exam. No evidence of acute intracranial hemorrhage.         DX-ABDOMEN FOR TUBE PLACEMENT   Final Result      Cortrak feeding tube tip projects in the region of the proximal/mid stomach.      US-EXTREMITY VENOUS LOWER BILAT   Final Result      MR-BRAIN-W/O   Final Result         1. Age-related cerebral atrophy. Mild effacement of the right lateral ventricle.      2. Mild  periventricular white matter changes consistent with chronic microvascular ischemic gliosis.      3. Large area of acute infarction involving the right frontal and parietal lobes as well as the right-sided basal ganglia. Subacute area of infarction involving the right temporal and lateral occipital lobes.      4. Petechial hemorrhage noted in the right basal ganglia and involving the cortex in the right temporal and lateral occipital lobes.      5. Right internal carotid artery occlusion or high-grade stenosis.            EC-ECHOCARDIOGRAM COMPLETE W/O CONT   Final Result      DX-SHOULDER 2+ LEFT   Final Result      Negative 2 views of left shoulder.      DX-SHOULDER 2+ RIGHT   Final Result      Moderate subacromial spurring      DX-FOOT-COMPLETE 3+ RIGHT   Final Result      No radiographic evidence of acute displaced fracture or subluxation.      DX-FOOT-2- LEFT   Final Result      No radiographic evidence of acute displaced fracture or subluxation.      DX-ELBOW-COMPLETE 3+ RIGHT   Final Result      No radiographic evidence of acute traumatic injury.      Limited due to rotation of the lateral radiograph attempts. Consider repeat lateral radiograph to help exclude effusion      CT-CHEST,ABDOMEN,PELVIS WITH   Final Result      No significant traumatic abnormality in thorax, abdomen and pelvis CT scans.      Hepatic steatosis      Moderate colonic diverticulosis.      CT-LSPINE W/O PLUS RECONS   Final Result      No CT evidence of acute traumatic injury.      Moderate degenerative disc disease with trace degenerative L5/S1 retrolisthesis      CT-TSPINE W/O PLUS RECONS   Final Result      No CT evidence of acute traumatic abnormality.      CT-CSPINE WITHOUT PLUS RECONS   Final Result      No CT evidence of acute cervical spine abnormality.      CT-HEAD W/O   Final Result      Subacute right temporal, temporo-occipital infarction without hemorrhagic transformation      CT-CTA HEAD WITH & W/O-POST PROCESS   Final  Result      Right internal carotid artery occlusion with extension to the MCA which lacks contrast opacification      Right A1 and KATIA contrast opacification secondary to cross-filling from the left      Normal left anterior and bilateral posterior circulation      Findings of all of the CTs were discussed with Dr. Betancourt before completion of this dictation.            CT-CTA NECK WITH & W/O-POST PROCESSING   Final Result      Occluded right internal carotid artery at its origin      Atherosclerosis at origin of bilateral vertebral arteries could obscure stenosis. Arteries are patent without post stenotic dilatation      DX-PELVIS-1 OR 2 VIEWS   Final Result      Negative AP view of the pelvis.      DX-CHEST-LIMITED (1 VIEW)   Final Result      No acute cardiopulmonary disease.      US-ABORTED US PROCEDURE    (Results Pending)        Assessment/Plan  * Stroke due to thrombosis of right carotid artery (HCC)- (present on admission)  Assessment & Plan  Extensive stroke due to thrombosis of right carotid artery  Occurred while on anticoagulation with warfarin, INR slightly subtherapeutic at 1.8  Cytotoxic edema has improved, appreciate neurology recommendations, low sodium to normalize  High intensity statin  On eliquis 5 mg BID  PT/OT/speech  Needs rehab, pending placement in [place]   Continue inpatient therapies to prevent further deconditioning   Pt with nerve pain and spasms, baclofen at night added, low dose gabapentin    Moderate asthma with acute exacerbation  Assessment & Plan  On room air   He is allergic to cat.  On Advair at home  Duoneb Q4H  Solumedrol 40 mg daily X 5 days   on symbicort    Allergy  Assessment & Plan  Diffuse erythema  Likely penicillin induced /drug allergy   Improving with steroid and benadryl   resolved    Aspiration pneumonia (HCC)  Assessment & Plan  Completed 7 days course of antibiotics   perocal negative   Saturating well on room air   Aspiration precautions, SLP following      Sepsis (HCC)  Assessment & Plan  This is Sepsis Not present on admission  SIRS criteria identified on my evaluation include: Tachycardia, with heart rate greater than 90 BPM  Source is lungs  Sepsis protocol initiated  Fluid resuscitation ordered per protocol  IV antibiotics as appropriate for source of sepsis  While organ dysfunction may be noted elsewhere in this problem list or in the chart, degree of organ dysfunction does not meet CMS criteria for severe sepsis    Resolved    Depression- (present on admission)  Assessment & Plan  Outpatient Wellbutrin and Lexapro    Chronic anticoagulation- (present on admission)  Assessment & Plan  On eliquis    Dyslipidemia- (present on admission)  Assessment & Plan  Atorvastatin    Primary hypertension- (present on admission)  Assessment & Plan  Goal normotensive  Continue monitoring, consider restarting enteral blood pressure pressure agent if needed.       VTE prophylaxis: therapeutic anticoagulation with Eliquis    I have performed a physical exam and reviewed and updated ROS and Plan today (1/20/2022). In review of yesterday's note (1/19/2022), there are no changes except as documented above.

## 2022-01-20 NOTE — THERAPY
Physical Therapy   Daily Treatment     Patient Name: Jose Quintanilla  Age:  66 y.o., Sex:  male  Medical Record #: [id number]  Today's Date: 1/20/2022       Precautions: Fall Risk;Swallow Precautions ( See Comments)  Comments: left side deficits     Assessment    Pt was more lethargic today and fatigued. He required total A and poor initiation. Once at EOB he continues to require maxA for trunk control. Due to difficulty maintaining arousal deferred further balance activities and assisted pt back to supine. Will continue to follow while in house.     Plan    Continue current treatment plan.    DC Equipment Recommendations: Unable to determine at this time  Discharge Recommendations: Recommend post-acute placement for additional physical therapy services prior to discharge home         01/20/22 0915   Cognition    Safety Awareness Impaired   New Learning Impaired   Attention Impaired   Initiation Impaired   Comments more lethargic and fatigued today    Balance   Sitting Balance (Static) Trace   Sitting Balance (Dynamic) Trace   Comments more lethargic today difficulty maintaining arousal    Gait Analysis   Gait Level Of Assist Unable to Participate   Bed Mobility    Supine to Sit Total Assist   Sit to Supine Total Assist   Scooting Total Assist   Rolling Total Assist to Lt.;Total Assist to Rt.   Skilled Intervention Verbal Cuing   Comments poor initiation, total A for all mobility at this time    Functional Mobility   Sit to Stand Unable to Participate   Short Term Goals    Short Term Goal # 1 Patient will move supine<>sitting EOB with bed features and mod A within 6tx in order to get in/out of bed   Goal Outcome # 1 goal not met   Short Term Goal # 2 Patient will maintain dynamic sitting balance for 5 min with min A within 6tx in order to perform functional task   Goal Outcome # 2 Goal not met   Short Term Goal # 3 Patient will transfer with mod A within 6tx in order to achieve functional transfer   Goal Outcome # 3 Goal  not met

## 2022-01-20 NOTE — CARE PLAN
The patient is Stable - Low risk of patient condition declining or worsening    Shift Goals  Clinical Goals: maintain skin integrity  Patient Goals: eat  Family Goals: Lin    Progress made toward(s) clinical / shift goals:  Pt educated on care plan, stroke condition and interventions such as skin breakdown prevention, fall and aspiration precautions. Pt has stable neuro status with no changes. Pt has aspiration precautions in place and verbalizes needing to swallow twice while eating. Pt is able to move RU and RL extremities and is now able to mobilize LLE with concentration he can wiggle his toes and bed his knee up and down and lift slightly off bed. Pt shows no signs of skin breakdown with barrier paste and wipes in use. Pt has fall precautions in place, bed in low and locked position, call light in reach and calls appropriately. Pt has decreased pain with rest, warm blanket and repositioning as well as medications pt has fallen asleep after being woken up every time.

## 2022-01-20 NOTE — DISCHARGE PLANNING
Agency[facility]Facility Name: [facility]   Outcome: DPA left a voicemail for admissions regarding bed availability.     Agency/Facility Name: [facility]  Outcome: DPA left a voicemail regarding referral. DPA requested a call back.    Agency/Facility Name: [facility]   Outcome: DPA left a voicemail for admissions regarding referral. DPA requested a call back.     1321  Received Choice form at 1203  Agency[facility]Facility Name: [facility]  Referral sent per Choice form @ 1321     1443  Agency[facility]Facility Name: [facility]   Spoke To: Idalia   Outcome: Per Idalia pt is accepted and would like to talk with case manger. Phone number for admissions was given   [phone] her name is Bhupinder.   RN CM Notified

## 2022-01-20 NOTE — DISCHARGE PLANNING
Dr. Pederson to f/u today.     1121-Please review the consult from Dr. Pederson regarding post acute recommendations.  [facility] will no longer follow.  Please reach out to myself @ [phone] with any questions.

## 2022-01-20 NOTE — THERAPY
Speech Language Pathology  Daily Treatment     Patient Name: Jose Quintanilla  Age:  66 y.o., Sex:  male  Medical Record #: [id number]  Today's Date: 1/20/2022     Precautions  Precautions: (P) Fall Risk,Swallow Precautions ( See Comments)  Comments: L side deficits    Assessment    The patient was seen on this date with his minced moist breakfast with mildly thick liquids and trials of soft and bite sized and thin liquids. The patient was able to verbalize swallow strategies with min verbal cues. RN and CNA endorse patient is consuming current diet without difficulty. The patient had min lingual residue with minced moist textures, which he cleared with mod verbal cues to alternate bites and sips. Less residue appreciated with soft and bite sized textures. Thin liquids were consumed with timely swallow trigger. No overt s/sx of aspiration appreciated. Patient noted to be very sleepy- likely r/t medications and patient recently waking up.       Recommendations  1. Upgrade to Soft and bite sized with thin liquids  2. Swallowing Instructions & Precautions:   Supervision: 1:1 feeding with constant supervision  Positioning: Fully upright and midline during oral intake  Medication: Whole with puree  Strategies: Small bites/sips, Slow rate of intake, Multiple swallows (x 2) per bite/sip , no straws  Oral Care: Q6h   4. Diligent oral care and mobilization per PT/OT recs to mitigate risk for aspiration pneumonia       Plan    Continue current treatment plan.    Discharge Recommendations: Recommend post-acute placement for additional speech therapy services prior to discharge home     Objective       01/20/22 0921   Dysphagia    Positioning / Behavior Modification Cough / Clear after Swallow;Self Monitoring;Modulate Rate or Bite Size   Diet / Liquid Recommendation Thin (0);Soft & Bite-Sized (6) - (Dysphagia III)   Nutritional Liquid Intake Rating Scale Non thickened beverages   Nutritional Food Intake Rating Scale Total oral diet  "with multiple consistencies without special preparation but with specific food limitations   Nursing Communication Swallow Precaution Sign Posted at Head of Bed   Skilled Intervention Compensatory Strategies   Recommended Route of Medication Administration   Medication Administration  Float Whole with Puree   Patient / Family Goals   Patient / Family Goal #1 \"Popsicles\"   Goal #1 Outcome Progressing as expected   Short Term Goals   Short Term Goal # 1 NEW 1/20: Pt will consume SB6/TN0 meal with no overt s/sx of aspiration    Short Term Goal # 4 NEW 1/17: Pt will consume a diet of MM5/MT2 with no s/sx of aspiration and min cues.    Goal Outcome  # 4 Goal met         "

## 2022-01-20 NOTE — CARE PLAN
The patient is Stable - Low risk of patient condition declining or worsening    Shift Goals  Clinical Goals: Maintain skin integrity  Patient Goals: Eat  Family Goals: OMAYRA    Progress made toward(s) clinical / shift goals:    Problem: Neuro Status  Goal: Neuro status will remain stable or improve  Outcome: Progressing  Note: Pt's neuro status remains stable.     Problem: Dysphagia  Goal: Dysphagia will improve  Outcome: Progressing  Note: Pt on modified diet, head of bed at 90 degrees when eating, exhibits no coughing during eating.      Problem: Skin Integrity  Goal: Skin integrity is maintained or improved  Outcome: Progressing  Note: Pt on low air loss mattress, q2 hr turns in place, heel float boots on, pillows in use for repositioning.

## 2022-01-21 PROCEDURE — 99232 SBSQ HOSP IP/OBS MODERATE 35: CPT | Performed by: STUDENT IN AN ORGANIZED HEALTH CARE EDUCATION/TRAINING PROGRAM

## 2022-01-21 PROCEDURE — 700102 HCHG RX REV CODE 250 W/ 637 OVERRIDE(OP): Performed by: STUDENT IN AN ORGANIZED HEALTH CARE EDUCATION/TRAINING PROGRAM

## 2022-01-21 PROCEDURE — A9270 NON-COVERED ITEM OR SERVICE: HCPCS | Performed by: STUDENT IN AN ORGANIZED HEALTH CARE EDUCATION/TRAINING PROGRAM

## 2022-01-21 PROCEDURE — 92526 ORAL FUNCTION THERAPY: CPT

## 2022-01-21 PROCEDURE — 770001 HCHG ROOM/CARE - MED/SURG/GYN PRIV*

## 2022-01-21 PROCEDURE — 94640 AIRWAY INHALATION TREATMENT: CPT

## 2022-01-21 RX ORDER — BACLOFEN 10 MG/1
10 TABLET ORAL 2 TIMES DAILY
Status: DISCONTINUED | OUTPATIENT
Start: 2022-01-21 | End: 2022-01-25 | Stop reason: HOSPADM

## 2022-01-21 RX ADMIN — GABAPENTIN 100 MG: 100 CAPSULE ORAL at 05:49

## 2022-01-21 RX ADMIN — FAMOTIDINE 10 MG: 20 TABLET, FILM COATED ORAL at 17:21

## 2022-01-21 RX ADMIN — GABAPENTIN 100 MG: 100 CAPSULE ORAL at 17:21

## 2022-01-21 RX ADMIN — LOSARTAN POTASSIUM 25 MG: 50 TABLET, FILM COATED ORAL at 05:49

## 2022-01-21 RX ADMIN — ACETAMINOPHEN 650 MG: 325 TABLET, FILM COATED ORAL at 10:41

## 2022-01-21 RX ADMIN — FAMOTIDINE 10 MG: 20 TABLET, FILM COATED ORAL at 05:49

## 2022-01-21 RX ADMIN — ACETAMINOPHEN 650 MG: 325 TABLET, FILM COATED ORAL at 01:30

## 2022-01-21 RX ADMIN — ATORVASTATIN CALCIUM 80 MG: 80 TABLET, FILM COATED ORAL at 17:21

## 2022-01-21 RX ADMIN — HYDROCORTISONE: 10 CREAM TOPICAL at 17:20

## 2022-01-21 RX ADMIN — BUDESONIDE AND FORMOTEROL FUMARATE DIHYDRATE 2 PUFF: 80; 4.5 AEROSOL RESPIRATORY (INHALATION) at 09:14

## 2022-01-21 RX ADMIN — BACLOFEN 10 MG: 10 TABLET ORAL at 17:20

## 2022-01-21 RX ADMIN — BUPROPION HYDROCHLORIDE 100 MG: 100 TABLET, FILM COATED ORAL at 05:48

## 2022-01-21 RX ADMIN — HYDROCORTISONE: 10 CREAM TOPICAL at 05:49

## 2022-01-21 RX ADMIN — APIXABAN 5 MG: 5 TABLET, FILM COATED ORAL at 05:49

## 2022-01-21 RX ADMIN — BUPROPION HYDROCHLORIDE 100 MG: 100 TABLET, FILM COATED ORAL at 17:20

## 2022-01-21 RX ADMIN — APIXABAN 5 MG: 5 TABLET, FILM COATED ORAL at 17:21

## 2022-01-21 ASSESSMENT — ENCOUNTER SYMPTOMS
SHORTNESS OF BREATH: 0
VOMITING: 0
BLURRED VISION: 0
INSOMNIA: 0
FOCAL WEAKNESS: 1
FEVER: 0
MYALGIAS: 0
ABDOMINAL PAIN: 0
NAUSEA: 0
HEADACHES: 0

## 2022-01-21 ASSESSMENT — PAIN DESCRIPTION - PAIN TYPE: TYPE: CHRONIC PAIN

## 2022-01-21 ASSESSMENT — LIFESTYLE VARIABLES: SUBSTANCE_ABUSE: 0

## 2022-01-21 NOTE — CARE PLAN
The patient is Stable - Low risk of patient condition declining or worsening    Shift Goals  Clinical Goals: Maintain skin integrity  Patient Goals: Sleep  Family Goals: Lin    Progress made toward(s) clinical / shift goals:    Problem: Neuro Status  Goal: Neuro status will remain stable or improve  Outcome: Progressing  Note: Pt's neuro status remains stable.       Problem: Skin Integrity  Goal: Skin integrity is maintained or improved  Outcome: Progressing  Note: Pt on a low air loss mattress, pillows in use for repositioning, Q2 hr turns in place, condom catheter in use to prevent excess moisture.

## 2022-01-21 NOTE — THERAPY
Speech Language Pathology  Daily Treatment     Patient Name: Jose Quintanilla  Age:  66 y.o., Sex:  male  Medical Record #: [id number]  Today's Date: 1/21/2022     Precautions  Precautions: (P) Fall Risk,Swallow Precautions ( See Comments)  Comments: (P) L side deficits    Assessment    Dysphagia follow up completed with pt's cousin present at bedside. Pt seen with breakfast tray of soft and bite sized solids and thin liquids. Pt required assist for tray set up. Pt recalled safe swallow strategies independently but required intermittent cues to follow strategies during meal, particularly with clearing left-sided pocketing. Significant left-sided anterior spillage of thin liquids across trials, both from cup and straw. Reduced mastication with mild-moderate left-sided pocketing of solids. Pt reported he felt the pocketing but required cues to clear consistently. Applesauce wash > liquid wash in clearing material on first attempt. Pharyngeal swallow appeared less coordinated with use of straw sip, as evidenced by audible pharyngeal swallow and cough, concerning for possible airway invasion. No other clinical s/sx aspiration noted throughout breakfast. Of note, pt required a break after consuming slightly less than half of his breakfast. SLP discussed with RN to monitor pt's intake, possibly warranting a downgrade in solids if pt does not meet nutritional needs. Overall, pt is very motivated to participate in SLP.     Recommendations  1. Continue soft and bite sized with thin liquids by cup only  2. Swallowing Instructions & Precautions:   Supervision: 1:1 feeding with constant supervision  Positioning: Fully upright and midline during oral intake  Medication: Whole with puree  Strategies: Small bites/sips, Slow rate of intake, Multiple swallows (x 2) per bite/sip, no straws, monitor left-sided pocketing throughout meal (give bite of applesauce or sip of liquid)  Oral Care: Q6h   4. Diligent oral care and mobilization per  "PT/OT recs to mitigate risk for aspiration pneumonia    Plan    Continue current treatment plan.    Discharge Recommendations: (P) Recommend post-acute placement for additional speech therapy services prior to discharge home       Objective       01/21/22 0944   Vitals   O2 (LPM) 0   O2 Delivery Device None - Room Air   Dysphagia    Dysphagia X   Positioning / Behavior Modification Cough / Clear after Swallow;Self Monitoring;Modulate Rate or Bite Size;Multiple Swallows   Other Treatments breakfast tray of SB6/TN0   Diet / Liquid Recommendation Thin (0);Soft & Bite-Sized (6) - (Dysphagia III)   Nutritional Liquid Intake Rating Scale Non thickened beverages   Nutritional Food Intake Rating Scale Total oral diet with multiple consistencies but requiring special preparation or compensations   Nursing Communication Swallow Precaution Sign Posted at Head of Bed   Skilled Intervention Compensatory Strategies;Verbal Cueing   Comments intermittent cues for L pocketing   Recommended Route of Medication Administration   Medication Administration  Float Whole with Puree   Patient / Family Goals   Patient / Family Goal #1 \"Popsicles\"   Goal #1 Outcome Progressing as expected   Short Term Goals   Short Term Goal # 1 NEW 1/20: Pt will consume SB6/TN0 meal with no overt s/sx of aspiration    Goal Outcome # 1 Progressing as expected         "

## 2022-01-21 NOTE — CARE PLAN
The patient is Stable - Low risk of patient condition declining or worsening    Shift Goals  Clinical Goals: Keep skin clean and dry  Patient Goals: Pain control  Family Goals: OMAYRA    Progress made toward(s) clinical / shift goals:  Condom cath in place and pt checked for soiled linen with Q2hour turns. Pt educated on pain scale and medication per MAR. Requested reordering BID baclofen for pt due to back spasms.    Patient is not progressing towards the following goals:  N/A

## 2022-01-21 NOTE — CARE PLAN
The patient is Stable - Low risk of patient condition declining or worsening    Shift Goals  Clinical Goals: maintain skin integrity, monitor neuro status  Patient Goals: rest  Family Goals: OMAYRA    Progress made toward(s) clinical / shift goals:    Problem: Neuro Status  Goal: Neuro status will remain stable or improve  Outcome: Progressing   Aox3. Patient left upper arm flaccid patient education on using right arm to move arm.   Problem: Respiratory - Stroke Patient  Goal: Patient will achieve/maintain optimum respiratory rate/effort  Outcome: Progressing   Respirations even and unlabored  Problem: Urinary Elimination  Goal: Establish and maintain regular urinary output  Outcome: Progressing     Problem: Skin Integrity  Goal: Skin integrity is maintained or improved  Outcome: Progressing  Turns q 2 hrs.

## 2022-01-21 NOTE — DISCHARGE PLANNING
[facility]    Action: ORLIN WHITTEN sent transportation quote request to [employer].    RN CM called [facility] admissions and spoke with Idalia Director of the facility. ORLIN WHITTEN informed Idalia the patient is medically cleared for discharge. Per Idalia they can definitely accept the patient on Monday and may be able to accept the patient over the weekend.     Idalia would need MAR history, vaccine information on file, inpatient order, discharge orders, and discharge summary faxed to [phone].     Per Idalia they would not need a covid test as the patient will be traveling there and they will test the patient when he gets to their facility.     Barriers to Discharge: bed availability, transportation quotes/[employer] availablity    Plan: Follow up with medical team and ride line.

## 2022-01-21 NOTE — PROGRESS NOTES
Hospital Medicine Daily Progress Note    Date of Service  1/21/2022    Chief Complaint  Jose Quintanilla is a 66 y.o. male admitted 1/1/2022 with left sided hemiparesis    Hospital Course  Mr Quintanilla has past medical history which includes hypertension, hyperlipidemia, DVT/PE and was treated with anticoagulation prior to admission.  Presented on 1/1/2022 after a fall with symptoms of left hemiparesis and rightward gaze.  Patient was evaluated and cleared by trauma.  Neuro imaging was concerning for right-sided carotid occlusion.Mri noted with  Large area of acute infarction involving the right frontal and parietal lobes as well as the right-sided basal ganglia. Subacute area of infarction involving the right temporal and lateral occipital lobes.  Patient was evaluated neurology with recommendations for admission to the ICU, permissive hypertension, and follow-up imaging.  started on anticoagulation on 1/13/2022 .Chest x-ray noted with bilateral interstitial opacities concerning for aspiration pneumonia, he was treated with complete course of Augmentin. Pt also has asthma and was treated for acute asthma exacerbation. SLP following. patient did have tube feeds, modified diet started on 1/17.   Patient is pending placement in [place] to be near family.          Interval Problem Update  Pt seen at bedside, no acute issues, no complaints, feels spasms and nerve pain are both improved with medications, sleeping better at night as well, still sleepy but is more awake today   Friend at bedside  Hemodynamically stable   Has been accepted to [facility] in [place],  working on transfer   Pt is medically stable for transfer     I have personally seen and examined the patient at bedside. I discussed the plan of care with patient, family, bedside RN and .    Consultants/Specialty  critical care, neurology, palliative care and physiatry    Code Status  DNAR/DNI    Disposition  Patient is medically cleared for  discharge.   Anticipate discharge to to skilled nursing facility.  I have placed the appropriate orders for post-discharge needs.    Review of Systems  Review of Systems   Constitutional: Positive for malaise/fatigue. Negative for fever.   HENT: Negative for congestion.    Eyes: Negative for blurred vision.   Respiratory: Negative for shortness of breath.    Cardiovascular: Negative for chest pain.   Gastrointestinal: Negative for abdominal pain, nausea and vomiting.   Musculoskeletal: Negative for myalgias.   Neurological: Positive for focal weakness. Negative for headaches.   Psychiatric/Behavioral: Negative for substance abuse. The patient does not have insomnia.         Physical Exam  Temp:  [36 °C (96.8 °F)-36.2 °C (97.1 °F)] 36 °C (96.8 °F)  Pulse:  [72-92] 72  Resp:  [16-18] 16  BP: (129-139)/(81-89) 138/83  SpO2:  [91 %-96 %] 95 %    Physical Exam  Vitals and nursing note reviewed.   Constitutional:       Appearance: He is obese. He is ill-appearing. He is not toxic-appearing.   HENT:      Head: Normocephalic and atraumatic.      Mouth/Throat:      Mouth: Mucous membranes are moist.      Pharynx: Oropharynx is clear.   Eyes:      Conjunctiva/sclera: Conjunctivae normal.   Cardiovascular:      Rate and Rhythm: Normal rate and regular rhythm.      Heart sounds: No murmur heard.      Pulmonary:      Effort: Pulmonary effort is normal.      Comments: Course breath sounds  Abdominal:      General: Bowel sounds are normal.      Palpations: Abdomen is soft.   Musculoskeletal:      Cervical back: Neck supple.      Right lower leg: No edema.      Left lower leg: No edema.   Skin:     General: Skin is dry.   Neurological:      Mental Status: He is oriented to person, place, and time.      Cranial Nerves: Cranial nerve deficit (left facial droop) present.      Motor: Weakness (left hemiparesis) present.   Psychiatric:         Mood and Affect: Mood normal.         Behavior: Behavior normal.          Fluids    Intake/Output Summary (Last 24 hours) at 1/21/2022 1324  Last data filed at 1/21/2022 0400  Gross per 24 hour   Intake --   Output 1250 ml   Net -1250 ml       Laboratory  Recent Labs     01/20/22  0225   WBC 14.4*   RBC 4.11*   HEMOGLOBIN 13.3*   HEMATOCRIT 40.1*   MCV 97.6   MCH 32.4   MCHC 33.2*   RDW 54.1*   PLATELETCT 248   MPV 10.6     Recent Labs     01/20/22 0225   SODIUM 137   POTASSIUM 4.3   CHLORIDE 104   CO2 21   GLUCOSE 97   BUN 15   CREATININE 0.74   CALCIUM 9.0                   Imaging  DX-CHEST-LIMITED (1 VIEW)   Final Result      1.  Patchy bilateral interstitial opacities. Underlying infection is possible.   2.  Stable enlargement of the cardiomediastinal silhouette.      DX-CHEST-LIMITED (1 VIEW)   Final Result         Linear opacities in the left lung base could be due to discoid atelectasis or developing infiltrate such as pneumonia or pneumonitis.      DX-ABDOMEN FOR TUBE PLACEMENT   Final Result      1.  Feeding tube tip overlies the distal stomach.      IR-PICC LINE PLACEMENT W/ GUIDANCE > AGE 5   Final Result                  Ultrasound-guided PICC placement performed by qualified nursing staff as    above.          CT-HEAD W/O   Final Result      Evolving moderate to large right middle cerebral artery territory has increased in size when compared with the previous exam. No evidence of acute intracranial hemorrhage.         DX-ABDOMEN FOR TUBE PLACEMENT   Final Result      Cortrak feeding tube tip projects in the region of the proximal/mid stomach.      US-EXTREMITY VENOUS LOWER BILAT   Final Result      MR-BRAIN-W/O   Final Result         1. Age-related cerebral atrophy. Mild effacement of the right lateral ventricle.      2. Mild periventricular white matter changes consistent with chronic microvascular ischemic gliosis.      3. Large area of acute infarction involving the right frontal and parietal lobes as well as the right-sided basal ganglia. Subacute area of  infarction involving the right temporal and lateral occipital lobes.      4. Petechial hemorrhage noted in the right basal ganglia and involving the cortex in the right temporal and lateral occipital lobes.      5. Right internal carotid artery occlusion or high-grade stenosis.            EC-ECHOCARDIOGRAM COMPLETE W/O CONT   Final Result      DX-SHOULDER 2+ LEFT   Final Result      Negative 2 views of left shoulder.      DX-SHOULDER 2+ RIGHT   Final Result      Moderate subacromial spurring      DX-FOOT-COMPLETE 3+ RIGHT   Final Result      No radiographic evidence of acute displaced fracture or subluxation.      DX-FOOT-2- LEFT   Final Result      No radiographic evidence of acute displaced fracture or subluxation.      DX-ELBOW-COMPLETE 3+ RIGHT   Final Result      No radiographic evidence of acute traumatic injury.      Limited due to rotation of the lateral radiograph attempts. Consider repeat lateral radiograph to help exclude effusion      CT-CHEST,ABDOMEN,PELVIS WITH   Final Result      No significant traumatic abnormality in thorax, abdomen and pelvis CT scans.      Hepatic steatosis      Moderate colonic diverticulosis.      CT-LSPINE W/O PLUS RECONS   Final Result      No CT evidence of acute traumatic injury.      Moderate degenerative disc disease with trace degenerative L5/S1 retrolisthesis      CT-TSPINE W/O PLUS RECONS   Final Result      No CT evidence of acute traumatic abnormality.      CT-CSPINE WITHOUT PLUS RECONS   Final Result      No CT evidence of acute cervical spine abnormality.      CT-HEAD W/O   Final Result      Subacute right temporal, temporo-occipital infarction without hemorrhagic transformation      CT-CTA HEAD WITH & W/O-POST PROCESS   Final Result      Right internal carotid artery occlusion with extension to the MCA which lacks contrast opacification      Right A1 and KATIA contrast opacification secondary to cross-filling from the left      Normal left anterior and bilateral  posterior circulation      Findings of all of the CTs were discussed with Dr. Betancourt before completion of this dictation.            CT-CTA NECK WITH & W/O-POST PROCESSING   Final Result      Occluded right internal carotid artery at its origin      Atherosclerosis at origin of bilateral vertebral arteries could obscure stenosis. Arteries are patent without post stenotic dilatation      DX-PELVIS-1 OR 2 VIEWS   Final Result      Negative AP view of the pelvis.      DX-CHEST-LIMITED (1 VIEW)   Final Result      No acute cardiopulmonary disease.      US-ABORTED US PROCEDURE    (Results Pending)        Assessment/Plan  * Stroke due to thrombosis of right carotid artery (HCC)- (present on admission)  Assessment & Plan  Extensive stroke due to thrombosis of right carotid artery  Occurred while on anticoagulation with warfarin, INR slightly subtherapeutic at 1.8  Cytotoxic edema has improved, appreciate neurology recommendations, low sodium to normalize  High intensity statin  On eliquis 5 mg BID  PT/OT/speech  Needs rehab, pending placement in [place]   Continue inpatient therapies to prevent further deconditioning   Pt with nerve pain and spasms, baclofen at night added, low dose gabapentin    Moderate asthma with acute exacerbation  Assessment & Plan  On room air   He is allergic to cat.  On Advair at home  Duoneb Q4H  Solumedrol 40 mg daily X 5 days   on symbicort    Allergy  Assessment & Plan  Diffuse erythema  Likely penicillin induced /drug allergy   Improving with steroid and benadryl   resolved    Aspiration pneumonia ([facility])  Assessment & Plan  Completed 7 days course of antibiotics   perocal negative   Saturating well on room air   Aspiration precautions, SLP following     Sepsis ([facility])  Assessment & Plan  This is Sepsis Not present on admission  SIRS criteria identified on my evaluation include: Tachycardia, with heart rate greater than 90 BPM  Source is lungs  Sepsis protocol initiated  Fluid resuscitation  ordered per protocol  IV antibiotics as appropriate for source of sepsis  While organ dysfunction may be noted elsewhere in this problem list or in the chart, degree of organ dysfunction does not meet CMS criteria for severe sepsis    Resolved    Depression- (present on admission)  Assessment & Plan  Outpatient Wellbutrin and Lexapro    Chronic anticoagulation- (present on admission)  Assessment & Plan  On eliquis    Dyslipidemia- (present on admission)  Assessment & Plan  Atorvastatin    Primary hypertension- (present on admission)  Assessment & Plan  Goal normotensive  Continue monitoring, consider restarting enteral blood pressure pressure agent if needed.       VTE prophylaxis: therapeutic anticoagulation with Eliquis    I have performed a physical exam and reviewed and updated ROS and Plan today (1/21/2022). In review of yesterday's note (1/20/2022), there are no changes except as documented above.

## 2022-01-21 NOTE — THERAPY
Occupational Therapy  Daily Treatment     Patient Name: Jose Quintanilla  Age:  66 y.o., Sex:  male  Medical Record #: [id number]  Today's Date: 1/20/2022       Precautions: Fall Risk,Swallow Precautions ( See Comments)  Comments: L side deficits     Assessment    Pt seen for OT tx. Continues to be limited by decreased activity tolerance, balance deficits and LUE weakness impacting ability to complete ADLs and ADL transfers independently. Total A supine > sit, pt more lethargic today w/ difficulty maintaining arousal during session. Returned to supine. Will continue to benefit from OT services while in house.     Plan    Continue current treatment plan.    DC Equipment Recommendations: Unable to determine at this time  Discharge Recommendations: Recommend post-acute placement for additional occupational therapy services prior to discharge home       01/20/22 1031   Cognition    Cognition / Consciousness X   Safety Awareness Impaired   New Learning Impaired   Comments increased lethargy   Active ROM Upper Body   Active ROM Upper Body  X   Flaccid Upper Extremity Left Upper Extremity Flaccid   Comments subluxation   Strength Upper Body   Upper Body Strength  X   Comments LUE flaccid    Balance   Sitting Balance (Static) Trace   Sitting Balance (Dynamic) Trace   Weight Shift Sitting Absent   Bed Mobility    Supine to Sit Total Assist   Sit to Supine Total Assist   Activities of Daily Living   Grooming Moderate Assist;Seated   Upper Body Dressing Maximal Assist   Lower Body Dressing Total Assist   Toileting Total Assist   Functional Mobility   Sit to Stand Unable to Participate   Short Term Goals   Short Term Goal # 1 Mod A with washing face with a cloth   Goal Outcome # 1 Progressing as expected   Short Term Goal # 2 mod A with UB dressing   Goal Outcome # 2 Progressing slower than expected   Short Term Goal # 3 mod A with ADL txfs   Goal Outcome # 3 Progressing slower than expected   Anticipated Discharge Equipment and  Recommendations   DC Equipment Recommendations Unable to determine at this time   Discharge Recommendations Recommend post-acute placement for additional occupational therapy services prior to discharge home

## 2022-01-22 PROCEDURE — 700102 HCHG RX REV CODE 250 W/ 637 OVERRIDE(OP): Performed by: STUDENT IN AN ORGANIZED HEALTH CARE EDUCATION/TRAINING PROGRAM

## 2022-01-22 PROCEDURE — A9270 NON-COVERED ITEM OR SERVICE: HCPCS | Performed by: STUDENT IN AN ORGANIZED HEALTH CARE EDUCATION/TRAINING PROGRAM

## 2022-01-22 PROCEDURE — 94640 AIRWAY INHALATION TREATMENT: CPT

## 2022-01-22 PROCEDURE — 770001 HCHG ROOM/CARE - MED/SURG/GYN PRIV*

## 2022-01-22 PROCEDURE — 99232 SBSQ HOSP IP/OBS MODERATE 35: CPT | Performed by: STUDENT IN AN ORGANIZED HEALTH CARE EDUCATION/TRAINING PROGRAM

## 2022-01-22 RX ADMIN — FAMOTIDINE 10 MG: 20 TABLET, FILM COATED ORAL at 16:59

## 2022-01-22 RX ADMIN — HYDROCORTISONE: 10 CREAM TOPICAL at 17:00

## 2022-01-22 RX ADMIN — ACETAMINOPHEN 650 MG: 325 TABLET, FILM COATED ORAL at 00:43

## 2022-01-22 RX ADMIN — HYDROCORTISONE: 10 CREAM TOPICAL at 05:57

## 2022-01-22 RX ADMIN — LOSARTAN POTASSIUM 25 MG: 50 TABLET, FILM COATED ORAL at 05:56

## 2022-01-22 RX ADMIN — BACLOFEN 10 MG: 10 TABLET ORAL at 05:56

## 2022-01-22 RX ADMIN — BUDESONIDE AND FORMOTEROL FUMARATE DIHYDRATE 2 PUFF: 80; 4.5 AEROSOL RESPIRATORY (INHALATION) at 20:45

## 2022-01-22 RX ADMIN — APIXABAN 5 MG: 5 TABLET, FILM COATED ORAL at 16:59

## 2022-01-22 RX ADMIN — BUPROPION HYDROCHLORIDE 100 MG: 100 TABLET, FILM COATED ORAL at 16:59

## 2022-01-22 RX ADMIN — GABAPENTIN 100 MG: 100 CAPSULE ORAL at 05:56

## 2022-01-22 RX ADMIN — ACETAMINOPHEN 650 MG: 325 TABLET, FILM COATED ORAL at 20:45

## 2022-01-22 RX ADMIN — FAMOTIDINE 10 MG: 20 TABLET, FILM COATED ORAL at 05:56

## 2022-01-22 RX ADMIN — BACLOFEN 10 MG: 10 TABLET ORAL at 16:59

## 2022-01-22 RX ADMIN — APIXABAN 5 MG: 5 TABLET, FILM COATED ORAL at 05:57

## 2022-01-22 RX ADMIN — GABAPENTIN 100 MG: 100 CAPSULE ORAL at 17:00

## 2022-01-22 RX ADMIN — BUPROPION HYDROCHLORIDE 100 MG: 100 TABLET, FILM COATED ORAL at 05:56

## 2022-01-22 RX ADMIN — ACETAMINOPHEN 650 MG: 325 TABLET, FILM COATED ORAL at 11:25

## 2022-01-22 RX ADMIN — BUDESONIDE AND FORMOTEROL FUMARATE DIHYDRATE 2 PUFF: 80; 4.5 AEROSOL RESPIRATORY (INHALATION) at 08:04

## 2022-01-22 RX ADMIN — ATORVASTATIN CALCIUM 80 MG: 80 TABLET, FILM COATED ORAL at 16:59

## 2022-01-22 ASSESSMENT — PAIN DESCRIPTION - PAIN TYPE
TYPE: CHRONIC PAIN

## 2022-01-22 ASSESSMENT — ENCOUNTER SYMPTOMS
FOCAL WEAKNESS: 1
MYALGIAS: 0
VOMITING: 0
BLURRED VISION: 0
ABDOMINAL PAIN: 0
HEADACHES: 0
FEVER: 0
NAUSEA: 0
SHORTNESS OF BREATH: 0
INSOMNIA: 0

## 2022-01-22 ASSESSMENT — LIFESTYLE VARIABLES: SUBSTANCE_ABUSE: 0

## 2022-01-22 NOTE — CARE PLAN
The patient is Stable - Low risk of patient condition declining or worsening    Shift Goals  Clinical Goals: maintain skin integrity   Patient Goals: rest  Family Goals: OMAYRA    Progress made toward(s) clinical / shift goals:    Problem: Neuro Status  Goal: Neuro status will remain stable or improve  Outcome: Progressing   Patient Aox3. Left upper arm flaccid. Left lower extremity weak.   Problem: Respiratory - Stroke Patient  Goal: Patient will achieve/maintain optimum respiratory rate/effort  Outcome: Progressing   Patient on RA.   Problem: Urinary Elimination  Goal: Establish and maintain regular urinary output  Outcome: Progressing   Patient has condom cath. Checked q2 hrs for soiled sheets.   Problem: Skin Integrity  Goal: Skin integrity is maintained or improved  Outcome: Progressing   Turns q2 hrs.   Problem: Pain - Standard  Goal: Alleviation of pain or a reduction in pain to the patient’s comfort goal  Outcome: Progressing   Pain in lower back tylenol and warm pack given to patient.

## 2022-01-22 NOTE — CARE PLAN
The patient is Stable - Low risk of patient condition declining or worsening    Shift Goals  Clinical Goals: Improve CVA symptoms  Patient Goals: Rest  Family Goals: OMAYRA    Progress made toward(s) clinical / shift goals:    Problem: Skin Integrity  Goal: Skin integrity is maintained or improved  Outcome: Progressing  Q2h turning and positioning provided.     Problem: Fall Risk  Goal: Patient will remain free from falls  Outcome: Progressing  Bed low and locked, alarm set, call bell within reach.       Patient is not progressing towards the following goals: N/A

## 2022-01-22 NOTE — PROGRESS NOTES
Assumed care of pt at 0700. Pt alert and oriented x3 (disoriented to time). He c/o chronic back pain but denies any pharmacologic/non-pharmacologic relief measures. LUE/LLE deficits remain. Left pupil noted to be sluggish (different from previous RNs's assessment); Dr. Robles made aware. No other neuro changes so no new orders placed. Q2h turning and positioning provided. Bed low and locked, alarm set and call bell within reach. Hourly rounding continues.

## 2022-01-22 NOTE — PROGRESS NOTES
[facility] Medicine Daily Progress Note    Date of Service  1/22/2022    Chief Complaint  Jose Quintanilla is a 66 y.o. male admitted 1/1/2022 with left sided hemiparesis    Hospital Course  Mr Quintanilla has past medical history which includes hypertension, hyperlipidemia, DVT/PE and was treated with anticoagulation prior to admission.  Presented on 1/1/2022 after a fall with symptoms of left hemiparesis and rightward gaze.  Patient was evaluated and cleared by trauma.  Neuro imaging was concerning for right-sided carotid occlusion.Mri noted with  Large area of acute infarction involving the right frontal and parietal lobes as well as the right-sided basal ganglia. Subacute area of infarction involving the right temporal and lateral occipital lobes.  Patient was evaluated neurology with recommendations for admission to the ICU, permissive hypertension, and follow-up imaging.  started on anticoagulation on 1/13/2022 .Chest x-ray noted with bilateral interstitial opacities concerning for aspiration pneumonia, he was treated with complete course of Augmentin. Pt also has asthma and was treated for acute asthma exacerbation. SLP following. patient did have tube feeds, modified diet started on 1/17.   Patient is pending placement in [place] to be near family.          Interval Problem Update  Pt seen at bedside, no acute issues, c/o of low back pain/spasming   Cousin at bedside   Hemodynamically stable   neuro exam unchanged   Has been accepted to [facility] in [place],  working on transport   Pt is medically stable for transfer     I have personally seen and examined the patient at bedside. I discussed the plan of care with patient, family, bedside RN and .    Consultants/Specialty  critical care, neurology, palliative care and physiatry    Code Status  DNAR/DNI    Disposition  Patient is medically cleared for discharge.   Anticipate discharge to to skilled nursing facility.  I have placed the appropriate  orders for post-discharge needs.    Review of Systems  Review of Systems   Constitutional: Positive for malaise/fatigue. Negative for fever.   HENT: Negative for congestion.    Eyes: Negative for blurred vision.   Respiratory: Negative for shortness of breath.    Cardiovascular: Negative for chest pain.   Gastrointestinal: Negative for abdominal pain, nausea and vomiting.   Musculoskeletal: Negative for myalgias.   Neurological: Positive for focal weakness. Negative for headaches.   Psychiatric/Behavioral: Negative for substance abuse. The patient does not have insomnia.         Physical Exam  Temp:  [36.1 °C (96.9 °F)-36.6 °C (97.9 °F)] 36.4 °C (97.5 °F)  Pulse:  [] 94  Resp:  [16-18] 16  BP: ()/(67-94) 127/80  SpO2:  [94 %-96 %] 96 %    Physical Exam  Vitals and nursing note reviewed.   Constitutional:       Appearance: He is obese. He is ill-appearing. He is not toxic-appearing.   HENT:      Head: Normocephalic and atraumatic.      Mouth/Throat:      Mouth: Mucous membranes are moist.      Pharynx: Oropharynx is clear.   Eyes:      Conjunctiva/sclera: Conjunctivae normal.   Cardiovascular:      Rate and Rhythm: Normal rate and regular rhythm.      Heart sounds: No murmur heard.      Pulmonary:      Effort: Pulmonary effort is normal.      Comments: Course breath sounds  Abdominal:      General: Bowel sounds are normal.      Palpations: Abdomen is soft.   Musculoskeletal:      Cervical back: Neck supple.      Right lower leg: No edema.      Left lower leg: No edema.   Skin:     General: Skin is dry.   Neurological:      Mental Status: He is oriented to person, place, and time.      Cranial Nerves: Cranial nerve deficit (left facial droop) present.      Motor: Weakness (left hemiparesis) present.   Psychiatric:         Mood and Affect: Mood normal.         Behavior: Behavior normal.         Fluids    Intake/Output Summary (Last 24 hours) at 1/22/2022 1306  Last data filed at 1/22/2022 0500  Gross per 24  hour   Intake --   Output 1075 ml   Net -1075 ml       Laboratory  Recent Labs     01/20/22  0225   WBC 14.4*   RBC 4.11*   HEMOGLOBIN 13.3*   HEMATOCRIT 40.1*   MCV 97.6   MCH 32.4   MCHC 33.2*   RDW 54.1*   PLATELETCT 248   MPV 10.6     Recent Labs     01/20/22 0225   SODIUM 137   POTASSIUM 4.3   CHLORIDE 104   CO2 21   GLUCOSE 97   BUN 15   CREATININE 0.74   CALCIUM 9.0                   Imaging  DX-CHEST-LIMITED (1 VIEW)   Final Result      1.  Patchy bilateral interstitial opacities. Underlying infection is possible.   2.  Stable enlargement of the cardiomediastinal silhouette.      DX-CHEST-LIMITED (1 VIEW)   Final Result         Linear opacities in the left lung base could be due to discoid atelectasis or developing infiltrate such as pneumonia or pneumonitis.      DX-ABDOMEN FOR TUBE PLACEMENT   Final Result      1.  Feeding tube tip overlies the distal stomach.      IR-PICC LINE PLACEMENT W/ GUIDANCE > AGE 5   Final Result                  Ultrasound-guided PICC placement performed by qualified nursing staff as    above.          CT-HEAD W/O   Final Result      Evolving moderate to large right middle cerebral artery territory has increased in size when compared with the previous exam. No evidence of acute intracranial hemorrhage.         DX-ABDOMEN FOR TUBE PLACEMENT   Final Result      Cortrak feeding tube tip projects in the region of the proximal/mid stomach.      US-EXTREMITY VENOUS LOWER BILAT   Final Result      MR-BRAIN-W/O   Final Result         1. Age-related cerebral atrophy. Mild effacement of the right lateral ventricle.      2. Mild periventricular white matter changes consistent with chronic microvascular ischemic gliosis.      3. Large area of acute infarction involving the right frontal and parietal lobes as well as the right-sided basal ganglia. Subacute area of infarction involving the right temporal and lateral occipital lobes.      4. Petechial hemorrhage noted in the right basal  ganglia and involving the cortex in the right temporal and lateral occipital lobes.      5. Right internal carotid artery occlusion or high-grade stenosis.            EC-ECHOCARDIOGRAM COMPLETE W/O CONT   Final Result      DX-SHOULDER 2+ LEFT   Final Result      Negative 2 views of left shoulder.      DX-SHOULDER 2+ RIGHT   Final Result      Moderate subacromial spurring      DX-FOOT-COMPLETE 3+ RIGHT   Final Result      No radiographic evidence of acute displaced fracture or subluxation.      DX-FOOT-2- LEFT   Final Result      No radiographic evidence of acute displaced fracture or subluxation.      DX-ELBOW-COMPLETE 3+ RIGHT   Final Result      No radiographic evidence of acute traumatic injury.      Limited due to rotation of the lateral radiograph attempts. Consider repeat lateral radiograph to help exclude effusion      CT-CHEST,ABDOMEN,PELVIS WITH   Final Result      No significant traumatic abnormality in thorax, abdomen and pelvis CT scans.      Hepatic steatosis      Moderate colonic diverticulosis.      CT-LSPINE W/O PLUS RECONS   Final Result      No CT evidence of acute traumatic injury.      Moderate degenerative disc disease with trace degenerative L5/S1 retrolisthesis      CT-TSPINE W/O PLUS RECONS   Final Result      No CT evidence of acute traumatic abnormality.      CT-CSPINE WITHOUT PLUS RECONS   Final Result      No CT evidence of acute cervical spine abnormality.      CT-HEAD W/O   Final Result      Subacute right temporal, temporo-occipital infarction without hemorrhagic transformation      CT-CTA HEAD WITH & W/O-POST PROCESS   Final Result      Right internal carotid artery occlusion with extension to the MCA which lacks contrast opacification      Right A1 and KATIA contrast opacification secondary to cross-filling from the left      Normal left anterior and bilateral posterior circulation      Findings of all of the CTs were discussed with Dr. Betancourt before completion of this dictation.             CT-CTA NECK WITH & W/O-POST PROCESSING   Final Result      Occluded right internal carotid artery at its origin      Atherosclerosis at origin of bilateral vertebral arteries could obscure stenosis. Arteries are patent without post stenotic dilatation      DX-PELVIS-1 OR 2 VIEWS   Final Result      Negative AP view of the pelvis.      DX-CHEST-LIMITED (1 VIEW)   Final Result      No acute cardiopulmonary disease.      US-ABORTED US PROCEDURE    (Results Pending)        Assessment/Plan  * Stroke due to thrombosis of right carotid artery (HCC)- (present on admission)  Assessment & Plan  Extensive stroke due to thrombosis of right carotid artery  Occurred while on anticoagulation with warfarin, INR slightly subtherapeutic at 1.8  Cytotoxic edema has improved, appreciate neurology recommendations, low sodium to normalize  High intensity statin  On eliquis 5 mg BID  PT/OT/speech  Needs rehab, pending placement in [place]   Continue inpatient therapies to prevent further deconditioning   Pt with nerve pain and spasms, baclofen at night added, low dose gabapentin    Moderate asthma with acute exacerbation  Assessment & Plan  On room air   He is allergic to cat.  On Advair at home  Duoneb Q4H  Solumedrol 40 mg daily X 5 days   on symbicort    Allergy  Assessment & Plan  Diffuse erythema  Likely penicillin induced /drug allergy   Improving with steroid and benadryl   resolved    Aspiration pneumonia (HCC)  Assessment & Plan  Completed 7 days course of antibiotics   perocal negative   Saturating well on room air   Aspiration precautions, SLP following     Sepsis (formerly [facility])  Assessment & Plan  This is Sepsis Not present on admission  SIRS criteria identified on my evaluation include: Tachycardia, with heart rate greater than 90 BPM  Source is lungs  Sepsis protocol initiated  Fluid resuscitation ordered per protocol  IV antibiotics as appropriate for source of sepsis  While organ dysfunction may be noted elsewhere in this  problem list or in the chart, degree of organ dysfunction does not meet CMS criteria for severe sepsis    Resolved    Depression- (present on admission)  Assessment & Plan  Outpatient Wellbutrin and Lexapro    Chronic anticoagulation- (present on admission)  Assessment & Plan  On eliquis    Dyslipidemia- (present on admission)  Assessment & Plan  Atorvastatin    Primary hypertension- (present on admission)  Assessment & Plan  Goal normotensive  Continue monitoring, consider restarting enteral blood pressure pressure agent if needed.       VTE prophylaxis: therapeutic anticoagulation with Eliquis    I have performed a physical exam and reviewed and updated ROS and Plan today (1/22/2022). In review of yesterday's note (1/21/2022), there are no changes except as documented above.

## 2022-01-22 NOTE — DISCHARGE PLANNING
Agency/Facility Name: [facility]  Spoke To:Yancy  Outcome: DPA  Left voicemail regarding bed avalilbilty for patient at facility.    LSW  notified     1015  Agency/Facility Name: [facility]  Spoke To: Natali   Outcome:DPA called requesting transport quote for patient due to elongated distance  Estimated quotes will be as follows:   If patients needs a stretcher- [phone]     If patient needs a wheelchair- [phone]  If patient can walk- [phone]     Fees are subject change.     RN CM notified

## 2022-01-23 PROCEDURE — 99232 SBSQ HOSP IP/OBS MODERATE 35: CPT | Performed by: STUDENT IN AN ORGANIZED HEALTH CARE EDUCATION/TRAINING PROGRAM

## 2022-01-23 PROCEDURE — 700102 HCHG RX REV CODE 250 W/ 637 OVERRIDE(OP): Performed by: STUDENT IN AN ORGANIZED HEALTH CARE EDUCATION/TRAINING PROGRAM

## 2022-01-23 PROCEDURE — 770001 HCHG ROOM/CARE - MED/SURG/GYN PRIV*

## 2022-01-23 PROCEDURE — 94640 AIRWAY INHALATION TREATMENT: CPT

## 2022-01-23 PROCEDURE — A9270 NON-COVERED ITEM OR SERVICE: HCPCS | Performed by: STUDENT IN AN ORGANIZED HEALTH CARE EDUCATION/TRAINING PROGRAM

## 2022-01-23 RX ADMIN — BACLOFEN 10 MG: 10 TABLET ORAL at 05:27

## 2022-01-23 RX ADMIN — FAMOTIDINE 10 MG: 20 TABLET, FILM COATED ORAL at 05:26

## 2022-01-23 RX ADMIN — ACETAMINOPHEN 650 MG: 325 TABLET, FILM COATED ORAL at 19:53

## 2022-01-23 RX ADMIN — BUPROPION HYDROCHLORIDE 100 MG: 100 TABLET, FILM COATED ORAL at 16:09

## 2022-01-23 RX ADMIN — BUDESONIDE AND FORMOTEROL FUMARATE DIHYDRATE 2 PUFF: 80; 4.5 AEROSOL RESPIRATORY (INHALATION) at 19:53

## 2022-01-23 RX ADMIN — ATORVASTATIN CALCIUM 80 MG: 80 TABLET, FILM COATED ORAL at 16:09

## 2022-01-23 RX ADMIN — FAMOTIDINE 10 MG: 20 TABLET, FILM COATED ORAL at 16:09

## 2022-01-23 RX ADMIN — ACETAMINOPHEN 650 MG: 325 TABLET, FILM COATED ORAL at 02:39

## 2022-01-23 RX ADMIN — HYDROCORTISONE: 10 CREAM TOPICAL at 05:31

## 2022-01-23 RX ADMIN — BUDESONIDE AND FORMOTEROL FUMARATE DIHYDRATE 2 PUFF: 80; 4.5 AEROSOL RESPIRATORY (INHALATION) at 08:05

## 2022-01-23 RX ADMIN — GABAPENTIN 100 MG: 100 CAPSULE ORAL at 05:26

## 2022-01-23 RX ADMIN — APIXABAN 5 MG: 5 TABLET, FILM COATED ORAL at 05:27

## 2022-01-23 RX ADMIN — BACLOFEN 10 MG: 10 TABLET ORAL at 16:09

## 2022-01-23 RX ADMIN — ACETAMINOPHEN 650 MG: 325 TABLET, FILM COATED ORAL at 13:21

## 2022-01-23 RX ADMIN — APIXABAN 5 MG: 5 TABLET, FILM COATED ORAL at 16:09

## 2022-01-23 RX ADMIN — GABAPENTIN 100 MG: 100 CAPSULE ORAL at 16:09

## 2022-01-23 RX ADMIN — BUPROPION HYDROCHLORIDE 100 MG: 100 TABLET, FILM COATED ORAL at 05:27

## 2022-01-23 RX ADMIN — LOSARTAN POTASSIUM 25 MG: 50 TABLET, FILM COATED ORAL at 05:27

## 2022-01-23 ASSESSMENT — PAIN DESCRIPTION - PAIN TYPE
TYPE: CHRONIC PAIN

## 2022-01-23 ASSESSMENT — ENCOUNTER SYMPTOMS
BLURRED VISION: 0
NAUSEA: 0
MYALGIAS: 0
FEVER: 0
ABDOMINAL PAIN: 0
SHORTNESS OF BREATH: 0
FOCAL WEAKNESS: 1
HEADACHES: 0
INSOMNIA: 0
VOMITING: 0

## 2022-01-23 ASSESSMENT — LIFESTYLE VARIABLES: SUBSTANCE_ABUSE: 0

## 2022-01-23 NOTE — DISCHARGE PLANNING
Care Transition Team Discharge Planning    Anticipated Discharge Disposition: DC to [facility] - pending.    Action: Hiltonw spoke with Chandra anderson to discuss DC planning. He was given the following transport costs:  Stretcher- [phone]     Wheelchair- [phone]  Walk- [phone]     He was also informed that these costs may vary. The son reported that he is also looking for other transportation costs. Per the bedside nurse the patient will need to travel via gurney. This information was given to the son.    The son requested that we contact [facility] at [facility] to see if they have a bed availability.      Lsw attempt to contact the facility, there was no answer, a v/m was left with a request for a return call.     Barriers to Discharge: Awaiting for bed availability.    Plan: Hiltonw will assist medical team with DC planning.

## 2022-01-23 NOTE — PROGRESS NOTES
Hospital Medicine Daily Progress Note    Date of Service  1/23/2022    Chief Complaint  Jose Quintanilla is a 66 y.o. male admitted 1/1/2022 with left sided hemiparesis    Hospital Course  Mr Quintanilla has past medical history which includes hypertension, hyperlipidemia, DVT/PE and was treated with anticoagulation prior to admission.  Presented on 1/1/2022 after a fall with symptoms of left hemiparesis and rightward gaze.  Patient was evaluated and cleared by trauma.  Neuro imaging was concerning for right-sided carotid occlusion.Mri noted with  Large area of acute infarction involving the right frontal and parietal lobes as well as the right-sided basal ganglia. Subacute area of infarction involving the right temporal and lateral occipital lobes.  Patient was evaluated neurology with recommendations for admission to the ICU, permissive hypertension, and follow-up imaging.  started on anticoagulation on 1/13/2022 .Chest x-ray noted with bilateral interstitial opacities concerning for aspiration pneumonia, he was treated with complete course of Augmentin. Pt also has asthma and was treated for acute asthma exacerbation. SLP following. patient did have tube feeds, modified diet started on 1/17.   Patient is pending placement in [place] to be near family.          Interval Problem Update  Pt seen at bedside, no acute issues, continues to ave c/o of low back pain/spasming due to being in bed, eager to get to rehab   Hemodynamically stable   neuro exam unchanged   Has been accepted to [facility] in [place],  working on transport   Pt is medically stable for transfer     I have personally seen and examined the patient at bedside. I discussed the plan of care with patient, family, bedside RN and .    Consultants/Specialty  critical care, neurology, palliative care and physiatry    Code Status  DNAR/DNI    Disposition  Patient is medically cleared for discharge.   Anticipate discharge to to skilled nursing  facility.  I have placed the appropriate orders for post-discharge needs.    Review of Systems  Review of Systems   Constitutional: Positive for malaise/fatigue. Negative for fever.   HENT: Negative for congestion.    Eyes: Negative for blurred vision.   Respiratory: Negative for shortness of breath.    Cardiovascular: Negative for chest pain.   Gastrointestinal: Negative for abdominal pain, nausea and vomiting.   Musculoskeletal: Negative for myalgias.   Neurological: Positive for focal weakness. Negative for headaches.   Psychiatric/Behavioral: Negative for substance abuse. The patient does not have insomnia.         Physical Exam  Temp:  [36.1 °C (96.9 °F)-37 °C (98.6 °F)] 36.1 °C (96.9 °F)  Pulse:  [82-93] 82  Resp:  [16-18] 16  BP: (125-132)/(77-84) 125/83  SpO2:  [96 %-100 %] 96 %    Physical Exam  Vitals and nursing note reviewed.   Constitutional:       Appearance: He is obese. He is ill-appearing. He is not toxic-appearing.   HENT:      Head: Normocephalic and atraumatic.      Mouth/Throat:      Mouth: Mucous membranes are moist.      Pharynx: Oropharynx is clear.   Eyes:      Conjunctiva/sclera: Conjunctivae normal.   Cardiovascular:      Rate and Rhythm: Normal rate and regular rhythm.      Heart sounds: No murmur heard.      Pulmonary:      Effort: Pulmonary effort is normal.      Comments: Course breath sounds  Abdominal:      General: Bowel sounds are normal.      Palpations: Abdomen is soft.   Musculoskeletal:      Cervical back: Neck supple.      Right lower leg: No edema.      Left lower leg: No edema.   Skin:     General: Skin is dry.   Neurological:      Mental Status: He is oriented to person, place, and time.      Cranial Nerves: Cranial nerve deficit (left facial droop) present.      Motor: Weakness (left hemiparesis) present.   Psychiatric:         Mood and Affect: Mood normal.         Behavior: Behavior normal.         Fluids    Intake/Output Summary (Last 24 hours) at 1/23/2022 1403  Last  data filed at 1/23/2022 0530  Gross per 24 hour   Intake 418 ml   Output 550 ml   Net -132 ml       Laboratory                        Imaging  DX-CHEST-LIMITED (1 VIEW)   Final Result      1.  Patchy bilateral interstitial opacities. Underlying infection is possible.   2.  Stable enlargement of the cardiomediastinal silhouette.      DX-CHEST-LIMITED (1 VIEW)   Final Result         Linear opacities in the left lung base could be due to discoid atelectasis or developing infiltrate such as pneumonia or pneumonitis.      DX-ABDOMEN FOR TUBE PLACEMENT   Final Result      1.  Feeding tube tip overlies the distal stomach.      IR-PICC LINE PLACEMENT W/ GUIDANCE > AGE 5   Final Result                  Ultrasound-guided PICC placement performed by qualified nursing staff as    above.          CT-HEAD W/O   Final Result      Evolving moderate to large right middle cerebral artery territory has increased in size when compared with the previous exam. No evidence of acute intracranial hemorrhage.         DX-ABDOMEN FOR TUBE PLACEMENT   Final Result      Cortrak feeding tube tip projects in the region of the proximal/mid stomach.      US-EXTREMITY VENOUS LOWER BILAT   Final Result      MR-BRAIN-W/O   Final Result         1. Age-related cerebral atrophy. Mild effacement of the right lateral ventricle.      2. Mild periventricular white matter changes consistent with chronic microvascular ischemic gliosis.      3. Large area of acute infarction involving the right frontal and parietal lobes as well as the right-sided basal ganglia. Subacute area of infarction involving the right temporal and lateral occipital lobes.      4. Petechial hemorrhage noted in the right basal ganglia and involving the cortex in the right temporal and lateral occipital lobes.      5. Right internal carotid artery occlusion or high-grade stenosis.            EC-ECHOCARDIOGRAM COMPLETE W/O CONT   Final Result      DX-SHOULDER 2+ LEFT   Final Result       Negative 2 views of left shoulder.      DX-SHOULDER 2+ RIGHT   Final Result      Moderate subacromial spurring      DX-FOOT-COMPLETE 3+ RIGHT   Final Result      No radiographic evidence of acute displaced fracture or subluxation.      DX-FOOT-2- LEFT   Final Result      No radiographic evidence of acute displaced fracture or subluxation.      DX-ELBOW-COMPLETE 3+ RIGHT   Final Result      No radiographic evidence of acute traumatic injury.      Limited due to rotation of the lateral radiograph attempts. Consider repeat lateral radiograph to help exclude effusion      CT-CHEST,ABDOMEN,PELVIS WITH   Final Result      No significant traumatic abnormality in thorax, abdomen and pelvis CT scans.      Hepatic steatosis      Moderate colonic diverticulosis.      CT-LSPINE W/O PLUS RECONS   Final Result      No CT evidence of acute traumatic injury.      Moderate degenerative disc disease with trace degenerative L5/S1 retrolisthesis      CT-TSPINE W/O PLUS RECONS   Final Result      No CT evidence of acute traumatic abnormality.      CT-CSPINE WITHOUT PLUS RECONS   Final Result      No CT evidence of acute cervical spine abnormality.      CT-HEAD W/O   Final Result      Subacute right temporal, temporo-occipital infarction without hemorrhagic transformation      CT-CTA HEAD WITH & W/O-POST PROCESS   Final Result      Right internal carotid artery occlusion with extension to the MCA which lacks contrast opacification      Right A1 and KATIA contrast opacification secondary to cross-filling from the left      Normal left anterior and bilateral posterior circulation      Findings of all of the CTs were discussed with Dr. Betancourt before completion of this dictation.            CT-CTA NECK WITH & W/O-POST PROCESSING   Final Result      Occluded right internal carotid artery at its origin      Atherosclerosis at origin of bilateral vertebral arteries could obscure stenosis. Arteries are patent without post stenotic dilatation       DX-PELVIS-1 OR 2 VIEWS   Final Result      Negative AP view of the pelvis.      DX-CHEST-LIMITED (1 VIEW)   Final Result      No acute cardiopulmonary disease.      US-ABORTED US PROCEDURE    (Results Pending)        Assessment/Plan  * Stroke due to thrombosis of right carotid artery (HCC)- (present on admission)  Assessment & Plan  Extensive stroke due to thrombosis of right carotid artery  Occurred while on anticoagulation with warfarin, INR slightly subtherapeutic at 1.8  Cytotoxic edema has improved, appreciate neurology recommendations, low sodium to normalize  High intensity statin  On eliquis 5 mg BID  PT/OT/speech  Needs rehab, pending placement in [place]   Continue inpatient therapies to prevent further deconditioning   Pt with nerve pain and spasms, baclofen at night added, low dose gabapentin    Moderate asthma with acute exacerbation  Assessment & Plan  On room air   He is allergic to cat.  On Advair at home  Duoneb Q4H  Solumedrol 40 mg daily X 5 days   on symbicort    Allergy  Assessment & Plan  Diffuse erythema  Likely penicillin induced /drug allergy   Improving with steroid and benadryl   resolved    Aspiration pneumonia (HCC)  Assessment & Plan  Completed 7 days course of antibiotics   perocal negative   Saturating well on room air   Aspiration precautions, SLP following     Sepsis (HCC)  Assessment & Plan  This is Sepsis Not present on admission  SIRS criteria identified on my evaluation include: Tachycardia, with heart rate greater than 90 BPM  Source is lungs  Sepsis protocol initiated  Fluid resuscitation ordered per protocol  IV antibiotics as appropriate for source of sepsis  While organ dysfunction may be noted elsewhere in this problem list or in the chart, degree of organ dysfunction does not meet CMS criteria for severe sepsis    Resolved    Depression- (present on admission)  Assessment & Plan  Outpatient Wellbutrin and Lexapro    Chronic anticoagulation- (present on  admission)  Assessment & Plan  On eliquis    Dyslipidemia- (present on admission)  Assessment & Plan  Atorvastatin    Primary hypertension- (present on admission)  Assessment & Plan  Goal normotensive  Continue monitoring, consider restarting enteral blood pressure pressure agent if needed.       VTE prophylaxis: therapeutic anticoagulation with Eliquis    I have performed a physical exam and reviewed and updated ROS and Plan today (1/23/2022). In review of yesterday's note (1/22/2022), there are no changes except as documented above.    I certify that the patient requires continued medically necessary hospital services for the treatment of right cerebral infarctio and will remain in the hospital for  5 days.  Discharge plan is anticipated to be to skilled nursing facility in [place] pending transport set up.

## 2022-01-23 NOTE — CARE PLAN
The patient is Stable - Low risk of patient condition declining or worsening    Shift Goals  Clinical Goals: improve strength, maintain neuro status  Patient Goals: sleep  Family Goals: OMAYRA    Progress made toward(s) clinical / shift goals:  Patient educated on care plan, stroke symptoms  and condition. Pt is able to verbalize feelings if given increased time and asked questions. Pt maintained a stable neuro status and has maintained his diet with no signs of aspiration. Pt has [id number] in place with MERY mattress, barrier wipes and cream and pt is able to make small adjustments by himself in bed. Pt is working on increasing his strength in left leg and right side to make up for left arm. Pt has fall precautions in place.     Problem: Knowledge Deficit - Stroke Education  Goal: Patient's knowledge of stroke and risk factors will improve  Outcome: Progressing     Problem: Psychosocial - Patient Condition  Goal: Patient's ability to verbalize feelings about condition will improve  Outcome: Progressing     Problem: Neuro Status  Goal: Neuro status will remain stable or improve  Outcome: Progressing     Problem: Risk for Aspiration  Goal: Patient's risk for aspiration will be absent or decrease  Outcome: Progressing     Problem: Mobility - Stroke  Goal: Patient's capacity to carry out activities will improve  Outcome: Progressing     Problem: Skin Integrity  Goal: Skin integrity is maintained or improved  Outcome: Progressing     Problem: Fall Risk  Goal: Patient will remain free from falls  Outcome: Progressing

## 2022-01-24 ENCOUNTER — PATIENT OUTREACH (OUTPATIENT)
Dept: HEALTH INFORMATION MANAGEMENT | Facility: OTHER | Age: 67
End: 2022-01-24

## 2022-01-24 PROBLEM — A41.9 SEPSIS (HCC): Status: RESOLVED | Noted: 2022-01-09 | Resolved: 2022-01-24

## 2022-01-24 PROCEDURE — A9270 NON-COVERED ITEM OR SERVICE: HCPCS | Performed by: STUDENT IN AN ORGANIZED HEALTH CARE EDUCATION/TRAINING PROGRAM

## 2022-01-24 PROCEDURE — 99232 SBSQ HOSP IP/OBS MODERATE 35: CPT | Performed by: STUDENT IN AN ORGANIZED HEALTH CARE EDUCATION/TRAINING PROGRAM

## 2022-01-24 PROCEDURE — 700102 HCHG RX REV CODE 250 W/ 637 OVERRIDE(OP): Performed by: STUDENT IN AN ORGANIZED HEALTH CARE EDUCATION/TRAINING PROGRAM

## 2022-01-24 PROCEDURE — 770001 HCHG ROOM/CARE - MED/SURG/GYN PRIV*

## 2022-01-24 PROCEDURE — 97535 SELF CARE MNGMENT TRAINING: CPT | Mod: CO

## 2022-01-24 PROCEDURE — 94640 AIRWAY INHALATION TREATMENT: CPT

## 2022-01-24 RX ORDER — BUDESONIDE AND FORMOTEROL FUMARATE DIHYDRATE 80; 4.5 UG/1; UG/1
2 AEROSOL RESPIRATORY (INHALATION) 2 TIMES DAILY
Qty: 10.2 G | Refills: 0 | Status: SHIPPED
Start: 2022-01-24

## 2022-01-24 RX ORDER — GABAPENTIN 100 MG/1
100 CAPSULE ORAL 2 TIMES DAILY
Qty: 90 CAPSULE | Status: SHIPPED
Start: 2022-01-24

## 2022-01-24 RX ORDER — FAMOTIDINE 10 MG
10 TABLET ORAL 2 TIMES DAILY
Qty: 60 TABLET | Status: SHIPPED
Start: 2022-01-24

## 2022-01-24 RX ORDER — BACLOFEN 10 MG/1
10 TABLET ORAL 2 TIMES DAILY
Qty: 90 TABLET | Status: SHIPPED
Start: 2022-01-24

## 2022-01-24 RX ORDER — BUPROPION HYDROCHLORIDE 100 MG/1
100 TABLET ORAL 2 TIMES DAILY
Qty: 60 TABLET | Status: SHIPPED
Start: 2022-01-24 | End: 2022-03-11

## 2022-01-24 RX ADMIN — BACLOFEN 10 MG: 10 TABLET ORAL at 05:58

## 2022-01-24 RX ADMIN — BUPROPION HYDROCHLORIDE 100 MG: 100 TABLET, FILM COATED ORAL at 17:49

## 2022-01-24 RX ADMIN — APIXABAN 5 MG: 5 TABLET, FILM COATED ORAL at 05:59

## 2022-01-24 RX ADMIN — LOSARTAN POTASSIUM 25 MG: 50 TABLET, FILM COATED ORAL at 05:59

## 2022-01-24 RX ADMIN — BUDESONIDE AND FORMOTEROL FUMARATE DIHYDRATE 2 PUFF: 80; 4.5 AEROSOL RESPIRATORY (INHALATION) at 08:56

## 2022-01-24 RX ADMIN — APIXABAN 5 MG: 5 TABLET, FILM COATED ORAL at 17:49

## 2022-01-24 RX ADMIN — BUPROPION HYDROCHLORIDE 100 MG: 100 TABLET, FILM COATED ORAL at 05:59

## 2022-01-24 RX ADMIN — FAMOTIDINE 10 MG: 20 TABLET, FILM COATED ORAL at 17:49

## 2022-01-24 RX ADMIN — GABAPENTIN 100 MG: 100 CAPSULE ORAL at 05:59

## 2022-01-24 RX ADMIN — ATORVASTATIN CALCIUM 80 MG: 80 TABLET, FILM COATED ORAL at 17:49

## 2022-01-24 RX ADMIN — BACLOFEN 10 MG: 10 TABLET ORAL at 17:49

## 2022-01-24 RX ADMIN — BUDESONIDE AND FORMOTEROL FUMARATE DIHYDRATE 2 PUFF: 80; 4.5 AEROSOL RESPIRATORY (INHALATION) at 20:04

## 2022-01-24 RX ADMIN — HYDROCORTISONE: 10 CREAM TOPICAL at 17:50

## 2022-01-24 RX ADMIN — FAMOTIDINE 10 MG: 20 TABLET, FILM COATED ORAL at 05:58

## 2022-01-24 RX ADMIN — ACETAMINOPHEN 650 MG: 325 TABLET, FILM COATED ORAL at 15:50

## 2022-01-24 RX ADMIN — GABAPENTIN 100 MG: 100 CAPSULE ORAL at 17:49

## 2022-01-24 RX ADMIN — HYDROCORTISONE: 10 CREAM TOPICAL at 05:59

## 2022-01-24 RX ADMIN — ACETAMINOPHEN 650 MG: 325 TABLET, FILM COATED ORAL at 03:38

## 2022-01-24 ASSESSMENT — PAIN DESCRIPTION - PAIN TYPE
TYPE: CHRONIC PAIN

## 2022-01-24 ASSESSMENT — COGNITIVE AND FUNCTIONAL STATUS - GENERAL
TOILETING: TOTAL
PERSONAL GROOMING: A LOT
DRESSING REGULAR UPPER BODY CLOTHING: A LOT
DAILY ACTIVITIY SCORE: 9
HELP NEEDED FOR BATHING: TOTAL
SUGGESTED CMS G CODE MODIFIER DAILY ACTIVITY: CL
DRESSING REGULAR LOWER BODY CLOTHING: TOTAL
EATING MEALS: A LOT

## 2022-01-24 ASSESSMENT — ENCOUNTER SYMPTOMS
ABDOMINAL PAIN: 0
VOMITING: 0
FEVER: 0
BACK PAIN: 1
NAUSEA: 0
HEADACHES: 0
FOCAL WEAKNESS: 1
BLURRED VISION: 0
SHORTNESS OF BREATH: 0
MYALGIAS: 0
INSOMNIA: 0

## 2022-01-24 ASSESSMENT — LIFESTYLE VARIABLES: SUBSTANCE_ABUSE: 0

## 2022-01-24 NOTE — CARE PLAN
The patient is Stable - Low risk of patient condition declining or worsening    Shift Goals  Clinical Goals: Improve strength  Patient Goals: Rest  Family Goals: OMAYRA    Progress made toward(s) clinical / shift goals:  ROM exercises    Patient is not progressing towards the following goals:

## 2022-01-24 NOTE — DISCHARGE PLANNING
Agency/Facility Name: [facility]  Spoke To: Joanie  Outcome: [place], [place] is approx 9 hour drive and a team would have to be put together.  Tomorrow call [facility] and discuss with the Juan M manager and cost.

## 2022-01-24 NOTE — DISCHARGE PLANNING
ADDENDUM  1/24/22  1606  [facility] will provide transportation tomorrow 1/25/22 between 0630 to 0730.    Mary contacted Cholo ([place]) [phone] to inform her of the update. There was no answer, a v/m was left with a request for a return call.    Care Transition Team Discharge Planning    Anticipated Discharge Disposition: DC to [facility].    Action: Patient was discussed during rounds. DC plan is for the patient to DC to [facility]. Mary emailed payment contract to anthony Artis.T was set up but for the wrong date (1/31/22). Hiltonw contacted Jillian to ask for a correction. She has sent the request to her supervisor - waiting to hear back. Mary also faxed a copy of the DNR script to Jillian as requested. Anthony Artis has been made aware.      Barriers to Discharge: Awaiting for transportation to be scheduled.    Plan: Hiltonw will assist medical team with DC planning.

## 2022-01-24 NOTE — THERAPY
Occupational Therapy  Daily Treatment     Patient Name: Jose Quintanilla  Age:  66 y.o., Sex:  male  Medical Record #: [id number]  Today's Date: 1/24/2022       Precautions: Fall Risk,Swallow Precautions ( See Comments)  Comments: L side deficits     Assessment    Pt seen for OT tx. Continues to be limited by decreased activity tolerance, balance deficits, inattention and LUE weakness impacting ability to complete ADLs and ADL transfers independently. Continues to require total A for bed mobility. Mod A seated grooming using RUE. L shoulder continues to be flaccid and subluxed. Educated about keeping pillow under for joint protection. Pt receptive to education. Will continue to benefit from OT services while in house.     Plan    Continue current treatment plan.    DC Equipment Recommendations: Unable to determine at this time  Discharge Recommendations: Recommend post-acute placement for additional occupational therapy services prior to discharge home       01/24/22 0801   Cognition    Cognition / Consciousness X   Safety Awareness Impaired   New Learning Impaired   Attention Impaired   Active ROM Upper Body   Active ROM Upper Body  X   Flaccid Upper Extremity Left Upper Extremity Flaccid   Strength Upper Body   Upper Body Strength  X   Comments LUE flaccid    Balance   Sitting Balance (Static) Trace   Sitting Balance (Dynamic) Trace   Weight Shift Sitting Absent   Bed Mobility    Supine to Sit Total Assist   Sit to Supine Maximal Assist   Scooting Maximal Assist   Rolling Total Assist to Rt.;Total Assist to Lt.   Activities of Daily Living   Grooming Moderate Assist;Seated   Upper Body Dressing Maximal Assist   Lower Body Dressing Total Assist   Toileting Total Assist   Functional Mobility   Sit to Stand Unable to Participate   Short Term Goals   Short Term Goal # 1 Mod A with washing face with a cloth   Goal Outcome # 1 Progressing as expected   Short Term Goal # 2 mod A with UB dressing   Goal Outcome # 2 Progressing  as expected   Short Term Goal # 3 mod A with ADL txfs   Goal Outcome # 3 Progressing slower than expected   Anticipated Discharge Equipment and Recommendations   DC Equipment Recommendations Unable to determine at this time   Discharge Recommendations Recommend post-acute placement for additional occupational therapy services prior to discharge home

## 2022-01-24 NOTE — PROGRESS NOTES
[facility] Medicine Daily Progress Note    Date of Service  1/24/2022    Chief Complaint  Jose Quintanilla is a 66 y.o. male admitted 1/1/2022 with left sided hemiparesis    Hospital Course  Mr Quintanilla has past medical history which includes hypertension, hyperlipidemia, DVT/PE and was treated with anticoagulation prior to admission.  Presented on 1/1/2022 after a fall with symptoms of left hemiparesis and rightward gaze.  Patient was evaluated and cleared by trauma.  Neuro imaging was concerning for right-sided carotid occlusion.Mri noted with  Large area of acute infarction involving the right frontal and parietal lobes as well as the right-sided basal ganglia. Subacute area of infarction involving the right temporal and lateral occipital lobes.  Patient was evaluated neurology with recommendations for admission to the ICU, permissive hypertension, and follow-up imaging.  started on anticoagulation on 1/13/2022 .Chest x-ray noted with bilateral interstitial opacities concerning for aspiration pneumonia, he was treated with complete course of Augmentin. Pt also has asthma and was treated for acute asthma exacerbation. SLP following. patient did have tube feeds, modified diet started on 1/17.   Patient is pending placement in [place] to be near family.          Interval Problem Update  Pt seen at bedside, no acute issues, very eager for discharge to skill   Has been accepted, pending transport set, per CM anticipate discharge tomorrow, time yet unknown   Hemodynamically stable   neuro exam unchanged   Pt is medically stable for transfer     I have personally seen and examined the patient at bedside. I discussed the plan of care with patient, family, bedside RN and .    Consultants/Specialty  critical care, neurology, palliative care and physiatry    Code Status  DNAR/DNI    Disposition  Patient is medically cleared for discharge.   Anticipate discharge to to skilled nursing facility.  I have placed the  appropriate orders for post-discharge needs.    Review of Systems  Review of Systems   Constitutional: Positive for malaise/fatigue. Negative for fever.   HENT: Negative for congestion.    Eyes: Negative for blurred vision.   Respiratory: Negative for shortness of breath.    Cardiovascular: Negative for chest pain.   Gastrointestinal: Negative for abdominal pain, nausea and vomiting.   Musculoskeletal: Positive for back pain. Negative for myalgias.        Muscle spasms   Neurological: Positive for focal weakness. Negative for headaches.   Psychiatric/Behavioral: Negative for substance abuse. The patient does not have insomnia.         Physical Exam  Temp:  [36.1 °C (96.9 °F)-36.4 °C (97.5 °F)] 36.2 °C (97.2 °F)  Pulse:  [82-92] 82  Resp:  [16] 16  BP: (115-137)/(73-92) 115/73  SpO2:  [95 %-100 %] 95 %    Physical Exam  Vitals and nursing note reviewed.   Constitutional:       Appearance: He is obese. He is ill-appearing. He is not toxic-appearing.   HENT:      Head: Normocephalic and atraumatic.      Mouth/Throat:      Mouth: Mucous membranes are moist.      Pharynx: Oropharynx is clear.   Eyes:      Conjunctiva/sclera: Conjunctivae normal.   Cardiovascular:      Rate and Rhythm: Normal rate and regular rhythm.      Heart sounds: No murmur heard.      Pulmonary:      Effort: Pulmonary effort is normal.      Comments: Course breath sounds  Abdominal:      General: Bowel sounds are normal.      Palpations: Abdomen is soft.   Musculoskeletal:      Cervical back: Neck supple.      Right lower leg: No edema.      Left lower leg: No edema.   Skin:     General: Skin is dry.   Neurological:      Mental Status: He is oriented to person, place, and time.      Cranial Nerves: Cranial nerve deficit (left facial droop) present.      Motor: Weakness (left hemiparesis) present.   Psychiatric:         Mood and Affect: Mood normal.         Behavior: Behavior normal.         Fluids    Intake/Output Summary (Last 24 hours) at  1/24/2022 1132  Last data filed at 1/24/2022 0600  Gross per 24 hour   Intake 700 ml   Output 650 ml   Net 50 ml       Laboratory                        Imaging  DX-CHEST-LIMITED (1 VIEW)   Final Result      1.  Patchy bilateral interstitial opacities. Underlying infection is possible.   2.  Stable enlargement of the cardiomediastinal silhouette.      DX-CHEST-LIMITED (1 VIEW)   Final Result         Linear opacities in the left lung base could be due to discoid atelectasis or developing infiltrate such as pneumonia or pneumonitis.      DX-ABDOMEN FOR TUBE PLACEMENT   Final Result      1.  Feeding tube tip overlies the distal stomach.      IR-PICC LINE PLACEMENT W/ GUIDANCE > AGE 5   Final Result                  Ultrasound-guided PICC placement performed by qualified nursing staff as    above.          CT-HEAD W/O   Final Result      Evolving moderate to large right middle cerebral artery territory has increased in size when compared with the previous exam. No evidence of acute intracranial hemorrhage.         DX-ABDOMEN FOR TUBE PLACEMENT   Final Result      Cortrak feeding tube tip projects in the region of the proximal/mid stomach.      US-EXTREMITY VENOUS LOWER BILAT   Final Result      MR-BRAIN-W/O   Final Result         1. Age-related cerebral atrophy. Mild effacement of the right lateral ventricle.      2. Mild periventricular white matter changes consistent with chronic microvascular ischemic gliosis.      3. Large area of acute infarction involving the right frontal and parietal lobes as well as the right-sided basal ganglia. Subacute area of infarction involving the right temporal and lateral occipital lobes.      4. Petechial hemorrhage noted in the right basal ganglia and involving the cortex in the right temporal and lateral occipital lobes.      5. Right internal carotid artery occlusion or high-grade stenosis.            EC-ECHOCARDIOGRAM COMPLETE W/O CONT   Final Result      DX-SHOULDER 2+ LEFT    Final Result      Negative 2 views of left shoulder.      DX-SHOULDER 2+ RIGHT   Final Result      Moderate subacromial spurring      DX-FOOT-COMPLETE 3+ RIGHT   Final Result      No radiographic evidence of acute displaced fracture or subluxation.      DX-FOOT-2- LEFT   Final Result      No radiographic evidence of acute displaced fracture or subluxation.      DX-ELBOW-COMPLETE 3+ RIGHT   Final Result      No radiographic evidence of acute traumatic injury.      Limited due to rotation of the lateral radiograph attempts. Consider repeat lateral radiograph to help exclude effusion      CT-CHEST,ABDOMEN,PELVIS WITH   Final Result      No significant traumatic abnormality in thorax, abdomen and pelvis CT scans.      Hepatic steatosis      Moderate colonic diverticulosis.      CT-LSPINE W/O PLUS RECONS   Final Result      No CT evidence of acute traumatic injury.      Moderate degenerative disc disease with trace degenerative L5/S1 retrolisthesis      CT-TSPINE W/O PLUS RECONS   Final Result      No CT evidence of acute traumatic abnormality.      CT-CSPINE WITHOUT PLUS RECONS   Final Result      No CT evidence of acute cervical spine abnormality.      CT-HEAD W/O   Final Result      Subacute right temporal, temporo-occipital infarction without hemorrhagic transformation      CT-CTA HEAD WITH & W/O-POST PROCESS   Final Result      Right internal carotid artery occlusion with extension to the MCA which lacks contrast opacification      Right A1 and KATIA contrast opacification secondary to cross-filling from the left      Normal left anterior and bilateral posterior circulation      Findings of all of the CTs were discussed with Dr. Betancourt before completion of this dictation.            CT-CTA NECK WITH & W/O-POST PROCESSING   Final Result      Occluded right internal carotid artery at its origin      Atherosclerosis at origin of bilateral vertebral arteries could obscure stenosis. Arteries are patent without post  stenotic dilatation      DX-PELVIS-1 OR 2 VIEWS   Final Result      Negative AP view of the pelvis.      DX-CHEST-LIMITED (1 VIEW)   Final Result      No acute cardiopulmonary disease.      US-ABORTED US PROCEDURE    (Results Pending)        Assessment/Plan  * Stroke due to thrombosis of right carotid artery (HCC)- (present on admission)  Assessment & Plan  Extensive stroke due to thrombosis of right carotid artery  Occurred while on anticoagulation with warfarin, INR slightly subtherapeutic at 1.8  Cytotoxic edema has improved, appreciate neurology recommendations, low sodium to normalize  High intensity statin  On eliquis 5 mg BID  PT/OT/speech  Needs rehab, pending placement in [place]   Continue inpatient therapies to prevent further deconditioning   Pt with nerve pain and spasms, baclofen at night added, low dose gabapentin    Moderate asthma with acute exacerbation  Assessment & Plan  On room air   He is allergic to cat.  On Advair at home  Duoneb Q4H  Solumedrol 40 mg daily X 5 days   on symbicort    Allergy  Assessment & Plan  Diffuse erythema  Likely penicillin induced /drug allergy   Improving with steroid and benadryl   resolved    Aspiration pneumonia (HCC)  Assessment & Plan  Completed 7 days course of antibiotics   perocal negative   Saturating well on room air   Aspiration precautions, SLP following     Depression- (present on admission)  Assessment & Plan  Outpatient Wellbutrin and Lexapro    Chronic anticoagulation- (present on admission)  Assessment & Plan  On eliquis    Dyslipidemia- (present on admission)  Assessment & Plan  Atorvastatin    Primary hypertension- (present on admission)  Assessment & Plan  Goal normotensive  Continue monitoring, consider restarting enteral blood pressure pressure agent if needed.       VTE prophylaxis: therapeutic anticoagulation with Eliquis    I have performed a physical exam and reviewed and updated ROS and Plan today (1/24/2022). In review of yesterday's note  (1/23/2022), there are no changes except as documented above.    I certify that the patient requires continued medically necessary hospital services for the treatment of right cerebral infarctio and will remain in the hospital for 1-2 more days.  Discharge plan is anticipated to be to skilled nursing facility in [place] pending transport set up.

## 2022-01-24 NOTE — CARE PLAN
The patient is Stable - Low risk of patient condition declining or worsening    Shift Goals  Clinical Goals: improve strength  Patient Goals: sleep  Family Goals: OMAYRA    Progress made toward(s) clinical / shift goals:  Patient educated on care plan, pain relief medications and non-pharm techniques. Pt has maintained a stable neuro status with no changes. Pt has been doing ROM exercises with Right side and LLE to increase his strength. Pt has fall precautions in place and no signs of skin breakdown. Pt shows no signs of aspiration while eating and showed he understands swallowing twice and other aspiration precautions. Pt has decreased pain with repositioning, rest, warm packs, warm blankets and tylenol, he hs been able to sleep most the night.    Problem: Neuro Status  Goal: Neuro status will remain stable or improve  Outcome: Progressing     Problem: Risk for Aspiration  Goal: Patient's risk for aspiration will be absent or decrease  Outcome: Progressing     Problem: Mobility - Stroke  Goal: Patient's capacity to carry out activities will improve  Outcome: Progressing     Problem: Skin Integrity  Goal: Skin integrity is maintained or improved  Outcome: Progressing     Problem: Fall Risk  Goal: Patient will remain free from falls  Outcome: Progressing     Problem: Pain - Standard  Goal: Alleviation of pain or a reduction in pain to the patient’s comfort goal  Outcome: Progressing

## 2022-01-24 NOTE — DISCHARGE SUMMARY
Discharge Summary    CHIEF COMPLAINT ON ADMISSION  No chief complaint on file.      Reason for Admission  Trauma [facility]     CODE STATUS  DNAR/DNI    HPI & HOSPITAL COURSE  This is a 66 y.o. male here with  past medical history which includes hypertension, hyperlipidemia, DVT/PE and was treated with anticoagulation prior to admission who presented on 1/1/2022 after a fall with symptoms of left hemiparesis and rightward gaze. Patient was evaluated and cleared by trauma.  Neuro imaging was concerning for right-sided carotid occlusion. MRI noted with  Large area of acute infarction involving the right frontal and parietal lobes as well as the right-sided basal ganglia. Subacute area of infarction involving the right temporal and lateral occipital lobes.  Patient was evaluated neurology with recommendations for admission to the ICU, permissive hypertension, and follow-up imaging.  He was started on anticoagulation on 1/13/2022 .Chest x-ray noted with bilateral interstitial opacities concerning for aspiration pneumonia, he was treated with complete course of Augmentin. Pt also has asthma and was treated for acute asthma exacerbation. Speech therapy following. patient did have tube feeds initially however he was transitioned to modified diet starting on 1/17 and has been doing well. He continues to have severe left hemiparesis and some cognitive dysfunction.      Therefore, he is discharged in fair and stable condition to skilled nursing facility.    The patient met 2-midnight criteria for an inpatient stay at the time of discharge.      FOLLOW UP ITEMS POST DISCHARGE  Blood pressure   Anticoagulation   Stroke follow up and rehab needs  Depression       DISCHARGE DIAGNOSES  Principal Problem:    Stroke due to thrombosis of right carotid artery (HCC) POA: Yes      Overview: -  Active Problems:    Primary hypertension POA: Yes    Dyslipidemia POA: Yes    Chronic anticoagulation POA: Yes    Depression POA: Yes     Aspiration pneumonia (HCC) POA: Unknown    Allergy POA: Unknown    Moderate asthma with acute exacerbation POA: Unknown  Resolved Problems:    Mild intermittent asthma without complication POA: Yes    Sepsis (HCC) POA: Unknown      FOLLOW UP  No future appointments.  Shivani Weber P.A.-C.  [address]  [facility] [id number] [phone]      following rehab, post hospitalization follow up      MEDICATIONS ON DISCHARGE     Medication List      START taking these medications      Instructions   apixaban 5mg Tabs  Commonly known as: ELIQUIS   Take 1 Tablet by mouth 2 times a day. Indications: Thromboembolism secondary to Atrial Fibrillation  Dose: 5 mg     baclofen 10 MG Tabs  Commonly known as: LIORESAL   Take 1 Tablet by mouth 2 times a day.  Dose: 10 mg     budesonide-formoterol 80-4.5 MCG/ACT Aero  Commonly known as: SYMBICORT   Inhale 2 Puffs 2 times a day.  Dose: 2 Puff     buPROPion 100 MG Tabs  Commonly known as: WELLBUTRIN  Replaces: buPROPion HCl ER (XL) 450 MG Tb24   Take 1 Tablet by mouth 2 times a day.  Dose: 100 mg     famotidine 10 MG tablet  Commonly known as: PEPCID   Take 1 Tablet by mouth 2 times a day.  Dose: 10 mg     gabapentin 100 MG Caps  Commonly known as: NEURONTIN   Take 1 Capsule by mouth 2 times a day.  Dose: 100 mg        CONTINUE taking these medications      Instructions   atorvastatin 80 MG tablet  Commonly known as: LIPITOR   Take 80 mg by mouth every evening.  Dose: 80 mg     losartan 25 MG Tabs  Commonly known as: COZAAR   Take 25 mg by mouth every day.  Dose: 25 mg        STOP taking these medications    buPROPion HCl ER (XL) 450 MG Tb24  Replaced by: buPROPion 100 MG Tabs     Lexapro 20 MG tablet  Generic drug: escitalopram     warfarin 5 MG Tabs  Commonly known as: COUMADIN            Allergies  Allergies   Allergen Reactions   • Augmentin Rash     Diffuse itchy rash all over patient's back noted. started during this admission (possibly on day 2 of augmentin  therapy), was not there prior per discussion with RN.       DIET  Orders Placed This Encounter   Procedures   • Diet Order Diet: Level 6 - Soft and Bite Sized (FLOAT MEDS); Liquid level: Level 0 - Thin; Tray Modifications (optional): No Straws, SLP - 1:1 Supervision by Nursing, SLP - Deliver to Nursing Station     Standing Status:   Standing     Number of Occurrences:   1     Order Specific Question:   Diet:     Answer:   Level 6 - Soft and Bite Sized [23]     Comments:   FLOAT MEDS     Order Specific Question:   Liquid level     Answer:   Level 0 - Thin     Order Specific Question:   Tray Modifications (optional)     Answer:   No Straws     Order Specific Question:   Tray Modifications (optional)     Answer:   SLP - 1:1 Supervision by Nursing     Order Specific Question:   Tray Modifications (optional)     Answer:   SLP - Deliver to Nursing Station       ACTIVITY  As tolerated and directed by skilled nursing.    LINES, DRAINS, AND WOUNDS  This is an automated list. Peripheral IVs will be removed prior to discharge.  PICC Double Lumen 01/04/22 Lumen 1: Purple Lumen 2: Red Right Cephalic (Active)   Site Assessment Clean;Dry;Intact 01/23/22 1950   Lumen 1 Status Scrubbed the hub prior to access;Flushed;Normal saline locked;Blood return noted 01/23/22 1950   Lumen 2 Status Scrubbed the hub prior to access;Flushed;Blood return noted;Normal saline locked 01/23/22 1950   Line Secured at (cm) 0 cm 01/04/22 0927   Extremity Circumference (cm) 36 cm 01/04/22 0927   Dressing Type Biopatch;Transparent;Securing device 01/23/22 1950   Dressing Status Clean;Dry;Intact 01/23/22 1950   Dressing Intervention N/A 01/23/22 1950   Dressing Change Due 01/29/22 01/23/22 0800   Date IV Connector(s) Changed 01/22/22 01/23/22 0800   NEXT IV Connector(s) Change 01/15/22 01/12/22 3613   Line Necessity Assessed Medication with pH <4.1 and >9.0 01/04/22 0927   $ Double Lumen PICC Charge Single kit used 01/04/22 0927     External Urinary  Catheter (Condom) (Active)   Collection Container Standard drainage bag 01/23/22 1950   Output (mL) 300 mL 01/22/22 0500      Wound 01/03/22 Toe, Hallux Bilateral Toenail denuded (Active)   Wound Image    01/03/22 1120   Site Assessment Dry;Brown;Black;Red 01/23/22 1950   Periwound Assessment Dry;Intact 01/23/22 1950   Margins Defined edges 01/23/22 1950   Closure None 01/22/22 2045   Drainage Amount None 01/22/22 2045   Drainage Description OMAYRA 01/17/22 0800   Treatments Cleansed 01/22/22 2045   Wound Cleansing Soap and Water 01/22/22 2045   Periwound Protectant Not Applicable 01/14/22 2000   Dressing Cleansing/Solutions Not Applicable 01/14/22 2000   Dressing Options Open to Air 01/23/22 0800   Dressing Changed Observed 01/23/22 1950   Dressing Status Open to Air 01/23/22 1950   Dressing Change/Treatment Frequency Every 48 hrs, and As Needed 01/07/22 2000   NEXT Dressing Change/Treatment Date 01/09/22 01/07/22 2000   NEXT Weekly Photo (Inpatient Only) 01/10/22 01/07/22 2000   Non-staged Wound Description Full thickness 01/03/22 1120   Wound Length (cm) 1.5 cm 01/03/22 1120   Wound Width (cm) 1.5 cm 01/03/22 1120   Wound Depth (cm) 0.3 cm 01/03/22 1120   Wound Surface Area (cm^2) 2.25 cm^2 01/03/22 1120   Wound Volume (cm^3) 0.675 cm^3 01/03/22 1120   Shape squarish 01/03/22 1120   Wound Odor None 01/03/22 1120   Pulses 2+;DP 01/03/22 1120   Exposed Structures Other (Comments) 01/03/22 1120   WOUND NURSE ONLY - Time Spent with Patient (mins) 40 01/03/22 1120       Wound 01/12/22 Ear (Active)   Wound Image   01/12/22 1300   Site Assessment Brown;Dry 01/21/22 2108   Periwound Assessment Intact 01/21/22 2108   Margins Defined edges 01/19/22 1950   Drainage Amount None 01/19/22 1950   Wound Cleansing Soap and Water 01/13/22 1951   Periwound Protectant Not Applicable 01/21/22 2108   Dressing Options Open to Air 01/23/22 0800   Dressing Status Open to Air 01/23/22 0800      PICC Double Lumen 01/04/22 Lumen 1: Purple  Lumen 2: Red Right Cephalic (Active)   Site Assessment Clean;Dry;Intact 01/23/22 1950   Lumen 1 Status Scrubbed the hub prior to access;Flushed;Normal saline locked;Blood return noted 01/23/22 1950   Lumen 2 Status Scrubbed the hub prior to access;Flushed;Blood return noted;Normal saline locked 01/23/22 1950   Line Secured at (cm) 0 cm 01/04/22 0927   Extremity Circumference (cm) 36 cm 01/04/22 0927   Dressing Type Biopatch;Transparent;Securing device 01/23/22 1950   Dressing Status Clean;Dry;Intact 01/23/22 1950   Dressing Intervention N/A 01/23/22 1950   Dressing Change Due 01/29/22 01/23/22 0800   Date IV Connector(s) Changed 01/22/22 01/23/22 0800   NEXT IV Connector(s) Change 01/15/22 01/12/22 0974   Line Necessity Assessed Medication with pH <4.1 and >9.0 01/04/22 0927   $ Double Lumen PICC Charge Single kit used 01/04/22 0927                MENTAL STATUS ON TRANSFER      patient is alert and oriented x 3, he does exhibit some higher level cognitive dysfunction        CONSULTATIONS  Physiatry   Critical care   Neurology     PROCEDURES  PICC line 1/4/2022    LABORATORY  Lab Results   Component Value Date    SODIUM 137 01/20/2022    POTASSIUM 4.3 01/20/2022    CHLORIDE 104 01/20/2022    CO2 21 01/20/2022    GLUCOSE 97 01/20/2022    BUN 15 01/20/2022    CREATININE 0.74 01/20/2022        Lab Results   Component Value Date    WBC 14.4 (H) 01/20/2022    HEMOGLOBIN 13.3 (L) 01/20/2022    HEMATOCRIT 40.1 (L) 01/20/2022    PLATELETCT 248 01/20/2022        Total time of the discharge process exceeds 38 minutes.

## 2022-01-24 NOTE — DISCHARGE INSTRUCTIONS
Stroke Prevention  Some medical conditions and lifestyle choices can lead to a higher risk for a stroke. You can help to prevent a stroke by making nutrition, lifestyle, and other changes.  What nutrition changes can be made?    · Eat healthy foods.  ? Choose foods that are high in fiber. These include:  § Fresh fruits.  § Fresh vegetables.  § Whole grains.  ? Eat at least 5 or more servings of fruits and vegetables each day. Try to fill half of your plate at each meal with fruits and vegetables.  ? Choose lean protein foods. These include:  § Lowfat (lean) cuts of meat.  § Chicken without skin.  § Fish.  § Tofu.  § Beans.  § Nuts.  ? Eat low-fat dairy products.  ? Avoid foods that:  § Are high in salt (sodium).  § Have saturated fat.  § Have trans fat.  § Have cholesterol.  § Are processed.  § Are premade.  · Follow eating guidelines as told by your doctor. These may include:  ? Reducing how many calories you eat and drink each day.  ? Limiting how much salt you eat or drink each day to 1,500 milligrams (mg).  ? Using only healthy fats for cooking. These include:  § Olive oil.  § Canola oil.  § Sunflower oil.  ? Counting how many carbohydrates you eat and drink each day.  What lifestyle changes can be made?  · Try to stay at a healthy weight. Talk to your doctor about what a good weight is for you.  · Get at least 30 minutes of moderate physical activity at least 5 days a week. This can include:  ? Fast walking.  ? Biking.  ? Swimming.  · Do not use any products that have nicotine or tobacco. This includes cigarettes and e-cigarettes. If you need help quitting, ask your doctor. Avoid being around tobacco smoke in general.  · Limit how much alcohol you drink to no more than 1 drink a day for nonpregnant women and 2 drinks a day for men. One drink equals 12 oz of beer, 5 oz of wine, or 1½ oz of hard liquor.  · Do not use drugs.  · Avoid taking birth control pills. Talk to your doctor about the risks of taking birth  "control pills if:  ? You are over 35 years old.  ? You smoke.  ? You get migraines.  ? You have had a blood clot.  What other changes can be made?  · Manage your cholesterol.  ? It is important to eat a healthy diet.  ? If your cholesterol cannot be managed through your diet, you may also need to take medicines. Take medicines as told by your doctor.  · Manage your diabetes.  ? It is important to eat a healthy diet and to exercise regularly.  ? If your blood sugar cannot be managed through diet and exercise, you may need to take medicines. Take medicines as told by your doctor.  · Control your high blood pressure (hypertension).  ? Try to keep your blood pressure below 130/80. This can help lower your risk of stroke.  ? It is important to eat a healthy diet and to exercise regularly.  ? If your blood pressure cannot be managed through diet and exercise, you may need to take medicines. Take medicines as told by your doctor.  ? Ask your doctor if you should check your blood pressure at home.  ? Have your blood pressure checked every year. Do this even if your blood pressure is normal.  · Talk to your doctor about getting checked for a sleep disorder. Signs of this can include:  ? Snoring a lot.  ? Feeling very tired.  · Take over-the-counter and prescription medicines only as told by your doctor. These may include aspirin or blood thinners (antiplatelets or anticoagulants).  · Make sure that any other medical conditions you have are managed.  Where to find more information  · American Stroke Association: www.strokeassociation.org  · National Stroke Association: www.stroke.org  Get help right away if:  · You have any symptoms of stroke. \"BE FAST\" is an easy way to remember the main warning signs:  ? B - Balance. Signs are dizziness, sudden trouble walking, or loss of balance.  ? E - Eyes. Signs are trouble seeing or a sudden change in how you see.  ? F - Face. Signs are sudden weakness or loss of feeling of the face, " or the face or eyelid drooping on one side.  ? A - Arms. Signs are weakness or loss of feeling in an arm. This happens suddenly and usually on one side of the body.  ? S - Speech. Signs are sudden trouble speaking, slurred speech, or trouble understanding what people say.  ? T - Time. Time to call emergency services. Write down what time symptoms started.  · You have other signs of stroke, such as:  ? A sudden, very bad headache with no known cause.  ? Feeling sick to your stomach (nausea).  ? Throwing up (vomiting).  ? Jerky movements you cannot control (seizure).  These symptoms may represent a serious problem that is an emergency. Do not wait to see if the symptoms will go away. Get medical help right away. Call your local emergency services (911 in the [place] Do not drive yourself to the hospital.  Summary  · You can prevent a stroke by eating healthy, exercising, not smoking, drinking less alcohol, and treating other health problems, such as diabetes, high blood pressure, or high cholesterol.  · Do not use any products that contain nicotine or tobacco, such as cigarettes and e-cigarettes.  · Get help right away if you have any signs or symptoms of a stroke.  This information is not intended to replace advice given to you by your health care provider. Make sure you discuss any questions you have with your health care provider.  Document Released: 06/18/2013 Document Revised: 02/13/2020 Document Reviewed: 03/21/2018  [employer] Patient Education © 2020 [employer]      Discharge Instructions    Discharged to other by medical transportation with escort. Discharged via wheelchair, hospital escort: Yes.  Special equipment needed: Not Applicable    Be sure to schedule a follow-up appointment with your primary care doctor or any specialists as instructed.     Discharge Plan:   Diet Plan: Discussed  Activity Level: Discussed  Confirmed Follow up Appointment: Patient to Call and Schedule Appointment  Confirmed Symptoms  Management: Discussed  Medication Reconciliation Updated: Yes  Influenza Vaccine Indication: Patient Refuses    I understand that a diet low in cholesterol, fat, and sodium is recommended for good health. Unless I have been given specific instructions below for another diet, I accept this instruction as my diet prescription.   Other diet: Regular    Special Instructions: None    · Is patient discharged on Warfarin / Coumadin?   No     Depression / Suicide Risk    As you are discharged from this [facility] facility, it is important to learn how to keep safe from harming yourself.    Recognize the warning signs:  · Abrupt changes in personality, positive or negative- including increase in energy   · Giving away possessions  · Change in eating patterns- significant weight changes-  positive or negative  · Change in sleeping patterns- unable to sleep or sleeping all the time   · Unwillingness or inability to communicate  · Depression  · Unusual sadness, discouragement and loneliness  · Talk of wanting to die  · Neglect of personal appearance   · Rebelliousness- reckless behavior  · Withdrawal from people/activities they love  · Confusion- inability to concentrate     If you or a loved one observes any of these behaviors or has concerns about self-harm, here's what you can do:  · Talk about it- your feelings and reasons for harming yourself  · Remove any means that you might use to hurt yourself (examples: pills, rope, extension cords, firearm)  · Get professional help from the community (Mental Health, Substance Abuse, psychological counseling)  · Do not be alone:Call your Safe Contact- someone whom you trust who will be there for you.  · Call your local CRISIS HOTLINE 484-4319 or 674-867-5417  · Call your local Children's Mobile Crisis Response Team [place] (237) 220-4801 or www.InsuranceLibrary.com  · Call the toll free National Suicide Prevention Hotlines   · National Suicide Prevention Lifeline 032-277-SWGK  (1020)  · [facility] 800-SUICIDE (418-2591)

## 2022-01-25 VITALS
HEIGHT: 73 IN | SYSTOLIC BLOOD PRESSURE: 139 MMHG | TEMPERATURE: 98.1 F | RESPIRATION RATE: 18 BRPM | WEIGHT: 268.74 LBS | HEART RATE: 89 BPM | OXYGEN SATURATION: 96 % | DIASTOLIC BLOOD PRESSURE: 87 MMHG | BODY MASS INDEX: 35.62 KG/M2

## 2022-01-25 PROCEDURE — 99239 HOSP IP/OBS DSCHRG MGMT >30: CPT | Performed by: HOSPITALIST

## 2022-01-25 PROCEDURE — A9270 NON-COVERED ITEM OR SERVICE: HCPCS | Performed by: STUDENT IN AN ORGANIZED HEALTH CARE EDUCATION/TRAINING PROGRAM

## 2022-01-25 PROCEDURE — 700102 HCHG RX REV CODE 250 W/ 637 OVERRIDE(OP): Performed by: STUDENT IN AN ORGANIZED HEALTH CARE EDUCATION/TRAINING PROGRAM

## 2022-01-25 RX ADMIN — GABAPENTIN 100 MG: 100 CAPSULE ORAL at 06:14

## 2022-01-25 RX ADMIN — APIXABAN 5 MG: 5 TABLET, FILM COATED ORAL at 06:14

## 2022-01-25 RX ADMIN — BACLOFEN 10 MG: 10 TABLET ORAL at 06:14

## 2022-01-25 RX ADMIN — ACETAMINOPHEN 650 MG: 325 TABLET, FILM COATED ORAL at 00:38

## 2022-01-25 RX ADMIN — BUPROPION HYDROCHLORIDE 100 MG: 100 TABLET, FILM COATED ORAL at 06:15

## 2022-01-25 RX ADMIN — HYDROCORTISONE: 10 CREAM TOPICAL at 06:15

## 2022-01-25 RX ADMIN — FAMOTIDINE 10 MG: 20 TABLET, FILM COATED ORAL at 06:14

## 2022-01-25 RX ADMIN — LOSARTAN POTASSIUM 25 MG: 50 TABLET, FILM COATED ORAL at 06:14

## 2022-01-25 NOTE — DISCHARGE SUMMARY
Discharge Summary    CHIEF COMPLAINT ON ADMISSION  No chief complaint on file.      Reason for Admission  Trauma [facility]     CODE STATUS  Prior    HPI & HOSPITAL COURSE  This is a 66 y.o. male here with  past medical history which includes hypertension, hyperlipidemia, DVT/PE and was treated with anticoagulation prior to admission who presented on 1/1/2022 after a fall with symptoms of left hemiparesis and rightward gaze. Patient was evaluated and cleared by trauma.  Neuro imaging was concerning for right-sided carotid occlusion. MRI noted with  Large area of acute infarction involving the right frontal and parietal lobes as well as the right-sided basal ganglia. Subacute area of infarction involving the right temporal and lateral occipital lobes.  Patient was evaluated neurology with recommendations for admission to the ICU, permissive hypertension, and follow-up imaging.  He was started on anticoagulation on 1/13/2022 .Chest x-ray noted with bilateral interstitial opacities concerning for aspiration pneumonia, he was treated with complete course of Augmentin. Pt also has asthma and was treated for acute asthma exacerbation. Speech therapy following. patient did have tube feeds initially however he was transitioned to modified diet starting on 1/17 and has been doing well. He continues to have severe left hemiparesis and some cognitive dysfunction.    Patient was seen on the day of discharge and was cleared to go to rehab on 1/25/2022    Therefore, he is discharged in fair and stable condition to skilled nursing facility.    The patient met 2-midnight criteria for an inpatient stay at the time of discharge.      FOLLOW UP ITEMS POST DISCHARGE  Blood pressure   Anticoagulation   Stroke follow up and rehab needs  Depression       DISCHARGE DIAGNOSES  Principal Problem:    Stroke due to thrombosis of right carotid artery (HCC) POA: Yes      Overview: -  Active Problems:    Primary hypertension POA: Yes     Dyslipidemia POA: Yes    Chronic anticoagulation POA: Yes    Depression POA: Yes    Aspiration pneumonia (HCC) POA: Unknown    Allergy POA: Unknown    Moderate asthma with acute exacerbation POA: Unknown  Resolved Problems:    Mild intermittent asthma without complication POA: Yes    Sepsis (HCC) POA: Unknown      FOLLOW UP  No future appointments.  Shivani Weber P.A.-C.  [address]  [facility] [address] [phone]    Call  Please call your primary care provider to schedule a hospital follow up.      MEDICATIONS ON DISCHARGE     Medication List      START taking these medications      Instructions   apixaban 5mg Tabs  Commonly known as: ELIQUIS   Take 1 Tablet by mouth 2 times a day. Indications: Thromboembolism secondary to Atrial Fibrillation  Dose: 5 mg     baclofen 10 MG Tabs  Commonly known as: LIORESAL   Take 1 Tablet by mouth 2 times a day.  Dose: 10 mg     budesonide-formoterol 80-4.5 MCG/ACT Aero  Commonly known as: SYMBICORT   Inhale 2 Puffs 2 times a day.  Dose: 2 Puff     buPROPion 100 MG Tabs  Commonly known as: WELLBUTRIN  Replaces: buPROPion HCl ER (XL) 450 MG Tb24   Take 1 Tablet by mouth 2 times a day.  Dose: 100 mg     famotidine 10 MG tablet  Commonly known as: PEPCID   Take 1 Tablet by mouth 2 times a day.  Dose: 10 mg     gabapentin 100 MG Caps  Commonly known as: NEURONTIN   Take 1 Capsule by mouth 2 times a day.  Dose: 100 mg        CONTINUE taking these medications      Instructions   atorvastatin 80 MG tablet  Commonly known as: LIPITOR   Take 80 mg by mouth every evening.  Dose: 80 mg     losartan 25 MG Tabs  Commonly known as: COZAAR   Take 25 mg by mouth every day.  Dose: 25 mg        STOP taking these medications    buPROPion HCl ER (XL) 450 MG Tb24  Replaced by: buPROPion 100 MG Tabs     Lexapro 20 MG tablet  Generic drug: escitalopram     warfarin 5 MG Tabs  Commonly known as: COUMADIN            Allergies  Allergies   Allergen Reactions   • Augmentin Rash      Diffuse itchy rash all over patient's back noted. started during this admission (possibly on day 2 of augmentin therapy), was not there prior per discussion with RN.       DIET  No orders of the defined types were placed in this encounter.      ACTIVITY  As tolerated and directed by skilled nursing.    LINES, DRAINS, AND WOUNDS  This is an automated list. Peripheral IVs will be removed prior to discharge.  PICC Double Lumen 01/04/22 Lumen 1: Purple Lumen 2: Red Right Cephalic (Active)   Site Assessment Clean;Dry;Intact 01/23/22 1950   Lumen 1 Status Scrubbed the hub prior to access;Flushed;Normal saline locked;Blood return noted 01/23/22 1950   Lumen 2 Status Scrubbed the hub prior to access;Flushed;Blood return noted;Normal saline locked 01/23/22 1950   Line Secured at (cm) 0 cm 01/04/22 0927   Extremity Circumference (cm) 36 cm 01/04/22 0927   Dressing Type Biopatch;Transparent;Securing device 01/23/22 1950   Dressing Status Clean;Dry;Intact 01/23/22 1950   Dressing Intervention N/A 01/23/22 1950   Dressing Change Due 01/29/22 01/23/22 0800   Date IV Connector(s) Changed 01/22/22 01/23/22 0800   NEXT IV Connector(s) Change 01/15/22 01/12/22 2354   Line Necessity Assessed Medication with pH <4.1 and >9.0 01/04/22 0927   $ Double Lumen PICC Charge Single kit used 01/04/22 0927     External Urinary Catheter (Condom) (Active)   Collection Container Standard drainage bag 01/23/22 1950   Output (mL) 300 mL 01/22/22 0500      Wound 01/03/22 Toe, Hallux Bilateral Toenail denuded (Active)   Wound Image    01/03/22 1120   Site Assessment Dry;Brown;Black;Red 01/23/22 1950   Periwound Assessment Dry;Intact 01/23/22 1950   Margins Defined edges 01/23/22 1950   Closure None 01/22/22 2045   Drainage Amount None 01/22/22 2045   Drainage Description OMAYRA 01/17/22 0800   Treatments Cleansed 01/22/22 2045   Wound Cleansing Soap and Water 01/22/22 2045   Periwound Protectant Not Applicable 01/14/22 2000   Dressing Cleansing/Solutions  Not Applicable 01/14/22 2000   Dressing Options Open to Air 01/23/22 0800   Dressing Changed Observed 01/23/22 1950   Dressing Status Open to Air 01/23/22 1950   Dressing Change/Treatment Frequency Every 48 hrs, and As Needed 01/07/22 2000   NEXT Dressing Change/Treatment Date 01/09/22 01/07/22 2000   NEXT Weekly Photo (Inpatient Only) 01/10/22 01/07/22 2000   Non-staged Wound Description Full thickness 01/03/22 1120   Wound Length (cm) 1.5 cm 01/03/22 1120   Wound Width (cm) 1.5 cm 01/03/22 1120   Wound Depth (cm) 0.3 cm 01/03/22 1120   Wound Surface Area (cm^2) 2.25 cm^2 01/03/22 1120   Wound Volume (cm^3) 0.675 cm^3 01/03/22 1120   Shape squarish 01/03/22 1120   Wound Odor None 01/03/22 1120   Pulses 2+;DP 01/03/22 1120   Exposed Structures Other (Comments) 01/03/22 1120   WOUND NURSE ONLY - Time Spent with Patient (mins) 40 01/03/22 1120       Wound 01/12/22 Ear (Active)   Wound Image   01/12/22 1300   Site Assessment Brown;Dry 01/21/22 2108   Periwound Assessment Intact 01/21/22 2108   Margins Defined edges 01/19/22 1950   Drainage Amount None 01/19/22 1950   Wound Cleansing Soap and Water 01/13/22 1951   Periwound Protectant Not Applicable 01/21/22 2108   Dressing Options Open to Air 01/23/22 0800   Dressing Status Open to Air 01/23/22 0800      PICC Double Lumen 01/04/22 Lumen 1: Purple Lumen 2: Red Right Cephalic (Active)   Site Assessment Clean;Dry;Intact 01/23/22 1950   Lumen 1 Status Scrubbed the hub prior to access;Flushed;Normal saline locked;Blood return noted 01/23/22 1950   Lumen 2 Status Scrubbed the hub prior to access;Flushed;Blood return noted;Normal saline locked 01/23/22 1950   Line Secured at (cm) 0 cm 01/04/22 0927   Extremity Circumference (cm) 36 cm 01/04/22 0927   Dressing Type Biopatch;Transparent;Securing device 01/23/22 1950   Dressing Status Clean;Dry;Intact 01/23/22 1950   Dressing Intervention N/A 01/23/22 1950   Dressing Change Due 01/29/22 01/23/22 0800   Date IV Connector(s)  Changed 01/22/22 01/23/22 0800   NEXT IV Connector(s) Change 01/15/22 01/12/22 7956   Line Necessity Assessed Medication with pH <4.1 and >9.0 01/04/22 0927   $ Double Lumen PICC Charge Single kit used 01/04/22 0927                MENTAL STATUS ON TRANSFER      patient is alert and oriented x 3, he does exhibit some higher level cognitive dysfunction        CONSULTATIONS  Physiatry   Critical care   Neurology     PROCEDURES  PICC line 1/4/2022    LABORATORY  Lab Results   Component Value Date    SODIUM 137 01/20/2022    POTASSIUM 4.3 01/20/2022    CHLORIDE 104 01/20/2022    CO2 21 01/20/2022    GLUCOSE 97 01/20/2022    BUN 15 01/20/2022    CREATININE 0.74 01/20/2022        Lab Results   Component Value Date    WBC 14.4 (H) 01/20/2022    HEMOGLOBIN 13.3 (L) 01/20/2022    HEMATOCRIT 40.1 (L) 01/20/2022    PLATELETCT 248 01/20/2022        Total time of the discharge process exceeds 38 minutes.
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
